# Patient Record
Sex: FEMALE | Race: BLACK OR AFRICAN AMERICAN | NOT HISPANIC OR LATINO | Employment: UNEMPLOYED | URBAN - METROPOLITAN AREA
[De-identification: names, ages, dates, MRNs, and addresses within clinical notes are randomized per-mention and may not be internally consistent; named-entity substitution may affect disease eponyms.]

---

## 2017-01-18 ENCOUNTER — HOSPITAL ENCOUNTER (EMERGENCY)
Facility: CLINIC | Age: 32
Discharge: HOME | End: 2017-01-18
Attending: FAMILY MEDICINE | Admitting: FAMILY MEDICINE
Payer: MEDICAID

## 2017-01-18 ENCOUNTER — TELEPHONE (OUTPATIENT)
Dept: OBGYN | Facility: CLINIC | Age: 32
End: 2017-01-18

## 2017-01-18 VITALS
DIASTOLIC BLOOD PRESSURE: 86 MMHG | RESPIRATION RATE: 16 BRPM | TEMPERATURE: 97.5 F | HEART RATE: 85 BPM | WEIGHT: 104.6 LBS | SYSTOLIC BLOOD PRESSURE: 118 MMHG | OXYGEN SATURATION: 98 %

## 2017-01-18 DIAGNOSIS — O03.4 INCOMPLETE MISCARRIAGE: ICD-10-CM

## 2017-01-18 LAB
B-HCG SERPL-ACNC: 432 IU/L
BASOPHILS # BLD AUTO: 0 10E9/L (ref 0–0.2)
BASOPHILS NFR BLD AUTO: 0.3 %
DIFFERENTIAL METHOD BLD: NORMAL
EOSINOPHIL # BLD AUTO: 0.5 10E9/L (ref 0–0.7)
EOSINOPHIL NFR BLD AUTO: 7.4 %
ERYTHROCYTE [DISTWIDTH] IN BLOOD BY AUTOMATED COUNT: 12.4 % (ref 10–15)
HCT VFR BLD AUTO: 36.5 % (ref 35–47)
HGB BLD-MCNC: 12.6 G/DL (ref 11.7–15.7)
IMM GRANULOCYTES # BLD: 0 10E9/L (ref 0–0.4)
IMM GRANULOCYTES NFR BLD: 0 %
LYMPHOCYTES # BLD AUTO: 1.7 10E9/L (ref 0.8–5.3)
LYMPHOCYTES NFR BLD AUTO: 27 %
MCH RBC QN AUTO: 30.9 PG (ref 26.5–33)
MCHC RBC AUTO-ENTMCNC: 34.5 G/DL (ref 31.5–36.5)
MCV RBC AUTO: 90 FL (ref 78–100)
MONOCYTES # BLD AUTO: 0.5 10E9/L (ref 0–1.3)
MONOCYTES NFR BLD AUTO: 7.6 %
NEUTROPHILS # BLD AUTO: 3.6 10E9/L (ref 1.6–8.3)
NEUTROPHILS NFR BLD AUTO: 57.7 %
NRBC # BLD AUTO: 0 10*3/UL
NRBC BLD AUTO-RTO: 0 /100
PLATELET # BLD AUTO: 177 10E9/L (ref 150–450)
RBC # BLD AUTO: 4.08 10E12/L (ref 3.8–5.2)
WBC # BLD AUTO: 6.2 10E9/L (ref 4–11)

## 2017-01-18 PROCEDURE — 99283 EMERGENCY DEPT VISIT LOW MDM: CPT | Mod: Z6 | Performed by: EMERGENCY MEDICINE

## 2017-01-18 PROCEDURE — 36415 COLL VENOUS BLD VENIPUNCTURE: CPT | Performed by: EMERGENCY MEDICINE

## 2017-01-18 PROCEDURE — 99283 EMERGENCY DEPT VISIT LOW MDM: CPT | Performed by: EMERGENCY MEDICINE

## 2017-01-18 PROCEDURE — 85025 COMPLETE CBC W/AUTO DIFF WBC: CPT | Performed by: FAMILY MEDICINE

## 2017-01-18 PROCEDURE — 84702 CHORIONIC GONADOTROPIN TEST: CPT | Performed by: FAMILY MEDICINE

## 2017-01-18 RX ORDER — PRENATAL VIT/IRON FUM/FOLIC AC 27MG-0.8MG
1 TABLET ORAL DAILY
COMMUNITY
End: 2017-04-05

## 2017-01-18 ASSESSMENT — ENCOUNTER SYMPTOMS: ABDOMINAL PAIN: 1

## 2017-01-18 NOTE — ED AVS SNAPSHOT
[facility], [place], Emergency Department    [address]    [facility] [address]    Phone:  [phone]    Fax:  [phone]                                       Roger Desouza   MRN: [id number]    Department:  [facility], Emergency Department   Date of Visit:  1/18/2017           After Visit Summary Signature Page     I have received my discharge instructions, and my questions have been answered. I have discussed any challenges I see with this plan with the nurse or doctor.    ..........................................................................................................................................  Patient/Patient Representative Signature      ..........................................................................................................................................  Patient Representative Print Name and Relationship to Patient    ..................................................               ................................................  Date                                            Time    ..........................................................................................................................................  Reviewed by Signature/Title    ...................................................              ..............................................  Date                                                            Time

## 2017-01-18 NOTE — TELEPHONE ENCOUNTER
Received call from Dr. Andrews from the ED. He stated this patient is there for probable miscarriage and after discussing with the resident, they want pt to be seen within 2 days at clinic. He reports she has declining bhcg and is bleeding but cannot rule out ectopic completely. No other notes in chart at this time.    Scheduled pt for Friday, 1/20/17 with midwife. Spoke with pt spouse who agreed with plan and had no further questions.

## 2017-01-18 NOTE — ED NOTES
Bleeding since return from [facility] on Sunday. Pt did not do a follow up with OB as recommended. Now here with bleeding for ~ 3 pads/ day. Pt states that urine for positive but US was negative.

## 2017-01-18 NOTE — ED PROVIDER NOTES
History     Chief Complaint   Patient presents with     Vaginal Bleeding     Patient is in early stages of pregnant. Has been bleeding since .     HPI  Roger Desouza is a 31 year old  female who presents today for evaluation of vaginal bleeding since . Patient reports a positive home pregancy test. She was seen in [facility] ED on  with vaginal bleeding and abdominal pain. She reports worsening vaginal bleeding since that visiti. She has been using 3 pads per day. Patient notes she has some vaginal bleeding at 3 months gestation of her last pregnancy. She reports mild abdominal pain. She denies history of other medical problems.           I have reviewed the Medications, Allergies, Past Medical and Surgical History, and Social History in the Epic system.      PAST MEDICAL HISTORY: History reviewed. No pertinent past medical history.    PAST SURGICAL HISTORY:   Past Surgical History   Procedure Laterality Date     Thyroid surgery         FAMILY HISTORY: No family history on file.    SOCIAL HISTORY:   Social History   Substance Use Topics     Smoking status: Never Smoker      Smokeless tobacco: Not on file     Alcohol Use: No         No current facility-administered medications for this encounter.     Current Outpatient Prescriptions   Medication     Acetaminophen (TYLENOL PO)     Prenatal Vit-Fe Fumarate-FA (PRENATAL MULTIVITAMIN  PLUS IRON) 27-0.8 MG TABS per tablet      No Known Allergies      Review of Systems   Gastrointestinal: Positive for abdominal pain.   All other systems reviewed and are negative.      Physical Exam   BP: 124/88 mmHg  Pulse: 85  Temp: 97.5  F (36.4  C)  Resp: 16  Weight: 47.446 kg (104 lb 9.6 oz)  SpO2: 100 %  Physical Exam   Constitutional: No distress.   HENT:   Head: Atraumatic.   Mouth/Throat: Oropharynx is clear and moist. No oropharyngeal exudate.   Eyes: Pupils are equal, round, and reactive to light. No scleral icterus.   Cardiovascular: Normal heart sounds and  intact distal pulses.    Pulmonary/Chest: Breath sounds normal. No respiratory distress.   Abdominal: Soft. Bowel sounds are normal. There is no tenderness.   Genitourinary:   Patient refusing vaginal examination at this time.   Musculoskeletal: She exhibits no edema or tenderness.   Skin: Skin is warm. No rash noted. She is not diaphoretic.       ED Course     [unfilled]  Procedures  10:29 AM  The patient was seen and examined by Dr. Blue in Room 8.          Critical Care time:  none   Labs/Imaging:      HCG Quantitative Serum 432 IU/L Final         Labs Ordered and Resulted from Time of ED Arrival Up to the Time of Departure from the ED   CBC WITH PLATELETS DIFFERENTIAL   HCG QUANTITATIVE PREGNANCY       Assessments & Plan (with Medical Decision Making)       I have reviewed the nursing notes.    I have reviewed the findings, diagnosis, plan and need for follow up with the patient.  Patient with vaginal bleeding at this time now presenting with hCG which has gone from greater than 1300-432 in approximately 72 hours I believe that she is likely having a miscarriage she has not passed the tissue and therefore I believe this is an incomplete miscarriage I cannot rule out the possibility of a tubal pregnancy at this time and patient has refused both further ultrasound or pelvic examination at this evaluation patient is requesting discharge to follow-up with her primary M.D. if she has marked increase in bleeding or pain she would return to the emergency room otherwise will follow up with her primary this week.      Final diagnoses:   Incomplete miscarriage     Isabelle NICK , am serving as a trained medical scribe to document services personally performed by Estevan Blue MD, based on the provider's statements to me.   IEstevan MD, was physically present and have reviewed and verified the accuracy of this note documented by Isabelle Vasquez.    1/18/2017   [facility], [place], [facility]  DEPARTMENT      [facility] Center[facility]  01/19/17 9881

## 2017-01-18 NOTE — ED AVS SNAPSHOT
[facility], Emergency Department    [address]    [place] [address]    Phone:  [phone]    Fax:  [phone]                                       Roger Desouza   MRN: [id number]    Department:  [facility], Emergency Department   Date of Visit:  1/18/2017           Patient Information     Date Of Birth          1985        Your diagnoses for this visit were:     Incomplete miscarriage        You were seen by Estevan Blue MD.        Discharge Instructions       Discharge to home with plans to follow up with the ObGyn clinic or return if marked increase in bleeding or pain.Please make an appointment to follow up with [facility] (phone: [phone]) they will call you for appointment.    24 Hour Appointment Hotline       To make an appointment at any [facility] clinic, call [phone] ([phone]). If you don't have a family doctor or clinic, we will help you find one. [facility] clinics are conveniently located to serve the needs of you and your family.             Review of your medicines      Our records show that you are taking the medicines listed below. If these are incorrect, please call your family doctor or clinic.        Dose / Directions Last dose taken    prenatal multivitamin  plus iron 27-0.8 MG Tabs per tablet   Dose:  1 tablet        Take 1 tablet by mouth daily   Refills:  0        TYLENOL PO   Dose:  325 mg        Take 325 mg by mouth   Refills:  0                Procedures and tests performed during your visit     CBC with platelets differential    HCG quantitative pregnancy (blood)      Orders Needing Specimen Collection     None      Pending Results     No orders found from 1/17/2017 to 1/19/2017.            Pending Culture Results     No orders found from 1/17/2017 to 1/19/2017.            Thank you for choosing [facility]       Thank you for choosing [facility] for your care. Our goal is always to provide you with excellent care. Hearing  "back from our patients is one way we can continue to improve our services. Please take a few minutes to complete the written survey that you may receive in the mail after you visit with us. Thank you!        [employer] Information     [employer] lets you send messages to your doctor, view your test results, renew your prescriptions, schedule appointments and more. To sign up, go to [email or web address] . Click on \"Log in\" on the left side of the screen, which will take you to the Welcome page. Then click on \"Sign up Now\" on the right side of the page.     You will be asked to enter the access code listed below, as well as some personal information. Please follow the directions to create your username and password.     Your access code is: [email or web address]  Expires: 2017 12:34 PM     Your access code will  in 90 days. If you need help or a new code, please call your [facility] clinic or [phone].        Care EveryWhere ID     This is your Care EveryWhere ID. This could be used by other organizations to access your [facility] medical records  [id number]        After Visit Summary       This is your record. Keep this with you and show to your community pharmacist(s) and doctor(s) at your next visit.                  "

## 2017-01-18 NOTE — DISCHARGE INSTRUCTIONS
Discharge to home with plans to follow up with the ObGyn clinic or return if marked increase in bleeding or pain.Please make an appointment to follow up with [facility] (phone: [phone]) they will call you for appointment.

## 2017-01-20 ENCOUNTER — OFFICE VISIT (OUTPATIENT)
Dept: OBGYN | Facility: CLINIC | Age: 32
End: 2017-01-20
Attending: ADVANCED PRACTICE MIDWIFE
Payer: MEDICAID

## 2017-01-20 VITALS — DIASTOLIC BLOOD PRESSURE: 75 MMHG | WEIGHT: 108 LBS | SYSTOLIC BLOOD PRESSURE: 121 MMHG | HEART RATE: 84 BPM

## 2017-01-20 DIAGNOSIS — O20.0 THREATENED MISCARRIAGE: Primary | ICD-10-CM

## 2017-01-20 LAB — B-HCG SERPL-ACNC: 257 IU/L

## 2017-01-20 PROCEDURE — 84702 CHORIONIC GONADOTROPIN TEST: CPT | Performed by: ADVANCED PRACTICE MIDWIFE

## 2017-01-20 PROCEDURE — 36415 COLL VENOUS BLD VENIPUNCTURE: CPT | Performed by: ADVANCED PRACTICE MIDWIFE

## 2017-01-20 PROCEDURE — 99212 OFFICE O/P EST SF 10 MIN: CPT | Mod: ZF

## 2017-01-20 NOTE — MR AVS SNAPSHOT
After Visit Summary   1/20/2017    Roger Desouza    MRN: [id number]           Patient Information     Date Of Birth          1985        Visit Information        Provider Department      1/20/2017 1:00 PM Crissy English APRN CNM [facility]        Today's Diagnoses     Threatened miscarriage    -  1        Follow-ups after your visit        Your next 10 appointments already scheduled     Jan 27, 2017  8:00 AM   ULTRASOUND with [facility] ULTRASOUND   [facility] ([facility])    [place] [facility] [address]  [address]  [facility] [id number] [phone]            Feb 02, 2017  2:15 PM   New Patient Visit with Shaista Humphreys MD   [facility] ([facility])    [place] [facility] [address]  [address]  [facility] [id number] [phone]              Future tests that were ordered for you today     Open Future Orders        Priority Expected Expires Ordered    Limited U/S - Viability, FHT, presentation, placenta location, and/or AKIRA Routine  1/20/2018 1/20/2017            Who to contact     Please call your clinic at [phone] to:    Ask questions about your health    Make or cancel appointments    Discuss your medicines    Learn about your test results    Speak to your doctor   If you have compliments or concerns about an experience at your clinic, or if you wish to file a complaint, please contact [facility] Patient Relations at [phone] or email us at [email or web address]         Additional Information About Your Visit        [employer] Information     [employer] is an electronic gateway that provides easy, online access to your medical records. With [employer], you can request a clinic appointment, read your test results, renew a prescription or communicate with your care team.     To sign up for [employer] visit the website at  [email or web address]   You will be asked to enter the access code listed below, as well as some personal information. Please follow the directions to create your username and password.     Your access code is: [email or web address]  Expires: 2017 12:34 PM     Your access code will  in 90 days. If you need help or a new code, please contact your [facility] or call [phone] for assistance.        Care EveryWhere ID     This is your Care EveryWhere ID. This could be used by other organizations to access your [facility] medical records  [id number]        Your Vitals Were     Pulse Breastfeeding?                84 No           Blood Pressure from Last 3 Encounters:   17 121/75   17 118/86    Weight from Last 3 Encounters:   17 48.988 kg (108 lb)   17 47.446 kg (104 lb 9.6 oz)              We Performed the Following     HCG Quantitative Pregnancy        Primary Care Provider    Physician No Ref-Primary       No address on file        Thank you!     Thank you for choosing [facility]  for your care. Our goal is always to provide you with excellent care. Hearing back from our patients is one way we can continue to improve our services. Please take a few minutes to complete the written survey that you may receive in the mail after your visit with us. Thank you!             Your Updated Medication List - Protect others around you: Learn how to safely use, store and throw away your medicines at www.disposemymeds.org.          This list is accurate as of: 17  1:50 PM.  Always use your most recent med list.                   Brand Name Dispense Instructions for use    prenatal multivitamin  plus iron 27-0.8 MG Tabs per tablet      Take 1 tablet by mouth daily       TYLENOL PO      Take 325 mg by mouth

## 2017-01-20 NOTE — PROGRESS NOTES
SUBJECTIVE: Roger Desouza is a 31 year old female   at 6 weeks 2 d gestation by LMP on 16      HPI: Pt had an US at [facility] on 1/15/17 after experiencing vaginal bleeding.  Hcg was 1327, Had an US that did not show anything in the uterus (or elsewhere). Was Recommended to repeat hcg and US.   On 17: went to  ED and  hcg 432. Pt did not have an US or exam at this time. Presents today in clinic for follow-up per ED recommendation.     Reports her bleeding has now stopped. Parker any pain. No fever.     Also reports took 5 years to achieve this pregnancy.   Past Medical History   Diagnosis Date     Hyperthyroidism      Better since surgery     Obstetric History       T1      TAB0   SAB1   E0   M0   L0       # Outcome Date GA Lbr Joe/2nd Weight Sex Delivery Anes PTL Lv   2 SAB 17           1 Term 12    F              Review Of Systems  Skin: negative  Eyes: negative  Ears/Nose/Throat: negative  Respiratory: No shortness of breath, dyspnea on exertion, cough, or hemoptysis  Cardiovascular: negative  Gastrointestinal: Mild nausea  Genitourinary: negative  Musculoskeletal: negative  Neurologic: negative  Psychiatric: negative  Hematologic/Lymphatic/Immunologic: negative  Endocrine: negative      OBJECTIVE: Blood pressure 121/75, pulse 84, weight 48.988 kg (108 lb), not currently breastfeeding.   Physical exam is deferred.     ASSESSMENT:Threatened AB- Appears to be near complete.  Rh POS    PLAN:      Quant Hcg and Viability US to rule out ectopic, ensure complete miscarriage.  We discussed causes of miscarriage including chromosome abnormalities and it was nothing she did or didn't do.  She understands that nothing can be done to prevent a miscarriage from occuring.    Warning signs were reviewed with patient. She was asked to call or present to the emergency room if she were to develop heavy bleeding saturating a maxi pad more frequently than every hour or passing large clots.   Pt instructed to call clinic if she develops a fever.   Pain relief: She knows to use Ibuprofen for the cramping, up to 800mg every 8 hours.   Discussed waiting one cycle before trying for pregnancy again.  Follow up with MD for c/o infertility.  Continue prenatal vitamin.    Rhogam NOT indicated    MARCIN Arnold CNM      More than 50% of this 30 minute appointment for threatened miscarriage was spent counseling and coordinating care for the patient.

## 2017-01-20 NOTE — Clinical Note
Date:January 23, 2017      Patient was self referred, no letter generated. Do not send.        [facility] Health Information

## 2017-01-20 NOTE — NURSING NOTE
Chief Complaint   Patient presents with     Follow Up For     Follow up miscarriage       See LEON Caceres 1/20/2017

## 2017-01-20 NOTE — Clinical Note
2017       RE: Roger Desouza  [address]   [place] [address]     Dear Colleague,    Thank you for referring your patient, Roger Desouza, to the [facility] at [facility]. Please see a copy of my visit note below.    SUBJECTIVE: Roger Desouza is a 31 year old female   at 6 weeks 2 d gestation by LMP on 16      HPI: Pt had an US at [facility] on 1/15/17 after experiencing vaginal bleeding.  Hcg was 1327, Had an US that did not show anything in the uterus (or elsewhere). Was Recommended to repeat hcg and US.   On 17: went to  ED and  hcg 432. Pt did not have an US or exam at this time. Presents today in clinic for follow-up per ED recommendation.     Reports her bleeding has now stopped. Parker any pain. No fever.     Also reports took 5 years to achieve this pregnancy.   Past Medical History   Diagnosis Date     Hyperthyroidism      Better since surgery     Obstetric History       T1      TAB0   SAB1   E0   M0   L0       # Outcome Date GA Lbr Joe/2nd Weight Sex Delivery Anes PTL Lv   2 SAB 17           1 Term 12    F              Review Of Systems  Skin: negative  Eyes: negative  Ears/Nose/Throat: negative  Respiratory: No shortness of breath, dyspnea on exertion, cough, or hemoptysis  Cardiovascular: negative  Gastrointestinal: Mild nausea  Genitourinary: negative  Musculoskeletal: negative  Neurologic: negative  Psychiatric: negative  Hematologic/Lymphatic/Immunologic: negative  Endocrine: negative      OBJECTIVE: Blood pressure 121/75, pulse 84, weight 48.988 kg (108 lb), not currently breastfeeding.   Physical exam is deferred.     ASSESSMENT:Threatened AB- Appears to be near complete.  Rh POS    PLAN:      Quant Hcg and Viability US to rule out ectopic, ensure complete miscarriage.  We discussed causes of miscarriage including chromosome abnormalities and it was nothing she did or didn't do.  She  understands that nothing can be done to prevent a miscarriage from occuring.    Warning signs were reviewed with patient. She was asked to call or present to the emergency room if she were to develop heavy bleeding saturating a maxi pad more frequently than every hour or passing large clots.  Pt instructed to call clinic if she develops a fever.   Pain relief: She knows to use Ibuprofen for the cramping, up to 800mg every 8 hours.   Discussed waiting one cycle before trying for pregnancy again.  Follow up with MD for c/o infertility.  Continue prenatal vitamin.    Rhogam NOT indicated    MARCIN Arnold CNM      More than 50% of this 30 minute appointment for threatened miscarriage was spent counseling and coordinating care for the patient.           Again, thank you for allowing me to participate in the care of your patient.      Sincerely,    MARCIN Arnold CNM

## 2017-01-27 ENCOUNTER — OFFICE VISIT (OUTPATIENT)
Dept: OBGYN | Facility: CLINIC | Age: 32
End: 2017-01-27
Attending: ADVANCED PRACTICE MIDWIFE
Payer: MEDICAID

## 2017-01-27 DIAGNOSIS — Z87.59 HISTORY OF MISCARRIAGE: Primary | ICD-10-CM

## 2017-01-27 NOTE — Clinical Note
1/27/2017       RE: Roger Desouza  [address]   [place] [facility] [facility] [place] [address]     Dear Colleague,    Thank you for referring your patient, Roger Desouza, to the [facility] at [facility]. Please see a copy of my visit note below.    31 year old female with presents for gynecologic ultrasound indicated by follow up post miscarriage.  This study was done transvaginally.    Uterine findings:   Presence: Visible Size: Normal 6.0 x 5.4 x 4.4  cm.  Endometrium = 3.7  mm.   Cx length =  31.7 mm.      Flexion:  Anteverted Position: Midline Margins: Smooth Shape: Normal   Contour: Regular Texture: Homogeneous Cavity: Normal Masses: Normal    Pelvic findings:    Right Adnexa: Normal   Left Adnexa: Normal   Bladder:  Normal         Cul - de - sac fluid: None    Ovarian follicles:   Right ovary:  2.7 x 1.1 x 1.3cm.     0 follicles     Left ovary:  3.2 x 3.2 x 2.4cm.     1 follicles     Size(s):  1.3 x 1.5 x 1.3cm       Comments:  Normal scan s/p SAb    EFFIE Koroma          Again, thank you for allowing me to participate in the care of your patient.      Sincerely,    [facility]

## 2017-01-27 NOTE — PROGRESS NOTES
31 year old female with presents for gynecologic ultrasound indicated by follow up post miscarriage.  This study was done transvaginally.    Uterine findings:   Presence: Visible Size: Normal 6.0 x 5.4 x 4.4  cm.  Endometrium = 3.7  mm.   Cx length =  31.7 mm.      Flexion:  Anteverted Position: Midline Margins: Smooth Shape: Normal   Contour: Regular Texture: Homogeneous Cavity: Normal Masses: Normal    Pelvic findings:    Right Adnexa: Normal   Left Adnexa: Normal   Bladder:  Normal         Cul - de - sac fluid: None    Ovarian follicles:   Right ovary:  2.7 x 1.1 x 1.3cm.     0 follicles     Left ovary:  3.2 x 3.2 x 2.4cm.     1 follicles     Size(s):  1.3 x 1.5 x 1.3cm       Comments:  Normal scan s/p Isael Lindsey, EFFIE Sauceda

## 2017-01-27 NOTE — Clinical Note
Date:January 30, 2017      Provider requested that no letter be sent. Do not send.       [facility] Health Information

## 2017-02-02 ENCOUNTER — OFFICE VISIT (OUTPATIENT)
Dept: OBGYN | Facility: CLINIC | Age: 32
End: 2017-02-02
Payer: MEDICAID

## 2017-02-02 VITALS
DIASTOLIC BLOOD PRESSURE: 83 MMHG | HEIGHT: 64 IN | WEIGHT: 111.1 LBS | BODY MASS INDEX: 18.97 KG/M2 | SYSTOLIC BLOOD PRESSURE: 123 MMHG

## 2017-02-02 DIAGNOSIS — E05.90 HYPERTHYROIDISM: ICD-10-CM

## 2017-02-02 DIAGNOSIS — O02.1 MISSED ABORTION: ICD-10-CM

## 2017-02-02 DIAGNOSIS — Z31.69 INFERTILITY COUNSELING: Primary | ICD-10-CM

## 2017-02-02 LAB
HCG UR QL: NEGATIVE
INTERNAL QC OK POCT: YES

## 2017-02-02 PROCEDURE — 81025 URINE PREGNANCY TEST: CPT | Mod: ZF | Performed by: OBSTETRICS & GYNECOLOGY

## 2017-02-02 PROCEDURE — 40000809 ZZH STATISTIC NO DOCUMENTATION TO SUPPORT CHARGE

## 2017-02-02 ASSESSMENT — PAIN SCALES - GENERAL: PAINLEVEL: NO PAIN (0)

## 2017-02-02 NOTE — PROGRESS NOTES
[facility]  Gynecology Visit    HPI:    Roger Desouza is a 31 year old , here to discuss infertility.  She recently had a SAB in 2016, that was a chemically diagnosed pregnancy.  US () revealed an empty uterus without structural abnormalities.  She is accompanied by her  today.  They note that it took them 3 years to achieve pregnancy with their daughter and 4 years to achieve this last pregnancy.  They have undergone work-up in the past a couple of years ago, which was remarkable for low sperm count confirmed on semen analysis x2.  Her  also underwent varicoele repair about 1 year, as this was thought to help with achieving pregnancy.  The patient has a history of hyperthyroidism, s/p partial thyroidectomy and now does not take thyroid replacement.  She denies weight changes, fevers, chills, hirsutism, dyspareunia, abdominal pain, nausea, vomiting, galactorrhea, diarrhea, constipation, urinary symptoms, abnormal vaginal discharge. Her bleeding has resolved, but she has not had a menstrual cycle yet.     GYN History  - LMP: No LMP recorded.  - Menses: every 28 days, lasts 5 days, denies dysmenorrhea and menorrhagia   - Pap Smears: 2016- NIL per patient, no history of abnormal pap smears   - Contraception: denies  - Sexual Activity/Concerns: none  - Hx STIs/UTIs: none    OBHx  Obstetric History       T1      TAB0   SAB1   E0   M0   L1       # Outcome Date GA Lbr Joe/2nd Weight Sex Delivery Anes PTL Lv   2 SAB 18           1 Term 13    F              PMHx: Hyperthyroidism  PSHx: Partial thyroidectomy   Meds: PNV  Allergies:  NKDA    SocHx:   - Denies tob/etoh/illicit drug use   - Eats healthy diet, weight stable   - Homemaker currently, 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 Non-tender  CV:  RRR, no m/r/g auscultated  Pulm: CTAB, no w/r/r auscultated  Abd: Soft, non-tender, non-distended  Ext: No LE edema, extremities warm and well perfused  Pelvic:  Declines today      Assessment/Plan:  Roger Desouza is a 31 year old  female here to discuss infertility with recent spontaneous , now resolved.      1) Infertility:   - Discussed potential etiologies of infertility - tubal, endocrine/ovulatory, male factor, structural, endometriosis, etc. The patient's partner has a history of male factor infertility, s/p varicoele repair although he never underwent repeat semen analysis postoperatively.  Discussed that encouragement by recent chemical pregnancy, although it ended in miscarriage.   - Discussed timed intercourse, OPKs.   - HA1C, TSH, prolactin ordered to evaluate endocrine causes especially given patient's history of hyperthyroidism.  AMH, Day 3 labs ordered to evaluate ovarian function. Recent TVUS was unremarkable making structural causes less likely.  Will not pursue HSG at this time, as the patient does not have any history suspicious for tubal factors and has successfully achieved pregnancy x 2.   - Recommend that partner contact his provider regarding repeat SA given history of male factor infertility. Discussed that BUD referral would not be unreasonable if azospermia persists.  They would like to delay this at this point, as they are limited financially.     2) Miscarriage management:   - Bleeding has resolved, UPT negative.     Return to clinic in 4-6 months     Staffed with Dr. Linnette Coronel MD  Ob/Gyn, PGY-2  2017, 2:15 PM    The Patient was seen in [facility] by DESIREE CORONEL.  I reviewed the history & exam. Assessment and plan were " jointly made.    Cheri Anglin MD

## 2017-02-02 NOTE — NURSING NOTE
Chief Complaint   Patient presents with     RECHECK   wanting to talk after mab 1- no period yet-  Wanting to get pregnant soon.  Trying to get pregnant 3 years before firawt child- then four years before this last mab,  Hx thyroid.

## 2017-02-02 NOTE — MR AVS SNAPSHOT
After Visit Summary   2/2/2017    Roger Desouza    MRN: [id number]           Patient Information     Date Of Birth          1985        Visit Information        Provider Department      2/2/2017 2:15 PM Shaista Humphreys MD [facility]        Today's Diagnoses     Hyperthyroidism    -  1     Infertility counseling           Care Instructions    Please go to the lab for blood draw.  Two labs (FHS, estradiol) need to be drawn on day 3 after start of menstrual cycle.      Continued timed intercourse, consider OPKs.         Follow-ups after your visit        Future tests that were ordered for you today     Open Future Orders        Priority Expected Expires Ordered    Follicle stimulating hormone Routine  2/2/2018 2/2/2017    hCG qual urine POCT Routine  2/2/2018 2/2/2017            Who to contact     Please call your clinic at [phone] to:    Ask questions about your health    Make or cancel appointments    Discuss your medicines    Learn about your test results    Speak to your doctor   If you have compliments or concerns about an experience at your clinic, or if you wish to file a complaint, please contact [facility] Patient Relations at [phone] or email us at [email or web address]         Additional Information About Your Visit        MyChart Information     PowerMag gives you secure access to your electronic health record. If you see a primary care provider, you can also send messages to your care team and make appointments. If you have questions, please call your primary care clinic.  If you do not have a primary care provider, please call [phone] and they will assist you.      PowerMag is an electronic gateway that provides easy, online access to your medical records. With PowerMag, you can request a clinic appointment, read your test results, renew a prescription or communicate with your care team.     To access your existing account,  "please contact your [facility] or call [phone] for assistance.        Care EveryWhere ID     This is your Care EveryWhere ID. This could be used by other organizations to access your [place] medical records  [id number]        Your Vitals Were     Height BMI (Body Mass Index)                1.626 m (5' 4\") 19.06 kg/m2           Blood Pressure from Last 3 Encounters:   02/02/17 123/83   01/20/17 121/75   01/18/17 118/86    Weight from Last 3 Encounters:   02/02/17 50.395 kg (111 lb 1.6 oz)   01/20/17 48.988 kg (108 lb)   01/18/17 47.446 kg (104 lb 9.6 oz)              We Performed the Following     Anti-Mullerian hormone     Estradiol     Hemoglobin A1c     Prolactin     TSH with free T4 reflex        Primary Care Provider    Physician No Ref-Primary       No address on file        Thank you!     Thank you for choosing [facility]  for your care. Our goal is always to provide you with excellent care. Hearing back from our patients is one way we can continue to improve our services. Please take a few minutes to complete the written survey that you may receive in the mail after your visit with us. Thank you!             Your Updated Medication List - Protect others around you: Learn how to safely use, store and throw away your medicines at www.disposemymeds.org.          This list is accurate as of: 2/2/17  2:50 PM.  Always use your most recent med list.                   Brand Name Dispense Instructions for use    prenatal multivitamin  plus iron 27-0.8 MG Tabs per tablet      Take 1 tablet by mouth daily       TYLENOL PO      Take 325 mg by mouth         "

## 2017-02-02 NOTE — PATIENT INSTRUCTIONS
Please go to the lab for blood draw.  Two labs (FHS, estradiol) need to be drawn on day 3 after start of menstrual cycle.      Continued timed intercourse, consider OPKs.

## 2017-04-05 ENCOUNTER — OFFICE VISIT (OUTPATIENT)
Dept: INTERNAL MEDICINE | Facility: CLINIC | Age: 32
End: 2017-04-05
Attending: INTERNAL MEDICINE
Payer: COMMERCIAL

## 2017-04-05 ENCOUNTER — TELEPHONE (OUTPATIENT)
Dept: INTERNAL MEDICINE | Facility: CLINIC | Age: 32
End: 2017-04-05

## 2017-04-05 ENCOUNTER — TELEPHONE (OUTPATIENT)
Dept: OBGYN | Facility: CLINIC | Age: 32
End: 2017-04-05

## 2017-04-05 VITALS
WEIGHT: 115 LBS | DIASTOLIC BLOOD PRESSURE: 82 MMHG | SYSTOLIC BLOOD PRESSURE: 117 MMHG | TEMPERATURE: 97.5 F | HEIGHT: 64 IN | BODY MASS INDEX: 19.63 KG/M2 | HEART RATE: 87 BPM

## 2017-04-05 DIAGNOSIS — Z34.91 NORMAL PREGNANCY IN FIRST TRIMESTER: Primary | ICD-10-CM

## 2017-04-05 DIAGNOSIS — E05.90 HYPERTHYROIDISM: Primary | ICD-10-CM

## 2017-04-05 DIAGNOSIS — R11.0 NAUSEA: ICD-10-CM

## 2017-04-05 DIAGNOSIS — Z34.91 SUPERVISION OF NORMAL PREGNANCY IN FIRST TRIMESTER: Primary | ICD-10-CM

## 2017-04-05 DIAGNOSIS — J01.90 ACUTE SINUSITIS WITH SYMPTOMS > 10 DAYS: ICD-10-CM

## 2017-04-05 PROCEDURE — 99213 OFFICE O/P EST LOW 20 MIN: CPT | Mod: ZF

## 2017-04-05 RX ORDER — NAPROXEN 500 MG/1
500 TABLET ORAL 2 TIMES DAILY PRN
Qty: 60 TABLET | Refills: 0 | Status: SHIPPED | OUTPATIENT
Start: 2017-04-05 | End: 2017-04-05

## 2017-04-05 RX ORDER — AMOXICILLIN AND CLAVULANATE POTASSIUM 500; 125 MG/1; MG/1
1 TABLET, FILM COATED ORAL 2 TIMES DAILY
Qty: 20 TABLET | Refills: 0 | Status: SHIPPED | OUTPATIENT
Start: 2017-04-05 | End: 2017-04-26

## 2017-04-05 RX ORDER — ONDANSETRON 4 MG/1
4-8 TABLET, ORALLY DISINTEGRATING ORAL EVERY 8 HOURS PRN
Qty: 20 TABLET | Refills: 1 | Status: SHIPPED | OUTPATIENT
Start: 2017-04-05 | End: 2017-07-05

## 2017-04-05 RX ORDER — PRENATAL VIT,CAL 73/IRON/FOLIC 28 MG-1 MG
1 TABLET ORAL DAILY
Qty: 90 TABLET | Refills: 3 | Status: SHIPPED | OUTPATIENT
Start: 2017-04-05 | End: 2017-09-27

## 2017-04-05 ASSESSMENT — PAIN SCALES - GENERAL: PAINLEVEL: NO PAIN (0)

## 2017-04-05 NOTE — MR AVS SNAPSHOT
"              After Visit Summary   4/5/2017    Roger Desouza    MRN: [id number]           Patient Information     Date Of Birth          1985        Visit Information        Provider Department      4/5/2017 3:00 PM Amairani Barton MD [facility]         Today's Diagnoses     Hyperthyroidism    -  1    Acute sinusitis with symptoms > 10 days           Follow-ups after your visit        Your next 10 appointments already scheduled     Apr 26, 2017  9:00 AM CDT   ULTRASOUND with [facility] ULTRASOUND   [facility] ([facility])    [facility] [address]  [address]  [facility] [address]   [phone]            Apr 26, 2017  9:30 AM CDT   OB INTAKE with Sebastian Mcdonough CNM   [facility] ([facility])    [facility] [address]  [address]  [facility] [address]   [phone]           Congratulations! You're Pregnant.  At [facility] we think of you as part of our team, working together toward a successful pregnancy and a healthy baby.  You might already have questions about all the different things that are happening to your body.  We have attached a link to our book \"The Expectant Family- From Pregnancy through Childbirth\" [email or web address]/[email or web address] to help answer some of those concerns. (You will be given a hard copy at your first visit in clinic)  Chapter 2(page 20)-is a must read- from questions about morning sickness, headaches, warning signs to look for, to:\"why do I have to go to the bathroom so often?\"   Our Doctors, Resident Physicians, Certified nurse midwives do work closely together as a team both in clinic and in the hospital to make this experience remarkable. Our patients have on numerous occasions expressed their satisfaction in knowing that there is always a provider at the hospital or on the phone no matter " what time of the day it is, to talk, triage or deliver their babies.   Your time is very important to us, so we will like you to know what to expect.  Routine Prenatal Visit Schedule:  Visit 1: 8 Weeks - Dating Ultrasound and Intake with Nurse  Visit 2: 10-12 Weeks- First Prenatal Visit & Exam with Midwife or Resident Physician, scheduling for early genetic screening at [facility] (optional)  Visit 3: 16-18 Weeks            18-20 Weeks- Level II Ultrasound done at [facility]- 4th Floor  Visit 4: 22-24 weeks  Visit 5: 28 Weeks- Extended Education Visit with Midwife or Resident Physician  Visit 6: 32 Weeks  Visit 7: 36 Weeks  Visit 8-11: 38-41 Weeks (Weekly Visits)  Changes can or may occur during your pregnancy. Your provider may ask you to come in more often for testing or monitoring frequently than stated above.   If you still have questions our triage nurses will be more than welcome to help you. Send us a message via MY Chart, our secure health Portal or give us a call anytime at [phone].  Thank You for allowing us to be part of your care.  Yours sincerely,  [facility]            Apr 26, 2017 10:20 AM CDT   Return Visit with Amairani Barton MD   [facility]  ([facility])    [place] [address]  [address] [id number] [phone]              Who to contact     Please call your clinic at [phone] to:    Ask questions about your health    Make or cancel appointments    Discuss your medicines    Learn about your test results    Speak to your doctor   If you have compliments or concerns about an experience at your clinic, or if you wish to file a complaint, please contact [facility] Patient Relations at [phone] or email us at [email or web address]         Additional Information About Your Visit        MyChart Information     PSafet gives you secure  "access to your electronic health record. If you see a primary care provider, you can also send messages to your care team and make appointments. If you have questions, please call your primary care clinic.  If you do not have a primary care provider, please call [phone] and they will assist you.      Celnyx is an electronic gateway that provides easy, online access to your medical records. With Cel[employer]x, you can request a clinic appointment, read your test results, renew a prescription or communicate with your care team.     To access your existing account, please contact your [facility] or call [phone] for assistance.        Care EveryWhere ID     This is your Care EveryWhere ID. This could be used by other organizations to access your [place] medical records  [id number]        Your Vitals Were     Pulse Temperature Height Last Period Breastfeeding? BMI (Body Mass Index)    87 97.5  F (36.4  C) (Oral) 1.626 m (5' 4\") 02/28/2017 No 19.74 kg/m2       Blood Pressure from Last 3 Encounters:   04/05/17 117/82   02/02/17 123/83   01/20/17 121/75    Weight from Last 3 Encounters:   04/05/17 52.2 kg (115 lb)   02/02/17 50.4 kg (111 lb 1.6 oz)   01/20/17 49 kg (108 lb)                 Today's Medication Changes          These changes are accurate as of: 4/5/17 11:59 PM.  If you have any questions, ask your nurse or doctor.               Start taking these medicines.        Dose/Directions    amoxicillin-clavulanate 500-125 MG per tablet   Commonly known as:  AUGMENTIN   Used for:  Acute sinusitis with symptoms > 10 days   Started by:  Amairani Barton MD        Dose:  1 tablet   Take 1 tablet by mouth 2 times daily   Quantity:  20 tablet   Refills:  0       doxylamine 25 MG Tabs tablet   Commonly known as:  UNISOM   Used for:  Acute sinusitis with symptoms > 10 days   Started by:  Amairani Barton MD        Dose:  25 mg   Take 1 tablet (25 mg) by mouth At Bedtime "   Quantity:  14 each   Refills:  0       ondansetron 4 MG ODT tab   Commonly known as:  ZOFRAN ODT   Used for:  Supervision of normal pregnancy in first trimester, Nausea   Started by:  Nurse, Ump Whs        Dose:  4-8 mg   Take 1-2 tablets (4-8 mg) by mouth every 8 hours as needed for nausea   Quantity:  20 tablet   Refills:  1       TRINATE Tabs   Used for:  Supervision of normal pregnancy in first trimester   Replaces:  prenatal multivitamin  plus iron 27-0.8 MG Tabs per tablet   Started by:  Nurse, Ump Whs        Dose:  1 tablet   Take 1 tablet by mouth daily   Quantity:  90 tablet   Refills:  3         Stop taking these medicines if you haven't already. Please contact your care team if you have questions.     prenatal multivitamin  plus iron 27-0.8 MG Tabs per tablet   Replaced by:  TRINATE Tabs   Stopped by:  Amairani Barton MD                Where to get your medicines      These medications were sent to [facility] [address]  [address] [address]     Phone:  [phone]     amoxicillin-clavulanate 500-125 MG per tablet    doxylamine 25 MG Tabs tablet    ondansetron 4 MG ODT tab    TRINATE Tabs                Primary Care Provider    Physician No Ref-Primary       No address on file        Thank you!     Thank you for choosing [facility]   for your care. Our goal is always to provide you with excellent care. Hearing back from our patients is one way we can continue to improve our services. Please take a few minutes to complete the written survey that you may receive in the mail after your visit with us. Thank you!             Your Updated Medication List - Protect others around you: Learn how to safely use, store and throw away your medicines at www.disposemymeds.org.          This list is accurate as of: 4/5/17 11:59 PM.  Always use your most recent med list.                   Brand Name Dispense Instructions for  use    amoxicillin-clavulanate 500-125 MG per tablet    AUGMENTIN    20 tablet    Take 1 tablet by mouth 2 times daily       doxylamine 25 MG Tabs tablet    UNISOM    14 each    Take 1 tablet (25 mg) by mouth At Bedtime       ondansetron 4 MG ODT tab    ZOFRAN ODT    20 tablet    Take 1-2 tablets (4-8 mg) by mouth every 8 hours as needed for nausea       TRINATE Tabs     90 tablet    Take 1 tablet by mouth daily       TYLENOL PO      Take 325 mg by mouth

## 2017-04-05 NOTE — TELEPHONE ENCOUNTER
Spoke with Roger via   Services who reports the followin. Fever - she does not have thermometer but feels 'hot' at night and wakes up thirsty. Denies sweating. 2. Cold - dry cough that comes for one week and leaves for one week and then comes back. Occasional nasal congestion followed by runny nose. 3. Nausea - worse in the morning. Is able to drink water but doesn't drink much. Has very slight dizziness sometimes. Denies increased HR, denies dry mouth. Did NOT require hydration in previous [place]    Advised that for the 'fever' and cough should see primary care and scheduled her with Dr. Barton for today. For the nausea, discussed that this is normal in early pregnancy. Advised zofran and when take increase water - goal is 8-10 eight ounces of water per day. If dizziness worsens or dry mouth or increase heartrate develops - go to ED. She indicated understanding and agreed with plan.

## 2017-04-05 NOTE — PROGRESS NOTES
HPI  Patient is here for evaluation of cough. She reports that she started to feel ill a month ago. She She had a cough and a fever as well nasal congestion. She was feeling better at one point, however, got worse again. She has not checked her temperature, however, reports subjecting fever. She reports that she has a headache and itchy eyes. She denies feeling short of breath     Review of Systems     Constitutional:  Negative for fever, chills and fatigue.   HENT:  Positive for sore throat and sinus congestion.    Respiratory:   Positive for cough. Negative for sputum production, wheezing and dyspnea on exertion.    Cardiovascular:  Negative for chest pain, dyspnea on exertion, claudication and edema.   Gastrointestinal:  Negative for abdominal pain and bloating.   Genitourinary:  Negative for dysuria.   Musculoskeletal:  Negative for back pain.   Skin:  Negative for itching and rash.   Neurological:  Negative for headaches.   Psychiatric/Behavioral:  Negative for depression, decreased concentration, mood swings and panic attacks.    Endocrine:  Negative for altered temperature regulation, polyphagia, polydipsia, unwanted hair growth and change in facial hair.    Current Outpatient Prescriptions   Medication     ondansetron (ZOFRAN ODT) 4 MG ODT tab     Prenatal Vit-Fe Fumarate-FA (TRINATE) TABS     Acetaminophen (TYLENOL PO)     No current facility-administered medications for this visit.      Past Medical History:   Diagnosis Date     Hyperthyroidism     Better since surgery     Past Surgical History:   Procedure Laterality Date     THYROID SURGERY  2010    in [facility]     No family history on file.  Social History     Social History     Marital status:      Spouse name: N/A     Number of children: N/A     Years of education: N/A     Occupational History      Unemployed     Social History Main Topics     Smoking status: Never Smoker     Smokeless tobacco: Never Used     Alcohol use No     Drug use: No  "    Sexual activity: Yes     Partners: Male     Birth control/ protection: None     Other Topics Concern     Not on file     Social History Narrative       Vitals:    04/05/17 1517   BP: 117/82   Pulse: 87   Temp: 97.5  F (36.4  C)   TempSrc: Oral   Weight: 52.2 kg (115 lb)   Height: 1.626 m (5' 4\")       Physical Exam   Constitutional: She is oriented to person, place, and time and well-developed, well-nourished, and in no distress.   HENT:   Head: Normocephalic and atraumatic.   Eyes: Pupils are equal, round, and reactive to light.   Neck: Normal range of motion. Neck supple.   Cardiovascular: Normal rate and regular rhythm.    Pulmonary/Chest: Effort normal and breath sounds normal.   Musculoskeletal: She exhibits no edema.   Neurological: She is alert and oriented to person, place, and time.   Skin: Skin is warm.   Psychiatric: Mood, memory, affect and judgment normal.   Vitals reviewed.    Assessment and Plan:  Roger was seen today for establish care.    Diagnoses and all orders for this visit:    Hyperthyroidism. Patient has enlarged thyroid on exam. Recommend checking TSH to determine thyroid function. Thyroid ultrasound was done in 2016, will consider repeating this year.   -     Cancel: TSH with free T4 reflex  -     TSH with free T4 reflex; Future    Acute sinusitis with symptoms > 10 days. Discussed possible causes of symptoms, suspect acute bacterial sinusitis. Recommend treatment with antibiotics, prescription was sent to patient's pharmacy. Discussed over the counter medications that can be used for control of symptoms in the setting of pregnancy.   -     amoxicillin-clavulanate (AUGMENTIN) 500-125 MG per tablet; Take 1 tablet by mouth 2 times daily  -     doxylamine (UNISOM) 25 MG TABS tablet; Take 1 tablet (25 mg) by mouth At Bedtime      Total time spent 45  minutes.  More than 50% of the time spent with Ms. Desouza on counseling / coordinating her care    Amairani Barton MD            "

## 2017-04-05 NOTE — TELEPHONE ENCOUNTER
Patient called and would like to establish prenatal care   Patient LMP 17  Patient   Patient complains of having vomiting, nausea and breast tenderness. Pt is taking prenatal vitamins

## 2017-04-05 NOTE — LETTER
4/5/2017       RE: Roger Desouza  [address]   [facility] [address]     Dear Colleague,    Thank you for referring your patient, Roger Desouza, to the [facility]  at [facility]. Please see a copy of my visit note below.    HPI  Patient is here for evaluation of cough. She reports that she started to feel ill a month ago. She She had a cough and a fever as well nasal congestion. She was feeling better at one point, however, got worse again. She has not checked her temperature, however, reports subjecting fever. She reports that she has a headache and itchy eyes. She denies feeling short of breath     Review of Systems     Constitutional:  Negative for fever, chills and fatigue.   HENT:  Positive for sore throat and sinus congestion.    Respiratory:   Positive for cough. Negative for sputum production, wheezing and dyspnea on exertion.    Cardiovascular:  Negative for chest pain, dyspnea on exertion, claudication and edema.   Gastrointestinal:  Negative for abdominal pain and bloating.   Genitourinary:  Negative for dysuria.   Musculoskeletal:  Negative for back pain.   Skin:  Negative for itching and rash.   Neurological:  Negative for headaches.   Psychiatric/Behavioral:  Negative for depression, decreased concentration, mood swings and panic attacks.    Endocrine:  Negative for altered temperature regulation, polyphagia, polydipsia, unwanted hair growth and change in facial hair.    Current Outpatient Prescriptions   Medication     ondansetron (ZOFRAN ODT) 4 MG ODT tab     Prenatal Vit-Fe Fumarate-FA (TRINATE) TABS     Acetaminophen (TYLENOL PO)     No current facility-administered medications for this visit.      Past Medical History:   Diagnosis Date     Hyperthyroidism     Better since surgery     Past Surgical History:   Procedure Laterality Date     THYROID SURGERY  2010    in [facility]     No family history on file.  Social History  "    Social History     Marital status:      Spouse name: N/A     Number of children: N/A     Years of education: N/A     Occupational History      Unemployed     Social History Main Topics     Smoking status: Never Smoker     Smokeless tobacco: Never Used     Alcohol use No     Drug use: No     Sexual activity: Yes     Partners: Male     Birth control/ protection: None     Other Topics Concern     Not on file     Social History Narrative       Vitals:    04/05/17 1517   BP: 117/82   Pulse: 87   Temp: 97.5  F (36.4  C)   TempSrc: Oral   Weight: 52.2 kg (115 lb)   Height: 1.626 m (5' 4\")       Physical Exam   Constitutional: She is oriented to person, place, and time and well-developed, well-nourished, and in no distress.   HENT:   Head: Normocephalic and atraumatic.   Eyes: Pupils are equal, round, and reactive to light.   Neck: Normal range of motion. Neck supple.   Cardiovascular: Normal rate and regular rhythm.    Pulmonary/Chest: Effort normal and breath sounds normal.   Musculoskeletal: She exhibits no edema.   Neurological: She is alert and oriented to person, place, and time.   Skin: Skin is warm.   Psychiatric: Mood, memory, affect and judgment normal.   Vitals reviewed.    Assessment and Plan:  Roger was seen today for establish care.    Diagnoses and all orders for this visit:    Hyperthyroidism. Patient has enlarged thyroid on exam. Recommend checking TSH to determine thyroid function. Thyroid ultrasound was done in 2016, will consider repeating this year.   -     Cancel: TSH with free T4 reflex  -     TSH with free T4 reflex; Future    Acute sinusitis with symptoms > 10 days. Discussed possible causes of symptoms, suspect acute bacterial sinusitis. Recommend treatment with antibiotics, prescription was sent to patient's pharmacy. Discussed over the counter medications that can be used for control of symptoms in the setting of pregnancy.   -     amoxicillin-clavulanate (AUGMENTIN) 500-125 MG per " tablet; Take 1 tablet by mouth 2 times daily  -     doxylamine (UNISOM) 25 MG TABS tablet; Take 1 tablet (25 mg) by mouth At Bedtime      Total time spent 45  minutes.  More than 50% of the time spent with Ms. Desouza on counseling / coordinating her care    Amairani Barton MD              Again, thank you for allowing me to participate in the care of your patient.      Sincerely,    Amairani Barton MD

## 2017-04-05 NOTE — LETTER
Date:April 10, 2017      Patient was self referred, no letter generated. Do not send.        [facility] Health Information

## 2017-04-08 ASSESSMENT — ENCOUNTER SYMPTOMS
CHILLS: 0
CLAUDICATION: 0
POLYPHAGIA: 0
SINUS CONGESTION: 1
DYSPNEA ON EXERTION: 0
FEVER: 0
INSOMNIA: 0
COUGH: 1
ALTERED TEMPERATURE REGULATION: 0
POLYDIPSIA: 0
HEADACHES: 0
ABDOMINAL PAIN: 0
NERVOUS/ANXIOUS: 0
FATIGUE: 0
WHEEZING: 0
SPUTUM PRODUCTION: 0
SORE THROAT: 1
DYSURIA: 0
BACK PAIN: 0
BLOATING: 0
DEPRESSION: 0
PANIC: 0
DECREASED CONCENTRATION: 0

## 2017-04-26 ENCOUNTER — OFFICE VISIT (OUTPATIENT)
Dept: OBGYN | Facility: CLINIC | Age: 32
End: 2017-04-26
Attending: ADVANCED PRACTICE MIDWIFE
Payer: COMMERCIAL

## 2017-04-26 VITALS
DIASTOLIC BLOOD PRESSURE: 84 MMHG | BODY MASS INDEX: 19.12 KG/M2 | HEIGHT: 64 IN | WEIGHT: 112 LBS | HEART RATE: 82 BPM | SYSTOLIC BLOOD PRESSURE: 123 MMHG

## 2017-04-26 DIAGNOSIS — Z36.89 ENCOUNTER FOR FETAL ANATOMIC SURVEY: ICD-10-CM

## 2017-04-26 DIAGNOSIS — Z34.81 OTHER NORMAL PREGNANCY, NOT FIRST, FIRST TRIMESTER: Primary | ICD-10-CM

## 2017-04-26 DIAGNOSIS — E05.90 HYPERTHYROIDISM: ICD-10-CM

## 2017-04-26 DIAGNOSIS — R12 HEARTBURN: ICD-10-CM

## 2017-04-26 DIAGNOSIS — Z34.91 NORMAL PREGNANCY IN FIRST TRIMESTER: ICD-10-CM

## 2017-04-26 LAB
ABO + RH BLD: NORMAL
ABO + RH BLD: NORMAL
BASOPHILS # BLD AUTO: 0 10E9/L (ref 0–0.2)
BASOPHILS NFR BLD AUTO: 0.2 %
BLD GP AB SCN SERPL QL: NORMAL
BLOOD BANK CMNT PATIENT-IMP: NORMAL
DEPRECATED CALCIDIOL+CALCIFEROL SERPL-MC: 42 UG/L (ref 20–75)
DIFFERENTIAL METHOD BLD: ABNORMAL
EOSINOPHIL # BLD AUTO: 0.2 10E9/L (ref 0–0.7)
EOSINOPHIL NFR BLD AUTO: 3.1 %
ERYTHROCYTE [DISTWIDTH] IN BLOOD BY AUTOMATED COUNT: 12.4 % (ref 10–15)
HBV SURFACE AG SERPL QL IA: NONREACTIVE
HCT VFR BLD AUTO: 34.1 % (ref 35–47)
HGB BLD-MCNC: 11.8 G/DL (ref 11.7–15.7)
HIV 1+2 AB+HIV1 P24 AG SERPL QL IA: NORMAL
IMM GRANULOCYTES # BLD: 0 10E9/L (ref 0–0.4)
IMM GRANULOCYTES NFR BLD: 0.2 %
LYMPHOCYTES # BLD AUTO: 1.9 10E9/L (ref 0.8–5.3)
LYMPHOCYTES NFR BLD AUTO: 31.2 %
MCH RBC QN AUTO: 30.3 PG (ref 26.5–33)
MCHC RBC AUTO-ENTMCNC: 34.6 G/DL (ref 31.5–36.5)
MCV RBC AUTO: 87 FL (ref 78–100)
MONOCYTES # BLD AUTO: 0.5 10E9/L (ref 0–1.3)
MONOCYTES NFR BLD AUTO: 7.8 %
NEUTROPHILS # BLD AUTO: 3.6 10E9/L (ref 1.6–8.3)
NEUTROPHILS NFR BLD AUTO: 57.5 %
NRBC # BLD AUTO: 0 10*3/UL
NRBC BLD AUTO-RTO: 0 /100
PLATELET # BLD AUTO: 209 10E9/L (ref 150–450)
RBC # BLD AUTO: 3.9 10E12/L (ref 3.8–5.2)
RUBV IGG SERPL IA-ACNC: 130 IU/ML
SPECIMEN EXP DATE BLD: NORMAL
T4 FREE SERPL-MCNC: 1.66 NG/DL (ref 0.76–1.46)
TSH SERPL DL<=0.05 MIU/L-ACNC: 0.03 MU/L (ref 0.4–4)
WBC # BLD AUTO: 6.2 10E9/L (ref 4–11)

## 2017-04-26 PROCEDURE — 84443 ASSAY THYROID STIM HORMONE: CPT | Performed by: ADVANCED PRACTICE MIDWIFE

## 2017-04-26 PROCEDURE — 86780 TREPONEMA PALLIDUM: CPT | Performed by: ADVANCED PRACTICE MIDWIFE

## 2017-04-26 PROCEDURE — 86850 RBC ANTIBODY SCREEN: CPT | Performed by: ADVANCED PRACTICE MIDWIFE

## 2017-04-26 PROCEDURE — 76801 OB US < 14 WKS SINGLE FETUS: CPT | Mod: ZF

## 2017-04-26 PROCEDURE — 85025 COMPLETE CBC W/AUTO DIFF WBC: CPT | Performed by: ADVANCED PRACTICE MIDWIFE

## 2017-04-26 PROCEDURE — 87340 HEPATITIS B SURFACE AG IA: CPT | Performed by: ADVANCED PRACTICE MIDWIFE

## 2017-04-26 PROCEDURE — 86900 BLOOD TYPING SEROLOGIC ABO: CPT | Performed by: ADVANCED PRACTICE MIDWIFE

## 2017-04-26 PROCEDURE — 36415 COLL VENOUS BLD VENIPUNCTURE: CPT | Performed by: ADVANCED PRACTICE MIDWIFE

## 2017-04-26 PROCEDURE — 82306 VITAMIN D 25 HYDROXY: CPT | Performed by: ADVANCED PRACTICE MIDWIFE

## 2017-04-26 PROCEDURE — 87086 URINE CULTURE/COLONY COUNT: CPT | Performed by: ADVANCED PRACTICE MIDWIFE

## 2017-04-26 PROCEDURE — 83021 HEMOGLOBIN CHROMOTOGRAPHY: CPT | Performed by: ADVANCED PRACTICE MIDWIFE

## 2017-04-26 PROCEDURE — 87389 HIV-1 AG W/HIV-1&-2 AB AG IA: CPT | Performed by: ADVANCED PRACTICE MIDWIFE

## 2017-04-26 PROCEDURE — 86901 BLOOD TYPING SEROLOGIC RH(D): CPT | Performed by: ADVANCED PRACTICE MIDWIFE

## 2017-04-26 PROCEDURE — 84439 ASSAY OF FREE THYROXINE: CPT | Performed by: ADVANCED PRACTICE MIDWIFE

## 2017-04-26 PROCEDURE — 86762 RUBELLA ANTIBODY: CPT | Performed by: ADVANCED PRACTICE MIDWIFE

## 2017-04-26 ASSESSMENT — PAIN SCALES - GENERAL: PAINLEVEL: NO PAIN (0)

## 2017-04-26 NOTE — NURSING NOTE
Chief Complaint   Patient presents with     Prenatal Care     OBI 8 weeks and 1 day   Neha Spencer LPN

## 2017-04-26 NOTE — PROGRESS NOTES
31 year old female, , with LMP 17, 8 1/7weeks Estimated Date of Delivery: 17 presents for confirmation of dates and assessment of viability. This study was done transabdominally.    Measurements     CRL = 15.0 mm = 7 6/7 weeks  EGA.   KEVIN = 17.     Fetal anatomy appears normal for gestational age.     Fetal/Fetal Cardiac Activity: Present.  FHR = 168bpm.     Implantation: Intrauterine.     Cervix = 3.7 cm      Maternal structures appear normal.    Impression: Single viable intrauterine pregnancy at 8w1d by LMP c/w today's scan.  Use KEVIN of 17 based on LMP.    Recommend comprehensive scan at 18 to 20 weeks.    Katty Collins MD

## 2017-04-26 NOTE — MR AVS SNAPSHOT
After Visit Summary   4/26/2017    Roger Desouza    MRN: [id number]           Patient Information     Date Of Birth          1985        Visit Information        Provider Department      4/26/2017 9:00 AM [facility] ULTRASOUND [facility]        Today's Diagnoses     Other normal pregnancy, not first, first trimester    -  1    Normal pregnancy in first trimester           Follow-ups after your visit        Your next 10 appointments already scheduled     May 10, 2017 11:15 AM CDT   NEW OB with Sebastian Mcdonough CNM   [facility] ([facility])    [facility] [address]  [address]  [facility] [address] [phone]              Future tests that were ordered for you today     Open Future Orders        Priority Expected Expires Ordered    [facility] Comprehensive Single Routine  2/26/2018 4/26/2017            Who to contact     Please call your clinic at [phone] to:    Ask questions about your health    Make or cancel appointments    Discuss your medicines    Learn about your test results    Speak to your doctor   If you have compliments or concerns about an experience at your clinic, or if you wish to file a complaint, please contact [facility] Patient Relations at [phone] or email us at [email or web address]         Additional Information About Your Visit        MyChart Information     [employer] gives you secure access to your electronic health record. If you see a primary care provider, you can also send messages to your care team and make appointments. If you have questions, please call your primary care clinic.  If you do not have a primary care provider, please call [phone] and they will assist you.      [employer] is an electronic gateway that provides easy, online access to your medical records. With [employer], you can request a clinic appointment, read  your test results, renew a prescription or communicate with your care team.     To access your existing account, please contact your [facility] or call [phone] for assistance.        Care EveryWhere ID     This is your Care EveryWhere ID. This could be used by other organizations to access your [place] medical records  [id number]        Your Vitals Were     Last Period                   02/28/2017 (Exact Date)            Blood Pressure from Last 3 Encounters:   04/26/17 123/84   04/05/17 117/82   02/02/17 123/83    Weight from Last 3 Encounters:   04/26/17 50.8 kg (112 lb)   04/05/17 52.2 kg (115 lb)   02/02/17 50.4 kg (111 lb 1.6 oz)              We Performed the Following     Dating ultrasound less than 14 weeks gestation Transab (Single) - 04288 (In Clinic)          Today's Medication Changes          These changes are accurate as of: 4/26/17 11:59 PM.  If you have any questions, ask your nurse or doctor.               Start taking these medicines.        Dose/Directions    ranitidine 75 MG tablet   Commonly known as:  ranitidine   Used for:  Heartburn   Started by:  Sebastian Mcdonough CNM        Dose:  75 mg   Take 1 tablet (75 mg) by mouth 2 times daily May take 1-2 tabs two times a day   Quantity:  60 tablet   Refills:  1         Stop taking these medicines if you haven't already. Please contact your care team if you have questions.     amoxicillin-clavulanate 500-125 MG per tablet   Commonly known as:  AUGMENTIN   Stopped by:  Sebastian Mcdonough CNM                Where to get your medicines      These medications were sent to [facility] [place], [place] - [address]  [address] [facility] [address], [facility] [address]     Phone:  [phone]     ranitidine 75 MG tablet                Primary Care Provider    Physician No Ref-Primary       No address on file        Thank you!     Thank you for choosing [facility]  [facility]CIALISTS CLIN[facility]  for your care. Our goal is always to provide you with excellent care. Hearing back from our patients is one way we can continue to improve our services. Please take a few minutes to complete the written survey that you may receive in the mail after your visit with us. Thank you!             Your Updated Medication List - Protect others around you: Learn how to safely use, store and throw away your medicines at www.disposemymeds.org.          This list is accurate as of: 4/26/17 11:59 PM.  Always use your most recent med list.                   Brand Name Dispense Instructions for use    doxylamine 25 MG Tabs tablet    UNISOM    14 each    Take 1 tablet (25 mg) by mouth At Bedtime       ondansetron 4 MG ODT tab    ZOFRAN ODT    20 tablet    Take 1-2 tablets (4-8 mg) by mouth every 8 hours as needed for nausea       ranitidine 75 MG tablet    ranitidine    60 tablet    Take 1 tablet (75 mg) by mouth 2 times daily May take 1-2 tabs two times a day       TRINATE Tabs     90 tablet    Take 1 tablet by mouth daily       TYLENOL PO      Take 325 mg by mouth

## 2017-04-26 NOTE — PROGRESS NOTES
Subjective   31 year old female who presents to clinic for initiation of OB care.   at 8w1d with estimated date of delivery of Dec 5, 2017 based on LMP, US today confirms.  Patient declined transvaginal ultrasound but agreed to transabdominal ultrasound. Normal u/s- c/w lmp dontrell. Fetal heart tones present.  Symptoms since LMP include nausea- reports it is mostly related to heartburn.  Patient has tried these relief measures: diet modification, small frequent meals, increased rest and increased fluids.    History significant for hyperthyroidism with partial thyroidectomy.  Done in [place] [facility].  Last pap 2016 in [place] was NIL per pt.     , Saji. Present and supportive at appointment.    PERSONAL/SOCIAL HISTORY  Lives lives with spouse and child.  Employment: Unemployed- home    Additional items: Denies past or present domestic violence, sexual and psychological abuse.  Objective  -VS: reviewed and within normal limits   -General appearance: no acute distress, patient is comfortable   NEUROLOGICAL/PSYCHIATRIC   - Oriented x3,   -Mood and affect: : normal   Genetic/Infection questionnaire completed, risks include none    Assessment/Plan   at 8w1d  by Patient's last menstrual period was 2017 (exact date). and US confirms.  Encounter Diagnosis   Name Primary?     Other normal pregnancy, not first, first trimester Yes     Orders Placed This Encounter   Procedures     25- OH-Vitamin D     CBC with Platelets Differential [DRG778]     Rubella Antibody IgG Quantitative [CXJ2338]     Anti Treponema [WNL2913]     Hepatitis B Surface Antigen [FJD258]     HIV Antigen Antibody Combo [RPH1003]     HGB Eval Reflex to ELP or RBC Solubility     TSH     T4 free     [facility] Referral [9046.022]     ABO/Rh Type and Screen [RQL468]     Orders Placed This Encounter   Medications     ranitidine (RANITIDINE) 75 MG tablet     Sig: Take 1 tablet (75 mg) by mouth 2 times daily May take  1-2 tabs two times a day     Dispense:  60 tablet     Refill:  1     - Oriented to Practice, types of care, and how to reach a provider.   - Patient received 1st trimester new OB education packet complete with aide of The Expectant Family booklet including information on genetic screening test options.  - Patient desires level II ultrasound which was ordered.  - Patient was encouraged to start prenatal vitamins as tolerated.    - Patient instructed to schedule new OB exam with provider in 2 weeks.  - Pregnancy concerns to be addressed by provider at new OB exam include: Needs GC/CT, need to request pap records if possible (NIL 2/2016), Hyperthyroidism.    OBI education reviewed.  OBI labs ordered. Hbg electrophoresis added.  Thyroid labs ordered d/t history of hyperthyroidism- follow up as needed.  Discussed relief measures for nausea and heartburn.  Prescription for ranitidine placed.  Desires level 2 ultrasound only, MFM referral placed.  Needs GC/CT at NOB. Up to date on pap- 2/2016 in [place]- need to request records.  Pt to RTO for NOB visit in 2 weeks and prn if questions or concerns    MARCIN Velasquez, OTONIEL

## 2017-04-26 NOTE — PATIENT INSTRUCTIONS
"  Thank you for choosing [facility] for your obstetrical care.  We are an integrated health clinic with obstetricians, midwives, a psychologist, an acupuncturist, a nutritionist, a pharmacist, internal medicine and family practice all in one.  If you have questions about services offered please ask.      o Please keep all appointments with your provider.  You will help catch, prevent and treat problems early.  o Review your Expectant Family booklet and folder given to you at this intake visit.  It can answer many basic questions including:    Treatment for nausea and vomiting    Medications that are safe in pregnancy    Healthy diet and weight gain    Exercise and activity  o ASK questions!!  Please use \"Questions for my New OB visit\" form to write down any questions or concerns for your next visit.  o Eat a healthy diet.  Visit www.choosemyplate.gov and click on  Pregnancy and Breastfeeding  for information and tips  o Do not smoke.  Avoid other people's smoke, too.  We are happy to help with referrals to stop smoking programs.  o Do not drink alcohol.  o Try to avoid people who have colds or other infections.  Practice good hand washing.  o Consider registering for our Healthy Pregnancy Class here at [facility].  This class is offered every 3rd Wednesday from 2:30-4:30 p.m.  [place] at [phone] or online at [email or web address]  o Consider registering for prenatal education classes through [place] at [facility].  You can view class schedules and register online at [email or web address] or call [phone] for questions     For urgent concerns, call [facility] at [phone] to speak with a triage nurse during regular clinic hours (8:00 am - 4:30 pm).  This line is answered by our service 24 hours a day, after hours a provider will return your call.  Thank you for choosing [facility] for your " "obstetrical care.  We are an integrated health clinic with obstetricians, midwives, a psychologist, an acupuncturist, a nutritionist, a pharmacist, internal medicine and family practice all in one.  If you have questions about services offered please ask.      o Please keep all appointments with your provider.  You will help catch, prevent and treat problems early.  o Review your Expectant Family booklet and folder given to you at this intake visit.  It can answer many basic questions including:    Treatment for nausea and vomiting    Medications that are safe in pregnancy    Healthy diet and weight gain    Exercise and activity  o ASK questions!!  Please use \"Questions for my New OB visit\" form to write down any questions or concerns for your next visit.  o Eat a healthy diet.  Visit www.choosemyplate.gov and click on  Pregnancy and Breastfeeding  for information and tips  o Do not smoke.  Avoid other people's smoke, too.  We are happy to help with referrals to stop smoking programs.  o Do not drink alcohol.  o Try to avoid people who have colds or other infections.  Practice good hand washing.  o Consider registering for our Healthy Pregnancy Class here at [facility].  This class is offered every 3rd Wednesday from 2:30-4:30 p.m.  [place] at [phone] or online at [email or web address] or [email or web address]  o Consider registering for prenatal education classes through [place] at [facility].  You can view class schedules and register online at [email or web address] or call [phone] for questions     For urgent concerns, call [facility] at [phone] to speak with a triage nurse during regular clinic hours (8:00 am - 4:30 pm).  This line is answered by our service 24 hours a day, after hours a provider will return your call.  "

## 2017-04-26 NOTE — LETTER
Date:May 2, 2017      Patient was self referred, no letter generated. Do not send.        [facility] Health Information

## 2017-04-26 NOTE — LETTER
2017       RE: Roger Desouza  [address]   [facility] [address]     Dear Colleague,    Thank you for referring your patient, Roger Desouza, to the [facility] at [facility]. Please see a copy of my visit note below.      Subjective   31 year old female who presents to clinic for initiation of OB care.   at 8w1d with estimated date of delivery of Dec 5, 2017 based on LMP, US today confirms.  Patient declined transvaginal ultrasound but agreed to transabdominal ultrasound. Normal u/s- c/w lmp dontrell. Fetal heart tones present.  Symptoms since LMP include nausea- reports it is mostly related to heartburn.  Patient has tried these relief measures: diet modification, small frequent meals, increased rest and increased fluids.    History significant for hyperthyroidism with partial thyroidectomy.  Done in [place].  Last pap 2016 in [place] was NIL per pt.     , Saji. Present and supportive at appointment.    PERSONAL/SOCIAL HISTORY  Lives lives with spouse and child.  Employment: Unemployed- home    Additional items: Denies past or present domestic violence, sexual and psychological abuse.  Objective  -VS: reviewed and within normal limits   -General appearance: no acute distress, patient is comfortable   NEUROLOGICAL/PSYCHIATRIC   - Oriented x3,   -Mood and affect: : normal   Genetic/Infection questionnaire completed, risks include none    Assessment/Plan   at 8w1d  by Patient's last menstrual period was 2017 (exact date). and US confirms.  Encounter Diagnosis   Name Primary?     Other normal pregnancy, not first, first trimester Yes     Orders Placed This Encounter   Procedures     25- OH-Vitamin D     CBC with Platelets Differential [YAR282]     Rubella Antibody IgG Quantitative [YRR0033]     Anti Treponema [KLT4536]     Hepatitis B Surface Antigen [WSM285]     HIV Antigen Antibody Combo [YHZ0510]     HGB Eval  Reflex to ELP or RBC Solubility     TSH     T4 free     M[facility] Referral [9046.022]     ABO/Rh Type and Screen [SNS231]     Orders Placed This Encounter   Medications     ranitidine (RANITIDINE) 75 MG tablet     Sig: Take 1 tablet (75 mg) by mouth 2 times daily May take 1-2 tabs two times a day     Dispense:  60 tablet     Refill:  1     - Oriented to Practice, types of care, and how to reach a provider.   - Patient received 1st trimester new OB education packet complete with aide of The Expectant Family booklet including information on genetic screening test options.  - Patient desires level II ultrasound which was ordered.  - Patient was encouraged to start prenatal vitamins as tolerated.    - Patient instructed to schedule new OB exam with provider in 2 weeks.  - Pregnancy concerns to be addressed by provider at new OB exam include: Needs GC/CT, need to request pap records if possible (NIL 2/2016), Hyperthyroidism.    OBI education reviewed.  OBI labs ordered. Hbg electrophoresis added.  Thyroid labs ordered d/t history of hyperthyroidism- follow up as needed.  Discussed relief measures for nausea and heartburn.  Prescription for ranitidine placed.  Desires level 2 ultrasound only, MFM referral placed.  Needs GC/CT at NOB. Up to date on pap- 2/2016 in [place]- need to request records.  Pt to RTO for NOB visit in 2 weeks and prn if questions or concerns    MARCNI Velasquez CNM                    Again, thank you for allowing me to participate in the care of your patient.      Sincerely,    Sebastian Mcdonough CNM

## 2017-04-26 NOTE — LETTER
2017       RE: Roger Desouza  [address]   [facility] [address]     Dear Colleague,    Thank you for referring your patient, Roger Desouza, to the [facility] at [facility]. Please see a copy of my visit note below.    31 year old female, , with LMP 17, 8 1/7weeks Estimated Date of Delivery: 17 presents for confirmation of dates and assessment of viability. This study was done transabdominally.    Measurements     CRL = 15.0 mm = 7 6/7 weeks  EGA.   KEVIN = 17.     Fetal anatomy appears normal for gestational age.     Fetal/Fetal Cardiac Activity: Present.  FHR = 168bpm.     Implantation: Intrauterine.     Cervix = 3.7 cm      Maternal structures appear normal.    Impression: Single viable intrauterine pregnancy at 8w1d by LMP c/w today's scan.  Use KEVIN of 17 based on LMP.    Recommend comprehensive scan at 18 to 20 weeks.    Katty Collins MD

## 2017-04-26 NOTE — MR AVS SNAPSHOT
"              After Visit Summary   4/26/2017    Roger Desouza    MRN: [id number]           Patient Information     Date Of Birth          1985        Visit Information        Provider Department      4/26/2017 9:30 AM Sebastian Mcdonough CNM [facility]        Today's Diagnoses     Other normal pregnancy, not first, first trimester    -  1    Encounter for fetal anatomic survey        Heartburn        Hyperthyroidism          Care Instructions      Thank you for choosing [facility] for your obstetrical care.  We are an integrated health clinic with obstetricians, midwives, a psychologist, an acupuncturist, a nutritionist, a pharmacist, internal medicine and family practice all in one.  If you have questions about services offered please ask.      o Please keep all appointments with your provider.  You will help catch, prevent and treat problems early.  o Review your Expectant Family booklet and folder given to you at this intake visit.  It can answer many basic questions including:    Treatment for nausea and vomiting    Medications that are safe in pregnancy    Healthy diet and weight gain    Exercise and activity  o ASK questions!!  Please use \"Questions for my New OB visit\" form to write down any questions or concerns for your next visit.  o Eat a healthy diet.  Visit www.choosemyplate.gov and click on  Pregnancy and Breastfeeding  for information and tips  o Do not smoke.  Avoid other people's smoke, too.  We are happy to help with referrals to stop smoking programs.  o Do not drink alcohol.  o Try to avoid people who have colds or other infections.  Practice good hand washing.  o Consider registering for our Healthy Pregnancy Class here at [facility].  This class is offered every 3rd Wednesday from 2:30-4:30 p.m.  [place] at [phone] or online at [email or web address] or [email or web address]  o Consider registering for prenatal " "education classes through [facility] at [facility].  You can view class schedules and register online at [email or web address] or call [phone] for questions     For urgent concerns, call [facility] at [phone] to speak with a triage nurse during regular clinic hours (8:00 am - 4:30 pm).  This line is answered by our service 24 hours a day, after hours a provider will return your call.  Thank you for choosing [facility] for your obstetrical care.  We are an integrated health clinic with obstetricians, midwives, a psychologist, an acupuncturist, a nutritionist, a pharmacist, internal medicine and family practice all in one.  If you have questions about services offered please ask.      o Please keep all appointments with your provider.  You will help catch, prevent and treat problems early.  o Review your Expectant Family booklet and folder given to you at this intake visit.  It can answer many basic questions including:    Treatment for nausea and vomiting    Medications that are safe in pregnancy    Healthy diet and weight gain    Exercise and activity  o ASK questions!!  Please use \"Questions for my New OB visit\" form to write down any questions or concerns for your next visit.  o Eat a healthy diet.  Visit www.choosemyplate.gov and click on  Pregnancy and Breastfeeding  for information and tips  o Do not smoke.  Avoid other people's smoke, too.  We are happy to help with referrals to stop smoking programs.  o Do not drink alcohol.  o Try to avoid people who have colds or other infections.  Practice good hand washing.  o Consider registering for our Healthy Pregnancy Class here at [facility].  This class is offered every 3rd Wednesday from 2:30-4:30 p.m.  [place] at [phone] or online at [email or web address] or [email or web address]  o Consider registering for prenatal education classes through [facility] at [facility].  You can view " class schedules and register online at [email or web address] or call [phone] for questions     For urgent concerns, call [facility] at [phone] to speak with a triage nurse during regular clinic hours (8:00 am - 4:30 pm).  This line is answered by our service 24 hours a day, after hours a provider will return your call.        Follow-ups after your visit        Additional Services     Maternal Fetal Medicine Center Referral [9046.022]       >> Patient may proceed with recommendations for further testing as directed by the Maternal Fetal Medicine Specialist >>    >> If requesting Fetal Echo: M will determine appropriate location for exam due to indication.    >> If requesting Lung Maturity Amnio:  If results indicate fetal lung maturity, induction or C/S is recommended within 36 hours.  Please schedule accordingly.     Dear Patient:   Please be aware that coverage of these services is subject to the terms and limitations of your health insurance plan.  Call member services at your health plan with any benefit or coverage questions.      Please bring the following to your appointment:    >>  Any x-rays, CTs or MRIs which have been performed.  Contact the facility where they were done to arrange for  prior to your scheduled appointment.  Any new CT, MRI or other procedures ordered by your specialist must be performed at a [place] facility or coordinated by your clinic's referral office.  >>  List of current medications   >>  This referral request   >>  Any documents/labs given to you for this referral                  Follow-up notes from your care team     Return in about 2 weeks (around 5/10/2017) for NOB.      Your next 10 appointments already scheduled     May 10, 2017 11:15 AM CDT   NEW OB with Sebastian Mcdonough CNM   [facility] ([facility])    [facility] [address]  [address]  [facility] [address]  "  [phone]              Who to contact     Please call your clinic at [phone] to:    Ask questions about your health    Make or cancel appointments    Discuss your medicines    Learn about your test results    Speak to your doctor   If you have compliments or concerns about an experience at your clinic, or if you wish to file a complaint, please contact [facility] Patient Relations at [phone] or email us at [email or web address]         Additional Information About Your Visit        [email or web address] Information     Bababoo gives you secure access to your electronic health record. If you see a primary care provider, you can also send messages to your care team and make appointments. If you have questions, please call your primary care clinic.  If you do not have a primary care provider, please call [phone] and they will assist you.      Bababoo is an electronic gateway that provides easy, online access to your medical records. With Bababoo, you can request a clinic appointment, read your test results, renew a prescription or communicate with your care team.     To access your existing account, please contact your [facility] or call [phone] for assistance.        Care EveryWhere ID     This is your Care EveryWhere ID. This could be used by other organizations to access your [place] medical records  [id number]        Your Vitals Were     Pulse Height Last Period Breastfeeding? BMI (Body Mass Index)       82 1.626 m (5' 4\") 02/28/2017 (Exact Date) No 19.22 kg/m2        Blood Pressure from Last 3 Encounters:   04/26/17 123/84   04/05/17 117/82   02/02/17 123/83    Weight from Last 3 Encounters:   04/26/17 50.8 kg (112 lb)   04/05/17 52.2 kg (115 lb)   02/02/17 50.4 kg (111 lb 1.6 oz)              We Performed the Following     25- OH-Vitamin D     ABO/Rh Type and Screen [HVY231]     Anti Treponema [ABO8526]     CBC with Platelets " Differential [ALL654]     Hepatitis B Surface Antigen [QVA320]     HGB Eval Reflex to ELP or RBC Solubility     HIV Antigen Antibody Combo [KJJ5886]     Maternal Fetal Medicine Center Referral [9046.022]     Rubella Antibody IgG Quantitative [EPA8761]     T4 free     TSH     Urine Culture Aerobic Bacterial [PHM342]          Today's Medication Changes          These changes are accurate as of: 4/26/17 11:59 PM.  If you have any questions, ask your nurse or doctor.               Start taking these medicines.        Dose/Directions    ranitidine 75 MG tablet   Commonly known as:  ranitidine   Used for:  Heartburn   Started by:  Sebastian Mcdonough CNM        Dose:  75 mg   Take 1 tablet (75 mg) by mouth 2 times daily May take 1-2 tabs two times a day   Quantity:  60 tablet   Refills:  1         Stop taking these medicines if you haven't already. Please contact your care team if you have questions.     amoxicillin-clavulanate 500-125 MG per tablet   Commonly known as:  AUGMENTIN   Stopped by:  Sebastian Mcdonough CNM                Where to get your medicines      These medications were sent to [facility] [address]  [address] [address]     Phone:  [phone]     ranitidine 75 MG tablet                Primary Care Provider    Physician No Ref-Primary       No address on file        Thank you!     Thank you for choosing [facility]  for your care. Our goal is always to provide you with excellent care. Hearing back from our patients is one way we can continue to improve our services. Please take a few minutes to complete the written survey that you may receive in the mail after your visit with us. Thank you!             Your Updated Medication List - Protect others around you: Learn how to safely use, store and throw away your medicines at www.disposemymeds.org.          This list is accurate as of:  4/26/17 11:59 PM.  Always use your most recent med list.                   Brand Name Dispense Instructions for use    doxylamine 25 MG Tabs tablet    UNISOM    14 each    Take 1 tablet (25 mg) by mouth At Bedtime       ondansetron 4 MG ODT tab    ZOFRAN ODT    20 tablet    Take 1-2 tablets (4-8 mg) by mouth every 8 hours as needed for nausea       ranitidine 75 MG tablet    ranitidine    60 tablet    Take 1 tablet (75 mg) by mouth 2 times daily May take 1-2 tabs two times a day       TRINATE Tabs     90 tablet    Take 1 tablet by mouth daily       TYLENOL PO      Take 325 mg by mouth

## 2017-04-27 LAB
BACTERIA SPEC CULT: NORMAL
HGB A1 MFR BLD: 95.9 %
HGB A2 MFR BLD: 3.2 %
HGB C MFR BLD: 0 %
HGB E MFR BLD: 0 %
HGB F MFR BLD: 0.9 %
HGB FRACT BLD ELPH-IMP: NORMAL
HGB OTHER MFR BLD: 0 %
HGB S BLD QL SOLY: NORMAL
HGB S MFR BLD: 0 %
Lab: NORMAL
MICRO REPORT STATUS: NORMAL
PATH INTERP BLD-IMP: NORMAL
SPECIMEN SOURCE: NORMAL
T PALLIDUM IGG+IGM SER QL: NEGATIVE

## 2017-05-10 ENCOUNTER — OFFICE VISIT (OUTPATIENT)
Dept: OBGYN | Facility: CLINIC | Age: 32
End: 2017-05-10
Attending: ADVANCED PRACTICE MIDWIFE
Payer: COMMERCIAL

## 2017-05-10 VITALS
HEIGHT: 64 IN | WEIGHT: 108.5 LBS | HEART RATE: 76 BPM | DIASTOLIC BLOOD PRESSURE: 71 MMHG | BODY MASS INDEX: 18.52 KG/M2 | SYSTOLIC BLOOD PRESSURE: 104 MMHG

## 2017-05-10 DIAGNOSIS — Z34.81 OTHER NORMAL PREGNANCY, NOT FIRST, FIRST TRIMESTER: Primary | ICD-10-CM

## 2017-05-10 DIAGNOSIS — R12 HEARTBURN: ICD-10-CM

## 2017-05-10 DIAGNOSIS — E89.0 H/O PARTIAL THYROIDECTOMY: ICD-10-CM

## 2017-05-10 DIAGNOSIS — E05.90 HYPERTHYROIDISM: ICD-10-CM

## 2017-05-10 PROCEDURE — 99212 OFFICE O/P EST SF 10 MIN: CPT | Mod: ZF

## 2017-05-10 PROCEDURE — 87491 CHLMYD TRACH DNA AMP PROBE: CPT | Performed by: ADVANCED PRACTICE MIDWIFE

## 2017-05-10 PROCEDURE — 87591 N.GONORRHOEAE DNA AMP PROB: CPT | Performed by: ADVANCED PRACTICE MIDWIFE

## 2017-05-10 ASSESSMENT — PAIN SCALES - GENERAL: PAINLEVEL: NO PAIN (0)

## 2017-05-10 NOTE — LETTER
5/10/2017       RE: Roger Desouza  [address]   [facility] [address]     Dear Colleague,    Thank you for referring your patient, Roger Desouza, to the [facility] at [facility]. Please see a copy of my visit note below.      SUBJECTIVE:    32 year old, female, , 10w1d,  who presents to the clinic today for a new ob visit.    Feels well. Has started PNV.  Estimated Date of Delivery: Dec 5, 2017   Dec 5, 2017 is calculated from Patient's last menstrual period was 2017 (exact date). c/w 1st trimester dating u/s.  She has not had bleeding since her LMP.   She has had mild nausea- improving. Weight loss has not occurred. Mostly having heartburn.  This was a planned pregnancy.   FOB is involved.   .  OTHER CONCERNS: Discussed recommendations from consult with Dr. Barton re: hyperthyroidism and hx of partial thyroidectomy- OB intake labs were c/w hyperthyroidism.  Recommended repeat thyroid labs in 4 weeks and endocrinology referral.    ===========================================  ROS  PSYCHIATRIC:  Denies mood changes, difficulty concentrating, depression, anxiety, panic attacks or post partum depression  PHQ9: Last PHQ-9 score on record= No Value exists for the : HP#PHQ9  Social History   Substance Use Topics     Smoking status: Never Smoker     Smokeless tobacco: Never Used     Alcohol use No     History   Drug Use No     History   Smoking Status     Never Smoker   Smokeless Tobacco     Never Used       Alcohol use No     Family History   Problem Relation Age of Onset     Hypertension Father      ============================================  MEDICAL HISTORY   No Known Allergies    [unfilled]      Current Outpatient Prescriptions:      ranitidine (ZANTAC) 150 MG tablet, Take 1 tablet (150 mg) by mouth 2 times daily, Disp: 60 tablet, Rfl: 1     ondansetron (ZOFRAN ODT) 4 MG ODT tab, Take 1-2 tablets (4-8 mg) by mouth every 8 hours  "as needed for nausea, Disp: 20 tablet, Rfl: 1     ranitidine (RANITIDINE) 75 MG tablet, Take 1 tablet (75 mg) by mouth 2 times daily May take 1-2 tabs two times a day (Patient not taking: Reported on 5/10/2017), Disp: 60 tablet, Rfl: 1     Prenatal Vit-Fe Fumarate-FA (TRINATE) TABS, Take 1 tablet by mouth daily (Patient not taking: Reported on 5/10/2017), Disp: 90 tablet, Rfl: 3     doxylamine (UNISOM) 25 MG TABS tablet, Take 1 tablet (25 mg) by mouth At Bedtime (Patient not taking: Reported on 5/10/2017), Disp: 14 each, Rfl: 0     Acetaminophen (TYLENOL PO), Take 325 mg by mouth Reported on 5/10/2017, Disp: , Rfl:     Past Medical History:   Diagnosis Date     Hyperthyroidism     Better since surgery       Past Surgical History:   Procedure Laterality Date     THYROID SURGERY      in [facility]; partial thyroidectomy            Obstetric History       T1      TAB0   SAB1   E0   M0   L1       # Outcome Date GA Lbr Joe/2nd Weight Sex Delivery Anes PTL Lv   3 Current            2 SAB 17           1 Term 12   3.1 kg (6 lb 13.4 oz) F  None            GYN History- Reports last pap was NIL on 2016.     Done in [place]- unsure of location, states she will find out so records can be requested.    I personally reviewed the past social/family/medical and surgical history on the date of service.   I reviewed lab work done at Intake visit with patient.    OBJECTIVE:   PHYSICAL EXAM:  /71  Pulse 76  Ht 1.626 m (5' 4\")  Wt 49.2 kg (108 lb 8 oz)  LMP 2017 (Exact Date)  BMI 18.62 kg/m2  BMI- Body mass index is 18.62 kg/(m^2)., GENERAL:  Pleasant pregnant female, alert, cooperative  and well groomed.  SKIN:  Warm and dry, without lesions or rashes  HEAD: Symmetrical features.  MOUTH:  Buccal mucosa pink, moist without lesions.  Teeth in good repair.    NECK:  Thyroid without enlargement and nodules.  Lymph nodes not palpable.   LUNGS:  Clear to auscultation.  BREAST:    No " dominant, fixed or suspicious masses are noted.  No skin or nipple changes or axillary nodes.   Nipples everted.      HEART:  RRR without murmur.  ABDOMEN: Soft without masses , tenderness or organomegaly.  No CVA tenderness.  Uterus palpable at size equal to dates.  No scars noted.. Fetal heart tones present.  MUSCULOSKELETAL:  Full range of motion  EXTREMITIES:  No edema. No significant varicosities.   PELVIC EXAM:  GENITALIA: EGBUS  External genitalia, Barth[place]'s glands, urethra & [place]'s glands:normal. Vulva reveals no erythema or lesions.         VAGINA:  pink, normal rugae and discharge, no lesions, good tone.   CERVIX:  smooth, without discharge or CMT. Cervix visually closed on sse. Closed/long on bimanual exam.              UTERUS:   nontender 10 week size.   ADNEXA:  Without masses or tenderness.   RECTAL:  Normal appearance.  Digital exam deferred.  WET PREP:Not done  GC/CHLAMYDIA CULTURE OBTAINED:YES,urine  Pap NIL 2016    ASSESSMENT:  Intrauterine pregnancy 10w1d size consistent with dates  Genetic Screening: Level 2 Ultrasound only  Encounter Diagnoses   Name Primary?     Heartburn      Hyperthyroidism      H/O partial thyroidectomy      Other normal pregnancy, not first, first trimester Yes        PLAN:  Orders Placed This Encounter   Procedures     Endocrinology Adult Referral     Orders Placed This Encounter   Medications     ranitidine (ZANTAC) 150 MG tablet     Sig: Take 1 tablet (150 mg) by mouth 2 times daily     Dispense:  60 tablet     Refill:  1     - Reviewed use of triage nurse line and contacting the on-call provider after hours for an urgent need such as fever, vagina bleeding, bladder or vaginal infection, rupture of membranes,  or term labor.    - Reviewed best evidence for: weight gain for her weight and height for pregnancy:  RECOMMENDED WEIGHT GAIN: 25-35 lbs.   healthy diet and foods to avoid; exercise and activity during pregnancy;avoiding exposure to toxoplasmosis; and  maintenance of a generally healthy lifestyle.   - Discussed the harms, benefits, side effects and alternative therapies for current prescribed and OTC medications.    - All pt's questions discussed and answered.  Pt verbalized understanding of and agreement to plan of care.     Reviewed relief measures for heartburn, including dietary modifications.  Discussed taking 150mg BID of ranitidine instead of 75mg.  Level 2 ultrasound ordered.  Discussed recommendations from consult with Dr. Barton re: hyperthyroidism and hx of partial thyroidectomy.  Recommended repeat thyroid labs in 4 weeks and endocrinology referral.  Endocrinology referral/consult placed.  Urine GC/CT done today.  Pt states she will find out name of provider in [place] so pap records can be requested- reports NIL 2/2016.  - Continue scheduled prenatal care, RTC in approx 4 weeks and prn if questions or concerns    MARCIN Velasquez CNM                   Again, thank you for allowing me to participate in the care of your patient.      Sincerely,    Sebastian Mcdonough CNM

## 2017-05-10 NOTE — NURSING NOTE
Breastfeeding education provided:  - Benefits of Breastfeeding reviewed with Roger, referenced page 53 of Great Expectations book she states she breast fed her daughter, Jessy Izquierdo RN

## 2017-05-10 NOTE — LETTER
Date:May 16, 2017      Patient was self referred, no letter generated. Do not send.        [facility] Health Information

## 2017-05-10 NOTE — NURSING NOTE
Chief Complaint   Patient presents with     Prenatal Care     Pt c/o vomiting and states unable to keep fluids and food down.

## 2017-05-10 NOTE — PROGRESS NOTES
SUBJECTIVE:    32 year old, female, , 10w1d,  who presents to the clinic today for a new ob visit.    Feels well. Has started PNV.  Estimated Date of Delivery: Dec 5, 2017   Dec 5, 2017 is calculated from Patient's last menstrual period was 2017 (exact date). c/w 1st trimester dating u/s.  She has not had bleeding since her LMP.   She has had mild nausea- improving. Weight loss has not occurred. Mostly having heartburn.  This was a planned pregnancy.   FOB is involved.   .  OTHER CONCERNS: Discussed recommendations from consult with Dr. Barton re: hyperthyroidism and hx of partial thyroidectomy- OB intake labs were c/w hyperthyroidism.  Recommended repeat thyroid labs in 4 weeks and endocrinology referral.    ===========================================  ROS  PSYCHIATRIC:  Denies mood changes, difficulty concentrating, depression, anxiety, panic attacks or post partum depression  PHQ9: Last PHQ-9 score on record= No Value exists for the : HP#PHQ9  Social History   Substance Use Topics     Smoking status: Never Smoker     Smokeless tobacco: Never Used     Alcohol use No     History   Drug Use No     History   Smoking Status     Never Smoker   Smokeless Tobacco     Never Used       Alcohol use No     Family History   Problem Relation Age of Onset     Hypertension Father      ============================================  MEDICAL HISTORY   No Known Allergies    [unfilled]      Current Outpatient Prescriptions:      ranitidine (ZANTAC) 150 MG tablet, Take 1 tablet (150 mg) by mouth 2 times daily, Disp: 60 tablet, Rfl: 1     ondansetron (ZOFRAN ODT) 4 MG ODT tab, Take 1-2 tablets (4-8 mg) by mouth every 8 hours as needed for nausea, Disp: 20 tablet, Rfl: 1     ranitidine (RANITIDINE) 75 MG tablet, Take 1 tablet (75 mg) by mouth 2 times daily May take 1-2 tabs two times a day (Patient not taking: Reported on 5/10/2017), Disp: 60 tablet, Rfl: 1     Prenatal Vit-Fe Fumarate-FA (TRINATE) TABS, Take 1  "tablet by mouth daily (Patient not taking: Reported on 5/10/2017), Disp: 90 tablet, Rfl: 3     doxylamine (UNISOM) 25 MG TABS tablet, Take 1 tablet (25 mg) by mouth At Bedtime (Patient not taking: Reported on 5/10/2017), Disp: 14 each, Rfl: 0     Acetaminophen (TYLENOL PO), Take 325 mg by mouth Reported on 5/10/2017, Disp: , Rfl:     Past Medical History:   Diagnosis Date     Hyperthyroidism     Better since surgery       Past Surgical History:   Procedure Laterality Date     THYROID SURGERY      in [facility]; partial thyroidectomy            Obstetric History       T1      TAB0   SAB1   E0   M0   L1       # Outcome Date GA Lbr Joe/2nd Weight Sex Delivery Anes PTL Lv   3 Current            2 SAB 17           1 Term 12   3.1 kg (6 lb 13.4 oz) F  None            GYN History- Reports last pap was NIL on 2016.     Done in [place]- unsure of location, states she will find out so records can be requested.    I personally reviewed the past social/family/medical and surgical history on the date of service.   I reviewed lab work done at Intake visit with patient.    OBJECTIVE:   PHYSICAL EXAM:  /71  Pulse 76  Ht 1.626 m (5' 4\")  Wt 49.2 kg (108 lb 8 oz)  LMP 2017 (Exact Date)  BMI 18.62 kg/m2  BMI- Body mass index is 18.62 kg/(m^2)., GENERAL:  Pleasant pregnant female, alert, cooperative  and well groomed.  SKIN:  Warm and dry, without lesions or rashes  HEAD: Symmetrical features.  MOUTH:  Buccal mucosa pink, moist without lesions.  Teeth in good repair.    NECK:  Thyroid without enlargement and nodules.  Lymph nodes not palpable.   LUNGS:  Clear to auscultation.  BREAST:    No dominant, fixed or suspicious masses are noted.  No skin or nipple changes or axillary nodes.   Nipples everted.      HEART:  RRR without murmur.  ABDOMEN: Soft without masses , tenderness or organomegaly.  No CVA tenderness.  Uterus palpable at size equal to dates.  No scars noted.. Fetal " heart tones present.  MUSCULOSKELETAL:  Full range of motion  EXTREMITIES:  No edema. No significant varicosities.   PELVIC EXAM:  GENITALIA: EGBUS  External genitalia, Bartholin's glands, urethra & [place]'s glands:normal. Vulva reveals no erythema or lesions.         VAGINA:  pink, normal rugae and discharge, no lesions, good tone.   CERVIX:  smooth, without discharge or CMT. Cervix visually closed on sse. Closed/long on bimanual exam.              UTERUS:   nontender 10 week size.   ADNEXA:  Without masses or tenderness.   RECTAL:  Normal appearance.  Digital exam deferred.  WET PREP:Not done  GC/CHLAMYDIA CULTURE OBTAINED:YES,urine  Pap NIL 2016    ASSESSMENT:  Intrauterine pregnancy 10w1d size consistent with dates  Genetic Screening: Level 2 Ultrasound only  Encounter Diagnoses   Name Primary?     Heartburn      Hyperthyroidism      H/O partial thyroidectomy      Other normal pregnancy, not first, first trimester Yes        PLAN:  Orders Placed This Encounter   Procedures     Endocrinology Adult Referral     Orders Placed This Encounter   Medications     ranitidine (ZANTAC) 150 MG tablet     Sig: Take 1 tablet (150 mg) by mouth 2 times daily     Dispense:  60 tablet     Refill:  1     - Reviewed use of triage nurse line and contacting the on-call provider after hours for an urgent need such as fever, vagina bleeding, bladder or vaginal infection, rupture of membranes,  or term labor.    - Reviewed best evidence for: weight gain for her weight and height for pregnancy:  RECOMMENDED WEIGHT GAIN: 25-35 lbs.   healthy diet and foods to avoid; exercise and activity during pregnancy;avoiding exposure to toxoplasmosis; and maintenance of a generally healthy lifestyle.   - Discussed the harms, benefits, side effects and alternative therapies for current prescribed and OTC medications.    - All pt's questions discussed and answered.  Pt verbalized understanding of and agreement to plan of care.     Reviewed  relief measures for heartburn, including dietary modifications.  Discussed taking 150mg BID of ranitidine instead of 75mg.  Level 2 ultrasound ordered.  Discussed recommendations from consult with Dr. Barton re: hyperthyroidism and hx of partial thyroidectomy.  Recommended repeat thyroid labs in 4 weeks and endocrinology referral.  Endocrinology referral/consult placed.  Urine GC/CT done today.  Pt states she will find out name of provider in [place] so pap records can be requested- reports NIL 2/2016.  - Continue scheduled prenatal care, RTC in approx 4 weeks and prn if questions or concerns    MARCIN Velasquez, CNM

## 2017-05-10 NOTE — PATIENT INSTRUCTIONS
"  Thank you for choosing [facility] for your obstetrical care.  We are an integrated health clinic with obstetricians, midwives, a psychologist, an acupuncturist, a nutritionist, a pharmacist, internal medicine and family practice all in one.  If you have questions about services offered please ask.      o Please keep all appointments with your provider.  You will help catch, prevent and treat problems early.  o Review your Expectant Family booklet and folder given to you at this intake visit.  It can answer many basic questions including:    Treatment for nausea and vomiting    Medications that are safe in pregnancy    Healthy diet and weight gain    Exercise and activity  o ASK questions!!  Please use \"Questions for my New OB visit\" form to write down any questions or concerns for your next visit.  o Eat a healthy diet.  Visit www.choosemyplate.gov and click on  Pregnancy and Breastfeeding  for information and tips  o Do not smoke.  Avoid other people's smoke, too.  We are happy to help with referrals to stop smoking programs.  o Do not drink alcohol.  o Try to avoid people who have colds or other infections.  Practice good hand washing.  o Consider registering for our Healthy Pregnancy Class here at [facility].  This class is offered every 3rd Wednesday from 2:30-4:30 p.m.  [place] at [phone] or online at [email or web address] or [email or web address]  o Consider registering for prenatal education classes through [place] at [facility].  You can view class schedules and register online at [email or web address] or call [phone] for questions     For urgent concerns, call [facility] at [phone] to speak with a triage nurse during regular clinic hours (8:00 am - 4:30 pm).  This line is answered by our service 24 hours a day, after hours a provider will return your call.  "

## 2017-05-10 NOTE — MR AVS SNAPSHOT
After Visit Summary   5/10/2017    Roger Desouza    MRN: [id number]           Patient Information     Date Of Birth          1985        Visit Information        Provider Department      5/10/2017 11:15 AM Sebastian Mcdonough CNM [facility]        Today's Diagnoses     Supervision of normal pregnancy in first trimester    -  1    Heartburn        Hyperthyroidism        H/O partial thyroidectomy           Follow-ups after your visit        Additional Services     Endocrinology Adult Referral       Patient has a history of hyperthyroidism and a partial thyroidectomy. She is currently 10 weeks pregnant. Had thyroid labs drawn TSH 0.03, T4 1.66. Reviewed by internal med MD who recommended endocrine referral.                  Follow-up notes from your care team     Return in about 4 weeks (around 6/7/2017) for SEGUNDO FREDERICK.      Your next 10 appointments already scheduled     Jun 07, 2017  3:00 PM CDT   RETURN OB with MARCIN Arnold CNM   [facility] ([facility])    [facility] [address]  [address]  [facility] [address] [phone]              Who to contact     Please call your clinic at [phone] to:    Ask questions about your health    Make or cancel appointments    Discuss your medicines    Learn about your test results    Speak to your doctor   If you have compliments or concerns about an experience at your clinic, or if you wish to file a complaint, please contact [facility] Patient Relations at [phone] or email us at [email or web address]         Additional Information About Your Visit        MyChart Information     [employer] gives you secure access to your electronic health record. If you see a primary care provider, you can also send messages to your care team and make appointments. If you have questions, please call your primary care  "clinic.  If you do not have a primary care provider, please call [phone] and they will assist you.      [employer] is an electronic gateway that provides easy, online access to your medical records. With [employer], you can request a clinic appointment, read your test results, renew a prescription or communicate with your care team.     To access your existing account, please contact your [facility] or call [phone] for assistance.        Care EveryWhere ID     This is your Care EveryWhere ID. This could be used by other organizations to access your [facility] medical records  [id number]        Your Vitals Were     Pulse Height Last Period BMI (Body Mass Index)          76 1.626 m (5' 4\") 02/28/2017 (Exact Date) 18.62 kg/m2         Blood Pressure from Last 3 Encounters:   05/10/17 104/71   04/26/17 123/84   04/05/17 117/82    Weight from Last 3 Encounters:   05/10/17 49.2 kg (108 lb 8 oz)   04/26/17 50.8 kg (112 lb)   04/05/17 52.2 kg (115 lb)              We Performed the Following     Endocrinology Adult Referral          Today's Medication Changes          These changes are accurate as of: 5/10/17 12:21 PM.  If you have any questions, ask your nurse or doctor.               These medicines have changed or have updated prescriptions.        Dose/Directions    * ranitidine 75 MG tablet   Commonly known as:  ranitidine   This may have changed:  Another medication with the same name was added. Make sure you understand how and when to take each.   Used for:  Heartburn   Changed by:  Sebastian Mcdonough CNM        Dose:  75 mg   Take 1 tablet (75 mg) by mouth 2 times daily May take 1-2 tabs two times a day   Quantity:  60 tablet   Refills:  1       * ranitidine 150 MG tablet   Commonly known as:  ZANTAC   This may have changed:  You were already taking a medication with the same name, and this prescription was added. Make sure you understand how and when to take each. "   Used for:  Heartburn   Changed by:  Sebastian Mcdonough CNM        Dose:  150 mg   Take 1 tablet (150 mg) by mouth 2 times daily   Quantity:  60 tablet   Refills:  1       * Notice:  This list has 2 medication(s) that are the same as other medications prescribed for you. Read the directions carefully, and ask your doctor or other care provider to review them with you.         Where to get your medicines      These medications were sent to [place], [place] - [address]  [address], [facility] [address]     Phone:  [phone]     ranitidine 150 MG tablet                Primary Care Provider    Physician No Ref-Primary       No address on file        Thank you!     Thank you for choosing [facility]  for your care. Our goal is always to provide you with excellent care. Hearing back from our patients is one way we can continue to improve our services. Please take a few minutes to complete the written survey that you may receive in the mail after your visit with us. Thank you!             Your Updated Medication List - Protect others around you: Learn how to safely use, store and throw away your medicines at www.disposemymeds.org.          This list is accurate as of: 5/10/17 12:21 PM.  Always use your most recent med list.                   Brand Name Dispense Instructions for use    doxylamine 25 MG Tabs tablet    UNISOM    14 each    Take 1 tablet (25 mg) by mouth At Bedtime       ondansetron 4 MG ODT tab    ZOFRAN ODT    20 tablet    Take 1-2 tablets (4-8 mg) by mouth every 8 hours as needed for nausea       * ranitidine 75 MG tablet    ranitidine    60 tablet    Take 1 tablet (75 mg) by mouth 2 times daily May take 1-2 tabs two times a day       * ranitidine 150 MG tablet    ZANTAC    60 tablet    Take 1 tablet (150 mg) by mouth 2 times daily       TRINATE Tabs     90 tablet    Take 1 tablet by mouth daily       TYLENOL PO       Take 325 mg by mouth Reported on 5/10/2017       * Notice:  This list has 2 medication(s) that are the same as other medications prescribed for you. Read the directions carefully, and ask your doctor or other care provider to review them with you.

## 2017-05-10 NOTE — LETTER
May 10, 2017    To Whom It May Concern:    Roger Desouza is being seen in our clinic for prenatal care.      Her Estimated Date of Delivery: Dec 5, 2017.    LMP:  Patient's last menstrual period was 02/28/2017 (exact date).     Sincerely,        MARCIN Velasquez, OTONIEL

## 2017-05-11 LAB
C TRACH DNA SPEC QL NAA+PROBE: NORMAL
N GONORRHOEA DNA SPEC QL NAA+PROBE: NORMAL
SPECIMEN SOURCE: NORMAL
SPECIMEN SOURCE: NORMAL

## 2017-06-07 ENCOUNTER — OFFICE VISIT (OUTPATIENT)
Dept: OBGYN | Facility: CLINIC | Age: 32
End: 2017-06-07
Attending: ADVANCED PRACTICE MIDWIFE
Payer: COMMERCIAL

## 2017-06-07 VITALS
WEIGHT: 108 LBS | SYSTOLIC BLOOD PRESSURE: 110 MMHG | HEART RATE: 86 BPM | DIASTOLIC BLOOD PRESSURE: 76 MMHG | BODY MASS INDEX: 18.44 KG/M2 | HEIGHT: 64 IN

## 2017-06-07 DIAGNOSIS — Z34.81 OTHER NORMAL PREGNANCY, NOT FIRST, FIRST TRIMESTER: Primary | ICD-10-CM

## 2017-06-07 DIAGNOSIS — E05.90 HYPERTHYROIDISM: ICD-10-CM

## 2017-06-07 DIAGNOSIS — K21.9 GASTROESOPHAGEAL REFLUX DISEASE, ESOPHAGITIS PRESENCE NOT SPECIFIED: ICD-10-CM

## 2017-06-07 DIAGNOSIS — K59.00 CONSTIPATION, UNSPECIFIED CONSTIPATION TYPE: ICD-10-CM

## 2017-06-07 LAB
T4 FREE SERPL-MCNC: 1.23 NG/DL (ref 0.76–1.46)
TSH SERPL DL<=0.05 MIU/L-ACNC: 0.02 MU/L (ref 0.4–4)

## 2017-06-07 PROCEDURE — 84439 ASSAY OF FREE THYROXINE: CPT | Performed by: ADVANCED PRACTICE MIDWIFE

## 2017-06-07 PROCEDURE — 84443 ASSAY THYROID STIM HORMONE: CPT | Performed by: ADVANCED PRACTICE MIDWIFE

## 2017-06-07 PROCEDURE — 36415 COLL VENOUS BLD VENIPUNCTURE: CPT | Performed by: ADVANCED PRACTICE MIDWIFE

## 2017-06-07 PROCEDURE — 99212 OFFICE O/P EST SF 10 MIN: CPT | Mod: ZF

## 2017-06-07 RX ORDER — ASPIRIN 81 MG
100 TABLET, DELAYED RELEASE (ENTERIC COATED) ORAL DAILY
Qty: 60 TABLET | Refills: 1 | Status: SHIPPED | OUTPATIENT
Start: 2017-06-07 | End: 2019-01-16

## 2017-06-07 RX ORDER — PRENATAL VIT/IRON FUM/FOLIC AC 27MG-0.8MG
1 TABLET ORAL DAILY
Qty: 100 TABLET | Refills: 3 | Status: SHIPPED | OUTPATIENT
Start: 2017-06-07 | End: 2017-07-05

## 2017-06-07 ASSESSMENT — PAIN SCALES - GENERAL: PAINLEVEL: NO PAIN (0)

## 2017-06-07 NOTE — LETTER
"2017       RE: Roger Desouza  [address]   [facility] [address]     Dear Colleague,    Thank you for referring your patient, Roger Desouza, to the [facility] at [facility]. Please see a copy of my visit note below.    Subjective:      32 year old  at 14w1d presents for a routine prenatal appointment.       No vaginal bleeding or leakage of fluid.  No contractions or cramping.   No  fetal movement.       No HA, visual changes, RUQ or epigastric pain.   The patient presents with the following concerns:   1. Wants a different PNV. Reports not tolerating current script.   2. C/o of leg pain.  Pain is in lower legs, bilateral. Describes as an ache, comes and goes. Denies tenderness. No redness. No edema. No varicosities.    3. C/o of persistent Heartburn- zantac 150 BID not helping. Requesting alternate medication.   4. Constipation: Reports significant constipation., Has not tried any diet changes or medications.   5. Thyroid- pt reports she was told she would be called by  Endocrine for appointment.  Pt was not called.  Has not followed up.   6. Poor appetite:  Pt reports she does not feel like eating or drinking.  Denies nausea and vomiting.     Level II US -scheduled,      Objective:  Vitals:    17 1506   BP: 110/76   Pulse: 86   Weight: 49 kg (108 lb)   Height: 1.626 m (5' 4\")     See OB flowsheet    Assessment/Plan  Encounter Diagnoses   Name Primary?     Other normal pregnancy, not first, first trimester, , CNM pt Yes     Constipation, unspecified constipation type      Gastroesophageal reflux disease, esophagitis presence not specified      Hyperthyroidism      Orders Placed This Encounter   Procedures     Thyroxine, Free     TSH     ENDOCRINOLOGY ADULT REFERRAL     Orders Placed This Encounter   Medications     Prenatal Vit-Fe Fumarate-FA (PRENATAL MULTIVITAMIN  PLUS IRON) 27-0.8 MG TABS per tablet     Sig: Take 1 tablet " by mouth daily     Dispense:  100 tablet     Refill:  3     docusate sodium (COLACE) 100 MG tablet     Sig: Take 100 mg by mouth daily     Dispense:  60 tablet     Refill:  1     omeprazole (PRILOSEC) 20 MG CR capsule     Sig: Take 1 capsule (20 mg) by mouth daily     Dispense:  90 capsule     Refill:  1     1. New Prenatal vitamin ordered. Instructed her to talk to pharmacist when picking it up.   2.Encouraged stretches, heat, ice, elevation, bath soaks, Mg supplements for sore legs.  Reviewed signs/sx of blood clot.   3. Prilosec ordered for heartburn.    4. Constipation: Rx for BID colace. Encouraged increase in water and fiber.    5. Instructed pt to call endocrine to make appt and labs ordered.    6. Poor appetite:  Reviewed importance of increase fluids, frequent small meals, and treating constipation.      - Reviewed total weight gain, encouraged continued healthy diet and exercise.      - Reviewed why/how to contact provider.    Patient education/orders or handouts today:constipation   Return to clinic in 4 weeks and prn if questions or concerns.   MARCIN Arnold CNM

## 2017-06-07 NOTE — PROGRESS NOTES
"Subjective:      32 year old  at 14w1d presents for a routine prenatal appointment.       No vaginal bleeding or leakage of fluid.  No contractions or cramping.   No  fetal movement.       No HA, visual changes, RUQ or epigastric pain.   The patient presents with the following concerns:   1. Wants a different PNV. Reports not tolerating current script.   2. C/o of leg pain.  Pain is in lower legs, bilateral. Describes as an ache, comes and goes. Denies tenderness. No redness. No edema. No varicosities.    3. C/o of persistent Heartburn- zantac 150 BID not helping. Requesting alternate medication.   4. Constipation: Reports significant constipation., Has not tried any diet changes or medications.   5. Thyroid- pt reports she was told she would be called by  Endocrine for appointment.  Pt was not called.  Has not followed up.   6. Poor appetite:  Pt reports she does not feel like eating or drinking.  Denies nausea and vomiting.     Level II US -scheduled,        Objective:  Vitals:    17 1506   BP: 110/76   Pulse: 86   Weight: 49 kg (108 lb)   Height: 1.626 m (5' 4\")     See OB flowsheet    Assessment/Plan     Encounter Diagnoses   Name Primary?     Other normal pregnancy, not first, first trimester, , CNM pt Yes     Constipation, unspecified constipation type      Gastroesophageal reflux disease, esophagitis presence not specified      Hyperthyroidism      Orders Placed This Encounter   Procedures     Thyroxine, Free     TSH     ENDOCRINOLOGY ADULT REFERRAL     Orders Placed This Encounter   Medications     Prenatal Vit-Fe Fumarate-FA (PRENATAL MULTIVITAMIN  PLUS IRON) 27-0.8 MG TABS per tablet     Sig: Take 1 tablet by mouth daily     Dispense:  100 tablet     Refill:  3     docusate sodium (COLACE) 100 MG tablet     Sig: Take 100 mg by mouth daily     Dispense:  60 tablet     Refill:  1     omeprazole (PRILOSEC) 20 MG CR capsule     Sig: Take 1 capsule (20 mg) by mouth daily     Dispense:  90 " capsule     Refill:  1     1. New Prenatal vitamin ordered. Instructed her to talk to pharmacist when picking it up.   2.Encouraged stretches, heat, ice, elevation, bath soaks, Mg supplements for sore legs.  Reviewed signs/sx of blood clot.   3. Prilosec ordered for heartburn.    4. Constipation: Rx for BID colace. Encouraged increase in water and fiber.    5. Instructed pt to call endocrine to make appt and labs ordered.    6. Poor appetite:  Reviewed importance of increase fluids, frequent small meals, and treating constipation.        - Reviewed total weight gain, encouraged continued healthy diet and exercise.      - Reviewed why/how to contact provider.    Patient education/orders or handouts today:constipation   Return to clinic in 4 weeks and prn if questions or concerns.   MARCIN Arnold CNM

## 2017-06-08 ENCOUNTER — TELEPHONE (OUTPATIENT)
Dept: ENDOCRINOLOGY | Facility: CLINIC | Age: 32
End: 2017-06-08

## 2017-06-08 NOTE — TELEPHONE ENCOUNTER
----- Message from Seda LUCAS Link sent at 6/8/2017  1:58 PM CDT -----  Regarding: RE: Gestational hyperthyroid NEW JULIA  10 AM Tomorrow  This is done.  ----- Message -----     From: Yolanda Lobato RN     Sent: 6/8/2017   1:10 PM       To: Clinic [id number]  Subject: FW: Gestational hyperthyroid NEW JULIA  10 #    Please contact patieint and schedule with Webb tomorrow at 10 AM  Per Dr Morataya. I hope she can make it .   ----- Message -----     From: Darwin Morataya MD     Sent: 6/7/2017   5:28 PM       To: Yolanda Lobato RN  Subject: RE: Gestational hyperthyroid NEW                 Sure - make it 10.  BR      ----- Message -----     From: Yolanda Lobato RN     Sent: 6/7/2017   4:03 PM       To: Darwin Morataya MD  Subject: Gestational hyperthyroid NEW                     Due in December ( miscarriage  1/2017) Hyperthyroid   FT4 1.66 and TSH 0.03  OB  Asking us to see soon. I think we were putting one in Friday  At 9 AM  Can you do 10 AM for this? Paty Frey is already set up to see  4 news  Add ons  Next week.

## 2017-06-09 ENCOUNTER — OFFICE VISIT (OUTPATIENT)
Dept: ENDOCRINOLOGY | Facility: CLINIC | Age: 32
End: 2017-06-09

## 2017-06-09 VITALS
BODY MASS INDEX: 18.5 KG/M2 | WEIGHT: 107.8 LBS | DIASTOLIC BLOOD PRESSURE: 72 MMHG | HEART RATE: 76 BPM | SYSTOLIC BLOOD PRESSURE: 107 MMHG

## 2017-06-09 DIAGNOSIS — E05.90 HYPERTHYROIDISM: Primary | ICD-10-CM

## 2017-06-09 DIAGNOSIS — E89.0 H/O PARTIAL THYROIDECTOMY: ICD-10-CM

## 2017-06-09 DIAGNOSIS — O99.281 HYPERTHYROIDISM COMPLICATING PREGNANCY IN FIRST TRIMESTER: ICD-10-CM

## 2017-06-09 DIAGNOSIS — E05.90 HYPERTHYROIDISM COMPLICATING PREGNANCY IN FIRST TRIMESTER: ICD-10-CM

## 2017-06-09 DIAGNOSIS — E05.90 HYPERTHYROIDISM: ICD-10-CM

## 2017-06-09 LAB
T3 SERPL-MCNC: 109 NG/DL (ref 60–181)
THYROPEROXIDASE AB SERPL-ACNC: 13 IU/ML

## 2017-06-09 PROCEDURE — 84480 ASSAY TRIIODOTHYRONINE (T3): CPT | Performed by: INTERNAL MEDICINE

## 2017-06-09 PROCEDURE — 86376 MICROSOMAL ANTIBODY EACH: CPT | Performed by: INTERNAL MEDICINE

## 2017-06-09 NOTE — PROGRESS NOTES
CHIEF COMPLAINT:  A 32-year-old woman with goiter, 31 weeks pregnant and abnormal thyroid function tests.      HISTORY OF PRESENT ILLNESS:  Ms. Desouza is referred to us from the OB clinic for evaluation.  The patient is now about 14 weeks pregnant.  Her history includes a history of goiter with partial thyroidectomy in 2010, which was reportedly done in [place].  She has had recent thyroid function tests with her pregnancy which have shown a mildly suppressed TSH and free T4 level that has been in the upper normal or borderline elevated range.  Because of her history and findings she is referred to us for evaluation.      She reports the pregnancy is going well.  She says she does not have a lot of appetite, but she is not having significant problems with nausea or vomiting.  She does not describe any palpitations, no heat intolerance, if anything she tends to feel cold.  I note that she is being treated for constipation, so no problems with frequent bowel movements.  She has no eye complaints.      Her  is here today with her, along with a [facility] .  She reports that in 2010 in [facility] she had a partial thyroidectomy.  She said this was done because the thyroid was swollen.  She was also noting increased heart rate and headache.  I asked if there was a reason found for an overactive thyroid at that time; she and her  were not entirely clear, although her  seems fairly certain that it was not Graves' disease.  In any event, she apparently has not required any thyroid hormone replacement.      She reports no family history of thyroid disease.  She is from [facility] [place].  I asked her whether she felt that enlarged thyroids were common in her community when she was growing up and she said she really had not noticed that.      She has 1 child already who is 5 years old.  She had a pregnancy around the first of the year, which ended in early miscarriage.  Apparently it did take the  patient and her  some time to conceive that pregnancy, although she clearly conceived fairly soon after the miscarriage.      PAST MEDICAL HISTORY:  Significant for pregnancies in the past with outcomes as above, history of goiter with hyperthyroidism by history, status post what appears to be partial thyroidectomy.      MEDICATIONS:  The only medication she is taking regularly she says are prenatal vitamin and a medication for gas.  She is also prescribed Colace for constipation.  I see a prescription for Zofran to use as needed.      HABITS:  She has never smoked.      FAMILY HISTORY:  As above, negative for any thyroid disease in her parents or siblings.      SOCIAL HISTORY:  She is a homemaker.      REVIEW OF SYSTEMS:  She has no acute cardiac, respiratory or GI or visual complaints.      PHYSICAL EXAMINATION:   GENERAL:  She is a thin young woman who appears healthy, pulse was 76, blood pressure 107/72.   VITAL SIGNS:  Her weight was almost 108 pounds.  It looks like it has been stable since May.  The highest weight I see over the last 6 months was 115 pounds in April.  In January, she had a weight of 104-1/2 pounds.   EYES:  She has no proptosis, lid lag or stare.  Extraocular movements are intact.   NECK:  She has a visible goiter.  There is a surgical scar visible from her previous surgery.  On palpation, the thyroid feels moderately enlarged, both lobes feel palpable.  I do not really appreciate any discrete nodules.  There is no tenderness.   CARDIAC:  Regular rate and rhythm, normal heart sounds.   NEUROLOGIC:  She has no tremor of the outstretched hands.  Deep tendon reflexes are 2+ with normal relaxation phase.      LABORATORY TESTS:  We reviewed her past records.  On 04/26 she had a free T4 of 1.66, TSH was 0.03.  On 06/07, 2 days ago, she had a free T4 of 1.23, TSH was 0.02.      ASSESSMENT:  A 32-year-old woman with a known goiter, probable hyperthyroidism in the past apparently treated by  subtotal thyroidectomy.  Presumably the patient has been euthyroid in the past, although we do not have any documentation one way or the other.      The patient is now pregnant; her thyroid hormone levels are those that could be seen typically with pregnancy.  Her TSH is not completely suppressed, although it is below the normal range for nonpregnant adults.  Her initial free T4 in April was very mildly above our normal range but would be within the normal range for pregnant women.  A repeat free T4 done 2 days ago was within the normal range, even for nonpregnant adults.      I discussed the findings with Ms. Desouza and her .  Clinically she is euthyroid.  Her thyroid function test may just reflect normal changes that we see with pregnancy, primarily due to the effect of the hCG to stimulate the thyroid.  Her levels may also be affected somewhat by the goiter and she may have some areas of autonomous function.  I doubt this is Graves' disease, although if it is it would be mild.      I told her at this point that I think her free T4 levels are in the normal range for pregnancy and given her clinical picture, I do not think there is any need for any intervention at this point, it would be reasonable to follow things.      PLAN:   1.  We are going to check a TSI today just to rule out Graves' disease.  If she did have positive antibodies this would also be important in terms of potential effects on the fetus, I doubt that she has Graves'.   2.  We will check a total T3 today just to see where the baseline level is.  I would expect it may be slightly high given that she will have elevated thyroid binding globulin with pregnancy.  I do not think we need to repeat a free T4 and TSH at this point since it was just done 2 days ago.   3.  We will follow up with her on the results, she is on MyChart.  Unless there is any substantial change in her hormonal levels, then I think we can just monitor things periodically  during her pregnancy.  I did discuss with her symptoms of overt hyperthyroidism and instructed her she should contact us if she begins to note any of these.   4.  I do not think we will likely need to see her on a regular basis in our clinic, I think we could monitor things with her thyroid function tests and through visits at her OB clinic.  If we need to see her in the future, we can arrange that as needed.

## 2017-06-09 NOTE — MR AVS SNAPSHOT
After Visit Summary   6/9/2017    Roger Desouza    MRN: [id number]           Patient Information     Date Of Birth          1985        Visit Information        Provider Department      6/9/2017 9:45 AM Darwin Morataya MD; [facility]        Today's Diagnoses     Hyperthyroidism    -  1    H/O partial thyroidectomy        Hyperthyroidism complicating pregnancy in first trimester          Care Instructions    Dr. Morataya will follow up with results of lab tests.  We will then decide when to recheck the tests or if we need to do anything additional.    Right now the thyroid hormone level is normal - this is good.          Follow-ups after your visit        Follow-up notes from your care team     Return will fu with pt after lavbs.      Your next 10 appointments already scheduled     Jul 05, 2017  1:00 PM CDT   RETURN OB with Sebastian Mcdonough CNM   [facility] ([facility])    [facility] [address]  [address]  [facility] [id number] [phone]            Jul 11, 2017  1:30 PM CDT   MFSIGRID US COMP with [id number]   [facility])    [address]  [facility] [id number] [phone]           Wear comfortable clothes and leave your valuables at home.            Jul 11, 2017  2:00 PM CDT   Radiology MD with MARINA FAJARDO MD   [facility])    [address]  [facility] [address]   [phone]           Please arrive at the time given for your first appointment.  This visit is used internally to schedule the physician's time during your ultrasound.              Who to contact     Please call your clinic at [phone] to:    Ask questions about your health    Make or cancel appointments    Discuss your  medicines    Learn about your test results    Speak to your doctor   If you have compliments or concerns about an experience at your clinic, or if you wish to file a complaint, please contact [facility] Patient Relations at [phone] or email us at [email or web address]         Additional Information About Your Visit        [employer] Information     [employer] gives you secure access to your electronic health record. If you see a primary care provider, you can also send messages to your care team and make appointments. If you have questions, please call your primary care clinic.  If you do not have a primary care provider, please call [phone] and they will assist you.      [employer] is an electronic gateway that provides easy, online access to your medical records. With [employer], you can request a clinic appointment, read your test results, renew a prescription or communicate with your care team.     To access your existing account, please contact your [facility] or call [phone] for assistance.        Care EveryWhere ID     This is your Care EveryWhere ID. This could be used by other organizations to access your [place] medical records  [id number]        Your Vitals Were     Pulse Last Period BMI (Body Mass Index)             76 02/28/2017 (Exact Date) 18.5 kg/m2          Blood Pressure from Last 3 Encounters:   06/09/17 107/72   06/07/17 110/76   05/10/17 104/71    Weight from Last 3 Encounters:   06/09/17 48.9 kg (107 lb 12.8 oz)   06/07/17 49 kg (108 lb)   05/10/17 49.2 kg (108 lb 8 oz)               Primary Care Provider    Physician No Ref-Primary       No address on file        Thank you!     Thank you for choosing [facility] ENDOCRIN[facility]  for your care. Our goal is always to provide you with excellent care. Hearing back from our patients is one way we can continue to improve our services. Please take a few minutes to complete the  written survey that you may receive in the mail after your visit with us. Thank you!             Your Updated Medication List - Protect others around you: Learn how to safely use, store and throw away your medicines at www.disposemymeds.org.          This list is accurate as of: 6/9/17 11:59 PM.  Always use your most recent med list.                   Brand Name Dispense Instructions for use    docusate sodium 100 MG tablet    COLACE    60 tablet    Take 100 mg by mouth daily       doxylamine 25 MG Tabs tablet    UNISOM    14 each    Take 1 tablet (25 mg) by mouth At Bedtime       omeprazole 20 MG CR capsule    priLOSEC    90 capsule    Take 1 capsule (20 mg) by mouth daily       ondansetron 4 MG ODT tab    ZOFRAN ODT    20 tablet    Take 1-2 tablets (4-8 mg) by mouth every 8 hours as needed for nausea       ranitidine 150 MG tablet    ZANTAC    60 tablet    Take 1 tablet (150 mg) by mouth 2 times daily       * TRINATE Tabs     90 tablet    Take 1 tablet by mouth daily       * prenatal multivitamin  plus iron 27-0.8 MG Tabs per tablet     100 tablet    Take 1 tablet by mouth daily       TYLENOL PO      Take 325 mg by mouth Reported on 5/10/2017       * Notice:  This list has 2 medication(s) that are the same as other medications prescribed for you. Read the directions carefully, and ask your doctor or other care provider to review them with you.

## 2017-06-09 NOTE — PATIENT INSTRUCTIONS
Dr. Morataya will follow up with results of lab tests.  We will then decide when to recheck the tests or if we need to do anything additional.    Right now the thyroid hormone level is normal - this is good.

## 2017-06-09 NOTE — LETTER
6/9/2017       RE: Roger Desouza  [address]   [place] [address]     Dear Colleague,    Thank you for referring your patient, Roger Desouza, to the [facility] ENDOCRINOLOGY at [facility]. Please see a copy of my visit note below.    CHIEF COMPLAINT:  A 32-year-old woman with goiter, 31 weeks pregnant and abnormal thyroid function tests.      HISTORY OF PRESENT ILLNESS:  Ms. Desouza is referred to us from the OB clinic for evaluation.  The patient is now about 14 weeks pregnant.  Her history includes a history of goiter with partial thyroidectomy in 2010, which was reportedly done in [place].  She has had recent thyroid function tests with her pregnancy which have shown a mildly suppressed TSH and free T4 level that has been in the upper normal or borderline elevated range.  Because of her history and findings she is referred to us for evaluation.      She reports the pregnancy is going well.  She says she does not have a lot of appetite, but she is not having significant problems with nausea or vomiting.  She does not describe any palpitations, no heat intolerance, if anything she tends to feel cold.  I note that she is being treated for constipation, so no problems with frequent bowel movements.  She has no eye complaints.      Her  is here today with her, along with a [place] .  She reports that in 2010 in [facility] she had a partial thyroidectomy.  She said this was done because the thyroid was swollen.  She was also noting increased heart rate and headache.  I asked if there was a reason found for an overactive thyroid at that time; she and her  were not entirely clear, although her  seems fairly certain that it was not Graves' disease.  In any event, she apparently has not required any thyroid hormone replacement.      She reports no family history of thyroid disease.  She is from [place].  I asked her whether she felt that  enlarged thyroids were common in her community when she was growing up and she said she really had not noticed that.      She has 1 child already who is 5 years old.  She had a pregnancy around the first of the year, which ended in early miscarriage.  Apparently it did take the patient and her  some time to conceive that pregnancy, although she clearly conceived fairly soon after the miscarriage.      PAST MEDICAL HISTORY:  Significant for pregnancies in the past with outcomes as above, history of goiter with hyperthyroidism by history, status post what appears to be partial thyroidectomy.      MEDICATIONS:  The only medication she is taking regularly she says are prenatal vitamin and a medication for gas.  She is also prescribed Colace for constipation.  I see a prescription for Zofran to use as needed.      HABITS:  She has never smoked.      FAMILY HISTORY:  As above, negative for any thyroid disease in her parents or siblings.      SOCIAL HISTORY:  She is a homemaker.      REVIEW OF SYSTEMS:  She has no acute cardiac, respiratory or GI or visual complaints.      PHYSICAL EXAMINATION:   GENERAL:  She is a thin young woman who appears healthy, pulse was 76, blood pressure 107/72.   VITAL SIGNS:  Her weight was almost 108 pounds.  It looks like it has been stable since May.  The highest weight I see over the last 6 months was 115 pounds in April.  In January, she had a weight of 104-1/2 pounds.   EYES:  She has no proptosis, lid lag or stare.  Extraocular movements are intact.   NECK:  She has a visible goiter.  There is a surgical scar visible from her previous surgery.  On palpation, the thyroid feels moderately enlarged, both lobes feel palpable.  I do not really appreciate any discrete nodules.  There is no tenderness.   CARDIAC:  Regular rate and rhythm, normal heart sounds.   NEUROLOGIC:  She has no tremor of the outstretched hands.  Deep tendon reflexes are 2+ with normal relaxation phase.       LABORATORY TESTS:  We reviewed her past records.  On 04/26 she had a free T4 of 1.66, TSH was 0.03.  On 06/07, 2 days ago, she had a free T4 of 1.23, TSH was 0.02.      ASSESSMENT:  A 32-year-old woman with a known goiter, probable hyperthyroidism in the past apparently treated by subtotal thyroidectomy.  Presumably the patient has been euthyroid in the past, although we do not have any documentation one way or the other.      The patient is now pregnant; her thyroid hormone levels are those that could be seen typically with pregnancy.  Her TSH is not completely suppressed, although it is below the normal range for nonpregnant adults.  Her initial free T4 in April was very mildly above our normal range but would be within the normal range for pregnant women.  A repeat free T4 done 2 days ago was within the normal range, even for nonpregnant adults.      I discussed the findings with Ms. Desouza and her .  Clinically she is euthyroid.  Her thyroid function test may just reflect normal changes that we see with pregnancy, primarily due to the effect of the hCG to stimulate the thyroid.  Her levels may also be affected somewhat by the goiter and she may have some areas of autonomous function.  I doubt this is Graves' disease, although if it is it would be mild.      I told her at this point that I think her free T4 levels are in the normal range for pregnancy and given her clinical picture, I do not think there is any need for any intervention at this point, it would be reasonable to follow things.      PLAN:   1.  We are going to check a TSI today just to rule out Graves' disease.  If she did have positive antibodies this would also be important in terms of potential effects on the fetus, I doubt that she has Graves'.   2.  We will check a total T3 today just to see where the baseline level is.  I would expect it may be slightly high given that she will have elevated thyroid binding globulin with pregnancy.  I do  not think we need to repeat a free T4 and TSH at this point since it was just done 2 days ago.   3.  We will follow up with her on the results, she is on MyChart.  Unless there is any substantial change in her hormonal levels, then I think we can just monitor things periodically during her pregnancy.  I did discuss with her symptoms of overt hyperthyroidism and instructed her she should contact us if she begins to note any of these.   4.  I do not think we will likely need to see her on a regular basis in our clinic, I think we could monitor things with her thyroid function tests and through visits at her OB clinic.  If we need to see her in the future, we can arrange that as needed.       Darwin Morataya MD

## 2017-06-14 LAB — TSI SER-ACNC: NORMAL

## 2017-06-15 ENCOUNTER — TELEPHONE (OUTPATIENT)
Dept: ENDOCRINOLOGY | Facility: CLINIC | Age: 32
End: 2017-06-15

## 2017-06-15 DIAGNOSIS — E05.90 HYPERTHYROIDISM: Primary | ICD-10-CM

## 2017-06-15 NOTE — TELEPHONE ENCOUNTER
----- Message from Seda LUCAS Link sent at 6/15/2017 10:45 AM CDT -----  Regarding: RE: Schedule lab   This is done.  ----- Message -----     From: Yolanda Lobato RN     Sent: 6/15/2017  10:32 AM       To: [id number]  Subject: Schedule lab                                      Lab orders placed. Please contact patient and schedule  Lab visit for one month per Dr morataya.   ----- Message -----     From: Darwin Morataya MD     Sent: 6/14/2017   5:06 PM       To: [facility]      Would like her to get FT4, T3 and TSH recheck in 1 month.  BR

## 2017-07-05 ENCOUNTER — OFFICE VISIT (OUTPATIENT)
Dept: OBGYN | Facility: CLINIC | Age: 32
End: 2017-07-05
Attending: ADVANCED PRACTICE MIDWIFE
Payer: COMMERCIAL

## 2017-07-05 VITALS
HEIGHT: 64 IN | DIASTOLIC BLOOD PRESSURE: 79 MMHG | WEIGHT: 111.3 LBS | SYSTOLIC BLOOD PRESSURE: 111 MMHG | BODY MASS INDEX: 19 KG/M2

## 2017-07-05 DIAGNOSIS — E05.90 HYPERTHYROIDISM: ICD-10-CM

## 2017-07-05 DIAGNOSIS — Z34.82 ENCOUNTER FOR SUPERVISION OF OTHER NORMAL PREGNANCY IN SECOND TRIMESTER: Primary | ICD-10-CM

## 2017-07-05 PROBLEM — Z34.92 SUPERVISION OF NORMAL PREGNANCY IN SECOND TRIMESTER: Status: ACTIVE | Noted: 2017-04-26

## 2017-07-05 PROBLEM — Z34.92 SUPERVISION OF NORMAL PREGNANCY IN SECOND TRIMESTER: Status: RESOLVED | Noted: 2017-04-26 | Resolved: 2017-07-05

## 2017-07-05 PROCEDURE — 99211 OFF/OP EST MAY X REQ PHY/QHP: CPT | Mod: ZF

## 2017-07-05 ASSESSMENT — PAIN SCALES - GENERAL: PAINLEVEL: MODERATE PAIN (4)

## 2017-07-05 NOTE — PROGRESS NOTES
"Subjective:      32 year old  at 18w1d presents for a routine prenatal appointment.         Denies cramping/contractions, vaginal bleeding or leakage of fluid.Reports +fetal movement- approx 1 week, most laying down at night.   No HA, visual changes, RUQ or epigastric pain.     Patient concerns: Doing well. Does still report some bilateral leg soreness- describes as achy/hurting. Worse with prolonged standing. Has felt similar discomfort in between pregnancies and during past pregnancies.     Saw endocrinologist who recommended repeat thyroid labs in 4 weeks.    Level II US  Scheduled for . Finding out boy or girl.     Objective:  Vitals:    17 1306   BP: 111/79   Weight: 50.5 kg (111 lb 4.8 oz)   Height: 1.626 m (5' 4.02\")      See OB flowsheet    Physical exam: Bilateral lower extremities- Exam normal, no edema, no redness, normal temperature. No isolated areas of tenderness.     Assessment/Plan     Encounter Diagnosis   Name Primary?     Encounter for supervision of other normal pregnancy in second trimester Yes       Orders Placed This Encounter   Medications     order for DME     Sig: Compression stockings     Dispense:  1 each     Refill:  0       - Reviewed total weight gain, encouraged continued healthy diet and exercise.      - Reviewed why/how to contact provider.    Patient education/orders or handouts today:  PTL signs/symptoms, Fetal movement and Level 2 u/s scheduled    Relief/comfort measures reviewed for generalized leg soreness with prolonged standing, including elevation, warm packs, compression stockings.  Prescription given for compression stockings.  S/S of dvt reviewed.  Follow endocrinology's recommendations - repeat thyroid labs on - orders in place from Dr. Morataya.  Level 2 u/s scheduled for .  Continue scheduled prenatal care, RTC in approx 4 weeks and prn if questions or concerns.     MARCIN Velasquez, OTONIEL                "

## 2017-07-05 NOTE — MR AVS SNAPSHOT
After Visit Summary   7/5/2017    Roger Desouza    MRN: [id number]           Patient Information     Date Of Birth          1985        Visit Information        Provider Department      7/5/2017 1:00 PM Sebastian Mcdonough CNM [facility]        Today's Diagnoses     Encounter for supervision of other normal pregnancy in second trimester    -  1    Hyperthyroidism           Follow-ups after your visit        Follow-up notes from your care team     Return in about 4 weeks (around 8/2/2017) for SEGUNDO FREDERICK.      Your next 10 appointments already scheduled     Jul 11, 2017  1:30 PM CDT   SCOTTY US COMP with [id number]   [facility])    [address]  [facility] [address] [phone]           Wear comfortable clothes and leave your valuables at home.            Jul 11, 2017  2:00 PM CDT   Radiology MD with UR SIGRID DONG   [facility])    [address]  [facility] [address] [phone]           Please arrive at the time given for your first appointment.  This visit is used internally to schedule the physician's time during your ultrasound.            Jul 17, 2017  2:30 PM CDT   LAB with  LAB    [facility])    [address] [address] [phone]           Patient must bring picture ID.  Patient should be prepared to give a urine specimen  Please do not eat 10-12 hours before your appointment if you are coming in fasting for labs on lipids, cholesterol, or glucose (sugar).  Pregnant women should follow their Care Team instructions. Water with medications is okay. Do not drink coffee or other fluids.   If you have concerns about taking  your medications, please ask at office or if scheduling via trip.met, send a  "message by clicking on Secure Messaging, Message Your Care Team.            Aug 02, 2017  2:00 PM CDT   RETURN OB with MARCIN Griffith CNM   [facility] ([facility])    [facility] [address]  [address]  [facility] [id number] [phone]              Who to contact     Please call your clinic at [phone] to:    Ask questions about your health    Make or cancel appointments    Discuss your medicines    Learn about your test results    Speak to your doctor   If you have compliments or concerns about an experience at your clinic, or if you wish to file a complaint, please contact [facility] Patient Relations at [phone] or email us at [email or web address]         Additional Information About Your Visit        Citizensidehart Information     [employer] gives you secure access to your electronic health record. If you see a primary care provider, you can also send messages to your care team and make appointments. If you have questions, please call your primary care clinic.  If you do not have a primary care provider, please call [phone] and they will assist you.      [employer] is an electronic gateway that provides easy, online access to your medical records. With [employer], you can request a clinic appointment, read your test results, renew a prescription or communicate with your care team.     To access your existing account, please contact your [facility] or call [phone] for assistance.        Care EveryWhere ID     This is your Care EveryWhere ID. This could be used by other organizations to access your [place] medical records  [id number]        Your Vitals Were     Height Last Period BMI (Body Mass Index)             1.626 m (5' 4.02\") 02/28/2017 (Exact Date) 19.1 kg/m2          Blood Pressure from Last 3 Encounters:   07/05/17 111/79   06/09/17 107/72   06/07/17 " 110/76    Weight from Last 3 Encounters:   07/05/17 50.5 kg (111 lb 4.8 oz)   06/09/17 48.9 kg (107 lb 12.8 oz)   06/07/17 49 kg (108 lb)              Today, you had the following     No orders found for display         Today's Medication Changes          These changes are accurate as of: 7/5/17 11:59 PM.  If you have any questions, ask your nurse or doctor.               Start taking these medicines.        Dose/Directions    order for DME   Used for:  Encounter for supervision of other normal pregnancy in second trimester   Started by:  Sebastian Mcdonough CNM        Compression stockings   Quantity:  1 each   Refills:  0         These medicines have changed or have updated prescriptions.        Dose/Directions    TRINATE Tabs   This may have changed:  Another medication with the same name was removed. Continue taking this medication, and follow the directions you see here.   Used for:  Supervision of normal pregnancy in first trimester   Changed by:  NurseJeremias        Dose:  1 tablet   Take 1 tablet by mouth daily   Quantity:  90 tablet   Refills:  3         Stop taking these medicines if you haven't already. Please contact your care team if you have questions.     doxylamine 25 MG Tabs tablet   Commonly known as:  UNISOM   Stopped by:  Sebastian Mcdonough CNM           ondansetron 4 MG ODT tab   Commonly known as:  ZOFRAN ODT   Stopped by:  Sebastian Mcdonough CNM                Where to get your medicines      Some of these will need a paper prescription and others can be bought over the counter.  Ask your nurse if you have questions.     Bring a paper prescription for each of these medications     order for DME                Primary Care Provider    Physician No Ref-Primary       No address on file        Equal Access to Services     AWAIS PARSON AH: Saira Sawyer, linda al, ynes suh  laaustin de la torre. So [facility] [phone].    ATENCIÓN: Si habla tejinder, tiene a fox disposición servicios gratuitos de asistencia lingüística. Justin al [phone].    We comply with applicable federal civil rights laws and [place] laws. We do not discriminate on the basis of race, color, national origin, age, disability sex, sexual orientation or gender identity.            Thank you!     Thank you for choosing [facility]  for your care. Our goal is always to provide you with excellent care. Hearing back from our patients is one way we can continue to improve our services. Please take a few minutes to complete the written survey that you may receive in the mail after your visit with us. Thank you!             Your Updated Medication List - Protect others around you: Learn how to safely use, store and throw away your medicines at www.disposemymeds.org.          This list is accurate as of: 7/5/17 11:59 PM.  Always use your most recent med list.                   Brand Name Dispense Instructions for use Diagnosis    docusate sodium 100 MG tablet    COLACE    60 tablet    Take 100 mg by mouth daily    Constipation, unspecified constipation type       omeprazole 20 MG CR capsule    priLOSEC    90 capsule    Take 1 capsule (20 mg) by mouth daily    Gastroesophageal reflux disease, esophagitis presence not specified       order for DME     1 each    Compression stockings    Encounter for supervision of other normal pregnancy in second trimester       ranitidine 150 MG tablet    ZANTAC    60 tablet    Take 1 tablet (150 mg) by mouth 2 times daily    Heartburn       TRINATE Tabs     90 tablet    Take 1 tablet by mouth daily    Supervision of normal pregnancy in first trimester       TYLENOL PO      Take 325 mg by mouth Reported on 5/10/2017

## 2017-07-05 NOTE — LETTER
"2017       RE: Roger Desouza  [address]   [facility] [address]     Dear Colleague,    Thank you for referring your patient, Roger Desouza, to the [facility] at [facility]. Please see a copy of my visit note below.    Subjective:      32 year old  at 18w1d presents for a routine prenatal appointment.         Denies cramping/contractions, vaginal bleeding or leakage of fluid.Reports +fetal movement- approx 1 week, most laying down at night.   No HA, visual changes, RUQ or epigastric pain.     Patient concerns: Doing well. Does still report some bilateral leg soreness- describes as achy/hurting. Worse with prolonged standing. Has felt similar discomfort in between pregnancies and during past pregnancies.     Saw endocrinologist who recommended repeat thyroid labs in 4 weeks.    Level II US  Scheduled for . Finding out boy or girl.     Objective:  Vitals:    17 1306   BP: 111/79   Weight: 50.5 kg (111 lb 4.8 oz)   Height: 1.626 m (5' 4.02\")      See OB flowsheet    Physical exam: Bilateral lower extremities- Exam normal, no edema, no redness, normal temperature. No isolated areas of tenderness.     Assessment/Plan     Encounter Diagnosis   Name Primary?     Encounter for supervision of other normal pregnancy in second trimester Yes       Orders Placed This Encounter   Medications     order for DME     Sig: Compression stockings     Dispense:  1 each     Refill:  0       - Reviewed total weight gain, encouraged continued healthy diet and exercise.      - Reviewed why/how to contact provider.    Patient education/orders or handouts today:  PTL signs/symptoms, Fetal movement and Level 2 u/s scheduled    Relief/comfort measures reviewed for generalized leg soreness with prolonged standing, including elevation, warm packs, compression stockings.  Prescription given for compression stockings.  S/S of dvt reviewed.  Follow endocrinology's " recommendations - repeat thyroid labs on 7/17- orders in place from Dr. Morataya.  Level 2 u/s scheduled for 7/11.  Continue scheduled prenatal care, RTC in approx 4 weeks and prn if questions or concerns.     MARCIN Velasquez, EDDIEM

## 2017-07-05 NOTE — NURSING NOTE
Chief Complaint   Patient presents with     Prenatal Care   18.2 week for almost the lat two weeks.   Left leg pain  For last month   Hurts to stand.

## 2017-07-06 ENCOUNTER — PRE VISIT (OUTPATIENT)
Dept: MATERNAL FETAL MEDICINE | Facility: CLINIC | Age: 32
End: 2017-07-06

## 2017-07-11 ENCOUNTER — HOSPITAL ENCOUNTER (OUTPATIENT)
Dept: ULTRASOUND IMAGING | Facility: CLINIC | Age: 32
Discharge: HOME | End: 2017-07-11
Attending: ADVANCED PRACTICE MIDWIFE | Admitting: ADVANCED PRACTICE MIDWIFE
Payer: COMMERCIAL

## 2017-07-11 ENCOUNTER — OFFICE VISIT (OUTPATIENT)
Dept: MATERNAL FETAL MEDICINE | Facility: CLINIC | Age: 32
End: 2017-07-11
Attending: ADVANCED PRACTICE MIDWIFE
Payer: COMMERCIAL

## 2017-07-11 DIAGNOSIS — O99.282 HYPERTHYROIDISM AFFECTING PREGNANCY IN SECOND TRIMESTER: Primary | ICD-10-CM

## 2017-07-11 DIAGNOSIS — O26.90 PREGNANCY RELATED CONDITION, UNSPECIFIED TRIMESTER: ICD-10-CM

## 2017-07-11 DIAGNOSIS — E05.90 HYPERTHYROIDISM AFFECTING PREGNANCY IN SECOND TRIMESTER: Primary | ICD-10-CM

## 2017-07-11 PROCEDURE — 76811 OB US DETAILED SNGL FETUS: CPT

## 2017-07-11 NOTE — PROGRESS NOTES
"Please see \"Imaging\" tab under \"Chart Review\" for details of today's US at the [facility].    Kaleb Valdivia MD  Maternal-Fetal Medicine      "

## 2017-07-11 NOTE — MR AVS SNAPSHOT
After Visit Summary   7/11/2017    Roger Desouza    MRN: [id number]           Patient Information     Date Of Birth          1985        Visit Information        Provider Department      7/11/2017 2:00 PM Kaleb Valdivia MD [facility] Maternal Fetal Medicine [facility]        Today's Diagnoses     Hyperthyroidism affecting pregnancy in second trimester    -  1       Follow-ups after your visit        Your next 10 appointments already scheduled     Jul 17, 2017  2:30 PM CDT   LAB with [facility] ([facility])    [address] [facility]  [facility] [address] [phone]           Patient must bring picture ID.  Patient should be prepared to give a urine specimen  Please do not eat 10-12 hours before your appointment if you are coming in fasting for labs on lipids, cholesterol, or glucose (sugar).  Pregnant women should follow their Care Team instructions. Water with medications is okay. Do not drink coffee or other fluids.   If you have concerns about taking  your medications, please ask at office or if scheduling via Jobmetoo, send a message by clicking on Secure Messaging, Message Your Care Team.            Aug 02, 2017  2:00 PM CDT   RETURN OB with MARCIN Griffith CNM   [facility] ([facility])    [facility] [address]  [address]  [address]  [facility] [address] [phone]              Who to contact     If you have questions or need follow up information about today's clinic visit or your schedule please contact [facility] directly at [phone].  Normal or non-critical lab and imaging results will be communicated to you by MyChart, letter or phone within 4 business days after the clinic has received the results. If you do not hear from us within 7 days, please contact the clinic through MyChart or phone. If you have a critical or abnormal  lab result, we will notify you by phone as soon as possible.  Submit refill requests through SwiftPayMD(TM) by [employer] or call your pharmacy and they will forward the refill request to us. Please allow 3 business days for your refill to be completed.          Additional Information About Your Visit        Evrihart Information     Swift[employer]MD(TM) by [employer] gives you secure access to your electronic health record. If you see a primary care provider, you can also send messages to your care team and make appointments. If you have questions, please call your primary care clinic.  If you do not have a primary care provider, please call [phone] and they will assist you.        Care EveryWhere ID     This is your Care EveryWhere ID. This could be used by other organizations to access your [place] medical records  [id number]        Your Vitals Were     Last Period                   02/28/2017 (Exact Date)            Blood Pressure from Last 3 Encounters:   07/05/17 111/79   06/09/17 107/72   06/07/17 110/76    Weight from Last 3 Encounters:   07/05/17 50.5 kg (111 lb 4.8 oz)   06/09/17 48.9 kg (107 lb 12.8 oz)   06/07/17 49 kg (108 lb)              Today, you had the following     No orders found for display       Primary Care Provider    Physician No Ref-Primary       No address on file        Equal Access to Services     AWAIS PARSON : Hadii barb bobbyo Sohomerali, waaxda luqadaha, qaybta kaalmada adeegyada, ynes palomino . So [facility] [phone].    ATENCIÓN: Si habla español, tiene a fox disposición servicios gratuitos de asistencia lingüística. Llame al [phone].    We comply with applicable federal civil rights laws and [place] laws. We do not discriminate on the basis of race, color, national origin, age, disability sex, sexual orientation or gender identity.            Thank you!     Thank you for choosing [facility]  for your care. Our goal is always to provide you with excellent  care. Hearing back from our patients is one way we can continue to improve our services. Please take a few minutes to complete the written survey that you may receive in the mail after your visit with us. Thank you!             Your Updated Medication List - Protect others around you: Learn how to safely use, store and throw away your medicines at www.disposemymeds.org.          This list is accurate as of: 7/11/17  2:54 PM.  Always use your most recent med list.                   Brand Name Dispense Instructions for use Diagnosis    docusate sodium 100 MG tablet    COLACE    60 tablet    Take 100 mg by mouth daily    Constipation, unspecified constipation type       omeprazole 20 MG CR capsule    priLOSEC    90 capsule    Take 1 capsule (20 mg) by mouth daily    Gastroesophageal reflux disease, esophagitis presence not specified       order for DME     1 each    Compression stockings    Encounter for supervision of other normal pregnancy in second trimester       ranitidine 150 MG tablet    ZANTAC    60 tablet    Take 1 tablet (150 mg) by mouth 2 times daily    Heartburn       TRINATE Tabs     90 tablet    Take 1 tablet by mouth daily    Supervision of normal pregnancy in first trimester       TYLENOL PO      Take 325 mg by mouth Reported on 5/10/2017

## 2017-07-17 DIAGNOSIS — E05.90 HYPERTHYROIDISM: ICD-10-CM

## 2017-07-17 LAB
T3 SERPL-MCNC: 137 NG/DL (ref 60–181)
T4 FREE SERPL-MCNC: 1.12 NG/DL (ref 0.76–1.46)
TSH SERPL DL<=0.05 MIU/L-ACNC: 0.6 MU/L (ref 0.4–4)

## 2017-07-17 PROCEDURE — 36415 COLL VENOUS BLD VENIPUNCTURE: CPT | Performed by: INTERNAL MEDICINE

## 2017-07-17 PROCEDURE — 84439 ASSAY OF FREE THYROXINE: CPT | Performed by: INTERNAL MEDICINE

## 2017-07-17 PROCEDURE — 84443 ASSAY THYROID STIM HORMONE: CPT | Performed by: INTERNAL MEDICINE

## 2017-07-17 PROCEDURE — 84480 ASSAY TRIIODOTHYRONINE (T3): CPT | Performed by: INTERNAL MEDICINE

## 2017-07-18 ENCOUNTER — TELEPHONE (OUTPATIENT)
Dept: ENDOCRINOLOGY | Facility: CLINIC | Age: 32
End: 2017-07-18

## 2017-07-18 DIAGNOSIS — E05.90 HYPERTHYROIDISM: Primary | ICD-10-CM

## 2017-08-02 ENCOUNTER — OFFICE VISIT (OUTPATIENT)
Dept: OBGYN | Facility: CLINIC | Age: 32
End: 2017-08-02
Attending: ADVANCED PRACTICE MIDWIFE
Payer: COMMERCIAL

## 2017-08-02 VITALS
BODY MASS INDEX: 19.58 KG/M2 | HEIGHT: 64 IN | WEIGHT: 114.7 LBS | SYSTOLIC BLOOD PRESSURE: 115 MMHG | DIASTOLIC BLOOD PRESSURE: 73 MMHG

## 2017-08-02 DIAGNOSIS — Z34.82 ENCOUNTER FOR SUPERVISION OF OTHER NORMAL PREGNANCY IN SECOND TRIMESTER: Primary | ICD-10-CM

## 2017-08-02 DIAGNOSIS — E05.90 HYPERTHYROIDISM: ICD-10-CM

## 2017-08-02 ASSESSMENT — PAIN SCALES - GENERAL: PAINLEVEL: NO PAIN (0)

## 2017-08-02 NOTE — NURSING NOTE
Chief Complaint   Patient presents with     Prenatal Care   22.1 weeks ob visit would like a pap copy rx for compression stockings.

## 2017-08-02 NOTE — MR AVS SNAPSHOT
After Visit Summary   8/2/2017    Roger Desouza    MRN: [id number]           Patient Information     Date Of Birth          1985        Visit Information        Provider Department      8/2/2017 2:00 PM Isha Pennington APRN [facility]        Today's Diagnoses     Encounter for supervision of other normal pregnancy in second trimester    -  1    Hyperthyroidism           Follow-ups after your visit        Follow-up notes from your care team     Return in about 5 weeks (around 9/6/2017) for EOB, GCT and labs.      Your next 10 appointments already scheduled     Sep 06, 2017 10:00 AM CDT   Return Obstetrics Extended with Sebastian Mcdonough CNM   [facility] ([facility])    [facility] [address]  [address]  [facility] [address] [phone]              Who to contact     Please call your clinic at [phone] to:    Ask questions about your health    Make or cancel appointments    Discuss your medicines    Learn about your test results    Speak to your doctor   If you have compliments or concerns about an experience at your clinic, or if you wish to file a complaint, please contact [facility] Patient Relations at [phone] or email us at [email or web address]         Additional Information About Your Visit        MyChart Information     Abigail Stewartt gives you secure access to your electronic health record. If you see a primary care provider, you can also send messages to your care team and make appointments. If you have questions, please call your primary care clinic.  If you do not have a primary care provider, please call [phone] and they will assist you.      [employer] is an electronic gateway that provides easy, online access to your medical records. With [employer], you can request a clinic appointment, read your test results, renew a  "prescription or communicate with your care team.     To access your existing account, please contact your [facility] or call [phone] for assistance.        Care EveryWhere ID     This is your Care EveryWhere ID. This could be used by other organizations to access your [place] medical records  [id number]        Your Vitals Were     Height Last Period BMI (Body Mass Index)             1.626 m (5' 4.02\") 02/28/2017 (Exact Date) 19.68 kg/m2          Blood Pressure from Last 3 Encounters:   08/07/17 120/81   08/02/17 115/73   07/05/17 111/79    Weight from Last 3 Encounters:   08/07/17 52 kg (114 lb 9.6 oz)   08/02/17 52 kg (114 lb 11.2 oz)   07/05/17 50.5 kg (111 lb 4.8 oz)              Today, you had the following     No orders found for display         Today's Medication Changes          These changes are accurate as of: 8/2/17 11:59 PM.  If you have any questions, ask your nurse or doctor.               Stop taking these medicines if you haven't already. Please contact your care team if you have questions.     order for DME   Stopped by:  Isha Pennington APRN CNM           ranitidine 150 MG tablet   Commonly known as:  ZANTAC   Stopped by:  Isha Pennington APRN CNM                    Primary Care Provider    Physician No Ref-Primary       No address on file        Equal Access to Services     AWAIS PARSON : Hadii barb otoole Sopadma, waaxda luqadaha, qaybta kaalmada ynes law . So [facility] [phone].    ATENCIÓN: Si habla español, tiene a fox disposición servicios gratuitos de asistencia lingüística. Llame al [phone].    We comply with applicable federal civil rights laws and [place] laws. We do not discriminate on the basis of race, color, national origin, age, disability sex, sexual orientation or gender identity.            Thank you!     Thank you for choosing [facility]  for your " care. Our goal is always to provide you with excellent care. Hearing back from our patients is one way we can continue to improve our services. Please take a few minutes to complete the written survey that you may receive in the mail after your visit with us. Thank you!             Your Updated Medication List - Protect others around you: Learn how to safely use, store and throw away your medicines at www.disposemymeds.org.          This list is accurate as of: 8/2/17 11:59 PM.  Always use your most recent med list.                   Brand Name Dispense Instructions for use Diagnosis    docusate sodium 100 MG tablet    COLACE    60 tablet    Take 100 mg by mouth daily    Constipation, unspecified constipation type       omeprazole 20 MG CR capsule    priLOSEC    90 capsule    Take 1 capsule (20 mg) by mouth daily    Gastroesophageal reflux disease, esophagitis presence not specified       TRINATE Tabs     90 tablet    Take 1 tablet by mouth daily    Supervision of normal pregnancy in first trimester       TYLENOL PO      Take 325 mg by mouth Reported on 5/10/2017

## 2017-08-02 NOTE — LETTER
"2017       RE: Roger Desouza  [address]   [place] [facility] [address]     Dear Colleague,    Thank you for referring your patient, Roger Desouza, to the [facility] at [facility]. Please see a copy of my visit note below.    Subjective:      32 year old  at 23w1d presents for a routine prenatal appointment.       no vaginal bleeding or leakage of fluid.  no contractions or cramping.    pos fetal movement.       No HA, visual changes, RUQ or epigastric pain.   The patient presents with the following concerns: none   Level II US  Results reviewed normal scan.      Objective:  Vitals:    17 1411   BP: 115/73   Weight: 52 kg (114 lb 11.2 oz)   Height: 1.626 m (5' 4.02\")     See OB flowsheet    Assessment/Plan  Encounter Diagnoses   Name Primary?     Encounter for supervision of other normal pregnancy in second trimester Yes     Hyperthyroidism      No orders of the defined types were placed in this encounter.    No orders of the defined types were placed in this encounter.    - Reviewed total weight gain, encouraged continued healthy diet and exercise.      - Reviewed why/how to contact provider.    Patient education/orders or handouts today:  Plan for EOB visit w labs   Return to clinic in 5 weeks and prn if questions or concerns.   Isha Pennington, APRN OTONIEL          "

## 2017-08-07 ENCOUNTER — HOSPITAL ENCOUNTER (OUTPATIENT)
Age: 32
End: 2017-08-07
Attending: ADVANCED PRACTICE MIDWIFE | Admitting: ADVANCED PRACTICE MIDWIFE
Payer: COMMERCIAL

## 2017-08-07 ENCOUNTER — OFFICE VISIT (OUTPATIENT)
Dept: OBGYN | Facility: CLINIC | Age: 32
End: 2017-08-07
Attending: ADVANCED PRACTICE MIDWIFE
Payer: COMMERCIAL

## 2017-08-07 VITALS
SYSTOLIC BLOOD PRESSURE: 120 MMHG | BODY MASS INDEX: 19.66 KG/M2 | DIASTOLIC BLOOD PRESSURE: 81 MMHG | WEIGHT: 114.6 LBS | HEART RATE: 84 BPM

## 2017-08-07 DIAGNOSIS — Z34.82 ENCOUNTER FOR SUPERVISION OF OTHER NORMAL PREGNANCY IN SECOND TRIMESTER: Primary | ICD-10-CM

## 2017-08-07 PROCEDURE — 99212 OFFICE O/P EST SF 10 MIN: CPT | Mod: ZF

## 2017-08-07 ASSESSMENT — PAIN SCALES - GENERAL: PAINLEVEL: NO PAIN (0)

## 2017-08-07 NOTE — LETTER
2017       RE: Roger Desouza  [address]   [facility] [address]     Dear Colleague,    Thank you for referring your patient, Roger Desouza, to the [facility] at [facility]. Please see a copy of my visit note below.    Subjective:      32 year old  at 22w6d presents for a prenatal appointment d/t decreased fetal movement.      Patient concerns: Pt walked into clinic today stating that she was worried because she has not felt fetal movement since yesterday morning. Pt states that she started feeling fetal movement around 17 weeks of pregnancy. She usually feels baby move at night when she is laying down to go to sleep. If she doesn't feel movement, she will position herself to her side or drink cold water. She states that she did not feel the normal movement last night. She was worried and came in for doptones.     Denies cramping/contractions. Denies vaginal bleeding or leakage of fluid.  Reports +fetal movement.   No HA, visual changes, RUQ or epigastric pain.      Objective:  Vitals:    17 1356   BP: 120/81   Pulse: 84   Weight: 52 kg (114 lb 9.6 oz)      See OB flowsheet    FHTs auscultated at 145bpm via doppler.  Auscultated >3minutes.     Assessment/Plan     Encounter Diagnosis   Name Primary?     Encounter for supervision of other normal pregnancy in second trimester Yes       - Reviewed total weight gain, encouraged continued healthy diet and exercise.      - Reviewed why/how to contact provider.    Patient education/orders or handouts today:  Fetal movement and Plan for EOB visit w labs     Pt reassured by hearing fetal heart tones.  Discussed fetal movement counts- call clinic for any concerns.  Plan EOB at next visit.  Continue scheduled prenatal care, RTC in approx 4 weeks for EOB  and prn if questions or concerns.     MARCIN Velasquez, OTONIEL

## 2017-08-07 NOTE — MR AVS SNAPSHOT
After Visit Summary   8/7/2017    Roger Desouza    MRN: [id number]           Patient Information     Date Of Birth          1985        Visit Information        Provider Department      8/7/2017 1:45 PM Sebastian Mcdonough CNM [facility]        Today's Diagnoses     Encounter for supervision of other normal pregnancy in second trimester    -  1       Follow-ups after your visit        Follow-up notes from your care team     Return in about 4 weeks (around 9/4/2017) for EOB visit.      Your next 10 appointments already scheduled     Sep 06, 2017 10:00 AM CDT   Return Obstetrics Extended with Sebastian Mcdonough CNM   [facility] ([facility])    [facility]  [address]  [address]  [facility] [id number] [phone]              Who to contact     Please call your clinic at [phone] to:    Ask questions about your health    Make or cancel appointments    Discuss your medicines    Learn about your test results    Speak to your doctor   If you have compliments or concerns about an experience at your clinic, or if you wish to file a complaint, please contact [facility] Patient Relations at [phone] or email us at [email or web address]         Additional Information About Your Visit        MyChart Information     Paicet gives you secure access to your electronic health record. If you see a primary care provider, you can also send messages to your care team and make appointments. If you have questions, please call your primary care clinic.  If you do not have a primary care provider, please call [phone] and they will assist you.      Parantez is an electronic gateway that provides easy, online access to your medical records. With Parantez, you can request a clinic appointment, read your test results, renew a prescription or communicate with your  care team.     To access your existing account, please contact your [facility] or call [phone] for assistance.        Care EveryWhere ID     This is your Care EveryWhere ID. This could be used by other organizations to access your [place] medical records  [id number]        Your Vitals Were     Pulse Last Period Breastfeeding? BMI (Body Mass Index)          84 02/28/2017 (Exact Date) No 19.66 kg/m2         Blood Pressure from Last 3 Encounters:   08/07/17 120/81   08/02/17 115/73   07/05/17 111/79    Weight from Last 3 Encounters:   08/07/17 52 kg (114 lb 9.6 oz)   08/02/17 52 kg (114 lb 11.2 oz)   07/05/17 50.5 kg (111 lb 4.8 oz)              Today, you had the following     No orders found for display       Primary Care Provider    Physician No Ref-Primary       No address on file        Equal Access to Services     LEONOR [facility] HospitalSHEFALI : Hadii barb Sawyer, waaxda servando, qaybta kaalmada ani, ynes palomino . So [facility] [phone].    ATENCIÓN: Si habla español, tiene a fox disposición servicios gratuitos de asistencia lingüística. Llame al [phone].    We comply with applicable federal civil rights laws and [place] laws. We do not discriminate on the basis of race, color, national origin, age, disability sex, sexual orientation or gender identity.            Thank you!     Thank you for choosing [facility]  for your care. Our goal is always to provide you with excellent care. Hearing back from our patients is one way we can continue to improve our services. Please take a few minutes to complete the written survey that you may receive in the mail after your visit with us. Thank you!             Your Updated Medication List - Protect others around you: Learn how to safely use, store and throw away your medicines at www.disposemymeds.org.          This list is accurate as of: 8/7/17  2:18 PM.  Always use your most  recent med list.                   Brand Name Dispense Instructions for use Diagnosis    docusate sodium 100 MG tablet    COLACE    60 tablet    Take 100 mg by mouth daily    Constipation, unspecified constipation type       omeprazole 20 MG CR capsule    priLOSEC    90 capsule    Take 1 capsule (20 mg) by mouth daily    Gastroesophageal reflux disease, esophagitis presence not specified       TRINATE Tabs     90 tablet    Take 1 tablet by mouth daily    Supervision of normal pregnancy in first trimester       TYLENOL PO      Take 325 mg by mouth Reported on 5/10/2017

## 2017-08-07 NOTE — NURSING NOTE
Chief Complaint   Patient presents with     Prenatal Care     ROB22 weeks and 6 days   Neha Spencer LPN

## 2017-08-07 NOTE — PROGRESS NOTES
Subjective:      32 year old  at 22w6d presents for a prenatal appointment d/t decreased fetal movement.      Patient concerns: Pt walked into clinic today stating that she was worried because she has not felt fetal movement since yesterday morning. Pt states that she started feeling fetal movement around 17 weeks of pregnancy. She usually feels baby move at night when she is laying down to go to sleep. If she doesn't feel movement, she will position herself to her side or drink cold water. She states that she did not feel the normal movement last night. She was worried and came in for doptones.     Denies cramping/contractions. Denies vaginal bleeding or leakage of fluid.  Reports +fetal movement.   No HA, visual changes, RUQ or epigastric pain.      Objective:  Vitals:    17 1356   BP: 120/81   Pulse: 84   Weight: 52 kg (114 lb 9.6 oz)      See OB flowsheet    FHTs auscultated at 145bpm via doppler.  Auscultated >3minutes.     Assessment/Plan     Encounter Diagnosis   Name Primary?     Encounter for supervision of other normal pregnancy in second trimester Yes       - Reviewed total weight gain, encouraged continued healthy diet and exercise.      - Reviewed why/how to contact provider.    Patient education/orders or handouts today:  Fetal movement and Plan for EOB visit w labs     Pt reassured by hearing fetal heart tones.  Discussed fetal movement counts- call clinic for any concerns.  Plan EOB at next visit.  Continue scheduled prenatal care, RTC in approx 4 weeks for EOB  and prn if questions or concerns.     MARCIN Velasquez, OTONIEL

## 2017-09-06 ENCOUNTER — OFFICE VISIT (OUTPATIENT)
Dept: OBGYN | Facility: CLINIC | Age: 32
End: 2017-09-06
Attending: OBSTETRICS & GYNECOLOGY
Payer: COMMERCIAL

## 2017-09-06 VITALS
BODY MASS INDEX: 19.99 KG/M2 | DIASTOLIC BLOOD PRESSURE: 78 MMHG | HEIGHT: 64 IN | HEART RATE: 98 BPM | WEIGHT: 117.1 LBS | SYSTOLIC BLOOD PRESSURE: 118 MMHG

## 2017-09-06 DIAGNOSIS — O99.810 GLUCOSE INTOLERANCE OF PREGNANCY: ICD-10-CM

## 2017-09-06 DIAGNOSIS — Z34.82 ENCOUNTER FOR SUPERVISION OF OTHER NORMAL PREGNANCY IN SECOND TRIMESTER: Primary | ICD-10-CM

## 2017-09-06 DIAGNOSIS — D64.9 ANEMIA, UNSPECIFIED TYPE: ICD-10-CM

## 2017-09-06 LAB
DEPRECATED CALCIDIOL+CALCIFEROL SERPL-MC: 60 UG/L (ref 20–75)
ERYTHROCYTE [DISTWIDTH] IN BLOOD BY AUTOMATED COUNT: 14 % (ref 10–15)
GLUCOSE 1H P 50 G GLC PO SERPL-MCNC: 141 MG/DL (ref 60–129)
HCT VFR BLD AUTO: 32.1 % (ref 35–47)
HGB BLD-MCNC: 10.8 G/DL (ref 11.7–15.7)
MCH RBC QN AUTO: 30.9 PG (ref 26.5–33)
MCHC RBC AUTO-ENTMCNC: 33.6 G/DL (ref 31.5–36.5)
MCV RBC AUTO: 92 FL (ref 78–100)
PLATELET # BLD AUTO: 193 10E9/L (ref 150–450)
RBC # BLD AUTO: 3.5 10E12/L (ref 3.8–5.2)
WBC # BLD AUTO: 9.5 10E9/L (ref 4–11)

## 2017-09-06 PROCEDURE — 86780 TREPONEMA PALLIDUM: CPT | Performed by: ADVANCED PRACTICE MIDWIFE

## 2017-09-06 PROCEDURE — 82306 VITAMIN D 25 HYDROXY: CPT | Performed by: ADVANCED PRACTICE MIDWIFE

## 2017-09-06 PROCEDURE — 82950 GLUCOSE TEST: CPT | Performed by: ADVANCED PRACTICE MIDWIFE

## 2017-09-06 PROCEDURE — 99212 OFFICE O/P EST SF 10 MIN: CPT | Mod: ZF

## 2017-09-06 PROCEDURE — 36415 COLL VENOUS BLD VENIPUNCTURE: CPT | Performed by: ADVANCED PRACTICE MIDWIFE

## 2017-09-06 PROCEDURE — 85027 COMPLETE CBC AUTOMATED: CPT | Performed by: ADVANCED PRACTICE MIDWIFE

## 2017-09-06 ASSESSMENT — PAIN SCALES - GENERAL: PAINLEVEL: NO PAIN (0)

## 2017-09-06 NOTE — LETTER
Date:September 11, 2017      Patient was self referred, no letter generated. Do not send.        [facility] Health Information

## 2017-09-06 NOTE — PROGRESS NOTES
32 year old, , 27w1d, presents for EOB visit.    Patient concerns: Feeling well overall. Denies cramping/contractions, vaginal bleeding, discharge or leakage of fluid. Reports +fetal movement.  No HA, vision changes, ruq/epigastric pain.    Education completed today includes breast feeding, [facility] hand out , contraception, counting movements, signs of pre-term labor, when to present to birthplace, post partum depression, GBS, getting enough iron and labor induction.  Birth preferences reviewed: Does not want epidural; maybe interested in nitrous oxide; Unmedicated with 1st baby  Labor support:     [place] Feeding plans : Breastfeeding   Contraception planned: Undecided- states she did not use anything since the birth of her daughter 5 years ago.   The following labs were ordered today:       GCT, CBC w platelets, Vitamin D, Hep C and Anti-treponema  [place] birth consent form was not given.    Blood type:   ABO   Date Value Ref Range Status   2017 O  Final     RH(D)   Date Value Ref Range Status   2017  Pos  Final     Antibody Screen   Date Value Ref Range Status   2017 Neg  Final     Rhogam  was not given.  TDAP  was not given- declined today.    A/P:  Encounter Diagnosis   Name Primary?     Encounter for supervision of other normal pregnancy in second trimester Yes     Orders Placed This Encounter   Procedures     Glucose 1 Hour     CBC with Platelets     25- OH-Vitamin D     Anti Treponema       EOB education reviewed.  EOB labs ordered- 1 hour glucose, cbc with plts, anti-trep, vitamin d.  Plan to check thyroid labs at next visit- after 17.  Declines Tdap today.  Continue scheduled prenatal care, RTC in approx 3 weeks for MARCIN Montes De Oca, OTONIEL

## 2017-09-06 NOTE — LETTER
2017       RE: Roger Desouza  [address]   [facility] [address]     Dear Colleague,    Thank you for referring your patient, Roger Desouza, to the [facility] at [facility]. Please see a copy of my visit note below.     32 year old, , 27w1d, presents for EOB visit.    Patient concerns: Feeling well overall. Denies cramping/contractions, vaginal bleeding, discharge or leakage of fluid. Reports +fetal movement.  No HA, vision changes, ruq/epigastric pain.    Education completed today includes breast feeding, [facility] hand out , contraception, counting movements, signs of pre-term labor, when to present to birthplace, post partum depression, GBS, getting enough iron and labor induction.  Birth preferences reviewed: Does not want epidural; maybe interested in nitrous oxide; Unmedicated with 1st baby  Labor support:     [place] Feeding plans : Breastfeeding   Contraception planned: Undecided- states she did not use anything since the birth of her daughter 5 years ago.   The following labs were ordered today:       GCT, CBC w platelets, Vitamin D, Hep C and Anti-treponema  Water birth consent form was not given.    Blood type:   ABO   Date Value Ref Range Status   2017 O  Final     RH(D)   Date Value Ref Range Status   2017  Pos  Final     Antibody Screen   Date Value Ref Range Status   2017 Neg  Final     Rhogam  was not given.  TDAP  was not given- declined today.    A/P:  Encounter Diagnosis   Name Primary?     Encounter for supervision of other normal pregnancy in second trimester Yes     Orders Placed This Encounter   Procedures     Glucose 1 Hour     CBC with Platelets     25- OH-Vitamin D     Anti Treponema       EOB education reviewed.  EOB labs ordered- 1 hour glucose, cbc with plts, anti-trep, vitamin d.  Plan to check thyroid labs at next visit- after 17.  Declines Tdap today.  Continue scheduled prenatal  care, RTC in approx 3 weeks for MARCIN Montes De Oca CNM                 Spoke with Roger through [employer] . Gave instructions/directions for 3 hour glucose test. She will come tomorrow. Also directed her to take iron daily. She would like Rx. Rx sent.    Again, thank you for allowing me to participate in the care of your patient.      Sincerely,    Sebastian Mcdonough CNM

## 2017-09-06 NOTE — MR AVS SNAPSHOT
After Visit Summary   9/6/2017    Roger Desouza    MRN: [id number]           Patient Information     Date Of Birth          1985        Visit Information        Provider Department      9/6/2017 10:00 AM Sebastian Mcdonough CNM [facility]        Today's Diagnoses     Encounter for supervision of other normal pregnancy in second trimester    -  1    Glucose intolerance of pregnancy        Anemia, unspecified type           Follow-ups after your visit        Follow-up notes from your care team     Return in about 3 weeks (around 9/27/2017) for LAECIA.      Your next 10 appointments already scheduled     Sep 27, 2017  1:00 PM CDT   RETURN OB with Sebastian Mcdonough CNM   [facility] ([facility])    [facility] [address]  [address]  [facility] [id number] [phone]              Who to contact     Please call your clinic at [phone] to:    Ask questions about your health    Make or cancel appointments    Discuss your medicines    Learn about your test results    Speak to your doctor   If you have compliments or concerns about an experience at your clinic, or if you wish to file a complaint, please contact [facility] Patient Relations at [phone] or email us at [email or web address]         Additional Information About Your Visit        MyChart Information     Mozaicohart gives you secure access to your electronic health record. If you see a primary care provider, you can also send messages to your care team and make appointments. If you have questions, please call your primary care clinic.  If you do not have a primary care provider, please call [phone] and they will assist you.      MobileTag is an electronic gateway that provides easy, online access to your medical records. With MobileTag, you can request a clinic appointment, read your test  "results, renew a prescription or communicate with your care team.     To access your existing account, please contact your [facility] or call [phone] for assistance.        Care EveryWhere ID     This is your Care EveryWhere ID. This could be used by other organizations to access your [place] medical records  [id number]        Your Vitals Were     Pulse Height Last Period BMI (Body Mass Index)          98 1.626 m (5' 4\") 02/28/2017 (Exact Date) 20.1 kg/m2         Blood Pressure from Last 3 Encounters:   09/06/17 118/78   08/07/17 120/81   08/02/17 115/73    Weight from Last 3 Encounters:   09/06/17 53.1 kg (117 lb 1.6 oz)   08/07/17 52 kg (114 lb 9.6 oz)   08/02/17 52 kg (114 lb 11.2 oz)              We Performed the Following     25- OH-Vitamin D     Anti Treponema     CBC with Platelets     Ferritin     Glucose 1 Hour     Iron and Iron Binding Capacity        Primary Care Provider    Physician No Ref-Primary       No address on file        Equal Access to Services     AWAIS PARSON : Hadii barb otoole Sopadma, waaxda luqadaha, qaybta kaalmabouchra law, ynes palomino . So [facility] [phone].    ATENCIÓN: Si habla español, tiene a fox disposición servicios gratuitos de asistencia lingüística. Llame al [phone].    We comply with applicable federal civil rights laws and [place] laws. We do not discriminate on the basis of race, color, national origin, age, disability sex, sexual orientation or gender identity.            Thank you!     Thank you for choosing [facility]  for your care. Our goal is always to provide you with excellent care. Hearing back from our patients is one way we can continue to improve our services. Please take a few minutes to complete the written survey that you may receive in the mail after your visit with us. Thank you!             Your Updated Medication List - Protect others around you: Learn how " to safely use, store and throw away your medicines at www.disposemymeds.org.          This list is accurate as of: 9/6/17 11:59 PM.  Always use your most recent med list.                   Brand Name Dispense Instructions for use Diagnosis    docusate sodium 100 MG tablet    COLACE    60 tablet    Take 100 mg by mouth daily    Constipation, unspecified constipation type       omeprazole 20 MG CR capsule    priLOSEC    90 capsule    Take 1 capsule (20 mg) by mouth daily    Gastroesophageal reflux disease, esophagitis presence not specified       TRINATE Tabs     90 tablet    Take 1 tablet by mouth daily    Supervision of normal pregnancy in first trimester       TYLENOL PO      Take 325 mg by mouth Reported on 5/10/2017

## 2017-09-07 ENCOUNTER — TELEPHONE (OUTPATIENT)
Dept: OBGYN | Facility: CLINIC | Age: 32
End: 2017-09-07

## 2017-09-07 DIAGNOSIS — O99.810 ABNORMAL MATERNAL GLUCOSE TOLERANCE, ANTEPARTUM: Primary | ICD-10-CM

## 2017-09-07 DIAGNOSIS — O99.012 ANEMIA IN PREGNANCY, SECOND TRIMESTER: ICD-10-CM

## 2017-09-07 LAB — T PALLIDUM IGG+IGM SER QL: NEGATIVE

## 2017-09-07 RX ORDER — PNV NO.95/FERROUS FUM/FOLIC AC 28MG-0.8MG
1 TABLET ORAL
Qty: 100 TABLET | Refills: 3 | Status: ON HOLD | OUTPATIENT
Start: 2017-09-07 | End: 2017-12-16

## 2017-09-07 NOTE — PROGRESS NOTES
Spoke with Roger through [facility] . Gave instructions/directions for 3 hour glucose test. She will come tomorrow. Also directed her to take iron daily. She would like Rx. Rx sent.

## 2017-09-08 DIAGNOSIS — O99.810 ABNORMAL MATERNAL GLUCOSE TOLERANCE, ANTEPARTUM: ICD-10-CM

## 2017-09-08 PROBLEM — D64.9 ANEMIA: Status: ACTIVE | Noted: 2017-09-08

## 2017-09-08 LAB
GLUCOSE 1H P 100 G GLC PO SERPL-MCNC: 187 MG/DL (ref 60–179)
GLUCOSE 2H P 100 G GLC PO SERPL-MCNC: 147 MG/DL (ref 60–154)
GLUCOSE 3H P 100 G GLC PO SERPL-MCNC: 136 MG/DL (ref 60–139)
GLUCOSE P FAST SERPL-MCNC: 87 MG/DL (ref 60–94)

## 2017-09-08 PROCEDURE — 83540 ASSAY OF IRON: CPT | Performed by: ADVANCED PRACTICE MIDWIFE

## 2017-09-08 PROCEDURE — 82952 GTT-ADDED SAMPLES: CPT | Performed by: MIDWIFE

## 2017-09-08 PROCEDURE — 82728 ASSAY OF FERRITIN: CPT | Performed by: ADVANCED PRACTICE MIDWIFE

## 2017-09-08 PROCEDURE — 36415 COLL VENOUS BLD VENIPUNCTURE: CPT | Performed by: MIDWIFE

## 2017-09-08 PROCEDURE — 82951 GLUCOSE TOLERANCE TEST (GTT): CPT | Performed by: MIDWIFE

## 2017-09-08 PROCEDURE — 83550 IRON BINDING TEST: CPT | Performed by: ADVANCED PRACTICE MIDWIFE

## 2017-09-11 LAB
FERRITIN SERPL-MCNC: 32 NG/ML (ref 12–150)
IRON SATN MFR SERPL: 63 % (ref 15–46)
IRON SERPL-MCNC: 228 UG/DL (ref 35–180)
TIBC SERPL-MCNC: 364 UG/DL (ref 240–430)

## 2017-09-27 ENCOUNTER — OFFICE VISIT (OUTPATIENT)
Dept: OBGYN | Facility: CLINIC | Age: 32
End: 2017-09-27
Attending: OBSTETRICS & GYNECOLOGY
Payer: COMMERCIAL

## 2017-09-27 VITALS
SYSTOLIC BLOOD PRESSURE: 129 MMHG | DIASTOLIC BLOOD PRESSURE: 80 MMHG | BODY MASS INDEX: 20.49 KG/M2 | HEIGHT: 64 IN | WEIGHT: 120 LBS

## 2017-09-27 DIAGNOSIS — D64.9 ANEMIA, UNSPECIFIED TYPE: ICD-10-CM

## 2017-09-27 DIAGNOSIS — E05.90 HYPERTHYROIDISM: ICD-10-CM

## 2017-09-27 DIAGNOSIS — Z34.83 ENCOUNTER FOR SUPERVISION OF OTHER NORMAL PREGNANCY IN THIRD TRIMESTER: Primary | ICD-10-CM

## 2017-09-27 DIAGNOSIS — Z23 NEED FOR VACCINATION: ICD-10-CM

## 2017-09-27 LAB
T3 SERPL-MCNC: 157 NG/DL (ref 60–181)
T4 FREE SERPL-MCNC: 1 NG/DL (ref 0.76–1.46)
TSH SERPL DL<=0.005 MIU/L-ACNC: 0.87 MU/L (ref 0.4–4)

## 2017-09-27 PROCEDURE — 25000128 H RX IP 250 OP 636: Mod: ZF

## 2017-09-27 PROCEDURE — 84480 ASSAY TRIIODOTHYRONINE (T3): CPT | Performed by: INTERNAL MEDICINE

## 2017-09-27 PROCEDURE — 90715 TDAP VACCINE 7 YRS/> IM: CPT | Mod: ZF

## 2017-09-27 PROCEDURE — T1013 SIGN LANG/ORAL INTERPRETER: HCPCS | Mod: U3

## 2017-09-27 PROCEDURE — 36415 COLL VENOUS BLD VENIPUNCTURE: CPT | Performed by: INTERNAL MEDICINE

## 2017-09-27 PROCEDURE — 40000809 ZZH STATISTIC NO DOCUMENTATION TO SUPPORT CHARGE

## 2017-09-27 PROCEDURE — 90471 IMMUNIZATION ADMIN: CPT | Mod: ZF

## 2017-09-27 PROCEDURE — 84439 ASSAY OF FREE THYROXINE: CPT | Performed by: INTERNAL MEDICINE

## 2017-09-27 PROCEDURE — 84443 ASSAY THYROID STIM HORMONE: CPT | Performed by: INTERNAL MEDICINE

## 2017-09-27 RX ORDER — PRENATAL VIT,CAL 73/IRON/FOLIC 28 MG-1 MG
1 TABLET ORAL DAILY
Qty: 90 TABLET | Refills: 3 | Status: SHIPPED | OUTPATIENT
Start: 2017-09-27 | End: 2019-01-18

## 2017-09-27 ASSESSMENT — PAIN SCALES - GENERAL: PAINLEVEL: NO PAIN (0)

## 2017-09-27 NOTE — PROGRESS NOTES
"Subjective:      32 year old  at 30w1d presentst for a routine prenatal appointment.      Denies cramping/contractions, vaginal bleeding or leakage of fluid. Reports + fetal movement.      No HA, visual changes, RUQ or epigastric pain.     Patient concerns:   Feeling well. Pt declines IV iron infusions despite previous recommendations. States that she will continue taking only po iron. Agreeable to thyroid labs today per endocrinologist's recommendations. Would like tdap.     Objective:  Vitals:    17 1302   BP: 129/80   Weight: 54.4 kg (120 lb)   Height: 1.626 m (5' 4.02\")     See ob flowsheet    Assessment/Plan     Encounter Diagnosis   Name Primary?     Encounter for supervision of other normal pregnancy in third trimester Yes     Orders Placed This Encounter   Procedures     TDAP VACCINE (BOOSTRIX)     Orders Placed This Encounter   Medications     Prenatal Vit-Fe Fumarate-FA (TRINATE) TABS     Sig: Take 1 tablet by mouth daily     Dispense:  90 tablet     Refill:  3     - Reviewed total weight gain, encouraged continued healthy diet and exercise.  .  Reviewed importance of daily fetal kick count and why/how to contact provider.  - Reviewed why/how to contact provider if headache/visual changes/RUQ or epigastric pain, decreased fetal movement, vaginal bleeding, leakage of fluid or more than 4 contractions in an hour.     Patient education/orders or handouts today:  PTL signs/symptoms    Reviewed EOB labs. 3 hour glucose test WNL.  Reviewed recommendations for IV iron infusions. Pt declines and states she will continue taking a daily iron supplement.  Tdap given today.  Refilled prenatal vitamin.  Thyroid labs to be drawn today- orders have been placed by endocrinologist, Dr. Morataya.  Continue scheduled prenatal care, RTC in approx 2 weeks and prn if questions or concerns.     MARCIN Velasquez, OTONIEL      "

## 2017-09-27 NOTE — LETTER
"2017       RE: Roger Desouza  [address]   [facility] [address]     Dear Colleague,    Thank you for referring your patient, Roger Desouza, to the [facility] at [facility]. Please see a copy of my visit note below.    Subjective:      32 year old  at 30w1d presentst for a routine prenatal appointment.      Denies cramping/contractions, vaginal bleeding or leakage of fluid. Reports + fetal movement.      No HA, visual changes, RUQ or epigastric pain.     Patient concerns:   Feeling well. Pt declines IV iron infusions despite previous recommendations. States that she will continue taking only po iron. Agreeable to thyroid labs today per endocrinologist's recommendations. Would like tdap.     Objective:  Vitals:    17 1302   BP: 129/80   Weight: 54.4 kg (120 lb)   Height: 1.626 m (5' 4.02\")     See ob flowsheet    Assessment/Plan     Encounter Diagnosis   Name Primary?     Encounter for supervision of other normal pregnancy in third trimester Yes     Orders Placed This Encounter   Procedures     TDAP VACCINE (BOOSTRIX)     Orders Placed This Encounter   Medications     Prenatal Vit-Fe Fumarate-FA (TRINATE) TABS     Sig: Take 1 tablet by mouth daily     Dispense:  90 tablet     Refill:  3     - Reviewed total weight gain, encouraged continued healthy diet and exercise.  .  Reviewed importance of daily fetal kick count and why/how to contact provider.  - Reviewed why/how to contact provider if headache/visual changes/RUQ or epigastric pain, decreased fetal movement, vaginal bleeding, leakage of fluid or more than 4 contractions in an hour.     Patient education/orders or handouts today:  PTL signs/symptoms    Reviewed EOB labs. 3 hour glucose test WNL.  Reviewed recommendations for IV iron infusions. Pt declines and states she will continue taking a daily iron supplement.  Tdap given today.  Refilled prenatal vitamin.  Thyroid labs to " be drawn today- orders have been placed by endocrinologist, Dr. Morataya.  Continue scheduled prenatal care, RTC in approx 2 weeks and prn if questions or concerns.     MARCIN Velasquez CNM        Again, thank you for allowing me to participate in the care of your patient.      Sincerely,    Sebastian Mcdonough CNM

## 2017-09-27 NOTE — MR AVS SNAPSHOT
After Visit Summary   9/27/2017    Roger Desouza    MRN: [id number]           Patient Information     Date Of Birth          1985        Visit Information        Provider Department      9/27/2017 1:00 PM Sebastian Mcdonough CNM [facility]        Today's Diagnoses     Encounter for supervision of other normal pregnancy in third trimester    -  1    Need for vaccination        Hyperthyroidism        Anemia, unspecified type           Follow-ups after your visit        Follow-up notes from your care team     Return in about 2 weeks (around 10/11/2017) for SEGUNDO FREDERICK.      Your next 10 appointments already scheduled     Oct 12, 2017  1:00 PM CDT   RETURN OB with Sebastian Mcdonough CNM   [facility] ([facility])    [facility] [address]  [address]  [facility] [address] [phone]              Who to contact     Please call your clinic at [phone] to:    Ask questions about your health    Make or cancel appointments    Discuss your medicines    Learn about your test results    Speak to your doctor   If you have compliments or concerns about an experience at your clinic, or if you wish to file a complaint, please contact [facility] Patient Relations at [phone] or email us at [email or web address]         Additional Information About Your Visit        MyChart Information     [employer] gives you secure access to your electronic health record. If you see a primary care provider, you can also send messages to your care team and make appointments. If you have questions, please call your primary care clinic.  If you do not have a primary care provider, please call [phone] and they will assist you.      [employer] is an electronic gateway that provides easy, online access to your medical records. With [employer], you can request a clinic appointment,  "read your test results, renew a prescription or communicate with your care team.     To access your existing account, please contact your [facility] or call [phone] for assistance.        Care EveryWhere ID     This is your Care EveryWhere ID. This could be used by other organizations to access your [place] medical records  [id number]        Your Vitals Were     Height Last Period BMI (Body Mass Index)             1.626 m (5' 4.02\") 02/28/2017 (Exact Date) 20.59 kg/m2          Blood Pressure from Last 3 Encounters:   09/27/17 129/80   09/06/17 118/78   08/07/17 120/81    Weight from Last 3 Encounters:   09/27/17 54.4 kg (120 lb)   09/06/17 53.1 kg (117 lb 1.6 oz)   08/07/17 52 kg (114 lb 9.6 oz)              We Performed the Following     TDAP VACCINE (BOOSTRIX)          Where to get your medicines      These medications were sent to [facility] [address]  [address] [address]     Phone:  [phone]     [facility]          Primary Care Provider Fax #    Physician No Ref-Primary [phone]       No address on file        Equal Access to Services     AWAIS PARSON : Hadii barb ku hadasho Soomaali, waaxda luqadaha, qaybta kaalmada adeegyada, waxay kyin radha de la torre. So [facility] [phone].    ATENCIÓN: Si habla español, tiene a fox disposición servicios gratuitos de asistencia lingüística. Llame al [phone].    We comply with applicable federal civil rights laws and [place] laws. We do not discriminate on the basis of race, color, national origin, age, disability, sex, sexual orientation, or gender identity.            Thank you!     Thank you for choosing [facility]  for your care. Our goal is always to provide you with excellent care. Hearing back from our patients is one way we can continue to improve our services. Please take a few minutes to complete the written " survey that you may receive in the mail after your visit with us. Thank you!             Your Updated Medication List - Protect others around you: Learn how to safely use, store and throw away your medicines at www.disposemymeds.org.          This list is accurate as of: 9/27/17 11:59 PM.  Always use your most recent med list.                   Brand Name Dispense Instructions for use Diagnosis    docusate sodium 100 MG tablet    COLACE    60 tablet    Take 100 mg by mouth daily    Constipation, unspecified constipation type       iron 325 (65 FE) MG tablet     100 tablet    Take 1 tablet by mouth daily (with breakfast)    Anemia in pregnancy, second trimester       omeprazole 20 MG CR capsule    priLOSEC    90 capsule    Take 1 capsule (20 mg) by mouth daily    Gastroesophageal reflux disease, esophagitis presence not specified       TRINATE Tabs     90 tablet    Take 1 tablet by mouth daily    Encounter for supervision of other normal pregnancy in third trimester, Need for vaccination       TYLENOL PO      Take 325 mg by mouth Reported on 5/10/2017

## 2017-09-27 NOTE — LETTER
Date:October 2, 2017      Patient was self referred, no letter generated. Do not send.        [facility] Health Information

## 2017-10-12 ENCOUNTER — OFFICE VISIT (OUTPATIENT)
Dept: OBGYN | Facility: CLINIC | Age: 32
End: 2017-10-12
Attending: ADVANCED PRACTICE MIDWIFE
Payer: COMMERCIAL

## 2017-10-12 VITALS
TEMPERATURE: 97.5 F | BODY MASS INDEX: 19.13 KG/M2 | WEIGHT: 119 LBS | DIASTOLIC BLOOD PRESSURE: 77 MMHG | SYSTOLIC BLOOD PRESSURE: 125 MMHG | HEIGHT: 66 IN

## 2017-10-12 DIAGNOSIS — D64.9 ANEMIA, UNSPECIFIED TYPE: ICD-10-CM

## 2017-10-12 DIAGNOSIS — Z34.83 ENCOUNTER FOR SUPERVISION OF OTHER NORMAL PREGNANCY IN THIRD TRIMESTER: Primary | ICD-10-CM

## 2017-10-12 LAB — HEMOGLOBIN: 10.3 G/DL (ref 11.7–15.7)

## 2017-10-12 PROCEDURE — 99211 OFF/OP EST MAY X REQ PHY/QHP: CPT

## 2017-10-12 PROCEDURE — 85018 HEMOGLOBIN: CPT | Mod: ZF | Performed by: ADVANCED PRACTICE MIDWIFE

## 2017-10-12 RX ORDER — DIPHENHYDRAMINE HCL 25 MG
25 TABLET ORAL
Status: CANCELLED
Start: 2017-10-12

## 2017-10-12 RX ORDER — ACETAMINOPHEN 325 MG/1
650 TABLET ORAL
Status: CANCELLED
Start: 2017-10-12

## 2017-10-12 ASSESSMENT — PAIN SCALES - GENERAL: PAINLEVEL: SEVERE PAIN (6)

## 2017-10-12 NOTE — NURSING NOTE
Chief Complaint   Patient presents with     Prenatal Care   32.2 weeks today   Not feeling well for last week   Headaches  Tylenol does make it better-   And fever at night.   But not in the day.   Feels dizzy in am.

## 2017-10-12 NOTE — LETTER
"10/12/2017       RE: Roger Desouza  [address]   [facility] [address]     Dear Colleague,    Thank you for referring your patient, Roger Desouza, to the [facility] at [facility]. Please see a copy of my visit note below.    Subjective:      32 year old  at 32w2d presentst for a routine prenatal appointment.      Denies cramping/contractions, vaginal bleeding or leakage of fluid. Reports + fetal movement.      No visual changes, RUQ or epigastric pain.     Patient concerns: Has noted that she is having trouble falling asleep in the last week. She tries to go to bed at 9pm like usual and cannot fall asleep. If she does fall asleep, she feels it is not restful and that she wakes up frequently. She reports that this recent lack of sleep has been causing her to have a headache. She is take 2 tabs of tylenol (unsure of dose) in the morning and evening which relieves the headache. Feels a little more weak than usual and sometimes dizzy in the morning if she gets up too quickly.     Discussed 1000mg tylenol may be taken at a time, no more than 4000mg/day. Pt would like prescription for sleep medicine- agreeable to trying unisom.  Reviewed previous recommendation for IV iron infusion and that her s/s could be c/w anemia. Pt would like to do a fingerstick hemoglobin in clinic. If still low, she agrees to IV iron infusions.     Objective:  Vitals:    10/12/17 1302   BP: 125/77   Weight: 54 kg (119 lb)   Height: 1.664 m (5' 5.51\")     See ob flowsheet    POCT HBG= 10.3    Assessment/Plan     Encounter Diagnoses   Name Primary?     Encounter for supervision of other normal pregnancy in third trimester Yes     Anemia, unspecified type      Orders Placed This Encounter   Procedures     Hemoglobin POCT     Orders Placed This Encounter   Medications     doxylamine (UNISOM) 25 MG TABS tablet     Sig: Take 1 tablet (25 mg) by mouth At Bedtime     Dispense:  30 " each     Refill:  0     - Reviewed total weight gain, encouraged continued healthy diet and exercise.  .  Reviewed importance of daily fetal kick count and why/how to contact provider.  - Reviewed why/how to contact provider if headache/visual changes/RUQ or epigastric pain, decreased fetal movement, vaginal bleeding, leakage of fluid or more than 4 contractions in an hour.     Patient education/orders or handouts today:  PTL signs/symptoms    POCT Hbg= 10.3. Pt now agreeable to IV iron infusions.  Orders placed. Infusion center number given to schedule with assistance of .  Prescription for 25mg unisom placed for sleep.  Continue scheduled prenatal care, Return for IV iron infusions asap and RTC in 1-2 weeks for ALECIA appt. PRN if questions or concerns.    MARCIN Velasquez CNM          Again, thank you for allowing me to participate in the care of your patient.      Sincerely,    Sebastian Mcdonough CNM

## 2017-10-12 NOTE — MR AVS SNAPSHOT
After Visit Summary   10/12/2017    Roger Desouza    MRN: [id number]           Patient Information     Date Of Birth          1985        Visit Information        Provider Department      10/12/2017 1:00 PM Sebastian Mcdonough CNM [facility]        Today's Diagnoses     Encounter for supervision of other normal pregnancy in third trimester    -  1    Anemia, unspecified type           Follow-ups after your visit        Follow-up notes from your care team     Return in about 2 weeks (around 10/26/2017) for ALECIA- CNM; Iron infusions asap.      Your next 10 appointments already scheduled     Oct 17, 2017  9:45 AM CDT   Level 3 with UR CH 05   [facility] ([facility])    [address] [place] [address] S.  [address]  [facility] [address] [phone]            Oct 26, 2017  1:00 PM CDT   RETURN OB with MARCIN Rock CNM   [facility] ([facility])    [facility] [address]  [address] [place], [place] [address]  [facility] [address] [phone]              Who to contact     Please call your clinic at [phone] to:    Ask questions about your health    Make or cancel appointments    Discuss your medicines    Learn about your test results    Speak to your doctor   If you have compliments or concerns about an experience at your clinic, or if you wish to file a complaint, please contact [facility] Patient Relations at [phone] or email us at [email or web address]         Additional Information About Your Visit        MyChart Information     [employer] gives you secure access to your electronic health record. If you see a primary care provider, you can also send messages to your care team and make appointments. If you have questions, please call your primary care clinic.  If you do not have a primary care provider, please call  "[phone] and they will assist you.      [employer] is an electronic gateway that provides easy, online access to your medical records. With [employer], you can request a clinic appointment, read your test results, renew a prescription or communicate with your care team.     To access your existing account, please contact your [facility] or call [phone] for assistance.        Care EveryWhere ID     This is your Care EveryWhere ID. This could be used by other organizations to access your [place] medical records  [id number]        Your Vitals Were     Temperature Height Last Period BMI (Body Mass Index)          97.5  F (36.4  C) 1.664 m (5' 5.51\") 02/28/2017 (Exact Date) 19.49 kg/m2         Blood Pressure from Last 3 Encounters:   10/12/17 125/77   09/27/17 129/80   09/06/17 118/78    Weight from Last 3 Encounters:   10/12/17 54 kg (119 lb)   09/27/17 54.4 kg (120 lb)   09/06/17 53.1 kg (117 lb 1.6 oz)              We Performed the Following     Hemoglobin POCT          Today's Medication Changes          These changes are accurate as of: 10/12/17 11:59 PM.  If you have any questions, ask your nurse or doctor.               Start taking these medicines.        Dose/Directions    doxylamine 25 MG Tabs tablet   Commonly known as:  UNISOM   Used for:  Encounter for supervision of other normal pregnancy in third trimester        Dose:  25 mg   Take 1 tablet (25 mg) by mouth At Bedtime   Quantity:  30 each   Refills:  0            Where to get your medicines      These medications were sent to [facility] [address]  [address] [address]     Phone:  [phone]     doxylamine 25 MG Tabs tablet                Primary Care Provider    Physician No Ref-Primary       NO REF-PRIMARY PHYSICIAN        Equal Access to Services     AWAIS PARSON AH: Saira Sawyer, linda al, qajoseta rebekah law, ynes dupont " kassyquang fredanisah mirashilpi lalissettcarroll ah. So [facility] [phone].    ATENCIÓN: Si habla tejinder, tiene a fox disposición servicios gratuitos de asistencia lingüística. Justin al [phone].    We comply with applicable federal civil rights laws and [place] laws. We do not discriminate on the basis of race, color, national origin, age, disability, sex, sexual orientation, or gender identity.            Thank you!     Thank you for choosing [facility]  for your care. Our goal is always to provide you with excellent care. Hearing back from our patients is one way we can continue to improve our services. Please take a few minutes to complete the written survey that you may receive in the mail after your visit with us. Thank you!             Your Updated Medication List - Protect others around you: Learn how to safely use, store and throw away your medicines at www.disposemymeds.org.          This list is accurate as of: 10/12/17 11:59 PM.  Always use your most recent med list.                   Brand Name Dispense Instructions for use Diagnosis    docusate sodium 100 MG tablet    COLACE    60 tablet    Take 100 mg by mouth daily    Constipation, unspecified constipation type       doxylamine 25 MG Tabs tablet    UNISOM    30 each    Take 1 tablet (25 mg) by mouth At Bedtime    Encounter for supervision of other normal pregnancy in third trimester       iron 325 (65 FE) MG tablet     100 tablet    Take 1 tablet by mouth daily (with breakfast)    Anemia in pregnancy, second trimester       omeprazole 20 MG CR capsule    priLOSEC    90 capsule    Take 1 capsule (20 mg) by mouth daily    Gastroesophageal reflux disease, esophagitis presence not specified       TRINATE Tabs     90 tablet    Take 1 tablet by mouth daily    Encounter for supervision of other normal pregnancy in third trimester, Need for vaccination       TYLENOL PO      Take 325 mg by mouth Reported on 5/10/2017

## 2017-10-12 NOTE — PROGRESS NOTES
"Subjective:      32 year old  at 32w2d presentst for a routine prenatal appointment.      Denies cramping/contractions, vaginal bleeding or leakage of fluid. Reports + fetal movement.      No visual changes, RUQ or epigastric pain.     Patient concerns: Has noted that she is having trouble falling asleep in the last week. She tries to go to bed at 9pm like usual and cannot fall asleep. If she does fall asleep, she feels it is not restful and that she wakes up frequently. She reports that this recent lack of sleep has been causing her to have a headache. She is take 2 tabs of tylenol (unsure of dose) in the morning and evening which relieves the headache. Feels a little more weak than usual and sometimes dizzy in the morning if she gets up too quickly.     Discussed 1000mg tylenol may be taken at a time, no more than 4000mg/day. Pt would like prescription for sleep medicine- agreeable to trying unisom.  Reviewed previous recommendation for IV iron infusion and that her s/s could be c/w anemia. Pt would like to do a fingerstick hemoglobin in clinic. If still low, she agrees to IV iron infusions.     Objective:  Vitals:    10/12/17 1302   BP: 125/77   Weight: 54 kg (119 lb)   Height: 1.664 m (5' 5.51\")     See ob flowsheet    POCT HBG= 10.3    Assessment/Plan     Encounter Diagnoses   Name Primary?     Encounter for supervision of other normal pregnancy in third trimester Yes     Anemia, unspecified type      Orders Placed This Encounter   Procedures     Hemoglobin POCT     Orders Placed This Encounter   Medications     doxylamine (UNISOM) 25 MG TABS tablet     Sig: Take 1 tablet (25 mg) by mouth At Bedtime     Dispense:  30 each     Refill:  0     - Reviewed total weight gain, encouraged continued healthy diet and exercise.  .  Reviewed importance of daily fetal kick count and why/how to contact provider.  - Reviewed why/how to contact provider if headache/visual changes/RUQ or epigastric pain, decreased fetal " movement, vaginal bleeding, leakage of fluid or more than 4 contractions in an hour.     Patient education/orders or handouts today:  PTL signs/symptoms    POCT Hbg= 10.3. Pt now agreeable to IV iron infusions.  Orders placed. Infusion center number given to schedule with assistance of .  Prescription for 25mg unisom placed for sleep.  Continue scheduled prenatal care, Return for IV iron infusions asap and RTC in 1-2 weeks for ALECIA appt. PRN if questions or concerns.    MARCIN Velasquez, EDDIEM

## 2017-10-12 NOTE — LETTER
Date:October 16, 2017      Patient was self referred, no letter generated. Do not send.        [facility] Health Information

## 2017-10-17 ENCOUNTER — INFUSION THERAPY VISIT (OUTPATIENT)
Dept: INFUSION THERAPY | Facility: CLINIC | Age: 32
End: 2017-10-17
Attending: ADVANCED PRACTICE MIDWIFE
Payer: COMMERCIAL

## 2017-10-17 VITALS — TEMPERATURE: 97.9 F | RESPIRATION RATE: 18 BRPM

## 2017-10-17 DIAGNOSIS — D64.9 ANEMIA: Primary | ICD-10-CM

## 2017-10-17 PROCEDURE — 40000268 ZZH STATISTIC NO CHARGES

## 2017-10-17 RX ORDER — DIPHENHYDRAMINE HCL 25 MG
25 TABLET ORAL
Status: CANCELLED
Start: 2017-10-17

## 2017-10-17 RX ORDER — ACETAMINOPHEN 325 MG/1
650 TABLET ORAL
Status: CANCELLED
Start: 2017-10-17

## 2017-10-17 NOTE — PROGRESS NOTES
Here for her first venofer infusion.  here to assist with questions and answers. Given printout of drug information. Reviewed the side affects. Roger was reluctant to start the infusion.  Roger decided to re-schedule so her  could be here with her during the infusion. Left ambulatory. Will return at next appointment.

## 2017-10-17 NOTE — MR AVS SNAPSHOT
After Visit Summary   10/17/2017    Roger Desouza    MRN: [id number]           Patient Information     Date Of Birth          1985        Visit Information        Provider Department      10/17/2017 9:45 AM [employer]; UR CH 05 [facility]        Today's Diagnoses     Anemia    -  1       Follow-ups after your visit        Your next 10 appointments already scheduled     Oct 20, 2017 12:30 PM CDT   Level 3 with UR CH 05   [facility] ([facility])    [address]  [address]  [facility] [address]   [phone]            Oct 23, 2017  1:00 PM CDT   Level 3 with UR CH 03   [facility])    [address]  [address]  [facility] [address]   [phone]            Oct 26, 2017  1:00 PM CDT   RETURN OB with MARCIN Rock CNM   [facility] ([facility])    [place] [facility] [address]  [address]  [facility] [address]   [phone]            Oct 27, 2017 10:00 AM CDT   Level 3 with UR CH 03   [facility])    [address]  [address]  [facility] [address]   [phone]              Who to contact     If you have questions or need follow up information about today's clinic visit or your schedule please contact [facility] directly at [phone].  Normal or non-critical lab and imaging results will be communicated to you by MyChart, letter or phone within 4 business days after the clinic has received the results. If you do not hear from us within 7 days, please contact the clinic through MyChart or phone. If you have a critical or abnormal lab result, we will notify you by phone as soon as possible.  Submit refill requests through [employer] or call  your pharmacy and they will forward the refill request to us. Please allow 3 business days for your refill to be completed.          Additional Information About Your Visit        [employer] Information     [employer] gives you secure access to your electronic health record. If you see a primary care provider, you can also send messages to your care team and make appointments. If you have questions, please call your primary care clinic.  If you do not have a primary care provider, please call [phone] and they will assist you.        Care EveryWhere ID     This is your Care EveryWhere ID. This could be used by other organizations to access your [place] medical records  [id number]        Your Vitals Were     Temperature Respirations Last Period             97.9  F (36.6  C) (Oral) 18 02/28/2017 (Exact Date)          Blood Pressure from Last 3 Encounters:   10/12/17 125/77   09/27/17 129/80   09/06/17 118/78    Weight from Last 3 Encounters:   10/12/17 54 kg (119 lb)   09/27/17 54.4 kg (120 lb)   09/06/17 53.1 kg (117 lb 1.6 oz)              Today, you had the following     No orders found for display       Primary Care Provider    Physician No Ref-Primary       NO REF-PRIMARY PHYSICIAN        Equal Access to Services     LEONOR PARSON : Hadii barb otoole Sopadma, waaxda lucastilloadaha, qaybta kaalmada adeanishayada, ynes palomino . So [facility] [phone].    ATENCIÓN: Si habla español, tiene a fox disposición servicios gratuitos de asistencia lingüística. Llame al [phone].    We comply with applicable federal civil rights laws and [place] laws. We do not discriminate on the basis of race, color, national origin, age, disability, sex, sexual orientation, or gender identity.            Thank you!     Thank you for choosing [facility]  for your care. Our goal is always to provide you with excellent care. Hearing back from our patients is one way we can continue to improve  our services. Please take a few minutes to complete the written survey that you may receive in the mail after your visit with us. Thank you!             Your Updated Medication List - Protect others around you: Learn how to safely use, store and throw away your medicines at www.disposemymeds.org.          This list is accurate as of: 10/17/17 10:25 AM.  Always use your most recent med list.                   Brand Name Dispense Instructions for use Diagnosis    docusate sodium 100 MG tablet    COLACE    60 tablet    Take 100 mg by mouth daily    Constipation, unspecified constipation type       doxylamine 25 MG Tabs tablet    UNISOM    30 each    Take 1 tablet (25 mg) by mouth At Bedtime    Encounter for supervision of other normal pregnancy in third trimester       iron 325 (65 FE) MG tablet     100 tablet    Take 1 tablet by mouth daily (with breakfast)    Anemia in pregnancy, second trimester       omeprazole 20 MG CR capsule    priLOSEC    90 capsule    Take 1 capsule (20 mg) by mouth daily    Gastroesophageal reflux disease, esophagitis presence not specified       TRINATE Tabs     90 tablet    Take 1 tablet by mouth daily    Encounter for supervision of other normal pregnancy in third trimester, Need for vaccination       TYLENOL PO      Take 325 mg by mouth Reported on 5/10/2017

## 2017-10-31 ENCOUNTER — OFFICE VISIT (OUTPATIENT)
Dept: OBGYN | Facility: CLINIC | Age: 32
End: 2017-10-31
Attending: ADVANCED PRACTICE MIDWIFE
Payer: COMMERCIAL

## 2017-10-31 VITALS
HEIGHT: 66 IN | DIASTOLIC BLOOD PRESSURE: 75 MMHG | HEART RATE: 106 BPM | BODY MASS INDEX: 19.65 KG/M2 | SYSTOLIC BLOOD PRESSURE: 131 MMHG | WEIGHT: 122.3 LBS

## 2017-10-31 DIAGNOSIS — Z34.83 ENCOUNTER FOR SUPERVISION OF OTHER NORMAL PREGNANCY IN THIRD TRIMESTER: Primary | ICD-10-CM

## 2017-10-31 DIAGNOSIS — Z23 NEED FOR IMMUNIZATION AGAINST INFLUENZA: ICD-10-CM

## 2017-10-31 PROCEDURE — 90686 IIV4 VACC NO PRSV 0.5 ML IM: CPT | Mod: ZF

## 2017-10-31 PROCEDURE — 25000128 H RX IP 250 OP 636: Mod: ZF

## 2017-10-31 PROCEDURE — G0008 ADMIN INFLUENZA VIRUS VAC: HCPCS

## 2017-10-31 PROCEDURE — 99211 OFF/OP EST MAY X REQ PHY/QHP: CPT | Mod: ZF

## 2017-10-31 NOTE — MR AVS SNAPSHOT
After Visit Summary   10/31/2017    Roger Desouza    MRN: [id number]           Patient Information     Date Of Birth          1985        Visit Information        Provider Department      10/31/2017 11:45 AM Aliyah Stephenson APRN CNM [facility]        Today's Diagnoses     Encounter for supervision of other normal pregnancy in third trimester    -  1       Follow-ups after your visit        Follow-up notes from your care team     Return in about 1 week (around 11/7/2017) for ALECIA.      Who to contact     Please call your clinic at [phone] to:    Ask questions about your health    Make or cancel appointments    Discuss your medicines    Learn about your test results    Speak to your doctor   If you have compliments or concerns about an experience at your clinic, or if you wish to file a complaint, please contact [facility] Patient Relations at [phone] or email us at [email or web address]         Additional Information About Your Visit        MyChart Information     M_SOLUTIONt gives you secure access to your electronic health record. If you see a primary care provider, you can also send messages to your care team and make appointments. If you have questions, please call your primary care clinic.  If you do not have a primary care provider, please call [phone] and they will assist you.      SimpleTuition is an electronic gateway that provides easy, online access to your medical records. With SimpleTuition, you can request a clinic appointment, read your test results, renew a prescription or communicate with your care team.     To access your existing account, please contact your [facility] or call [phone] for assistance.        Care EveryWhere ID     This is your Care EveryWhere ID. This could be used by other organizations to access your [place] medical records  [id number]        Your Vitals Were      "Pulse Height Last Period BMI (Body Mass Index)          106 1.664 m (5' 5.51\") 02/28/2017 (Exact Date) 20.03 kg/m2         Blood Pressure from Last 3 Encounters:   10/31/17 131/75   10/12/17 125/77   09/27/17 129/80    Weight from Last 3 Encounters:   10/31/17 55.5 kg (122 lb 4.8 oz)   10/12/17 54 kg (119 lb)   09/27/17 54.4 kg (120 lb)              Today, you had the following     No orders found for display       Primary Care Provider    Physician No Ref-Primary       NO REF-PRIMARY PHYSICIAN        Equal Access to Services     [facility]: Hadii barb Sawyer, linda al, jenny grimesalmabouchra law, ynes palomino . So [facility] [phone].    ATENCIÓN: Si habla español, tiene a fox disposición servicios gratuitos de asistencia lingüística. Llame al [phone].    We comply with applicable federal civil rights laws and [place] laws. We do not discriminate on the basis of race, color, national origin, age, disability, sex, sexual orientation, or gender identity.            Thank you!     Thank you for choosing [facility]  for your care. Our goal is always to provide you with excellent care. Hearing back from our patients is one way we can continue to improve our services. Please take a few minutes to complete the written survey that you may receive in the mail after your visit with us. Thank you!             Your Updated Medication List - Protect others around you: Learn how to safely use, store and throw away your medicines at www.disposemymeds.org.          This list is accurate as of: 10/31/17 12:08 PM.  Always use your most recent med list.                   Brand Name Dispense Instructions for use Diagnosis    docusate sodium 100 MG tablet    COLACE    60 tablet    Take 100 mg by mouth daily    Constipation, unspecified constipation type       doxylamine 25 MG Tabs tablet    UNISOM    30 each    Take 1 tablet (25 mg) by mouth At Bedtime    " Encounter for supervision of other normal pregnancy in third trimester       iron 325 (65 FE) MG tablet     100 tablet    Take 1 tablet by mouth daily (with breakfast)    Anemia in pregnancy, second trimester       omeprazole 20 MG CR capsule    priLOSEC    90 capsule    Take 1 capsule (20 mg) by mouth daily    Gastroesophageal reflux disease, esophagitis presence not specified       TRINATE Tabs     90 tablet    Take 1 tablet by mouth daily    Encounter for supervision of other normal pregnancy in third trimester, Need for vaccination       TYLENOL PO      Take 325 mg by mouth Reported on 5/10/2017

## 2017-10-31 NOTE — LETTER
"10/31/2017       RE: Roger Desouza  [address]   [place] [address]     Dear Colleague,    Thank you for referring your patient, Roger Desouza, to the [facility] at [facility]. Please see a copy of my visit note below.    Subjective:      32 year old  at 35w0d presentst for a routine prenatal appointment.    Denies vaginal bleeding or leakage of fluid.  Denies contractions. Denies fetal movement.       No HA, visual changes, RUQ or epigastric pain.   Patient concerns:  Did not complete IV iron, after reading potential side effects, patient declined infusions.   Feeling well overall.      Objective:  Vitals:    10/31/17 1150   BP: 131/75   Pulse: 106   Weight: 55.5 kg (122 lb 4.8 oz)   Height: 1.664 m (5' 5.51\")   , see ob flowsheet  Assessment/Plan     Encounter Diagnosis   Name Primary?     Encounter for supervision of other normal pregnancy in third trimester Yes         ABO   Date Value Ref Range Status   2017 O  Final     RH(D)   Date Value Ref Range Status   2017  Pos  Final     Antibody Screen   Date Value Ref Range Status   2017 Neg  Final     -Reviewed safety of IV iron transfusions, encourages patient to consider.    - Reviewed total weight gain, encouraged continued healthy diet and exercise.  Reviewed importance of daily fetal kick count and why/how to contact provider.    - Reviewed why/how to contact provider if headache/visual changes/RUQ or epigastric pain, decreased fetal movement, vaginal bleeding, leakage of fluid or more than 4 contractions in an hour.     Patient education/orders or handouts today:  Flu shot given today  Reviewed GBS screening at 35-36 wks.    Return to clinic in 1 weeks and prn if questions or concerns.     MARCIN Rock CNM      Again, thank you for allowing me to participate in the care of your patient.      Sincerely,    MARCIN Rock CNM      "

## 2017-10-31 NOTE — PROGRESS NOTES
"Subjective:      32 year old  at 35w0d presentst for a routine prenatal appointment.    Denies vaginal bleeding or leakage of fluid.  Denies contractions. Denies fetal movement.       No HA, visual changes, RUQ or epigastric pain.   Patient concerns:  Did not complete IV iron, after reading potential side effects, patient declined infusions.   Feeling well overall.      Objective:  Vitals:    10/31/17 1150   BP: 131/75   Pulse: 106   Weight: 55.5 kg (122 lb 4.8 oz)   Height: 1.664 m (5' 5.51\")   , see ob flowsheet  Assessment/Plan     Encounter Diagnosis   Name Primary?     Encounter for supervision of other normal pregnancy in third trimester Yes         ABO   Date Value Ref Range Status   2017 O  Final     RH(D)   Date Value Ref Range Status   2017  Pos  Final     Antibody Screen   Date Value Ref Range Status   2017 Neg  Final     -Reviewed safety of IV iron transfusions, encourages patient to consider.    - Reviewed total weight gain, encouraged continued healthy diet and exercise.  Reviewed importance of daily fetal kick count and why/how to contact provider.    - Reviewed why/how to contact provider if headache/visual changes/RUQ or epigastric pain, decreased fetal movement, vaginal bleeding, leakage of fluid or more than 4 contractions in an hour.     Patient education/orders or handouts today:  Flu shot given today  Reviewed GBS screening at 35-36 wks.    Return to clinic in 1 weeks and prn if questions or concerns.     MARCIN Rock CNM    "

## 2017-10-31 NOTE — NURSING NOTE
"Injectable Influenza Immunization Documentation    1.  Has the patient received the information for the injectable influenza vaccine? YES     2. Is the patient 6 months of age or older? YES     3. Does the patient have any of the following contraindications?         Severe allergy to eggs? No     Severe allergic reaction to previous influenza vaccines? No   Severe allergy to latex? No       History of Guillain-[place] syndrome? No     Currently have a temperature greater than 100.4F? No        4.  Severely egg allergic patients should have flu vaccine eligibility assessed by an MD, RN, or pharmacist, and those who received flu vaccine should be observed for 15 min by an MD, RN, Pharmacist, Medical Technician, or member of clinic staff.\": YES    5. Latex-allergic patients should be given latex-free influenza vaccine No. Please reference the Vaccine latex table to determine if your clinic s product is latex-containing.       Vaccination given by Yazmin MATTA MA        "

## 2017-10-31 NOTE — LETTER
Date:November 1, 2017      Patient was self referred, no letter generated. Do not send.        [facility] Health Information

## 2017-11-07 ENCOUNTER — OFFICE VISIT (OUTPATIENT)
Dept: OBGYN | Facility: CLINIC | Age: 32
End: 2017-11-07
Attending: ADVANCED PRACTICE MIDWIFE
Payer: COMMERCIAL

## 2017-11-07 VITALS
BODY MASS INDEX: 19.93 KG/M2 | WEIGHT: 124 LBS | DIASTOLIC BLOOD PRESSURE: 74 MMHG | HEART RATE: 109 BPM | SYSTOLIC BLOOD PRESSURE: 124 MMHG | HEIGHT: 66 IN

## 2017-11-07 DIAGNOSIS — Z34.83 ENCOUNTER FOR SUPERVISION OF OTHER NORMAL PREGNANCY IN THIRD TRIMESTER: Primary | ICD-10-CM

## 2017-11-07 DIAGNOSIS — D64.9 ANEMIA, UNSPECIFIED TYPE: ICD-10-CM

## 2017-11-07 LAB
ERYTHROCYTE [DISTWIDTH] IN BLOOD BY AUTOMATED COUNT: 13.5 % (ref 10–15)
HCT VFR BLD AUTO: 34.4 % (ref 35–47)
HGB BLD-MCNC: 11.4 G/DL (ref 11.7–15.7)
MCH RBC QN AUTO: 31.3 PG (ref 26.5–33)
MCHC RBC AUTO-ENTMCNC: 33.1 G/DL (ref 31.5–36.5)
MCV RBC AUTO: 95 FL (ref 78–100)
PLATELET # BLD AUTO: 165 10E9/L (ref 150–450)
RBC # BLD AUTO: 3.64 10E12/L (ref 3.8–5.2)
WBC # BLD AUTO: 10.3 10E9/L (ref 4–11)

## 2017-11-07 PROCEDURE — 99212 OFFICE O/P EST SF 10 MIN: CPT | Mod: ZF

## 2017-11-07 PROCEDURE — 85027 COMPLETE CBC AUTOMATED: CPT | Performed by: ADVANCED PRACTICE MIDWIFE

## 2017-11-07 PROCEDURE — 87653 STREP B DNA AMP PROBE: CPT | Performed by: ADVANCED PRACTICE MIDWIFE

## 2017-11-07 ASSESSMENT — ANXIETY QUESTIONNAIRES
GAD7 TOTAL SCORE: 0
6. BECOMING EASILY ANNOYED OR IRRITABLE: NOT AT ALL
2. NOT BEING ABLE TO STOP OR CONTROL WORRYING: NOT AT ALL
3. WORRYING TOO MUCH ABOUT DIFFERENT THINGS: NOT AT ALL
5. BEING SO RESTLESS THAT IT IS HARD TO SIT STILL: NOT AT ALL
1. FEELING NERVOUS, ANXIOUS, OR ON EDGE: NOT AT ALL
7. FEELING AFRAID AS IF SOMETHING AWFUL MIGHT HAPPEN: NOT AT ALL

## 2017-11-07 ASSESSMENT — PATIENT HEALTH QUESTIONNAIRE - PHQ9
5. POOR APPETITE OR OVEREATING: NOT AT ALL
SUM OF ALL RESPONSES TO PHQ QUESTIONS 1-9: 3

## 2017-11-07 NOTE — LETTER
Date:November 13, 2017      Patient was self referred, no letter generated. Do not send.        [facility] Health Information

## 2017-11-07 NOTE — LETTER
"2017       RE: Roger Desouza  [address]   [place] [address]     Dear Colleague,    Thank you for referring your patient, Roger Desouza, to the [facility] at [facility]. Please see a copy of my visit note below.    Subjective:      32 year old  at 36w0d presents for a routine prenatal appointment.    Denies cramping/contractions, vaginal bleeding,  leakage of fluid, or change in vaginal discharge. Reports + fetal movement.       No HA, visual changes, RUQ or epigastric pain.     Patient concerns: Feeling well overall- symptoms from last visit have improved. Went to infusion center when she agreead to IV iron and then decided not to receive infusions after she read the side effects. No longer interested in IV iron. Agreeable to repeating cbc today- states she has been taking the prescribed PO iron.    Objective:  Vitals:    17 1132   BP: 124/74   Pulse: 109   Weight: 56.2 kg (124 lb)   Height: 1.664 m (5' 5.51\")      See OB flowsheet    Cephalic    Assessment/Plan     Encounter Diagnoses   Name Primary?     Encounter for supervision of other normal pregnancy in third trimester Yes     Anemia, unspecified type      Orders Placed This Encounter   Procedures     CBC with Platelets     Labor signs discussed. Reinforced daily fetal movement counts.  Reviewed why/how to contact provider if headache/visual changes/RUQ or epigastric pain, decreased fetal movement, vaginal bleeding, leakage of fluid.    GBS collected.  Confirmed cephalic.  Discussed IV iron. Pt declines. Agreeable to repeat CBC today as she has been taking PO iron.  Continue scheduled prenatal care, RTC in  1 week and prn if questions or concerns.     MARCIN Velasquez CNM    Again, thank you for allowing me to participate in the care of your patient.      Sincerely,    Sebastian Mcdonough CNM      "

## 2017-11-07 NOTE — PROGRESS NOTES
"Subjective:      32 year old  at 36w0d presents for a routine prenatal appointment.    Denies cramping/contractions, vaginal bleeding,  leakage of fluid, or change in vaginal discharge. Reports + fetal movement.       No HA, visual changes, RUQ or epigastric pain.     Patient concerns: Feeling well overall- symptoms from last visit have improved. Went to infusion center when she agreead to IV iron and then decided not to receive infusions after she read the side effects. No longer interested in IV iron. Agreeable to repeating cbc today- states she has been taking the prescribed PO iron.    Objective:  Vitals:    17 1132   BP: 124/74   Pulse: 109   Weight: 56.2 kg (124 lb)   Height: 1.664 m (5' 5.51\")      See OB flowsheet    Cephalic    Assessment/Plan     Encounter Diagnoses   Name Primary?     Encounter for supervision of other normal pregnancy in third trimester Yes     Anemia, unspecified type      Orders Placed This Encounter   Procedures     CBC with Platelets     Labor signs discussed. Reinforced daily fetal movement counts.  Reviewed why/how to contact provider if headache/visual changes/RUQ or epigastric pain, decreased fetal movement, vaginal bleeding, leakage of fluid.    GBS collected.  Confirmed cephalic.  Discussed IV iron. Pt declines. Agreeable to repeat CBC today as she has been taking PO iron.  Continue scheduled prenatal care, RTC in  1 week and prn if questions or concerns.     MARCIN Velasquez, OTONIEL  "

## 2017-11-07 NOTE — MR AVS SNAPSHOT
After Visit Summary   11/7/2017    Roger Desouza    MRN: [id number]           Patient Information     Date Of Birth          1985        Visit Information        Provider Department      11/7/2017 11:30 AM Sebastian Mcdonough CNM [facility]        Today's Diagnoses     Encounter for supervision of other normal pregnancy in third trimester    -  1    Anemia, unspecified type           Follow-ups after your visit        Follow-up notes from your care team     Return in about 1 week (around 11/14/2017) for SEGUNDO FREDERICK.      Your next 10 appointments already scheduled     Nov 14, 2017  1:45 PM CST   RETURN OB with MARCIN Rock CNM   [facility] ([facility])    [facility]  [address]  [address]  [facility] [place] [address] [phone]              Who to contact     Please call your clinic at [phone] to:    Ask questions about your health    Make or cancel appointments    Discuss your medicines    Learn about your test results    Speak to your doctor   If you have compliments or concerns about an experience at your clinic, or if you wish to file a complaint, please contact [facility] Patient Relations at [phone] or email us at [email or web address]         Additional Information About Your Visit        MyChart Information     Slackt gives you secure access to your electronic health record. If you see a primary care provider, you can also send messages to your care team and make appointments. If you have questions, please call your primary care clinic.  If you do not have a primary care provider, please call [phone] and they will assist you.      Shout is an electronic gateway that provides easy, online access to your medical records. With Shout, you can request a clinic appointment, read your test results, renew a prescription or communicate  "with your care team.     To access your existing account, please contact your [facility] or call [phone] for assistance.        Care EveryWhere ID     This is your Care EveryWhere ID. This could be used by other organizations to access your [facility] medical records  [id number]        Your Vitals Were     Pulse Height Last Period BMI (Body Mass Index)          109 1.664 m (5' 5.51\") 02/28/2017 (Exact Date) 20.31 kg/m2         Blood Pressure from Last 3 Encounters:   11/07/17 124/74   10/31/17 131/75   10/12/17 125/77    Weight from Last 3 Encounters:   11/07/17 56.2 kg (124 lb)   10/31/17 55.5 kg (122 lb 4.8 oz)   10/12/17 54 kg (119 lb)              We Performed the Following     CBC with Platelets     Group B strep PCR        Primary Care Provider    Physician No Ref-Primary       NO REF-PRIMARY PHYSICIAN        Equal Access to Services     AWAIS PARSON : Hadii barb Sawyer, waaxda servando, qaybta kaalmada ani, ynes palomino . So [facility] [phone].    ATENCIÓN: Si habla español, tiene a fox disposición servicios gratuitos de asistencia lingüística. Llame al [phone].    We comply with applicable federal civil rights laws and [place] laws. We do not discriminate on the basis of race, color, national origin, age, disability, sex, sexual orientation, or gender identity.            Thank you!     Thank you for choosing [facility]  for your care. Our goal is always to provide you with excellent care. Hearing back from our patients is one way we can continue to improve our services. Please take a few minutes to complete the written survey that you may receive in the mail after your visit with us. Thank you!             Your Updated Medication List - Protect others around you: Learn how to safely use, store and throw away your medicines at www.disposemymeds.org.          This list is accurate as of: 11/7/17 11:59 " PM.  Always use your most recent med list.                   Brand Name Dispense Instructions for use Diagnosis    docusate sodium 100 MG tablet    COLACE    60 tablet    Take 100 mg by mouth daily    Constipation, unspecified constipation type       doxylamine 25 MG Tabs tablet    UNISOM    30 each    Take 1 tablet (25 mg) by mouth At Bedtime    Encounter for supervision of other normal pregnancy in third trimester       iron 325 (65 FE) MG tablet     100 tablet    Take 1 tablet by mouth daily (with breakfast)    Anemia in pregnancy, second trimester       omeprazole 20 MG CR capsule    priLOSEC    90 capsule    Take 1 capsule (20 mg) by mouth daily    Gastroesophageal reflux disease, esophagitis presence not specified       TRINATE Tabs     90 tablet    Take 1 tablet by mouth daily    Encounter for supervision of other normal pregnancy in third trimester, Need for vaccination       TYLENOL PO      Take 325 mg by mouth Reported on 5/10/2017

## 2017-11-08 LAB
GP B STREP DNA SPEC QL NAA+PROBE: NEGATIVE
SPECIMEN SOURCE: NORMAL

## 2017-11-08 ASSESSMENT — ANXIETY QUESTIONNAIRES: GAD7 TOTAL SCORE: 0

## 2017-11-14 ENCOUNTER — OFFICE VISIT (OUTPATIENT)
Dept: OBGYN | Facility: CLINIC | Age: 32
End: 2017-11-14
Attending: ADVANCED PRACTICE MIDWIFE
Payer: COMMERCIAL

## 2017-11-14 VITALS
HEART RATE: 102 BPM | WEIGHT: 125.3 LBS | BODY MASS INDEX: 20.14 KG/M2 | SYSTOLIC BLOOD PRESSURE: 116 MMHG | HEIGHT: 66 IN | DIASTOLIC BLOOD PRESSURE: 74 MMHG

## 2017-11-14 DIAGNOSIS — O36.8130 DECREASED FETAL MOVEMENTS IN THIRD TRIMESTER, SINGLE OR UNSPECIFIED FETUS: ICD-10-CM

## 2017-11-14 DIAGNOSIS — O09.93 HRP (HIGH RISK PREGNANCY), THIRD TRIMESTER: Primary | ICD-10-CM

## 2017-11-14 PROCEDURE — 99211 OFF/OP EST MAY X REQ PHY/QHP: CPT | Mod: ZF

## 2017-11-14 NOTE — LETTER
"2017       RE: Roger Desouza  [address]   [place] [address]     Dear Colleague,    Thank you for referring your patient, Roger Desouza, to the [facility] at [facility]. Please see a copy of my visit note below.    Subjective:     32 year old  at 37w0d presents for routine prenatal visit.            Denies vaginal bleeding or leakage of fluid.  Denies contractions.  Reports decreased  fetal movement over the weekend, reports feeling movement now.  Did report that she started URI symptoms last week, decreased fetal movement during that time.  Does feel like URI symptoms are resolving..        No HA, visual changes, RUQ or epigastric pain.     Objective:  Vitals:    17 1326   BP: 116/74   Pulse: 102   Weight: 56.8 kg (125 lb 4.8 oz)   Height: 1.664 m (5' 5.51\")    See OB flowsheet  Assessment/Plan     Encounter Diagnoses   Name Primary?     HRP (high risk pregnancy), third trimester Yes     Decreased fetal movements in third trimester, single or unspecified fetus      Orders Placed This Encounter   Procedures     NST, Single (In Clinic)     -EFM applied and Reactive NST obtained.  Baseline 45 with accels to 160.  Category 1 tracing.    -Reviewed negative GBS results with patient today.  - Reviewed why/how to contact provider if headache/visual changes/RUQ or epigastric pain, decreased fetal movement, vaginal bleeding, leakage of fluid or strong/regular contractions.   Patient education/orders or handouts today:  Sign/symptoms of labor and When to call for labor or other concerns  Return to clinic in 1 week and prn if questions or concerns.   MARCIN Rock CNM      Again, thank you for allowing me to participate in the care of your patient.      Sincerely,    MARCIN Rock CNM      "

## 2017-11-14 NOTE — LETTER
Date:November 15, 2017      Patient was self referred, no letter generated. Do not send.        [facility] Health Information

## 2017-11-14 NOTE — MR AVS SNAPSHOT
After Visit Summary   11/14/2017    Roger Desouza    MRN: [id number]           Patient Information     Date Of Birth          1985        Visit Information        Provider Department      11/14/2017 1:45 PM Aliyah Stephenson APRN [facility]        Today's Diagnoses     HRP (high risk pregnancy), third trimester    -  1    Decreased fetal movements in third trimester, single or unspecified fetus           Follow-ups after your visit        Follow-up notes from your care team     Return in about 1 week (around 11/21/2017) for ALECIA.      Your next 10 appointments already scheduled     Nov 22, 2017 11:15 AM CST   RETURN OB with MARCIN Rokc CNM   [facility] ([facility])    [place] [facility] [address]  [address]  [facility] [address] [phone]            Nov 29, 2017 11:15 AM CST   RETURN OB with MARCIN Gonzalez [facility] ([facility])    [place] [facility] [address]  [address]  [facility] [id number] [phone]            Dec 06, 2017 11:15 AM CST   RETURN OB with EDDIE Henson   [facility] ([facility])    [place] [facility] [address]  [address]  [facility] [address] [phone]              Future tests that were ordered for you today     Open Future Orders        Priority Expected Expires Ordered    NST, Single (In Clinic) Routine  3/14/2018 11/14/2017            Who to contact     Please call your clinic at [phone] to:    Ask questions about your health    Make or cancel appointments    Discuss your medicines    Learn about your test results    Speak to your doctor   If you have compliments or concerns about an experience at your clinic, or if you wish to file a complaint, please contact [facility] Patient  "Relations at [phone] or email us at [email or web address]         Additional Information About Your Visit        LaserGenhart Information     IndaBox gives you secure access to your electronic health record. If you see a primary care provider, you can also send messages to your care team and make appointments. If you have questions, please call your primary care clinic.  If you do not have a primary care provider, please call [phone] and they will assist you.      IndaBox is an electronic gateway that provides easy, online access to your medical records. With IndaBox, you can request a clinic appointment, read your test results, renew a prescription or communicate with your care team.     To access your existing account, please contact your [facility] or call [phone] for assistance.        Care EveryWhere ID     This is your Care EveryWhere ID. This could be used by other organizations to access your [place] medical records  [id number]        Your Vitals Were     Pulse Height Last Period BMI (Body Mass Index)          102 1.664 m (5' 5.51\") 02/28/2017 (Exact Date) 20.53 kg/m2         Blood Pressure from Last 3 Encounters:   11/14/17 116/74   11/07/17 124/74   10/31/17 131/75    Weight from Last 3 Encounters:   11/14/17 56.8 kg (125 lb 4.8 oz)   11/07/17 56.2 kg (124 lb)   10/31/17 55.5 kg (122 lb 4.8 oz)               Primary Care Provider    Physician No Ref-Primary       NO REF-PRIMARY PHYSICIAN        Equal Access to Services     AWAIS PARSON AH: Hadii aad ku hadasho Soomaali, waaxda luqadaha, qaybta kaalmada adeegyada, ynes de la torre. So [facility] [phone].    ATENCIÓN: Si habla español, tiene a fox disposición servicios gratuitos de asistencia lingüística. Llame al [phone].    We comply with applicable federal civil rights laws and [place] laws. We do not discriminate on the basis of race, color, national origin, age, " disability, sex, sexual orientation, or gender identity.            Thank you!     Thank you for choosing [facility]  for your care. Our goal is always to provide you with excellent care. Hearing back from our patients is one way we can continue to improve our services. Please take a few minutes to complete the written survey that you may receive in the mail after your visit with us. Thank you!             Your Updated Medication List - Protect others around you: Learn how to safely use, store and throw away your medicines at www.disposemymeds.org.          This list is accurate as of: 11/14/17  2:45 PM.  Always use your most recent med list.                   Brand Name Dispense Instructions for use Diagnosis    docusate sodium 100 MG tablet    COLACE    60 tablet    Take 100 mg by mouth daily    Constipation, unspecified constipation type       doxylamine 25 MG Tabs tablet    UNISOM    30 each    Take 1 tablet (25 mg) by mouth At Bedtime    Encounter for supervision of other normal pregnancy in third trimester       iron 325 (65 FE) MG tablet     100 tablet    Take 1 tablet by mouth daily (with breakfast)    Anemia in pregnancy, second trimester       omeprazole 20 MG CR capsule    priLOSEC    90 capsule    Take 1 capsule (20 mg) by mouth daily    Gastroesophageal reflux disease, esophagitis presence not specified       TRINATE Tabs     90 tablet    Take 1 tablet by mouth daily    Encounter for supervision of other normal pregnancy in third trimester, Need for vaccination       TYLENOL PO      Take 325 mg by mouth Reported on 5/10/2017

## 2017-11-22 ENCOUNTER — OFFICE VISIT (OUTPATIENT)
Dept: OBGYN | Facility: CLINIC | Age: 32
End: 2017-11-22
Attending: ADVANCED PRACTICE MIDWIFE
Payer: COMMERCIAL

## 2017-11-22 VITALS
BODY MASS INDEX: 20.1 KG/M2 | DIASTOLIC BLOOD PRESSURE: 75 MMHG | HEIGHT: 66 IN | WEIGHT: 125.1 LBS | SYSTOLIC BLOOD PRESSURE: 116 MMHG

## 2017-11-22 DIAGNOSIS — O09.93 HRP (HIGH RISK PREGNANCY), THIRD TRIMESTER: Primary | ICD-10-CM

## 2017-11-22 PROCEDURE — 99211 OFF/OP EST MAY X REQ PHY/QHP: CPT | Mod: ZF

## 2017-11-22 ASSESSMENT — PAIN SCALES - GENERAL: PAINLEVEL: NO PAIN (0)

## 2017-11-22 NOTE — MR AVS SNAPSHOT
After Visit Summary   11/22/2017    Roger Desouza    MRN: [id number]           Patient Information     Date Of Birth          1985        Visit Information        Provider Department      11/22/2017 11:15 AM Aliyah Stephenson APRN [facility]        Today's Diagnoses     HRP (high risk pregnancy), third trimester    -  1       Follow-ups after your visit        Follow-up notes from your care team     Return in about 1 week (around 11/29/2017) for ALECIA.      Your next 10 appointments already scheduled     Nov 29, 2017 11:15 AM CST   RETURN OB with MARCIN Gonzalez [facility]   [facility]    [place] [facility] [address]  [address]  [facility] [address] [phone]            Dec 06, 2017 11:15 AM CST   RETURN OB with Sebastian Mcdonough [facility] ([facility])    [place] [facility] [address]  [address]  [facility] [address] [phone]              Who to contact     Please call your clinic at [phone] to:    Ask questions about your health    Make or cancel appointments    Discuss your medicines    Learn about your test results    Speak to your doctor   If you have compliments or concerns about an experience at your clinic, or if you wish to file a complaint, please contact [facility] Patient Relations at [phone] or email us at [email or web address]         Additional Information About Your Visit        MyChart Information     [employer] Onlinehart gives you secure access to your electronic health record. If you see a primary care provider, you can also send messages to your care team and make appointments. If you have questions, please call your primary care clinic.  If you do not have a primary care provider, please call [phone] and they will assist you.      WikiBrains is an electronic  "gateway that provides easy, online access to your medical records. With Simple IT, you can request a clinic appointment, read your test results, renew a prescription or communicate with your care team.     To access your existing account, please contact your [facility] or call [phone] for assistance.        Care EveryWhere ID     This is your Care EveryWhere ID. This could be used by other organizations to access your [place] medical records  [id number]        Your Vitals Were     Height Last Period BMI (Body Mass Index)             1.664 m (5' 5.51\") 02/28/2017 (Exact Date) 20.49 kg/m2          Blood Pressure from Last 3 Encounters:   11/22/17 116/75   11/14/17 116/74   11/07/17 124/74    Weight from Last 3 Encounters:   11/22/17 56.7 kg (125 lb 1.6 oz)   11/14/17 56.8 kg (125 lb 4.8 oz)   11/07/17 56.2 kg (124 lb)              Today, you had the following     No orders found for display       Primary Care Provider    Physician No Ref-Primary       NO REF-PRIMARY PHYSICIAN        Equal Access to Services     [facility]: Hadii barb Sawyer, waaxda servando, qaybta kaalsheldon law, ynes palomino . So [facility] [phone].    ATENCIÓN: Si habla español, tiene a fox disposición servicios gratuitos de asistencia lingüística. Justin al [phone].    We comply with applicable federal civil rights laws and [place] laws. We do not discriminate on the basis of race, color, national origin, age, disability, sex, sexual orientation, or gender identity.            Thank you!     Thank you for choosing [facility]  for your care. Our goal is always to provide you with excellent care. Hearing back from our patients is one way we can continue to improve our services. Please take a few minutes to complete the written survey that you may receive in the mail after your visit with us. Thank you!             Your Updated Medication " List - Protect others around you: Learn how to safely use, store and throw away your medicines at www.disposemymeds.org.          This list is accurate as of: 11/22/17 11:56 AM.  Always use your most recent med list.                   Brand Name Dispense Instructions for use Diagnosis    docusate sodium 100 MG tablet    COLACE    60 tablet    Take 100 mg by mouth daily    Constipation, unspecified constipation type       doxylamine 25 MG Tabs tablet    UNISOM    30 each    Take 1 tablet (25 mg) by mouth At Bedtime    Encounter for supervision of other normal pregnancy in third trimester       iron 325 (65 FE) MG tablet     100 tablet    Take 1 tablet by mouth daily (with breakfast)    Anemia in pregnancy, second trimester       omeprazole 20 MG CR capsule    priLOSEC    90 capsule    Take 1 capsule (20 mg) by mouth daily    Gastroesophageal reflux disease, esophagitis presence not specified       TRINATE Tabs     90 tablet    Take 1 tablet by mouth daily    Encounter for supervision of other normal pregnancy in third trimester, Need for vaccination       TYLENOL PO      Take 325 mg by mouth Reported on 5/10/2017

## 2017-11-22 NOTE — PROGRESS NOTES
"Subjective:     32 year old  at 38w1d presents for routine prenatal visit.            Denies vaginal bleeding or leakage of fluid.  Denies contractions.  Reports regular fetal movement.        No HA, visual changes, RUQ or epigastric pain.   Patient concerns: Low back pain at night.  Has not tried any OTC relief.  Discussed comfort measures and Tylenol use.  Feeling well overall.  Objective:  Vitals:    17 1053   BP: 116/75   Weight: 56.7 kg (125 lb 1.6 oz)   Height: 1.664 m (5' 5.51\")    See OB flowsheet  Assessment/Plan     Encounter Diagnosis   Name Primary?     HRP (high risk pregnancy), third trimester Yes         - Reviewed why/how to contact provider if headache/visual changes/RUQ or epigastric pain, decreased fetal movement, vaginal bleeding, leakage of fluid or strong/regular contractions.   Patient education/orders or handouts today:  Sign/symptoms of labor and When to call for labor or other concerns  Return to clinic in 1 week and prn if questions or concerns.   Aliyah Stephenson, MARCIN MORGANM    "

## 2017-11-22 NOTE — LETTER
"2017       RE: Roger Desouza  [address]   [facility] [address]     Dear Colleague,    Thank you for referring your patient, Roger Desouza, to the [facility] at [facility]. Please see a copy of my visit note below.    Subjective:     32 year old  at 38w1d presents for routine prenatal visit.            Denies vaginal bleeding or leakage of fluid.  Denies contractions.  Reports regular fetal movement.        No HA, visual changes, RUQ or epigastric pain.   Patient concerns: Low back pain at night.  Has not tried any OTC relief.  Discussed comfort measures and Tylenol use.  Feeling well overall.  Objective:  Vitals:    17 1053   BP: 116/75   Weight: 56.7 kg (125 lb 1.6 oz)   Height: 1.664 m (5' 5.51\")    See OB flowsheet  Assessment/Plan     Encounter Diagnosis   Name Primary?     HRP (high risk pregnancy), third trimester Yes         - Reviewed why/how to contact provider if headache/visual changes/RUQ or epigastric pain, decreased fetal movement, vaginal bleeding, leakage of fluid or strong/regular contractions.   Patient education/orders or handouts today:  Sign/symptoms of labor and When to call for labor or other concerns  Return to clinic in 1 week and prn if questions or concerns.   MARCIN Rock CNM      Again, thank you for allowing me to participate in the care of your patient.      Sincerely,    MARCIN Rock CNM      "

## 2017-11-22 NOTE — LETTER
Date:November 29, 2017      Patient was self referred, no letter generated. Do not send.        [facility] Health Information

## 2017-11-29 ENCOUNTER — OFFICE VISIT (OUTPATIENT)
Dept: OBGYN | Facility: CLINIC | Age: 32
End: 2017-11-29
Attending: ADVANCED PRACTICE MIDWIFE
Payer: COMMERCIAL

## 2017-11-29 VITALS
SYSTOLIC BLOOD PRESSURE: 121 MMHG | HEIGHT: 66 IN | WEIGHT: 124.3 LBS | BODY MASS INDEX: 19.98 KG/M2 | HEART RATE: 92 BPM | DIASTOLIC BLOOD PRESSURE: 78 MMHG

## 2017-11-29 DIAGNOSIS — Z34.83 ENCOUNTER FOR SUPERVISION OF OTHER NORMAL PREGNANCY IN THIRD TRIMESTER: Primary | ICD-10-CM

## 2017-11-29 PROCEDURE — 99211 OFF/OP EST MAY X REQ PHY/QHP: CPT | Mod: ZF

## 2017-11-29 NOTE — LETTER
"2017       RE: Roger Desouza  [address]   [facility] [address]     Dear Colleague,    Thank you for referring your patient, Roger Desouza, to the [facility] at [facility]. Please see a copy of my visit note below.    Subjective:     32 year old  at 39w1d presents for routine prenatal visit.   Denies vaginal bleeding or leakage of fluid.  Denies contractions.  + fetal movement.       No HA, visual changes, RUQ or epigastric pain.   Patient concerns: Feeling well overall, continuing to take iron supplement    Objective:  Vitals:    17 1048   BP: 121/78   Pulse: 92   Weight: 56.4 kg (124 lb 4.8 oz)   Height: 1.664 m (5' 5.51\")   See OB flowsheet    Assessment/Plan:   Encounter Diagnosis   Name Primary?     Encounter for supervision of other normal pregnancy in third trimester Yes     - Reviewed postdates testing including BPP => 41 weeks and rationale for induction of labor based on results. Patient desires postdates testing.  - Reviewed why/how to contact provider if headache/visual changes/RUQ or epigastric pain, decreased fetal movement, vaginal bleeding, leakage of fluid or strong/regular contractions.  - Patient education/orders or handouts today: Sign/symptoms of labor, When to call for labor or other concerns and Postdates testing discussion  - Return to clinic in 1 week and prn if questions or concerns.     Again, thank you for allowing me to participate in the care of your patient.      Sincerely,    MARCIN Whitehead CNM      "

## 2017-11-29 NOTE — MR AVS SNAPSHOT
After Visit Summary   11/29/2017    Roger Desouza    MRN: [id number]           Patient Information     Date Of Birth          1985        Visit Information        Provider Department      11/29/2017 11:15 AM Chas Noel APRN [facility]        Today's Diagnoses     Encounter for supervision of other normal pregnancy in third trimester    -  1       Follow-ups after your visit        Follow-up notes from your care team     Return in about 1 week (around 12/6/2017) for ALECIA Visit.      Your next 10 appointments already scheduled     Nov 29, 2017 11:15 AM CST   RETURN OB with MARCIN Gonzalez [facility]   [facility] ([facility])    [place] [facility] [address]  [address]  [facility] [address] [phone]            Dec 06, 2017 11:15 AM CST   RETURN OB with Sebastian Mcdonough [facility] ([facility])    [place] [facility] [address]  [address]  [facility] [address] [phone]              Who to contact     Please call your clinic at [phone] to:    Ask questions about your health    Make or cancel appointments    Discuss your medicines    Learn about your test results    Speak to your doctor   If you have compliments or concerns about an experience at your clinic, or if you wish to file a complaint, please contact [facility] Patient Relations at [phone] or email us at [email or web address]         Additional Information About Your Visit        MyChart Information     ContactPointt gives you secure access to your electronic health record. If you see a primary care provider, you can also send messages to your care team and make appointments. If you have questions, please call your primary care clinic.  If you do not have a primary care provider, please call [phone] and they will assist you.    "   Enpirion is an electronic gateway that provides easy, online access to your medical records. With Enpirion, you can request a clinic appointment, read your test results, renew a prescription or communicate with your care team.     To access your existing account, please contact your [facility] or call [phone] for assistance.        Care EveryWhere ID     This is your Care EveryWhere ID. This could be used by other organizations to access your [place] medical records  [id number]        Your Vitals Were     Pulse Height Last Period BMI (Body Mass Index)          92 1.664 m (5' 5.51\") 02/28/2017 (Exact Date) 20.36 kg/m2         Blood Pressure from Last 3 Encounters:   11/29/17 121/78   11/22/17 116/75   11/14/17 116/74    Weight from Last 3 Encounters:   11/29/17 56.4 kg (124 lb 4.8 oz)   11/22/17 56.7 kg (125 lb 1.6 oz)   11/14/17 56.8 kg (125 lb 4.8 oz)              Today, you had the following     No orders found for display       Primary Care Provider Fax #    Physician No Ref-Primary [phone]       No address on file        Equal Access to Services     LEONOR PARSON : Hadii barb Sawyer, washannenda servando, qaybta kaalmada ani, ynes de la torre. So [facility] [phone].    ATENCIÓN: Si habla español, tiene a fox disposición servicios gratuitos de asistencia lingüística. Willyame al [phone].    We comply with applicable federal civil rights laws and [place] laws. We do not discriminate on the basis of race, color, national origin, age, disability, sex, sexual orientation, or gender identity.            Thank you!     Thank you for choosing [facility]  for your care. Our goal is always to provide you with excellent care. Hearing back from our patients is one way we can continue to improve our services. Please take a few minutes to complete the written survey that you may receive in the mail after your visit with " us. Thank you!             Your Updated Medication List - Protect others around you: Learn how to safely use, store and throw away your medicines at www.disposemymeds.org.          This list is accurate as of: 11/29/17 11:08 AM.  Always use your most recent med list.                   Brand Name Dispense Instructions for use Diagnosis    docusate sodium 100 MG tablet    COLACE    60 tablet    Take 100 mg by mouth daily    Constipation, unspecified constipation type       doxylamine 25 MG Tabs tablet    UNISOM    30 each    Take 1 tablet (25 mg) by mouth At Bedtime    Encounter for supervision of other normal pregnancy in third trimester       iron 325 (65 FE) MG tablet     100 tablet    Take 1 tablet by mouth daily (with breakfast)    Anemia in pregnancy, second trimester       omeprazole 20 MG CR capsule    priLOSEC    90 capsule    Take 1 capsule (20 mg) by mouth daily    Gastroesophageal reflux disease, esophagitis presence not specified       TRINATE Tabs     90 tablet    Take 1 tablet by mouth daily    Encounter for supervision of other normal pregnancy in third trimester, Need for vaccination       TYLENOL PO      Take 325 mg by mouth Reported on 5/10/2017

## 2017-11-29 NOTE — LETTER
Date:December 1, 2017      Patient was self referred, no letter generated. Do not send.        [facility] Health Information

## 2017-11-29 NOTE — PROGRESS NOTES
"Subjective:     32 year old  at 39w1d presents for routine prenatal visit.   Denies vaginal bleeding or leakage of fluid.  Denies contractions.  + fetal movement.       No HA, visual changes, RUQ or epigastric pain.   Patient concerns: Feeling well overall, continuing to take iron supplement    Objective:  Vitals:    17 1048   BP: 121/78   Pulse: 92   Weight: 56.4 kg (124 lb 4.8 oz)   Height: 1.664 m (5' 5.51\")   See OB flowsheet    Assessment/Plan:   Encounter Diagnosis   Name Primary?     Encounter for supervision of other normal pregnancy in third trimester Yes     - Reviewed postdates testing including BPP => 41 weeks and rationale for induction of labor based on results. Patient desires postdates testing.  - Reviewed why/how to contact provider if headache/visual changes/RUQ or epigastric pain, decreased fetal movement, vaginal bleeding, leakage of fluid or strong/regular contractions.  - Patient education/orders or handouts today: Sign/symptoms of labor, When to call for labor or other concerns and Postdates testing discussion  - Return to clinic in 1 week and prn if questions or concerns.   "

## 2017-12-06 ENCOUNTER — OFFICE VISIT (OUTPATIENT)
Dept: OBGYN | Facility: CLINIC | Age: 32
End: 2017-12-06
Attending: ADVANCED PRACTICE MIDWIFE
Payer: COMMERCIAL

## 2017-12-06 VITALS
BODY MASS INDEX: 20.82 KG/M2 | HEART RATE: 89 BPM | SYSTOLIC BLOOD PRESSURE: 128 MMHG | DIASTOLIC BLOOD PRESSURE: 77 MMHG | WEIGHT: 127.1 LBS

## 2017-12-06 DIAGNOSIS — Z34.83 ENCOUNTER FOR SUPERVISION OF OTHER NORMAL PREGNANCY IN THIRD TRIMESTER: Primary | ICD-10-CM

## 2017-12-06 PROCEDURE — 99212 OFFICE O/P EST SF 10 MIN: CPT | Mod: ZF

## 2017-12-06 NOTE — LETTER
2017       RE: Roger Desouza  [address]   [place] [address]     Dear Colleague,    Thank you for referring your patient, Roger Desouza, to the [facility]. Please see a copy of my visit note below.    Subjective:     32 year old  at 40w1d presents for routine prenatal visit.            Denies cramping, contractions, vaginal bleeding or leakage of fluid.  Reports + fetal movement.        No HA, visual changes, RUQ or epigastric pain.     Patient concerns: Feeling well overall. Has noticed slight increase in swelling in ankles bilaterally. Encouraged elevating feet at night, po hydration. No other concerns.  Desires spontaneous labor- agreeable to BPP at 41 weeks.    Objective:  Vitals:    17 1100   BP: 128/77   BP Location: Left arm   Patient Position: Chair   Pulse: 89   Weight: 57.7 kg (127 lb 1.6 oz)      See OB flowsheet    Ankle edema trace to +1, no pitting    Assessment/Plan     Encounter Diagnosis   Name Primary?     Encounter for supervision of other normal pregnancy in third trimester Yes     Orders Placed This Encounter   Procedures     BPP (Single) w/out NST (In Clinic)       - Reviewed late term testing including BPP => 41 weeks and rationale for induction of labor based on results.   Patient desires  Late term testing.  - Reviewed why/how to contact provider if headache/visual changes/RUQ or epigastric pain, decreased fetal movement, vaginal bleeding, leakage of fluid or strong/regular contractions.   Patient education/orders or handouts today:  Sign/symptoms of labor, When to call for labor or other concerns, Postdates testing discussion, BPP, NST and Induction of labor     Desires spontaneous labor. Agreeable to late term testing at 41 weeks.  Plan BPP and ALECIA on  at 41 weeks. Order placed.  Continue scheduled prenatal care, RTC in 1 week for BPP and ALECIA and prn if questions or concerns.      MARCIN Velasquez, OTONIEL

## 2017-12-06 NOTE — PROGRESS NOTES
Subjective:     32 year old  at 40w1d presents for routine prenatal visit.            Denies cramping, contractions, vaginal bleeding or leakage of fluid.  Reports + fetal movement.        No HA, visual changes, RUQ or epigastric pain.     Patient concerns: Feeling well overall. Has noticed slight increase in swelling in ankles bilaterally. Encouraged elevating feet at night, po hydration. No other concerns.  Desires spontaneous labor- agreeable to BPP at 41 weeks.    Objective:  Vitals:    17 1100   BP: 128/77   BP Location: Left arm   Patient Position: Chair   Pulse: 89   Weight: 57.7 kg (127 lb 1.6 oz)      See OB flowsheet    Ankle edema trace to +1, no pitting    Assessment/Plan     Encounter Diagnosis   Name Primary?     Encounter for supervision of other normal pregnancy in third trimester Yes     Orders Placed This Encounter   Procedures     BPP (Single) w/out NST (In Clinic)       - Reviewed late term testing including BPP => 41 weeks and rationale for induction of labor based on results.   Patient desires  Late term testing.  - Reviewed why/how to contact provider if headache/visual changes/RUQ or epigastric pain, decreased fetal movement, vaginal bleeding, leakage of fluid or strong/regular contractions.   Patient education/orders or handouts today:  Sign/symptoms of labor, When to call for labor or other concerns, Postdates testing discussion, BPP, NST and Induction of labor     Desires spontaneous labor. Agreeable to late term testing at 41 weeks.  Plan BPP and ALECIA on  at 41 weeks. Order placed.  Continue scheduled prenatal care, RTC in 1 week for BPP and ALECIA and prn if questions or concerns.     MARCIN Velasquez, OTONIEL

## 2017-12-06 NOTE — NURSING NOTE
Chief Complaint   Patient presents with     Prenatal Care     40w1d       See LEON Caceres 12/6/2017

## 2017-12-06 NOTE — MR AVS SNAPSHOT
After Visit Summary   12/6/2017    Roger Desouza    MRN: [id number]           Patient Information     Date Of Birth          1985        Visit Information        Provider Department      12/6/2017 11:15 AM Sebastian Mcdonough [facility]        Today's Diagnoses     Encounter for supervision of other normal pregnancy in third trimester    -  1       Follow-ups after your visit        Follow-up notes from your care team     Return in about 6 days (around 12/12/2017) for BPP and ALECIA on 12/12.      Your next 10 appointments already scheduled     Dec 13, 2017  2:00 PM CST   ULTRASOUND with [facility] ULTRASOUND   [facility] ([facility])    [place] [facility] [address]  [facility] [address] [phone]            Dec 13, 2017  2:45 PM CST   RETURN OB with MARCIN Gonzalez [facility]   [facility] ([facility])    [place] [facility] [address]  [address]  [facility] [address] [phone]              Future tests that were ordered for you today     Open Standing Orders        Priority Remaining Interval Expires Ordered    BPP (Single) w/out NST (In Clinic) Routine 2/2 q3days 4/5/2018 12/6/2017            Who to contact     Please call your clinic at [phone] to:    Ask questions about your health    Make or cancel appointments    Discuss your medicines    Learn about your test results    Speak to your doctor   If you have compliments or concerns about an experience at your clinic, or if you wish to file a complaint, please contact [facility] Patient Relations at [phone] or email us at [email or web address]         Additional Information About Your Visit        MyChart Information     "[employer]"hart gives you secure access to your electronic health record. If you see a primary care provider, you can also  send messages to your care team and make appointments. If you have questions, please call your primary care clinic.  If you do not have a primary care provider, please call [phone] and they will assist you.      [email or web address] is an electronic gateway that provides easy, online access to your medical records. With [email or web address], you can request a clinic appointment, read your test results, renew a prescription or communicate with your care team.     To access your existing account, please contact your [facility] or call [phone] for assistance.        Care EveryWhere ID     This is your Care EveryWhere ID. This could be used by other organizations to access your [place] medical records  [id number]        Your Vitals Were     Pulse Last Period BMI (Body Mass Index)             89 02/28/2017 (Exact Date) 20.82 kg/m2          Blood Pressure from Last 3 Encounters:   12/06/17 128/77   11/29/17 121/78   11/22/17 116/75    Weight from Last 3 Encounters:   12/06/17 57.7 kg (127 lb 1.6 oz)   11/29/17 56.4 kg (124 lb 4.8 oz)   11/22/17 56.7 kg (125 lb 1.6 oz)               Primary Care Provider Fax #    Physician No Ref-Primary [phone]       No address on file        Equal Access to Services     AWAIS PARSON : Hadii aad ku hadasho Soomaali, waaxda luqadaha, qaybta kaalmada adeegyada, ynes de la torre. So [facility] [phone].    ATENCIÓN: Si habla español, tiene a fox disposición servicios gratuitos de asistencia lingüística. Llame al [phone].    We comply with applicable federal civil rights laws and [place] laws. We do not discriminate on the basis of race, color, national origin, age, disability, sex, sexual orientation, or gender identity.            Thank you!     Thank you for choosing [facility]  for your care. Our goal is always to provide you with excellent care. Hearing back from our patients is one way we can continue to improve  our services. Please take a few minutes to complete the written survey that you may receive in the mail after your visit with us. Thank you!             Your Updated Medication List - Protect others around you: Learn how to safely use, store and throw away your medicines at www.disposemymeds.org.          This list is accurate as of: 12/6/17 11:24 AM.  Always use your most recent med list.                   Brand Name Dispense Instructions for use Diagnosis    docusate sodium 100 MG tablet    COLACE    60 tablet    Take 100 mg by mouth daily    Constipation, unspecified constipation type       doxylamine 25 MG Tabs tablet    UNISOM    30 each    Take 1 tablet (25 mg) by mouth At Bedtime    Encounter for supervision of other normal pregnancy in third trimester       iron 325 (65 FE) MG tablet     100 tablet    Take 1 tablet by mouth daily (with breakfast)    Anemia in pregnancy, second trimester       omeprazole 20 MG CR capsule    priLOSEC    90 capsule    Take 1 capsule (20 mg) by mouth daily    Gastroesophageal reflux disease, esophagitis presence not specified       TRINATE Tabs     90 tablet    Take 1 tablet by mouth daily    Encounter for supervision of other normal pregnancy in third trimester, Need for vaccination       TYLENOL PO      Take 325 mg by mouth Reported on 5/10/2017

## 2017-12-13 ENCOUNTER — OFFICE VISIT (OUTPATIENT)
Dept: OBGYN | Facility: CLINIC | Age: 32
End: 2017-12-13
Attending: ADVANCED PRACTICE MIDWIFE
Payer: COMMERCIAL

## 2017-12-13 VITALS
HEIGHT: 66 IN | DIASTOLIC BLOOD PRESSURE: 72 MMHG | SYSTOLIC BLOOD PRESSURE: 118 MMHG | WEIGHT: 128.4 LBS | BODY MASS INDEX: 20.63 KG/M2

## 2017-12-13 DIAGNOSIS — Z34.83 ENCOUNTER FOR SUPERVISION OF OTHER NORMAL PREGNANCY IN THIRD TRIMESTER: Primary | ICD-10-CM

## 2017-12-13 DIAGNOSIS — Z34.83 ENCOUNTER FOR SUPERVISION OF OTHER NORMAL PREGNANCY IN THIRD TRIMESTER: ICD-10-CM

## 2017-12-13 PROCEDURE — 76819 FETAL BIOPHYS PROFIL W/O NST: CPT | Mod: ZF

## 2017-12-13 PROCEDURE — 99211 OFF/OP EST MAY X REQ PHY/QHP: CPT | Mod: ZF

## 2017-12-13 ASSESSMENT — PAIN SCALES - GENERAL: PAINLEVEL: NO PAIN (0)

## 2017-12-13 NOTE — LETTER
"2017       RE: Roger Desouza  [address]   [facility] [address]     Dear Colleague,    Thank you for referring your patient, Roger Desouza, to the [facility] at [facility]. Please see a copy of my visit note below.    Subjective:     32 year old  at 41w1d presents for routine prenatal visit.    Denies vaginal bleeding or leakage of fluid.  Denies contractions.  + fetal movement.       No HA, visual changes, RUQ or epigastric pain.   Patient concerns: feeling more pressure and mild discomfort when baby moves; denies regular contractions or cramping.    Objective:  Vitals:    17 1432   BP: 118/72   Weight: 58.2 kg (128 lb 6.4 oz)   Height: 1.664 m (5' 5.51\")   See OB flowsheet    Assessment/Plan:  Encounter Diagnosis   Name Primary?     Encounter for supervision of other normal pregnancy in third trimester Yes     - Reviewed postdates testing including BPP => 41 weeks and rationale for induction of labor based on results. Patient desires postdates testing.   - Reviewed why/how to contact provider if headache/visual changes/RUQ or epigastric pain, decreased fetal movement, vaginal bleeding, leakage of fluid or strong/regular contractions.  - Patient education/orders or handouts today: Sign/symptoms of labor and When to call for labor or other concerns   - Discussed recommendation for induction at 42 weeks  - Scheduled for NST on L&D for .    Again, thank you for allowing me to participate in the care of your patient.      Sincerely,    MARCIN Whitehead CNM      "

## 2017-12-13 NOTE — MR AVS SNAPSHOT
After Visit Summary   12/13/2017    Roger Desouza    MRN: [id number]           Patient Information     Date Of Birth          1985        Visit Information        Provider Department      12/13/2017 2:00 PM [facility] ULTRASOUND [facility]        Today's Diagnoses     Encounter for supervision of other normal pregnancy in third trimester           Follow-ups after your visit        Your next 10 appointments already scheduled     Dec 19, 2017 10:30 AM CST   ULTRASOUND with [facility] ULTRASOUND II   [facility] ([facility])    [place] [facility] [address]  [address]  [facility] [address] [phone]            Dec 19, 2017 11:15 AM CST   RETURN OB with MARCIN Al CNM   [facility] ([facility])    [place] [facility] [address]  [address]  [facility] [address] [phone]              Who to contact     Please call your clinic at [phone] to:    Ask questions about your health    Make or cancel appointments    Discuss your medicines    Learn about your test results    Speak to your doctor   If you have compliments or concerns about an experience at your clinic, or if you wish to file a complaint, please contact [facility] Patient Relations at [phone] or email us at [email or web address]         Additional Information About Your Visit        MyChart Information     [employer] gives you secure access to your electronic health record. If you see a primary care provider, you can also send messages to your care team and make appointments. If you have questions, please call your primary care clinic.  If you do not have a primary care provider, please call [phone] and they will assist you.      [employer] is an electronic gateway that provides easy, online access to your medical records. With [employer], you can request  a clinic appointment, read your test results, renew a prescription or communicate with your care team.     To access your existing account, please contact your [facility] or call [phone] for assistance.        Care EveryWhere ID     This is your Care EveryWhere ID. This could be used by other organizations to access your [place] medical records  [id number]        Your Vitals Were     Last Period                   02/28/2017 (Exact Date)            Blood Pressure from Last 3 Encounters:   12/13/17 118/72   12/06/17 128/77   11/29/17 121/78    Weight from Last 3 Encounters:   12/13/17 58.2 kg (128 lb 6.4 oz)   12/06/17 57.7 kg (127 lb 1.6 oz)   11/29/17 56.4 kg (124 lb 4.8 oz)              We Performed the Following     BPP (Single) w/out NST (In Clinic)        Primary Care Provider Fax #    Physician No Ref-Primary [phone]       No address on file        Equal Access to Services     AWAIS PARSON : Hadii aad ku hadasho Sopadma, waaxda luqadaha, qaybta kaalmada adeegyada, waxay kiah palomino . So [facility] [phone].    ATENCIÓN: Si habla español, tiene a fox disposición servicios gratuitos de asistencia lingüística. Llame al [phone].    We comply with applicable federal civil rights laws and [place] laws. We do not discriminate on the basis of race, color, national origin, age, disability, sex, sexual orientation, or gender identity.            Thank you!     Thank you for choosing [facility]  for your care. Our goal is always to provide you with excellent care. Hearing back from our patients is one way we can continue to improve our services. Please take a few minutes to complete the written survey that you may receive in the mail after your visit with us. Thank you!             Your Updated Medication List - Protect others around you: Learn how to safely use, store and throw away your medicines at www.disposemymeds.org.           This list is accurate as of: 12/13/17  4:33 PM.  Always use your most recent med list.                   Brand Name Dispense Instructions for use Diagnosis    docusate sodium 100 MG tablet    COLACE    60 tablet    Take 100 mg by mouth daily    Constipation, unspecified constipation type       doxylamine 25 MG Tabs tablet    UNISOM    30 each    Take 1 tablet (25 mg) by mouth At Bedtime    Encounter for supervision of other normal pregnancy in third trimester       iron 325 (65 FE) MG tablet     100 tablet    Take 1 tablet by mouth daily (with breakfast)    Anemia in pregnancy, second trimester       omeprazole 20 MG CR capsule    priLOSEC    90 capsule    Take 1 capsule (20 mg) by mouth daily    Gastroesophageal reflux disease, esophagitis presence not specified       TRINATE Tabs     90 tablet    Take 1 tablet by mouth daily    Encounter for supervision of other normal pregnancy in third trimester, Need for vaccination       TYLENOL PO      Take 325 mg by mouth Reported on 5/10/2017

## 2017-12-13 NOTE — LETTER
Date:December 14, 2017      Patient was self referred, no letter generated. Do not send.        [facility] Health Information

## 2017-12-13 NOTE — MR AVS SNAPSHOT
After Visit Summary   12/13/2017    Roger Desouza    MRN: [id number]           Patient Information     Date Of Birth          1985        Visit Information        Provider Department      12/13/2017 2:45 PM Chas Noel APRN [facility]        Today's Diagnoses     Encounter for supervision of other normal pregnancy in third trimester    -  1       Follow-ups after your visit        Follow-up notes from your care team     Return in about 3 days (around 12/16/2017).      Your next 10 appointments already scheduled     Dec 19, 2017 10:30 AM CST   ULTRASOUND with [facility] ULTRASOUND II   [facility] ([facility])    [place] [facility]  [address]  [facility] [address] [phone]            Dec 19, 2017 11:15 AM CST   RETURN OB with MARCIN Al CNM   [facility] ([facility])    [place] [facility] [address]  [address]  [facility] [address] [phone]              Who to contact     Please call your clinic at [phone] to:    Ask questions about your health    Make or cancel appointments    Discuss your medicines    Learn about your test results    Speak to your doctor   If you have compliments or concerns about an experience at your clinic, or if you wish to file a complaint, please contact [facility] Patient Relations at [phone] or email us at [email or web address]         Additional Information About Your Visit        Eugen[employer]hart Information     [employer] gives you secure access to your electronic health record. If you see a primary care provider, you can also send messages to your care team and make appointments. If you have questions, please call your primary care clinic.  If you do not have a primary care provider, please call [phone] and they will assist you.      [employer] is an  "electronic gateway that provides easy, online access to your medical records. With [employer], you can request a clinic appointment, read your test results, renew a prescription or communicate with your care team.     To access your existing account, please contact your [facility] or call [phone] for assistance.        Care EveryWhere ID     This is your Care EveryWhere ID. This could be used by other organizations to access your [place] medical records  [id number]        Your Vitals Were     Height Last Period BMI (Body Mass Index)             1.664 m (5' 5.51\") 02/28/2017 (Exact Date) 21.03 kg/m2          Blood Pressure from Last 3 Encounters:   12/13/17 118/72   12/06/17 128/77   11/29/17 121/78    Weight from Last 3 Encounters:   12/13/17 58.2 kg (128 lb 6.4 oz)   12/06/17 57.7 kg (127 lb 1.6 oz)   11/29/17 56.4 kg (124 lb 4.8 oz)              Today, you had the following     No orders found for display       Primary Care Provider Fax #    Physician No Ref-Primary [phone]       No address on file        Equal Access to Services     [facility] CenterSHEFALI : Hadii barb Sawyer, washannenda lumarlene, qaybta kaalmada ademaryda, ynes palomino . So [facility] [phone].    ATENCIÓN: Si habla español, tiene a fox disposición servicios gratuitos de asistencia lingüística. Llame al [phone].    We comply with applicable federal civil rights laws and [place] laws. We do not discriminate on the basis of race, color, national origin, age, disability, sex, sexual orientation, or gender identity.            Thank you!     Thank you for choosing [facility]  for your care. Our goal is always to provide you with excellent care. Hearing back from our patients is one way we can continue to improve our services. Please take a few minutes to complete the written survey that you may receive in the mail after your visit with us. Thank you!      "        Your Updated Medication List - Protect others around you: Learn how to safely use, store and throw away your medicines at www.disposemymeds.org.          This list is accurate as of: 12/13/17  3:05 PM.  Always use your most recent med list.                   Brand Name Dispense Instructions for use Diagnosis    docusate sodium 100 MG tablet    COLACE    60 tablet    Take 100 mg by mouth daily    Constipation, unspecified constipation type       doxylamine 25 MG Tabs tablet    UNISOM    30 each    Take 1 tablet (25 mg) by mouth At Bedtime    Encounter for supervision of other normal pregnancy in third trimester       iron 325 (65 FE) MG tablet     100 tablet    Take 1 tablet by mouth daily (with breakfast)    Anemia in pregnancy, second trimester       omeprazole 20 MG CR capsule    priLOSEC    90 capsule    Take 1 capsule (20 mg) by mouth daily    Gastroesophageal reflux disease, esophagitis presence not specified       TRINATE Tabs     90 tablet    Take 1 tablet by mouth daily    Encounter for supervision of other normal pregnancy in third trimester, Need for vaccination       TYLENOL PO      Take 325 mg by mouth Reported on 5/10/2017

## 2017-12-13 NOTE — PROGRESS NOTES
"Subjective:     32 year old  at 41w1d presents for routine prenatal visit.    Denies vaginal bleeding or leakage of fluid.  Denies contractions.  + fetal movement.       No HA, visual changes, RUQ or epigastric pain.   Patient concerns: feeling more pressure and mild discomfort when baby moves; denies regular contractions or cramping.    Objective:  Vitals:    17 1432   BP: 118/72   Weight: 58.2 kg (128 lb 6.4 oz)   Height: 1.664 m (5' 5.51\")   See OB flowsheet    Assessment/Plan:  Encounter Diagnosis   Name Primary?     Encounter for supervision of other normal pregnancy in third trimester Yes     - Reviewed postdates testing including BPP => 41 weeks and rationale for induction of labor based on results. Patient desires postdates testing.   - Reviewed why/how to contact provider if headache/visual changes/RUQ or epigastric pain, decreased fetal movement, vaginal bleeding, leakage of fluid or strong/regular contractions.  - Patient education/orders or handouts today: Sign/symptoms of labor and When to call for labor or other concerns   - Discussed recommendation for induction at 42 weeks  - Scheduled for NST on L&D for .  "

## 2017-12-13 NOTE — LETTER
Date:December 14, 2017      Patient was self referred, no letter generated. Do not send.        [facility] Health Information

## 2017-12-13 NOTE — LETTER
2017       RE: Roger Desouza  [address]   [place] [facility] [place] [facility] [address]     Dear Colleague,    Thank you for referring your patient, Roger Desouza, to the [facility] at [facility]. Please see a copy of my visit note below.    32 year old,  , presents at 41 1/7 weeks in pregnancy complicated by post dates for biophysical assessment.    Fetal Breathing Movements (FBM): Normal - 2  Gross Body Movements (GBM): Normal - 2  Fetal Tone (FT): Normal - 2  AFV: Pocket of amniotic fluid > or = to 2 cm x 2 cm - 2  Quantitative Amniotic Fluid Volume Total: 15.6 cm      BPP 8/8.  FHR = 171bpm    MCA Not done.  NST Not done.     Single fetus in cephalic presentation.  Placenta posterior and grade 2.    Recommend twice weekly assessment until delivery.    EFFIE Mitchell MD      Again, thank you for allowing me to participate in the care of your patient.      Sincerely,    S Ultrasound

## 2017-12-13 NOTE — NURSING NOTE
Chief Complaint   Patient presents with     Prenatal Care   41.1 weeks ob visit and bpp ultrasound today-    A lot of pressure  Baby's head is low.

## 2017-12-13 NOTE — PROGRESS NOTES
32 year old,  , presents at 41 1/7 weeks in pregnancy complicated by post dates for biophysical assessment.    Fetal Breathing Movements (FBM): Normal - 2  Gross Body Movements (GBM): Normal - 2  Fetal Tone (FT): Normal - 2  AFV: Pocket of amniotic fluid > or = to 2 cm x 2 cm - 2  Quantitative Amniotic Fluid Volume Total: 15.6 cm      BPP 8/8.  FHR = 171bpm    MCA Not done.  NST Not done.     Single fetus in cephalic presentation.  Placenta posterior and grade 2.    Recommend twice weekly assessment until delivery.    EFFIE Mitchell MD

## 2017-12-15 ENCOUNTER — HOSPITAL ENCOUNTER (INPATIENT)
Facility: CLINIC | Age: 32
LOS: 2 days | Discharge: HOME | End: 2017-12-17
Attending: ADVANCED PRACTICE MIDWIFE | Admitting: ADVANCED PRACTICE MIDWIFE
Payer: COMMERCIAL

## 2017-12-15 DIAGNOSIS — O99.012 ANEMIA IN PREGNANCY, SECOND TRIMESTER: ICD-10-CM

## 2017-12-15 PROBLEM — Z37.9 NORMAL LABOR: Status: ACTIVE | Noted: 2017-12-15

## 2017-12-15 LAB
ABO + RH BLD: NORMAL
ABO + RH BLD: NORMAL
BASOPHILS # BLD AUTO: 0 10E9/L (ref 0–0.2)
BASOPHILS NFR BLD AUTO: 0.2 %
DIFFERENTIAL METHOD BLD: ABNORMAL
EOSINOPHIL # BLD AUTO: 0.3 10E9/L (ref 0–0.7)
EOSINOPHIL NFR BLD AUTO: 3.2 %
ERYTHROCYTE [DISTWIDTH] IN BLOOD BY AUTOMATED COUNT: 13.3 % (ref 10–15)
HCT VFR BLD AUTO: 33.9 % (ref 35–47)
HGB BLD-MCNC: 11.7 G/DL (ref 11.7–15.7)
IMM GRANULOCYTES # BLD: 0.1 10E9/L (ref 0–0.4)
IMM GRANULOCYTES NFR BLD: 0.7 %
LYMPHOCYTES # BLD AUTO: 2 10E9/L (ref 0.8–5.3)
LYMPHOCYTES NFR BLD AUTO: 22.9 %
MCH RBC QN AUTO: 31.8 PG (ref 26.5–33)
MCHC RBC AUTO-ENTMCNC: 34.5 G/DL (ref 31.5–36.5)
MCV RBC AUTO: 92 FL (ref 78–100)
MONOCYTES # BLD AUTO: 0.7 10E9/L (ref 0–1.3)
MONOCYTES NFR BLD AUTO: 8.7 %
NEUTROPHILS # BLD AUTO: 5.5 10E9/L (ref 1.6–8.3)
NEUTROPHILS NFR BLD AUTO: 64.3 %
NRBC # BLD AUTO: 0 10*3/UL
NRBC BLD AUTO-RTO: 0 /100
PLATELET # BLD AUTO: 135 10E9/L (ref 150–450)
RBC # BLD AUTO: 3.68 10E12/L (ref 3.8–5.2)
SPECIMEN EXP DATE BLD: NORMAL
T PALLIDUM IGG+IGM SER QL: NEGATIVE
WBC # BLD AUTO: 8.5 10E9/L (ref 4–11)

## 2017-12-15 PROCEDURE — 12000030 ZZH R&B OB INTERMEDIATE UMMC

## 2017-12-15 PROCEDURE — 40000977 ZZH STATISTIC ATTENDANCE AT DELIVERY

## 2017-12-15 PROCEDURE — 86900 BLOOD TYPING SEROLOGIC ABO: CPT | Performed by: ADVANCED PRACTICE MIDWIFE

## 2017-12-15 PROCEDURE — 25000128 H RX IP 250 OP 636: Performed by: ADVANCED PRACTICE MIDWIFE

## 2017-12-15 PROCEDURE — 86901 BLOOD TYPING SEROLOGIC RH(D): CPT | Performed by: ADVANCED PRACTICE MIDWIFE

## 2017-12-15 PROCEDURE — 99215 OFFICE O/P EST HI 40 MIN: CPT

## 2017-12-15 PROCEDURE — 85025 COMPLETE CBC W/AUTO DIFF WBC: CPT | Performed by: ADVANCED PRACTICE MIDWIFE

## 2017-12-15 PROCEDURE — 72200001 ZZH LABOR CARE VAGINAL DELIVERY SINGLE

## 2017-12-15 PROCEDURE — 25000132 ZZH RX MED GY IP 250 OP 250 PS 637: Performed by: ADVANCED PRACTICE MIDWIFE

## 2017-12-15 PROCEDURE — 86780 TREPONEMA PALLIDUM: CPT | Performed by: ADVANCED PRACTICE MIDWIFE

## 2017-12-15 PROCEDURE — 36415 COLL VENOUS BLD VENIPUNCTURE: CPT | Performed by: ADVANCED PRACTICE MIDWIFE

## 2017-12-15 RX ORDER — BISACODYL 10 MG
10 SUPPOSITORY, RECTAL RECTAL DAILY PRN
Status: DISCONTINUED | OUTPATIENT
Start: 2017-12-17 | End: 2017-12-17 | Stop reason: HOSPADM

## 2017-12-15 RX ORDER — MISOPROSTOL 200 UG/1
TABLET ORAL
Status: DISCONTINUED
Start: 2017-12-15 | End: 2017-12-15 | Stop reason: HOSPADM

## 2017-12-15 RX ORDER — OXYTOCIN 10 [USP'U]/ML
10 INJECTION, SOLUTION INTRAMUSCULAR; INTRAVENOUS
Status: COMPLETED | OUTPATIENT
Start: 2017-12-15 | End: 2017-12-15

## 2017-12-15 RX ORDER — NALOXONE HYDROCHLORIDE 0.4 MG/ML
.1-.4 INJECTION, SOLUTION INTRAMUSCULAR; INTRAVENOUS; SUBCUTANEOUS
Status: DISCONTINUED | OUTPATIENT
Start: 2017-12-15 | End: 2017-12-17 | Stop reason: HOSPADM

## 2017-12-15 RX ORDER — CARBOPROST TROMETHAMINE 250 UG/ML
250 INJECTION, SOLUTION INTRAMUSCULAR
Status: DISCONTINUED | OUTPATIENT
Start: 2017-12-15 | End: 2017-12-15

## 2017-12-15 RX ORDER — MISOPROSTOL 200 UG/1
400 TABLET ORAL
Status: COMPLETED | OUTPATIENT
Start: 2017-12-15 | End: 2017-12-15

## 2017-12-15 RX ORDER — IBUPROFEN 800 MG/1
800 TABLET, FILM COATED ORAL EVERY 6 HOURS PRN
Status: DISCONTINUED | OUTPATIENT
Start: 2017-12-15 | End: 2017-12-17 | Stop reason: HOSPADM

## 2017-12-15 RX ORDER — OXYTOCIN 10 [USP'U]/ML
10 INJECTION, SOLUTION INTRAMUSCULAR; INTRAVENOUS
Status: DISCONTINUED | OUTPATIENT
Start: 2017-12-15 | End: 2017-12-17 | Stop reason: HOSPADM

## 2017-12-15 RX ORDER — AMOXICILLIN 250 MG
2 CAPSULE ORAL 2 TIMES DAILY PRN
Status: DISCONTINUED | OUTPATIENT
Start: 2017-12-15 | End: 2017-12-17 | Stop reason: HOSPADM

## 2017-12-15 RX ORDER — OXYTOCIN/0.9 % SODIUM CHLORIDE 30/500 ML
340 PLASTIC BAG, INJECTION (ML) INTRAVENOUS CONTINUOUS PRN
Status: DISCONTINUED | OUTPATIENT
Start: 2017-12-15 | End: 2017-12-17 | Stop reason: HOSPADM

## 2017-12-15 RX ORDER — SODIUM CHLORIDE, SODIUM LACTATE, POTASSIUM CHLORIDE, CALCIUM CHLORIDE 600; 310; 30; 20 MG/100ML; MG/100ML; MG/100ML; MG/100ML
INJECTION, SOLUTION INTRAVENOUS CONTINUOUS
Status: DISCONTINUED | OUTPATIENT
Start: 2017-12-15 | End: 2017-12-15

## 2017-12-15 RX ORDER — ACETAMINOPHEN 325 MG/1
650 TABLET ORAL EVERY 4 HOURS PRN
Status: DISCONTINUED | OUTPATIENT
Start: 2017-12-15 | End: 2017-12-15

## 2017-12-15 RX ORDER — OXYTOCIN/0.9 % SODIUM CHLORIDE 30/500 ML
100 PLASTIC BAG, INJECTION (ML) INTRAVENOUS CONTINUOUS
Status: DISCONTINUED | OUTPATIENT
Start: 2017-12-15 | End: 2017-12-17 | Stop reason: HOSPADM

## 2017-12-15 RX ORDER — IBUPROFEN 800 MG/1
800 TABLET, FILM COATED ORAL
Status: COMPLETED | OUTPATIENT
Start: 2017-12-15 | End: 2017-12-15

## 2017-12-15 RX ORDER — OXYTOCIN/0.9 % SODIUM CHLORIDE 30/500 ML
PLASTIC BAG, INJECTION (ML) INTRAVENOUS
Status: DISCONTINUED
Start: 2017-12-15 | End: 2017-12-15 | Stop reason: HOSPADM

## 2017-12-15 RX ORDER — HYDROCORTISONE 2.5 %
CREAM (GRAM) TOPICAL 3 TIMES DAILY PRN
Status: DISCONTINUED | OUTPATIENT
Start: 2017-12-15 | End: 2017-12-17 | Stop reason: HOSPADM

## 2017-12-15 RX ORDER — NALOXONE HYDROCHLORIDE 0.4 MG/ML
.1-.4 INJECTION, SOLUTION INTRAMUSCULAR; INTRAVENOUS; SUBCUTANEOUS
Status: DISCONTINUED | OUTPATIENT
Start: 2017-12-15 | End: 2017-12-15

## 2017-12-15 RX ORDER — OXYTOCIN/0.9 % SODIUM CHLORIDE 30/500 ML
100-340 PLASTIC BAG, INJECTION (ML) INTRAVENOUS CONTINUOUS PRN
Status: DISCONTINUED | OUTPATIENT
Start: 2017-12-15 | End: 2017-12-15

## 2017-12-15 RX ORDER — OXYTOCIN 10 [USP'U]/ML
INJECTION, SOLUTION INTRAMUSCULAR; INTRAVENOUS
Status: DISCONTINUED
Start: 2017-12-15 | End: 2017-12-15 | Stop reason: HOSPADM

## 2017-12-15 RX ORDER — AMOXICILLIN 250 MG
1 CAPSULE ORAL 2 TIMES DAILY PRN
Status: DISCONTINUED | OUTPATIENT
Start: 2017-12-15 | End: 2017-12-17 | Stop reason: HOSPADM

## 2017-12-15 RX ORDER — LANOLIN 100 %
OINTMENT (GRAM) TOPICAL
Status: DISCONTINUED | OUTPATIENT
Start: 2017-12-15 | End: 2017-12-17 | Stop reason: HOSPADM

## 2017-12-15 RX ORDER — METHYLERGONOVINE MALEATE 0.2 MG/ML
200 INJECTION INTRAVENOUS
Status: DISCONTINUED | OUTPATIENT
Start: 2017-12-15 | End: 2017-12-15

## 2017-12-15 RX ORDER — ACETAMINOPHEN 325 MG/1
650 TABLET ORAL EVERY 4 HOURS PRN
Status: DISCONTINUED | OUTPATIENT
Start: 2017-12-15 | End: 2017-12-17 | Stop reason: HOSPADM

## 2017-12-15 RX ORDER — LIDOCAINE HYDROCHLORIDE 10 MG/ML
INJECTION, SOLUTION EPIDURAL; INFILTRATION; INTRACAUDAL; PERINEURAL
Status: DISCONTINUED
Start: 2017-12-15 | End: 2017-12-15 | Stop reason: HOSPADM

## 2017-12-15 RX ORDER — FENTANYL CITRATE 50 UG/ML
50-100 INJECTION, SOLUTION INTRAMUSCULAR; INTRAVENOUS
Status: DISCONTINUED | OUTPATIENT
Start: 2017-12-15 | End: 2017-12-15

## 2017-12-15 RX ORDER — OXYCODONE AND ACETAMINOPHEN 5; 325 MG/1; MG/1
1 TABLET ORAL
Status: DISCONTINUED | OUTPATIENT
Start: 2017-12-15 | End: 2017-12-15

## 2017-12-15 RX ADMIN — OXYTOCIN 10 UNITS: 10 INJECTION INTRAVENOUS at 15:57

## 2017-12-15 RX ADMIN — ACETAMINOPHEN 650 MG: 325 TABLET, FILM COATED ORAL at 20:57

## 2017-12-15 RX ADMIN — MISOPROSTOL 400 MCG: 200 TABLET ORAL at 16:00

## 2017-12-15 RX ADMIN — IBUPROFEN 800 MG: 800 TABLET ORAL at 16:10

## 2017-12-15 NOTE — IP AVS SNAPSHOT
[facility]    [address] [facility] [address]    Phone:  [phone]                                       After Visit Summary   12/15/2017    Roger Desouza    MRN: [id number]           After Visit Summary Signature Page     I have received my discharge instructions, and my questions have been answered. I have discussed any challenges I see with this plan with the nurse or doctor.    ..........................................................................................................................................  Patient/Patient Representative Signature      ..........................................................................................................................................  Patient Representative Print Name and Relationship to Patient    ..................................................               ................................................  Date                                            Time    ..........................................................................................................................................  Reviewed by Signature/Title    ...................................................              ..............................................  Date                                                            Time

## 2017-12-15 NOTE — PLAN OF CARE
Problem: Labor (Cervical Ripen, Induct, Augment) (Adult,Obstetrics,Pediatric)  Goal: Signs and Symptoms of Listed Potential Problems Will be Absent, Minimized or Managed (Labor)  Signs and symptoms of listed potential problems will be absent, minimized or managed by discharge/transition of care (reference Labor (Cervical Ripen, Induct, Augment) (Adult,Obstetrics,Pediatric) CPG).  Outcome: Improving  Ctx continue to be strong. Pt coping well with labor with movement and position changes. Vss. Membranes intact. Declines interventions. Plan to continue supportive cares.

## 2017-12-15 NOTE — IP AVS SNAPSHOT
MRN:[id number]                      After Visit Summary   12/15/2017    Roger Desouza    MRN: [id number]           Thank you!     Thank you for choosing [facility] for your care. Our goal is always to provide you with excellent care. Hearing back from our patients is one way we can continue to improve our services. Please take a few minutes to complete the written survey that you may receive in the mail after you visit with us. Thank you!        Patient Information     Date Of Birth          1985        Designated Caregiver       Most Recent Value    Caregiver    Will someone help with your care after discharge? no      About your hospital stay     You were admitted on:  December 15, 2017 You last received care in the:  [facility]    You were discharged on:  December 17, 2017        Reason for your hospital stay       Maternity care                  Who to Call     For medical emergencies, please call 911.  For non-urgent questions about your medical care, please call your primary care provider or clinic, None          Attending Provider     Provider Specialty    Page, MARCIN Alvarado CNM MIDWIFE       Primary Care Provider Fax #    Physician No Ref-Primary [phone]      After Care Instructions     Activity       Review discharge instructions            Diet       Resume previous diet            Discharge Instructions - Gestational diabetic patients       Gestational diabetic patients to follow up for fasting blood sugar and 2 hour 75gm glucose load at 6 weeks postpartum.            Discharge Instructions - Hypertensive disorders patients       High Blood pressure patients to follow up in clinic or via home care within one week for a blood pressure check            Discharge Instructions - Postpartum visit       Schedule postpartum visit with your provider and return to clinic in 6 weeks.                  Your next 10 appointments already scheduled     Dec 19, 2017 10:30 AM CST    [facility] ([facility])    [facility] [address]  [address]  [facility] [id number] [phone]            Dec 19, 2017 11:15 AM CST   RETURN OB with MARCIN Al CNM   [facility] ([facility])    [facility] [address]  [address]  [facility] [id number] [phone]              Pending Results     No orders found from 12/13/2017 to 12/16/2017.            Statement of Approval     Ordered          12/17/17 1205  I have reviewed and agree with all the recommendations and orders detailed in this document.  EFFECTIVE NOW     Approved and electronically signed by:  Isha Pennington APRN CNM             Admission Information     Date & Time Provider Department Dept. Phone    12/15/2017 Isha Pennington APRN CNM [facility] [phone]      Your Vitals Were     Blood Pressure Temperature Respirations Last Period          133/87 97.8  F (36.6  C) (Oral) 16 02/28/2017 (Exact Date)        MyChart Information     The [employer] gives you secure access to your electronic health record. If you see a primary care provider, you can also send messages to your care team and make appointments. If you have questions, please call your primary care clinic.  If you do not have a primary care provider, please call [phone] and they will assist you.        Care EveryWhere ID     This is your Care EveryWhere ID. This could be used by other organizations to access your [facility] medical records  [id number]        Equal Access to Services     [facility] Hospital[facility]HEFALI : Hadii aad ku hadashevelin Sopadma, waaxda luqadaha, qaybta kaalmada alicjayabouchra, ynes de la torre. So [facility] [phone].    ATENCIÓN: Si habla español, tiene a fox disposición servicios gratuitos de asistencia lingüística. Llame al [phone].    We comply with applicable  federal civil rights laws and [place] laws. We do not discriminate on the basis of race, color, national origin, age, disability, sex, sexual orientation, or gender identity.               Review of your medicines      START taking        Dose / Directions    ibuprofen 800 MG tablet   Commonly known as:  ADVIL/MOTRIN        Dose:  800 mg   Take 1 tablet (800 mg) by mouth every 6 hours as needed for other (cramping)   Quantity:  90 tablet   Refills:  1       senna-docusate 8.6-50 MG per tablet   Commonly known as:  SENOKOT-S;PERICOLACE        Dose:  2 tablet   Take 2 tablets by mouth 2 times daily as needed for constipation   Quantity:  90 tablet   Refills:  1         CONTINUE these medicines which have NOT CHANGED        Dose / Directions    docusate sodium 100 MG tablet   Commonly known as:  COLACE   Used for:  Constipation, unspecified constipation type        Dose:  100 mg   Take 100 mg by mouth daily   Quantity:  60 tablet   Refills:  1       doxylamine 25 MG Tabs tablet   Commonly known as:  UNISOM   Used for:  Encounter for supervision of other normal pregnancy in third trimester        Dose:  25 mg   Take 1 tablet (25 mg) by mouth At Bedtime   Quantity:  30 each   Refills:  0       iron 325 (65 FE) MG tablet        Dose:  1 tablet   Take 1 tablet by mouth daily (with breakfast)   Quantity:  60 tablet   Refills:  1       omeprazole 20 MG CR capsule   Commonly known as:  priLOSEC   Used for:  Gastroesophageal reflux disease, esophagitis presence not specified        Dose:  20 mg   Take 1 capsule (20 mg) by mouth daily   Quantity:  90 capsule   Refills:  1       TRINATE Tabs   Used for:  Encounter for supervision of other normal pregnancy in third trimester, Need for vaccination        Dose:  1 tablet   Take 1 tablet by mouth daily   Quantity:  90 tablet   Refills:  3       TYLENOL PO        Dose:  325 mg   Take 325 mg by mouth Reported on 5/10/2017   Refills:  0            Where to get your medicines       These medications were sent to [facility] [place], [place] - [address]  [address] [place] [address], [facility] [address]     Phone:  [phone]     ibuprofen 800 MG tablet    iron 325 (65 FE) MG tablet    senna-docusate 8.6-50 MG per tablet                Protect others around you: Learn how to safely use, store and throw away your medicines at www.disposemymeds.org.             Medication List: This is a list of all your medications and when to take them. Check marks below indicate your daily home schedule. Keep this list as a reference.      Medications           Morning Afternoon Evening Bedtime As Needed    docusate sodium 100 MG tablet   Commonly known as:  COLACE   Take 100 mg by mouth daily                                doxylamine 25 MG Tabs tablet   Commonly known as:  UNISOM   Take 1 tablet (25 mg) by mouth At Bedtime                                ibuprofen 800 MG tablet   Commonly known as:  ADVIL/MOTRIN   Take 1 tablet (800 mg) by mouth every 6 hours as needed for other (cramping)   Last time this was given:  800 mg on 12/16/2017  8:00 AM                                iron 325 (65 FE) MG tablet   Take 1 tablet by mouth daily (with breakfast)                                omeprazole 20 MG CR capsule   Commonly known as:  priLOSEC   Take 1 capsule (20 mg) by mouth daily                                senna-docusate 8.6-50 MG per tablet   Commonly known as:  SENOKOT-S;PERICOLACE   Take 2 tablets by mouth 2 times daily as needed for constipation   Last time this was given:  2 tablets on 12/16/2017  8:00 AM                                TRINATE Tabs   Take 1 tablet by mouth daily                                TYLENOL PO   Take 325 mg by mouth Reported on 5/10/2017   Last time this was given:  650 mg on 12/16/2017  7:55 PM

## 2017-12-15 NOTE — PROGRESS NOTES
"ADMIT NOTE  =================  41w3d    Roger Desouza is a 32 year old female with an Patient's last menstrual period was 2017 (exact date). and Estimated Date of Delivery: Dec 5, 2017 [facility] at 1:31 PM with in active labor.     HPI  ================    Contractions- strong  Fetal movement- active  ROM- no   Vaginal bleeding- none  GBS- negative  FOB- is involved, Abdizirik  Other labor support- cousinYadira    Weight gain- pregravid wt. 108 lbs, Total weight gain- 20.6 lbs  Height- 5' 5.5\"  BMI- 17.7  First prenatal visit at 8 weeks, Total visits- 16    PROBLEM LIST  =================  Patient Active Problem List    Diagnosis Date Noted     Normal labor 12/15/2017     Priority: Medium     Glucose intolerance of pregnancy, PASSED 3 HOUR 2017     Priority: Medium     17: elevated 1 hour glucose, RN notified pt to schedule 3 hour _passed 3 hr GTT 1 elevated value  __       Anemia 2017     Priority: Medium     17: Hemoglobin @ EOB= 10.8  Per protocol, ordered ferritin, serum iron and tibc.   Follow Iron protocol when resulted to determine need for IV iron    17: Pt declines IV iron infusions. Ageres to PO iron q day    10/12/17: POCT Hbg= 10.3; pt now agreeable to IV iron infusions, orders placed.     Declined IV iron infusion when read side effects at the infusion center.    17: Does not want IV iron, agreeable to repeat CBC today as she has been taking PO iron        Encounter for supervision of other normal pregnancy in third trimester 2017     Priority: Medium     Level 2 u/s wnl. Posterior placenta. Baby boy.   Repeat thyroid labs  per endocrine    17: Does not want IV iron, agreeable to repeat CBC today as she has been taking PO iron   GBS collected    2017: BPP and ALECIA In 1 week  17: BPP 8/8, scheduled for NST on BP        H/O partial thyroidectomy 05/10/2017     Priority: Medium     Hyperthyroidism " 2017     Priority: Medium     S/p partial thyroidectomy  17: Per Dr. Barton: TSH and free T4 are consistent with mild hyperthyroidism. If patient is not symptomatic, recheck in 4 weeks would be indicated. Follow up with Endocrinology would be helpful. TSH 0.03, T4 1.66 (high)  17- TSH 0.02, T4 1.23  17- see endocrine note__orders in place fro f/u labs on __    17: Thyroid labs today, orders in by Dr. Morataya--> WNL, no change in plan         HISTORIES  ============  No Known Allergies  Past Medical History:   Diagnosis Date     Hyperthyroidism     Better since surgery     Past Surgical History:   Procedure Laterality Date     THYROID SURGERY      in [facility]; partial thyroidectomy   .  Family History   Problem Relation Age of Onset     Hypertension Father      Social History   Substance Use Topics     Smoking status: Never Smoker     Smokeless tobacco: Never Used     Alcohol use No     Obstetric History       T1      L1     SAB1   TAB0   Ectopic0   Multiple0   Live Births0       # Outcome Date GA Lbr Oje/2nd Weight Sex Delivery Anes PTL Lv   3 Current            2 SAB 17           1 Term 12 40w1d  3.1 kg (6 lb 13.4 oz) F  None             LABS:   ===========  Prenatal Labs:  Rhogam not indicated   Lab Results   Component Value Date    ABO O 12/15/2017    RH Pos 12/15/2017    AS Neg 2017    HEPBANG Nonreactive 2017    TREPAB Negative 2017    RUQIGG 130 2017    HGB 11.7 12/15/2017     Rubella immune  Lab Results   Component Value Date    GBS Negative 2017     Other labs:  Results for orders placed or performed during the hospital encounter of 12/15/17 (from the past 24 hour(s))   ABO and Rh   Result Value Ref Range    ABO O     RH(D) Pos     Specimen Expires 2017    CBC with platelets differential   Result Value Ref Range    WBC 8.5 4.0 - 11.0 10e9/L    RBC Count 3.68 (L) 3.8 - 5.2 10e12/L    Hemoglobin 11.7 11.7 -  15.7 g/dL    Hematocrit 33.9 (L) 35.0 - 47.0 %    MCV 92 78 - 100 fl    MCH 31.8 26.5 - 33.0 pg    MCHC 34.5 31.5 - 36.5 g/dL    RDW 13.3 10.0 - 15.0 %    Platelet Count 135 (L) 150 - 450 10e9/L    Diff Method Automated Method     % Neutrophils 64.3 %    % Lymphocytes 22.9 %    % Monocytes 8.7 %    % Eosinophils 3.2 %    % Basophils 0.2 %    % Immature Granulocytes 0.7 %    Nucleated RBCs 0 0 /100    Absolute Neutrophil 5.5 1.6 - 8.3 10e9/L    Absolute Lymphocytes 2.0 0.8 - 5.3 10e9/L    Absolute Monocytes 0.7 0.0 - 1.3 10e9/L    Absolute Eosinophils 0.3 0.0 - 0.7 10e9/L    Absolute Basophils 0.0 0.0 - 0.2 10e9/L    Abs Immature Granulocytes 0.1 0 - 0.4 10e9/L    Absolute Nucleated RBC 0.0        ROS  =========  Pt denies significant respiratory, cardiovacular, GI, or muscular/skeletalcomplaints.    See RN data base ROS.       PHYSICAL EXAM:  ===============  /87  Temp 97.4  F (36.3  C) (Oral)  Resp 18  LMP 02/28/2017 (Exact Date)  General appearance: uncomfortable with contractions  GENERAL APPEARANCE: healthy, alert and no distress  RESP: lungs clear to auscultation - no rales, rhonchi or wheezes  CV: regular rates and rhythm, normal S1 S2, no S3 or S4 and no murmur,and no varicosities  NEURO: Denies headache, blurred vision, other vision changes  PSYCH: mentation appears normal      Abdomen: gravid, vertex fetus per Leopold's, non-tender between contractions.   EFW-  8.5 lbs.   CONTACTIONS: strong and every 3 minutes  FETAL HEART TONES: Cat 1 on admit strip, no added risk factors to recommended CEFM, IA started.   Intermittent auscultation- 130's. No decelerations heard.  PELVIC EXAM: 7/ 90%/ Mid/ average/ -1   ZAMBRANO SCORE: 11  BLOODY SHOW: no   ROM:yes, moderate, clear and meconium  FLUID: clear  AMNISURE: not done    ASSESSMENT:  ==============  IUP @ 41w3d admitted in active labor   NST REACTIVE  Fetal Heart Rate - no decelerations heard with intermittent auscultation  GBS- negative      PLAN:  ===========  Admit - see IP orders  Pt is considering pain med options,knows the options.  Encourage upright and mobility.  IA for now.  MARCIN Levin CNM

## 2017-12-15 NOTE — PLAN OF CARE
Pt arrived for rule out labor assessment. Pt denies bleeding or LOF. Notes feeling well and and a states she has had a normal pregnancy. Pt states ctx are about Q10 minutes, however noted to be more frequent on TOCO. CNM Isha Page notified of pt arrival.  Jim interpretor requested. Pt is open to pain medications. Plan for CNM to eval and determine plan.

## 2017-12-16 LAB — HGB BLD-MCNC: 10.1 G/DL (ref 11.7–15.7)

## 2017-12-16 PROCEDURE — 25000132 ZZH RX MED GY IP 250 OP 250 PS 637: Performed by: ADVANCED PRACTICE MIDWIFE

## 2017-12-16 PROCEDURE — 36415 COLL VENOUS BLD VENIPUNCTURE: CPT | Performed by: ADVANCED PRACTICE MIDWIFE

## 2017-12-16 PROCEDURE — 85018 HEMOGLOBIN: CPT | Performed by: ADVANCED PRACTICE MIDWIFE

## 2017-12-16 PROCEDURE — 12000028 ZZH R&B OB UMMC

## 2017-12-16 RX ORDER — PNV NO.95/FERROUS FUM/FOLIC AC 28MG-0.8MG
1 TABLET ORAL
Qty: 60 TABLET | Refills: 1 | Status: SHIPPED | OUTPATIENT
Start: 2017-12-16 | End: 2019-01-16

## 2017-12-16 RX ORDER — IBUPROFEN 800 MG/1
800 TABLET, FILM COATED ORAL EVERY 6 HOURS PRN
Qty: 90 TABLET | Refills: 1 | Status: SHIPPED | OUTPATIENT
Start: 2017-12-16 | End: 2019-01-16

## 2017-12-16 RX ORDER — AMOXICILLIN 250 MG
2 CAPSULE ORAL 2 TIMES DAILY PRN
Qty: 90 TABLET | Refills: 1 | Status: SHIPPED | OUTPATIENT
Start: 2017-12-16 | End: 2019-01-16

## 2017-12-16 RX ADMIN — IBUPROFEN 800 MG: 800 TABLET ORAL at 08:00

## 2017-12-16 RX ADMIN — ACETAMINOPHEN 650 MG: 325 TABLET, FILM COATED ORAL at 01:23

## 2017-12-16 RX ADMIN — ACETAMINOPHEN 650 MG: 325 TABLET, FILM COATED ORAL at 19:55

## 2017-12-16 RX ADMIN — IBUPROFEN 800 MG: 800 TABLET ORAL at 01:23

## 2017-12-16 RX ADMIN — SENNOSIDES AND DOCUSATE SODIUM 2 TABLET: 8.6; 5 TABLET ORAL at 08:00

## 2017-12-16 NOTE — PROGRESS NOTES
Postpartum Note  Postpartum Day#1  SIGNIFICANT PROBLEMS:  Patient Active Problem List    Diagnosis Date Noted     Normal labor 12/15/2017     Priority: Medium      (normal spontaneous vaginal delivery) 12/15/2017     Priority: Medium     Glucose intolerance of pregnancy, PASSED 3 HOUR 2017     Priority: Medium     17: elevated 1 hour glucose, RN notified pt to schedule 3 hour _passed 3 hr GTT 1 elevated value  __       Anemia 2017     Priority: Medium     17: Hemoglobin @ EOB= 10.8  Per protocol, ordered ferritin, serum iron and tibc.   Follow Iron protocol when resulted to determine need for IV iron    17: Pt declines IV iron infusions. Ageres to PO iron q day    10/12/17: POCT Hbg= 10.3; pt now agreeable to IV iron infusions, orders placed.     Declined IV iron infusion when read side effects at the infusion center.    17: Does not want IV iron, agreeable to repeat CBC today as she has been taking PO iron        Encounter for supervision of other normal pregnancy in third trimester 2017     Priority: Medium     Level 2 u/s wnl. Posterior placenta. Baby boy.   Repeat thyroid labs  per endocrine    17: Does not want IV iron, agreeable to repeat CBC today as she has been taking PO iron   GBS collected    2017: BPP and ALECIA In 1 week  17: BPP 8/8, scheduled for NST on BP        H/O partial thyroidectomy 05/10/2017     Priority: Medium     Hyperthyroidism 2017     Priority: Medium     S/p partial thyroidectomy  17: Per Dr. Barton: TSH and free T4 are consistent with mild hyperthyroidism. If patient is not symptomatic, recheck in 4 weeks would be indicated. Follow up with Endocrinology would be helpful. TSH 0.03, T4 1.66 (high)  17- TSH 0.02, T4 1.23  17- see endocrine note__orders in place fro f/u labs on __    17: Thyroid labs today, orders in by Dr. Morataya--> WNL, no change in plan       INTERVAL HISTORY:  /79  Temp  "97.9  F (36.6  C) (Oral)  Resp 18  LMP 02/28/2017 (Exact Date)  Breastfeeding? Unknown  Pt stable, baby is rooming in.   Breast feeding status: initiated and breastfeeding with formula supplementation per patient request.  Pt reports baby \"not sucking well,\" and notes discomfort during nursing.   Complications since 2 hours post delivery: None  Patient is tolerating activity well. Voiding without difficulty, no BM yet. Pt reports R sided, abdominal cramping, relieved by ibuprofen and hot pack. Lochia is decreasing and patient denies clots.  Perineal pain is minimal, reports mild burning with urination. The perineum healing well, no edema present.    Physical Exam:   Breasts: soft, nontender, nipples intact  Abdomen: soft, nontender, fundus firm deviated to R at U  Lochia: scant, rubra, no clots or odor  Perineum: healing well, no edema or redness  Extremities: no edema, neg Mandeep's    Postpartum hemoglobin   Hemoglobin   Date Value Ref Range Status   12/16/2017 10.1 (L) 11.7 - 15.7 g/dL Final     Blood type   Lab Results   Component Value Date    ABO O 12/15/2017       Lab Results   Component Value Date    RH Pos 12/15/2017     Rubella status   Lab Results   Component Value Date    RUQIGG 130 04/26/2017     Rubella: immune  History of depression: denies. Postpartum depression warning signs reviewed.    ASSESSMENT/PLAN:  Normal postpartum exam, stable postpartum day #1  Complications: anemic, plan for iron supplementation and breastfeeding difficulties  Breastfeeding counseling and assistance given.  Discussed ideal latch for baby and different positions to try with baby. Baby able to successfully latch during visit.  Plan d/c home tomorrow. Home Visit Ordered- No  RTC 6 weeks  Postpartum warning s/s reviewed, including bleeding/clots, fever, mastitis, or depression  Continue prenatal vitamins and iron supplement. Stool softener and ibuprofen prn.  Birthcontrol planned:None    Current Discharge Medication List    "   CONTINUE these medications which have NOT CHANGED    Details   doxylamine (UNISOM) 25 MG TABS tablet Take 1 tablet (25 mg) by mouth At Bedtime  Qty: 30 each, Refills: 0    Associated Diagnoses: Encounter for supervision of other normal pregnancy in third trimester      Prenatal Vit-Fe Fumarate-FA (TRINATE) TABS Take 1 tablet by mouth daily  Qty: 90 tablet, Refills: 3    Associated Diagnoses: Encounter for supervision of other normal pregnancy in third trimester; Need for vaccination      Ferrous Sulfate (IRON) 325 (65 FE) MG tablet Take 1 tablet by mouth daily (with breakfast)  Qty: 100 tablet, Refills: 3    Associated Diagnoses: Anemia in pregnancy, second trimester      docusate sodium (COLACE) 100 MG tablet Take 100 mg by mouth daily  Qty: 60 tablet, Refills: 1    Associated Diagnoses: Constipation, unspecified constipation type      omeprazole (PRILOSEC) 20 MG CR capsule Take 1 capsule (20 mg) by mouth daily  Qty: 90 capsule, Refills: 1    Associated Diagnoses: Gastroesophageal reflux disease, esophagitis presence not specified      Acetaminophen (TYLENOL PO) Take 325 mg by mouth Reported on 5/10/2017           MARCIN WhiteheadM

## 2017-12-16 NOTE — PLAN OF CARE
Problem: Patient Care Overview  Goal: Plan of Care/Patient Progress Review  Outcome: No Change  Took care of patient from [id number]. Minor complaints of cramping; tylenol and warm packs. Attempting breastfeeding; formula requested. Voiding. Tolerating PO intake. Continue to monitor and notify MD of any changes or concerns.     Hourly Rounding Completed.

## 2017-12-16 NOTE — PROVIDER NOTIFICATION
Pt very uncomfortable. Using N2O2 but its not helping anymore. Wants an epidural. SVE. Rim of cervix left. Told her its too late for an epidural. Continued using the N2O2 in hands and knees position.

## 2017-12-16 NOTE — PLAN OF CARE
Problem: Patient Care Overview  Goal: Plan of Care/Patient Progress Review  Patient arrived to [facility] unit via wheelchair at 1855,with belongings, accompanied by spouse/ significant other, with infant in arms. Received report from deo NI and checked bands. Unit and room orientation started. Call light within arms reach; no concerns present at this time. Continue with plan of care.

## 2017-12-16 NOTE — PLAN OF CARE
Problem: Patient Care Overview  Goal: Plan of Care/Patient Progress Review  Outcome: Improving  Data: Vital signs and postpartum checks WDL - see flow record. Patient eating and drinking normally. Patient able to empty bladder independently and is up ambulating.  Patient performing self cares and is able to care for infant.  Action: Patient medicated during the shift for pain. Patient education done see education record.  Response: Positive attachment behaviors observed with infant. Support persons are present.    Plan: Continue with the plan of care and notify provider if decline in status. Will continue to monitor and assess.

## 2017-12-16 NOTE — PLAN OF CARE
Problem: Postpartum (Vaginal Delivery) (Adult,Obstetrics,Pediatric)  Goal: Signs and Symptoms of Listed Potential Problems Will be Absent, Minimized or Managed (Postpartum)  Signs and symptoms of listed potential problems will be absent, minimized or managed by discharge/transition of care (reference Postpartum (Vaginal Delivery) (Adult,Obstetrics,Pediatric) CPG).  Data: Roger Desouza transferred to 72 via wheelchair at 1850. Baby transferred via parent's arms.  Action: Receiving unit notified of transfer: Yes. Patient and family notified of room change. Report given to Pippa RAPHAEL at 1830. Belongings sent to receiving unit. Accompanied by Registered Nurse. Oriented patient to surroundings. Call light within reach. ID bands double-checked with receiving RN.  Response: Patient tolerated transfer and is stable.

## 2017-12-16 NOTE — PLAN OF CARE
Problem: Patient Care Overview  Goal: Plan of Care/Patient Progress Review  Outcome: Therapy, progress toward functional goals as expected  VSS.  Pain well controlled with Tylenol and Ibuprofen.  Uterus off to right, pt voided and uterus shifted more midline, but still remains slightly right. Fundus firm at U. Pt attempting to breastfeed and hand expressing EBM to spoon. Baby spitty/sleepy and not interested in latching. Pt voiding without diffuculty. Bonding well with baby.

## 2017-12-17 ENCOUNTER — OFFICE VISIT (OUTPATIENT)
Dept: INTERPRETER SERVICES | Facility: CLINIC | Age: 32
End: 2017-12-17
Payer: COMMERCIAL

## 2017-12-17 VITALS — SYSTOLIC BLOOD PRESSURE: 133 MMHG | RESPIRATION RATE: 16 BRPM | TEMPERATURE: 97.8 F | DIASTOLIC BLOOD PRESSURE: 87 MMHG

## 2017-12-17 PROCEDURE — T1013 SIGN LANG/ORAL INTERPRETER: HCPCS | Mod: U3

## 2017-12-17 NOTE — L&D DELIVERY NOTE
OTONIEL Delivery Note  Labor Course: spon labor  Pain meds: N2O, was wanting epidural, labor progressed too quickly  IUP at 41 weeks gestation delivered on 2017.     delivery of a viable Male infant.  Weight : 8 pounds 1 ounces   Apgars of 8 at 1 minute and 9 at 5 minutes.  Labor was spontaneous.  Medications administered  in labor:  Pain Rx N2O; Antibiotics No; Other   Perineum: Minor laceration - No repair  Placenta-mechanism: spontaneous, intact,  with a 3 vessel cord. IV oxytocin was given.  QBLs was 591.  Complications of pregnancy, labor and delivery: Shoulder Dystocia, pop heard w traction, some attempt at liv but patient resisted, delivery of posterior arm at 1 min. Dr. Valdivia in after delivery of baby.  Anticipated d/c date:   Birth attendants:MARCIN Levin,OTONIEL      Delivery Summary - No Beck  Delivery Summary    Roger Desouza MRN# [id number]   Age: 32 year old YOB: 1985     Labor Event Times:    Labor Onset Date       Labor Onset Time    Dilation Complete Date    Dilation Complete Time       Start Pushing Date        Start Pushing Time            Labor Length:    1st Stage (hrs/min) 1.00 30.00   2nd Stage (hrs/min) 0.00 18.00   3rd Stage (hrs/min) 0.00 12.00       Labor Events:     Labor No   Rupture Date     Rupture Time     Rupture Type Spontaneous rupture of membranes occuring during spontaneous labor or augmentation   Fluid Color     Labor Type     Induction    Induction Indication         Augmentation    Labor Complications     Additional Complications     Management of Labor        Antibiotics     IV Antibiotic Given     Additional Management     Fetal Status Prior to  Delivery     Fetal Status Comments         Cervical Ripening:    Date     Time     Type         Delivery:    Episiotomy None   Local Anesthetic        Lacerations 1st   Sponge Count Correct       Needle Count Correct     Final Count by:    Sutures     Blood loss (ml)    Packing  "Intentionally Left In     Number     Comments           Information for the patient's :  Ludy Desouza [[id number]]       Delivery  12/15/2017 3:48 PM by  Vaginal, Spontaneous Delivery  Sex:  male Gestational Age: 41w3d  Delivery Clinician:     Living?:            APGARS  One minute Five minutes Ten minutes   Skin color:            Heart rate:            Grimace:            Muscle tone:            Breathing:            Totals: 8  9         Presentation/position:           Resuscitation and Interventions: Method:  None  Oxygen Type:     Intubation Time:   # of Attempts:     ETT Size:        Tracheal Suction:     Tracheal returns:      Paron Care at Delivery:  Invited to attend this vaginal delivery for this term infant with meconium stained fluid. Difficult delivery with shoulder dystocia. Provider felt \"pop\" near left shoulder prior to delivery. Infant was born with grimace. Dried and stimulated. Infant   with tone and grimace within seconds, cry by 30 seconds. Infant was placed on mom's abdomen. Umbilical cord clamped and cut around 90 seconds of life. Infant brought to the open bed warmer for assessment. Exam WDL except crepitus felt over the medial   aspect of the left clavicle. Parents updated regarding this finding. Explained that an x-ray may be obtained to confirm the diagnosis of a fracture. Explained that he has equal strength and tone in both extremities. Discussed that these fractures us  ually need no treatment or intervention but their pediatrition will follow up with them. Encouraged the nursery staff to call with any concerns or questions.     MARCIN Vick, NNP-BC 12/15/2017 3:53 PM            Cord information:     Disposition of cord blood:      Blood gases sent?    Complications:     Placenta: Delivered:           appearance.  Comments:  .  Disposition: Hospital disposal   Measurements:  Weight: 8 lb 1 oz (3657 g)  Height: 21.5\"  Head circumference: 31.8 cm  Chest " circumference:     Temperature:     Other providers:       Additional  information:  Forceps:    Verbal Informed Consent Obtained:       Alternative Labor Strategies Discussed:     Emergency Resources Available:       Type:       Accrued Pulling Time:       # of Pulls:      Position:     Fetal Station:       Indications:      Other Indications:     Operative Vaginal Delivery Brief Note Forceps:        Vacuum:    Verbal Informed Consent Obtained:     Alternative Labor Strategies Discussed:     Emergency Resources Available:     Type:      Accrued Pulling Time:       # of Pop-Offs:       # of Pulls:       Position:     Fetal Station:      Indications for Vacuum:       Other Indications:    Operative Vaginal Delivery Brief Note Vacuum:        Shoulder Dystocia Shoulder Dystocia    Fetal Tracing Prior to Delivery:  Category 1   Shoulder dystocia present?:  Yes   Anterior shoulder:  right    Time recognized:  12/15/2017 1547    Time help called:  12/15/2017 1547 Help called by:  Isha Pennington CNM    Physician/Provider:  Isha pennington CNM   NICU arrived:  12/15/2017 153    Additional staff arrived:  12/15/2017 1548 Additional staff:  Dr. Valdivia      Gentle attempt at traction, assisted by maternal expulsive forces?:  Yes       First maneuver:  Kelsey maneuver, Delivery of the posterior arm   Time performed:  12/15/2017 154    Performed by:  Isha Pennington CNM                                       Breech:       : Type:     Indications for Primary:     Indications for Secondary:     Other Indications:        Observed anomalies     Output in Delivery Room:

## 2017-12-17 NOTE — PLAN OF CARE
Home care referral was made to [facility] care [place] for early discharge.

## 2017-12-17 NOTE — PLAN OF CARE
Problem: Postpartum (Vaginal Delivery) (Adult,Obstetrics,Pediatric)  Goal: Signs and Symptoms of Listed Potential Problems Will be Absent, Minimized or Managed (Postpartum)  Signs and symptoms of listed potential problems will be absent, minimized or managed by discharge/transition of care (reference Postpartum (Vaginal Delivery) (Adult,Obstetrics,Pediatric) CPG).   Outcome: No Change  Data: Vital signs within normal limits. Postpartum checks within normal limits - see flow record. Patient eating and drinking normally. Patient able to empty bladder independently and is up ambulating. No apparent signs of infection. laceration healing well. Patient performing self cares and is able to care for infant.  Action: Patient medicated during the shift for cramping. See MAR. Patient reassessed within 1 hour after each medication and pain was improved - patient stated she was comfortable. Patient education done about feeding. See flow record.  Response: Positive attachment behaviors observed with infant. Support persons present.   Plan: Anticipate discharge on 12/17/17.

## 2017-12-17 NOTE — DISCHARGE SUMMARY
[facility] [place] Discharge Summary    Roger Desouza MRN# [id number]   Age: 32 year old YOB: 1985     Date of Admission:  12/15/2017  Date of Discharge::  2017  Admitting Physician:  MARCIN Griffith CNM  Discharge Physician:  Isha Pennington CNM, MS      Home clinic: [facility]          Admission Diagnoses:   Maternity *KEVIN: 2017 NST  Normal labor   (normal spontaneous vaginal delivery)          Discharge Diagnosis:   Normal spontaneous vaginal delivery  Intrauterine pregnancy at 41 weeks gestation          Procedures:   Procedure(s): Shoulder dystocia reduction       No other procedures performed during this admission           Medications Prior to Admission:     Prescriptions Prior to Admission   Medication Sig Dispense Refill Last Dose     doxylamine (UNISOM) 25 MG TABS tablet Take 1 tablet (25 mg) by mouth At Bedtime 30 each 0 2017 at Unknown time     Prenatal Vit-Fe Fumarate-FA (TRINATE) TABS Take 1 tablet by mouth daily 90 tablet 3 2017 at Unknown time     docusate sodium (COLACE) 100 MG tablet Take 100 mg by mouth daily 60 tablet 1 2017 at Unknown time     omeprazole (PRILOSEC) 20 MG CR capsule Take 1 capsule (20 mg) by mouth daily 90 capsule 1 2017 at Unknown time     Acetaminophen (TYLENOL PO) Take 325 mg by mouth Reported on 5/10/2017   2017 at Unknown time             Discharge Medications:     Current Discharge Medication List      START taking these medications    Details   ibuprofen (ADVIL/MOTRIN) 800 MG tablet Take 1 tablet (800 mg) by mouth every 6 hours as needed for other (cramping)  Qty: 90 tablet, Refills: 1    Associated Diagnoses:  (normal spontaneous vaginal delivery)      senna-docusate (SENOKOT-S;PERICOLACE) 8.6-50 MG per tablet Take 2 tablets by mouth 2 times daily as needed for constipation  Qty: 90 tablet, Refills: 1    Associated Diagnoses:  (normal spontaneous vaginal delivery)         CONTINUE these medications  which have CHANGED    Details   Ferrous Sulfate (IRON) 325 (65 FE) MG tablet Take 1 tablet by mouth daily (with breakfast)  Qty: 60 tablet, Refills: 1    Associated Diagnoses:  (normal spontaneous vaginal delivery)         CONTINUE these medications which have NOT CHANGED    Details   doxylamine (UNISOM) 25 MG TABS tablet Take 1 tablet (25 mg) by mouth At Bedtime  Qty: 30 each, Refills: 0    Associated Diagnoses: Encounter for supervision of other normal pregnancy in third trimester      Prenatal Vit-Fe Fumarate-FA (TRINATE) TABS Take 1 tablet by mouth daily  Qty: 90 tablet, Refills: 3    Associated Diagnoses: Encounter for supervision of other normal pregnancy in third trimester; Need for vaccination      docusate sodium (COLACE) 100 MG tablet Take 100 mg by mouth daily  Qty: 60 tablet, Refills: 1    Associated Diagnoses: Constipation, unspecified constipation type      omeprazole (PRILOSEC) 20 MG CR capsule Take 1 capsule (20 mg) by mouth daily  Qty: 90 capsule, Refills: 1    Associated Diagnoses: Gastroesophageal reflux disease, esophagitis presence not specified      Acetaminophen (TYLENOL PO) Take 325 mg by mouth Reported on 5/10/2017                   Consultations:   No consultations were requested during this admission          Brief History of Labor:     CNM Delivery Note  Labor Course: spon labor  Pain meds: N2O, was wanting epidural, labor progressed too quickly  IUP at 41 weeks gestation delivered on 2017.     delivery of a viable Male infant.  Weight : 8 pounds 1 ounces   Apgars of 8 at 1 minute and 9 at 5 minutes.  Labor was spontaneous.  Medications administered  in labor:  Pain Rx N2O; Antibiotics No; Other   Perineum: Minor laceration - No repair  Placenta-mechanism: spontaneous, intact,  with a 3 vessel cord. IV oxytocin was given.  QBLs was 591.  Complications of pregnancy, labor and delivery: Shoulder Dystocia x 1 min, broke collar bone, delivery of posterior arm  Anticipated  d/c date: 12/17  Birth attendants:MARCIN Levin,JORGE       Assessment Day of Discharge      Breasts:soft  Nipples:erect  Fundus:ff u/-1  Abdomen:soft  Lochia:min rubra, no clots  Perineum:well approx, no edema  Legs:NT           Hospital Course:   The patient's hospital course was unremarkable.  On discharge, her pain was well controlled. Vaginal bleeding is similar to peak menstrual flow.  Voiding without difficulty.  Ambulating well and tolerating a normal diet.  No fever.  Breastfeeding well.  Infant is stable.  No bowel movement yet.*  She was discharged on post-partum day #2.    Post-partum hemoglobin:   Hemoglobin   Date Value Ref Range Status   12/16/2017 10.1 (L) 11.7 - 15.7 g/dL Final             Discharge Instructions and Follow-Up:   Discharge diet: Regular   Discharge activity: Activity as tolerated   Discharge follow-up: Follow up with jorge in 6 weeks   Wound care: Drink plenty of fluids           Discharge Disposition:   Discharged to home        Isha Pennington CNM, MS

## 2017-12-26 RX ORDER — BREAST PUMP
1 EACH MISCELLANEOUS DAILY PRN
Qty: 1 EACH | Refills: 0 | Status: SHIPPED | OUTPATIENT
Start: 2017-12-26 | End: 2019-01-16

## 2017-12-26 NOTE — PROGRESS NOTES
Javi with [employer] called to request breast pump order for patient. Order entered, signed, and faxed.

## 2018-01-07 ENCOUNTER — HEALTH MAINTENANCE LETTER (OUTPATIENT)
Age: 33
End: 2018-01-07

## 2018-01-09 NOTE — TELEPHONE ENCOUNTER
APPT INFO    Date /Time: 1/19/18 at 9:30 AM   Reason for Appt: Left side hearing loss after a cold   Ref Provider/Clinic: [facility] Dr. Olga Lassiter   Are there internal records? Yes/No?  IF YES, list clinic names: No   Are there outside records? Yes/No? Yes;  See below   Patient Contact (Y/N) & Call Details: No   Action: Chart Reviewed     OUTSIDE RECORDS CHECKLIST     CLINIC NAME COMMENTS REC (x) IMG (x)   [facility]  1/9/18, 1ST REQUEST FAXED   1/9/18 RECS RECEIVED & FORWARDED     - OV NOTES: 1/4/18      - AUDIO: 1/4/18 X NONE

## 2018-01-18 DIAGNOSIS — H91.92 HEARING LOSS IN LEFT EAR: Primary | ICD-10-CM

## 2018-01-19 ENCOUNTER — OFFICE VISIT (OUTPATIENT)
Dept: AUDIOLOGY | Facility: CLINIC | Age: 33
End: 2018-01-19
Attending: OTOLARYNGOLOGY
Payer: COMMERCIAL

## 2018-01-19 ENCOUNTER — OFFICE VISIT (OUTPATIENT)
Dept: OTOLARYNGOLOGY | Facility: CLINIC | Age: 33
End: 2018-01-19
Payer: COMMERCIAL

## 2018-01-19 ENCOUNTER — PRE VISIT (OUTPATIENT)
Dept: OTOLARYNGOLOGY | Facility: CLINIC | Age: 33
End: 2018-01-19

## 2018-01-19 VITALS — WEIGHT: 112 LBS | BODY MASS INDEX: 19.84 KG/M2 | HEIGHT: 63 IN

## 2018-01-19 DIAGNOSIS — H90.2 CONDUCTIVE HEARING LOSS, UNILATERAL: Primary | ICD-10-CM

## 2018-01-19 DIAGNOSIS — H93.12 TINNITUS OF LEFT EAR: ICD-10-CM

## 2018-01-19 DIAGNOSIS — H90.A21 SENSORINEURAL HEARING LOSS (SNHL) OF RIGHT EAR WITH RESTRICTED HEARING OF LEFT EAR: ICD-10-CM

## 2018-01-19 DIAGNOSIS — H90.A12 CONDUCTIVE HEARING LOSS OF LEFT EAR WITH RESTRICTED HEARING OF RIGHT EAR: Primary | ICD-10-CM

## 2018-01-19 ASSESSMENT — PAIN SCALES - GENERAL: PAINLEVEL: NO PAIN (0)

## 2018-01-19 NOTE — NURSING NOTE
"Chief Complaint   Patient presents with     Consult     left sided hearing loss    Height 1.61 m (5' 3.39\"), weight 50.8 kg (112 lb), last menstrual period 02/28/2017, unknown if currently breastfeeding.      Amador Valdes LPN    "

## 2018-01-19 NOTE — MR AVS SNAPSHOT
After Visit Summary   1/19/2018    Roger Desouza    MRN: [id number]           Patient Information     Date Of Birth          1985        Visit Information        Provider Department      1/19/2018 8:30 AM Dawna Bailey, [place]; [employer] [facility]        Today's Diagnoses     Conductive hearing loss of left ear with restricted hearing of right ear    -  1    Sensorineural hearing loss (SNHL) of right ear with restricted hearing of left ear        Tinnitus of left ear           Follow-ups after your visit        Your next 10 appointments already scheduled     Jan 23, 2018 11:30 AM CST   (Arrive by 11:15 AM)   New Patient Visit with Peg Pak MD   [facility] ([facility])    [address] [address] [phone]              Who to contact     Please call your clinic at [phone] to:    Ask questions about your health    Make or cancel appointments    Discuss your medicines    Learn about your test results    Speak to your doctor   If you have compliments or concerns about an experience at your clinic, or if you wish to file a complaint, please contact [facility] Patient Relations at [phone] or email us at [email or web address]         Additional Information About Your Visit        MyChart Information     [employer] gives you secure access to your electronic health record. If you see a primary care provider, you can also send messages to your care team and make appointments. If you have questions, please call your primary care clinic.  If you do not have a primary care provider, please call [phone] and they will assist you.      [employer] is an electronic gateway that provides easy, online access to your medical records. With [employer], you can request a clinic appointment, read your test results, renew a prescription or communicate with  your care team.     To access your existing account, please contact your [facility] or call [phone] for assistance.        Care EveryWhere ID     This is your Care EveryWhere ID. This could be used by other organizations to access your Mobile medical records  [id number]        Your Vitals Were     Last Period                   02/28/2017 (Exact Date)            Blood Pressure from Last 3 Encounters:   12/17/17 133/87   12/13/17 118/72   12/06/17 128/77    Weight from Last 3 Encounters:   01/19/18 50.8 kg (112 lb)   12/13/17 58.2 kg (128 lb 6.4 oz)   12/06/17 57.7 kg (127 lb 1.6 oz)              We Performed the Following     AUDIOGRAM/TYMPANOGRAM - INTERFACE     [facility] Eval [facility] Speech Recog ([id number])     Tymps / Reflex   ([id number])        Primary Care Provider Fax #    Physician No Ref-Primary [phone]       No address on file        Equal Access to Services     AWAIS PARSON : Hadii aad ku hadasho Soomaali, waaxda luqadaha, qaybta kaalmada adeegyada, waxay idiin hayaan fredeg kharacinthya palomino . So [facility] [phone].    ATENCIÓN: Si habla español, tiene a fox disposición servicios gratuitos de asistencia lingüística. Llame al [phone].    We comply with applicable federal civil rights laws and [place] laws. We do not discriminate on the basis of race, color, national origin, age, disability, sex, sexual orientation, or gender identity.            Thank you!     Thank you for choosing [facility]  for your care. Our goal is always to provide you with excellent care. Hearing back from our patients is one way we can continue to improve our services. Please take a few minutes to complete the written survey that you may receive in the mail after your visit with us. Thank you!             Your Updated Medication List - Protect others around you: Learn how to safely use, store and throw away your medicines at www.disposemymeds.org.          This list is  accurate as of: 18  9:58 AM.  Always use your most recent med list.                   Brand Name Dispense Instructions for use Diagnosis    breast pump Misc     1 each    1 each daily as needed    Postpartum care and examination of lactating mother       docusate sodium 100 MG tablet    COLACE    60 tablet    Take 100 mg by mouth daily    Constipation, unspecified constipation type       doxylamine 25 MG Tabs tablet    UNISOM    30 each    Take 1 tablet (25 mg) by mouth At Bedtime    Encounter for supervision of other normal pregnancy in third trimester       ibuprofen 800 MG tablet    ADVIL/MOTRIN    90 tablet    Take 1 tablet (800 mg) by mouth every 6 hours as needed for other (cramping)     (normal spontaneous vaginal delivery)       iron 325 (65 FE) MG tablet     60 tablet    Take 1 tablet by mouth daily (with breakfast)     (normal spontaneous vaginal delivery)       omeprazole 20 MG CR capsule    priLOSEC    90 capsule    Take 1 capsule (20 mg) by mouth daily    Gastroesophageal reflux disease, esophagitis presence not specified       senna-docusate 8.6-50 MG per tablet    SENOKOT-S;PERICOLACE    90 tablet    Take 2 tablets by mouth 2 times daily as needed for constipation     (normal spontaneous vaginal delivery)       TRINATE Tabs     90 tablet    Take 1 tablet by mouth daily    Encounter for supervision of other normal pregnancy in third trimester, Need for vaccination       TYLENOL PO      Take 325 mg by mouth Reported on 5/10/2017

## 2018-01-19 NOTE — PATIENT INSTRUCTIONS
The patient presents with a history of conductive hearing loss in the left ear. She will be referred to Dr. Peg Pak for consideration of further treatment options including surgical and non-surgical options.

## 2018-01-19 NOTE — PROGRESS NOTES
The patient presents with a history of hearing loss in the left ear. The patient reports many years of difficulty in the left ear only. The patient denies ear infections, otalgia, otorrhea, dizziness, or tinnitus. The patient denies sinusitis, rhinitis, facial pain, nasal obstruction or purulent nasal discharge. The patient denies chronic or recurrent tonsillitis, chronic or recurrent pharyngitis. Her Audiogram and Tympanogram are reviewed with her and they demonstrate bilateral conductive hearing loss in the left ear with 100% word recognition scores in both ears and normal tympanograms.     This patient is seen in consultation at the request of Dr. Olga Lassiter.    All other systems were reviewed and they are either negative or they are not directly pertinent to this Otolaryngology examination.      Past Medical History:    Past Medical History:   Diagnosis Date     Anemia 2017     Conductive hearing loss 2015     Hearing problem 2015     Hyperthyroidism     Better since surgery     Tinnitus 2018       Past Surgical History:    Past Surgical History:   Procedure Laterality Date     THYROID SURGERY  2010    in [facility]; partial thyroidectomy       Medications:      Current Outpatient Prescriptions:      Misc. Devices (BREAST PUMP) MISC, 1 each daily as needed, Disp: 1 each, Rfl: 0     ibuprofen (ADVIL/MOTRIN) 800 MG tablet, Take 1 tablet (800 mg) by mouth every 6 hours as needed for other (cramping), Disp: 90 tablet, Rfl: 1     Ferrous Sulfate (IRON) 325 (65 FE) MG tablet, Take 1 tablet by mouth daily (with breakfast), Disp: 60 tablet, Rfl: 1     senna-docusate (SENOKOT-S;PERICOLACE) 8.6-50 MG per tablet, Take 2 tablets by mouth 2 times daily as needed for constipation, Disp: 90 tablet, Rfl: 1     doxylamine (UNISOM) 25 MG TABS tablet, Take 1 tablet (25 mg) by mouth At Bedtime, Disp: 30 each, Rfl: 0     Prenatal Vit-Fe Fumarate-FA (TRINATE) TABS, Take 1 tablet by mouth daily, Disp: 90 tablet, Rfl: 3     docusate  sodium (COLACE) 100 MG tablet, Take 100 mg by mouth daily, Disp: 60 tablet, Rfl: 1     omeprazole (PRILOSEC) 20 MG CR capsule, Take 1 capsule (20 mg) by mouth daily, Disp: 90 capsule, Rfl: 1     Acetaminophen (TYLENOL PO), Take 325 mg by mouth Reported on 5/10/2017, Disp: , Rfl:     Allergies:    Review of patient's allergies indicates no known allergies.    Physical Examination:    The patient is a well developed, well nourished female in no apparent distress.  She is normocephalic, atraumatic with pupils equally round and reactive to light.    Oral Cavity Examination:  Normal mucosa with no masses or lesions  Nasal Examination: Normal mucosa with no masses or lesions  Ear Examination: Ear canals clear, tympanic membranes and middle ear spaces normal  Neurological Examination: Facial nerve function intact and symmetric  Integumentary Examination: No lesions on the skin of the head and neck  Neck Examination: No masses or lesions, no lymphadenopathy  Endocrine Examination: Normal thyroid examination    Assessment and Plan:    The patient presents with a history of conductive hearing loss in the left ear. She will be referred to Dr. Peg Pak for consideration of further treatment options including surgical and non-surgical options.     CC: Dr. Olga Lassiter  CC: Dr. Pavan Ferguson

## 2018-01-19 NOTE — PROGRESS NOTES
AUDIOLOGY REPORT    SUMMARY: Audiology visit completed. See audiogram for results.      RECOMMENDATIONS: Follow-up with ENT.      Mateo Figueroa  Audiologist  [place] License  #[id number]

## 2018-01-19 NOTE — MR AVS SNAPSHOT
After Visit Summary   1/19/2018    Roger Desouza    MRN: [id number]           Patient Information     Date Of Birth          1985        Visit Information        Provider Department      1/19/2018 9:15 AM Pavan Ferguson MD; [employer] [facility]        Today's Diagnoses     Conductive hearing loss, unilateral    -  1      Care Instructions    The patient presents with a history of conductive hearing loss in the left ear. She will be referred to Dr. Peg Pak for consideration of further treatment options including surgical and non-surgical options.           Follow-ups after your visit        Your next 10 appointments already scheduled     Jan 23, 2018 11:30 AM CST   (Arrive by 11:15 AM)   New Patient Visit with Peg Pak MD   [facility] ([facility] [place])    [address] [place] [id number] [phone]              Who to contact     Please call your clinic at [phone] to:    Ask questions about your health    Make or cancel appointments    Discuss your medicines    Learn about your test results    Speak to your doctor   If you have compliments or concerns about an experience at your clinic, or if you wish to file a complaint, please contact [facility] Patient Relations at [phone] or email us at [email or web address]         Additional Information About Your Visit        MyChart Information     [employer] gives you secure access to your electronic health record. If you see a primary care provider, you can also send messages to your care team and make appointments. If you have questions, please call your primary care clinic.  If you do not have a primary care provider, please call [phone] and they will assist you.      [employer] is an electronic gateway that provides easy, online access to your medical records. With [employer],  "you can request a clinic appointment, read your test results, renew a prescription or communicate with your care team.     To access your existing account, please contact your [facility] or call [phone] for assistance.        Care EveryWhere ID     This is your Care EveryWhere ID. This could be used by other organizations to access your [place] medical records  [id number]        Your Vitals Were     Height Last Period BMI (Body Mass Index)             1.61 m (5' 3.39\") 02/28/2017 (Exact Date) 19.6 kg/m2          Blood Pressure from Last 3 Encounters:   12/17/17 133/87   12/13/17 118/72   12/06/17 128/77    Weight from Last 3 Encounters:   01/19/18 50.8 kg (112 lb)   12/13/17 58.2 kg (128 lb 6.4 oz)   12/06/17 57.7 kg (127 lb 1.6 oz)              Today, you had the following     No orders found for display       Primary Care Provider Fax #    Physician No Ref-Primary [phone]       No address on file        Equal Access to Services     [facility]: Hadii aad ku hadasho Sopadma, waaxda luqadaha, qaybta kaalmada adeegyabouchra, ynes palomino . So [facility] [phone].    ATENCIÓN: Si habla español, tiene a fox disposición servicios gratuitos de asistencia lingüística. Llame al [phone].    We comply with applicable federal civil rights laws and [place] laws. We do not discriminate on the basis of race, color, national origin, age, disability, sex, sexual orientation, or gender identity.            Thank you!     Thank you for choosing [facility]  for your care. Our goal is always to provide you with excellent care. Hearing back from our patients is one way we can continue to improve our services. Please take a few minutes to complete the written survey that you may receive in the mail after your visit with us. Thank you!             Your Updated Medication List - Protect others around you: Learn how to safely use, store and throw " away your medicines at www.disposemymeds.org.          This list is accurate as of: 18  9:44 AM.  Always use your most recent med list.                   Brand Name Dispense Instructions for use Diagnosis    breast pump Misc     1 each    1 each daily as needed    Postpartum care and examination of lactating mother       docusate sodium 100 MG tablet    COLACE    60 tablet    Take 100 mg by mouth daily    Constipation, unspecified constipation type       doxylamine 25 MG Tabs tablet    UNISOM    30 each    Take 1 tablet (25 mg) by mouth At Bedtime    Encounter for supervision of other normal pregnancy in third trimester       ibuprofen 800 MG tablet    ADVIL/MOTRIN    90 tablet    Take 1 tablet (800 mg) by mouth every 6 hours as needed for other (cramping)     (normal spontaneous vaginal delivery)       iron 325 (65 FE) MG tablet     60 tablet    Take 1 tablet by mouth daily (with breakfast)     (normal spontaneous vaginal delivery)       omeprazole 20 MG CR capsule    priLOSEC    90 capsule    Take 1 capsule (20 mg) by mouth daily    Gastroesophageal reflux disease, esophagitis presence not specified       senna-docusate 8.6-50 MG per tablet    SENOKOT-S;PERICOLACE    90 tablet    Take 2 tablets by mouth 2 times daily as needed for constipation     (normal spontaneous vaginal delivery)       TRINATE Tabs     90 tablet    Take 1 tablet by mouth daily    Encounter for supervision of other normal pregnancy in third trimester, Need for vaccination       TYLENOL PO      Take 325 mg by mouth Reported on 5/10/2017

## 2018-01-19 NOTE — LETTER
1/19/2018       RE: Roger Desouza  [address]   [place] [facility] [place] [address]     Dear Colleague,    Thank you for referring your patient, Roger Desouza, to the [facility] at [facility]. Please see a copy of my visit note below.    The patient presents with a history of hearing loss in the left ear. The patient reports many years of difficulty in the left ear only. The patient denies ear infections, otalgia, otorrhea, dizziness, or tinnitus. The patient denies sinusitis, rhinitis, facial pain, nasal obstruction or purulent nasal discharge. The patient denies chronic or recurrent tonsillitis, chronic or recurrent pharyngitis. Her Audiogram and Tympanogram are reviewed with her and they demonstrate bilateral conductive hearing loss in the left ear with 100% word recognition scores in both ears and normal tympanograms.     This patient is seen in consultation at the request of Dr. Olga Lassiter.    All other systems were reviewed and they are either negative or they are not directly pertinent to this Otolaryngology examination.      Past Medical History:    Past Medical History:   Diagnosis Date     Anemia 2017     Conductive hearing loss 2015     Hearing problem 2015     Hyperthyroidism     Better since surgery     Tinnitus 2018       Past Surgical History:    Past Surgical History:   Procedure Laterality Date     THYROID SURGERY  2010    in [facility]; partial thyroidectomy       Medications:      Current Outpatient Prescriptions:      Misc. Devices (BREAST PUMP) MISC, 1 each daily as needed, Disp: 1 each, Rfl: 0     ibuprofen (ADVIL/MOTRIN) 800 MG tablet, Take 1 tablet (800 mg) by mouth every 6 hours as needed for other (cramping), Disp: 90 tablet, Rfl: 1     Ferrous Sulfate (IRON) 325 (65 FE) MG tablet, Take 1 tablet by mouth daily (with breakfast), Disp: 60 tablet, Rfl: 1     senna-docusate (SENOKOT-S;PERICOLACE) 8.6-50 MG per tablet, Take 2 tablets by mouth 2  times daily as needed for constipation, Disp: 90 tablet, Rfl: 1     doxylamine (UNISOM) 25 MG TABS tablet, Take 1 tablet (25 mg) by mouth At Bedtime, Disp: 30 each, Rfl: 0     Prenatal Vit-Fe Fumarate-FA (TRINATE) TABS, Take 1 tablet by mouth daily, Disp: 90 tablet, Rfl: 3     docusate sodium (COLACE) 100 MG tablet, Take 100 mg by mouth daily, Disp: 60 tablet, Rfl: 1     omeprazole (PRILOSEC) 20 MG CR capsule, Take 1 capsule (20 mg) by mouth daily, Disp: 90 capsule, Rfl: 1     Acetaminophen (TYLENOL PO), Take 325 mg by mouth Reported on 5/10/2017, Disp: , Rfl:     Allergies:    Review of patient's allergies indicates no known allergies.    Physical Examination:    The patient is a well developed, well nourished female in no apparent distress.  She is normocephalic, atraumatic with pupils equally round and reactive to light.    Oral Cavity Examination:  Normal mucosa with no masses or lesions  Nasal Examination: Normal mucosa with no masses or lesions  Ear Examination: Ear canals clear, tympanic membranes and middle ear spaces normal  Neurological Examination: Facial nerve function intact and symmetric  Integumentary Examination: No lesions on the skin of the head and neck  Neck Examination: No masses or lesions, no lymphadenopathy  Endocrine Examination: Normal thyroid examination    Assessment and Plan:    The patient presents with a history of conductive hearing loss in the left ear. She will be referred to Dr. Peg Pak for consideration of further treatment options including surgical and non-surgical options.     CC: Dr. Olga Lassiter  CC: Dr. Pavan Ferguson     Again, thank you for allowing me to participate in the care of your patient.      Sincerely,    Pavan Ferguson MD

## 2018-01-19 NOTE — TELEPHONE ENCOUNTER
APPT INFO    Date /Time: 1/23/18 at 11:30AM   Reason for Appt: Possible Stapedectomy surgery   Ref Provider/Clinic: Pavan Ferguson   Are there internal records? Yes/No?  IF YES, list clinic names: [facility]   Are there outside records? Yes/No? Yes, [facility] - see pre-visit encounter for [place]   Patient Contact (Y/N) & Call Details: No   Action: Chart reviewed

## 2018-01-23 ENCOUNTER — OFFICE VISIT (OUTPATIENT)
Dept: OTOLARYNGOLOGY | Facility: CLINIC | Age: 33
End: 2018-01-23
Payer: COMMERCIAL

## 2018-01-23 ENCOUNTER — PRE VISIT (OUTPATIENT)
Dept: OTOLARYNGOLOGY | Facility: CLINIC | Age: 33
End: 2018-01-23

## 2018-01-23 VITALS — HEIGHT: 63 IN | WEIGHT: 112 LBS | BODY MASS INDEX: 19.84 KG/M2

## 2018-01-23 DIAGNOSIS — H90.2 CONDUCTIVE HEARING LOSS, UNILATERAL: Primary | ICD-10-CM

## 2018-01-23 ASSESSMENT — PAIN SCALES - GENERAL: PAINLEVEL: MILD PAIN (3)

## 2018-01-23 NOTE — PROGRESS NOTES
Otolaryngology Consult Note  January 23, 2018      CC: otosclerosis     We were asked by Dr. Ferguson to evaluate Ms. Roger Desouza for possible otosclerosis.    HPI: Roger Desouza is a 32 year old female with a history of thryoid goiter s/p hemithyroidectomy (performed in [place]) who started noticing decreased hearing in her left ear approximately 2 years ago. She states that her hearing has slowly but progressively worsened on her left side. She does have associated tinnitus, which she describes as a constant whooshing sound. It's only worse when lying on her left ear, but not when straining or rotating her head laterally. She also endorses left sided aural fullness, but denies any vertiginous symptoms. She was recently pregnant, giving birth on 12/15/17. She believes that her hearing did worsen during her pregnancy.    Of note, she reports multiple ear infections in 2011. She was treated multiple times with antifungal and antibacterials for recurrent otitis. Since her infectious symptoms were controlled, she has not had any recurrent infectious symptoms.     Past Medical History:   Diagnosis Date     Anemia 2017     Conductive hearing loss 2015     Hearing problem 2015     Hyperthyroidism     Better since surgery     Tinnitus 2018       Past Surgical History:   Procedure Laterality Date     THYROID SURGERY  2010    in [facility]; partial thyroidectomy       Current Outpatient Prescriptions   Medication Sig Dispense Refill     Misc. Devices (BREAST PUMP) MISC 1 each daily as needed 1 each 0     ibuprofen (ADVIL/MOTRIN) 800 MG tablet Take 1 tablet (800 mg) by mouth every 6 hours as needed for other (cramping) 90 tablet 1     Ferrous Sulfate (IRON) 325 (65 FE) MG tablet Take 1 tablet by mouth daily (with breakfast) 60 tablet 1     senna-docusate (SENOKOT-S;PERICOLACE) 8.6-50 MG per tablet Take 2 tablets by mouth 2 times daily as needed for constipation 90 tablet 1     doxylamine (UNISOM) 25 MG TABS tablet Take 1 tablet  "(25 mg) by mouth At Bedtime 30 each 0     Prenatal Vit-Fe Fumarate-FA (TRINATE) TABS Take 1 tablet by mouth daily 90 tablet 3     docusate sodium (COLACE) 100 MG tablet Take 100 mg by mouth daily 60 tablet 1     omeprazole (PRILOSEC) 20 MG CR capsule Take 1 capsule (20 mg) by mouth daily 90 capsule 1     Acetaminophen (TYLENOL PO) Take 325 mg by mouth Reported on 5/10/2017          No Known Allergies    Social History     Social History     Marital status:      Spouse name: Saji     Number of children: N/A     Years of education: N/A     Occupational History      Unemployed     Social History Main Topics     Smoking status: Never Smoker     Smokeless tobacco: Never Used     Alcohol use No     Drug use: No     Sexual activity: Yes     Partners: Male     Birth control/ protection: None     Other Topics Concern     Not on file     Social History Narrative       Family History   Problem Relation Age of Onset     Hypertension Father        ROS: 12 point review of systems is negative unless noted in HPI.  Patient Supplied Answers to Review of Systems  [facility] ROS 1/23/2018   Constitutional -   Ears, Nose, Throat Hearing loss   The remainder of the 10 point review of systems is negative.    PHYSICAL EXAM:  Ht 1.61 m (5' 3.39\")  Wt 50.8 kg (112 lb)  LMP 02/28/2017 (Exact Date)  BMI 19.6 kg/m2  General: awake, alert, in no distress  HEAD: normocephalic, atraumatic  Face: symmetrical, CN VII intact bilaterally (HB 1), no swelling, edema, or erythema. Sensation V1-V3 intact and equal bilaterally.   Eyes: EOMI without spontaneous or gaze evoked nystagmus, PERRL, clear sclera  Ears: no tragal tenderness, external ear canal open and clear bilaterally; both ears examined under the microscope; both TMs clear bilaterally.  512 Hz tuning fork lateralizes to right ear.  Bone conduction is louder than air conduction on the right.  Air conduction is louder than bone conduction on the left.   Nose: no anterior drainage, " intact and midline septum without perforation or hematoma   Neck: no LAD, trachea midline  Neuro: cranial nerves 2-12 grossly intact    Audiogram  Right: essentially normal hearing with mild SNHL at 8 kHz.  5 dB speech detection threshold and 9 dB pure tone average.  %. Normal tymps.  Absent acoustic reflexes.    Left: asymmetric moderately-severe rising to mild conductive HL.  50 dB speech detection threshold. 54 dB pure tone average. %. Normal tymps.  Absent acoustic reflexes.     Assessment and Plan  Roger Desouza is a 32 year old female with conductive hearing loss symptoms consistent with otosclerosis. The management options for her condition were discussed in detail, including observation, hearing aids, and surgical management. She wants to discuss these options with her . She was instructed to contact the ENT clinic if she decides to pursue operative management. Prior to any surgical intervention, we would like to obtain a temporal bone CT to further assess for any underlying middle ear pathology.     Thank you for the opportunity to participate in her care.    Chantel Kunz MD  Otolaryngology-Head & Neck Surgery PGY2    I, Peg Pak, saw this patient with the resident/fellow and agree with the resident s findings and plan of care as documented in the resident s/fellow s note. I was present for the entire procedure.

## 2018-01-23 NOTE — PATIENT INSTRUCTIONS
1. Please call if you would like to schedule surgery with Dr. Pak. If you would like surgery, you will need a CT scan prior.   2. Please call the ENT clinic with any questions,concerns, new or worsening symptoms.    -Clinic number is [phone]   - [facility]'s direct line (Dr. Pak' nurse) [phone]

## 2018-01-23 NOTE — LETTER
1/23/2018       RE: Roger Desouza  [address]   [place] [address]     Dear Colleague,    Thank you for referring your patient, Roger Desouza, to the [facility] TH[facility] at [facility]. Please see a copy of my visit note below.    Otolaryngology Consult Note  January 23, 2018      CC: otosclerosis     We were asked by Dr. Ferguson to evaluate Ms. Roger Desouza for possible otosclerosis.    HPI: Roger Desouza is a 32 year old female with a history of thryoid goiter s/p hemithyroidectomy (performed in [place]) who started noticing decreased hearing in her left ear approximately 2 years ago. She states that her hearing has slowly but progressively worsened on her left side. She does have associated tinnitus, which she describes as a constant whooshing sound. It's only worse when lying on her left ear, but not when straining or rotating her head laterally. She also endorses left sided aural fullness, but denies any vertiginous symptoms. She was recently pregnant, giving birth on 12/15/17. She believes that her hearing did worsen during her pregnancy.    Of note, she reports multiple ear infections in 2011. She was treated multiple times with antifungal and antibacterials for recurrent otitis. Since her infectious symptoms were controlled, she has not had any recurrent infectious symptoms.     Past Medical History:   Diagnosis Date     Anemia 2017     Conductive hearing loss 2015     Hearing problem 2015     Hyperthyroidism     Better since surgery     Tinnitus 2018       Past Surgical History:   Procedure Laterality Date     THYROID SURGERY  2010    in [place]; partial thyroidectomy       Current Outpatient Prescriptions   Medication Sig Dispense Refill     Misc. Devices (BREAST PUMP) MISC 1 each daily as needed 1 each 0     ibuprofen (ADVIL/MOTRIN) 800 MG tablet Take 1 tablet (800 mg) by mouth every 6 hours as needed for other (cramping) 90 tablet 1     Ferrous  "Sulfate (IRON) 325 (65 FE) MG tablet Take 1 tablet by mouth daily (with breakfast) 60 tablet 1     senna-docusate (SENOKOT-S;PERICOLACE) 8.6-50 MG per tablet Take 2 tablets by mouth 2 times daily as needed for constipation 90 tablet 1     doxylamine (UNISOM) 25 MG TABS tablet Take 1 tablet (25 mg) by mouth At Bedtime 30 each 0     Prenatal Vit-Fe Fumarate-FA (TRINATE) TABS Take 1 tablet by mouth daily 90 tablet 3     docusate sodium (COLACE) 100 MG tablet Take 100 mg by mouth daily 60 tablet 1     omeprazole (PRILOSEC) 20 MG CR capsule Take 1 capsule (20 mg) by mouth daily 90 capsule 1     Acetaminophen (TYLENOL PO) Take 325 mg by mouth Reported on 5/10/2017          No Known Allergies    Social History     Social History     Marital status:      Spouse name: Saji     Number of children: N/A     Years of education: N/A     Occupational History      Unemployed     Social History Main Topics     Smoking status: Never Smoker     Smokeless tobacco: Never Used     Alcohol use No     Drug use: No     Sexual activity: Yes     Partners: Male     Birth control/ protection: None     Other Topics Concern     Not on file     Social History Narrative       Family History   Problem Relation Age of Onset     Hypertension Father        ROS: 12 point review of systems is negative unless noted in HPI.  Patient Supplied Answers to Review of Systems  [facility] ROS 1/23/2018   Constitutional -   Ears, Nose, Throat Hearing loss   The remainder of the 10 point review of systems is negative.    PHYSICAL EXAM:  Ht 1.61 m (5' 3.39\")  Wt 50.8 kg (112 lb)  LMP 02/28/2017 (Exact Date)  BMI 19.6 kg/m2  General: awake, alert, in no distress  HEAD: normocephalic, atraumatic  Face: symmetrical, CN VII intact bilaterally (HB 1), no swelling, edema, or erythema. Sensation V1-V3 intact and equal bilaterally.   Eyes: EOMI without spontaneous or gaze evoked nystagmus, PERRL, clear sclera  Ears: no tragal tenderness, external ear canal open " and clear bilaterally; both ears examined under the microscope; both TMs clear bilaterally.  512 Hz tuning fork lateralizes to right ear.  Bone conduction is louder than air conduction on the right.  Air conduction is louder than bone conduction on the left.   Nose: no anterior drainage, intact and midline septum without perforation or hematoma   Neck: no LAD, trachea midline  Neuro: cranial nerves 2-12 grossly intact    Audiogram  Right: essentially normal hearing with mild SNHL at 8 kHz.  5 dB speech detection threshold and 9 dB pure tone average.  %. Normal tymps.  Absent acoustic reflexes.    Left: asymmetric moderately-severe rising to mild conductive HL.  50 dB speech detection threshold. 54 dB pure tone average. %. Normal tymps.  Absent acoustic reflexes.     Assessment and Plan  Roger Desouza is a 32 year old female with conductive hearing loss symptoms consistent with otosclerosis. The management options for her condition were discussed in detail, including observation, hearing aids, and surgical management. She wants to discuss these options with her . She was instructed to contact the ENT clinic if she decides to pursue operative management. Prior to any surgical intervention, we would like to obtain a temporal bone CT to further assess for any underlying middle ear pathology.     Thank you for the opportunity to participate in her care.    Chantel Kunz MD  Otolaryngology-Head & Neck Surgery PGY2    I, Peg Pak, saw this patient with the resident/fellow and agree with the resident s findings and plan of care as documented in the resident s/fellow s note. I was present for the entire procedure.        Again, thank you for allowing me to participate in the care of your patient.      Sincerely,    Peg Pak MD

## 2018-01-23 NOTE — NURSING NOTE
Chief Complaint   Patient presents with     Consult     possible surgery     Luzmaria Dubon Medical Assistant

## 2018-01-23 NOTE — MR AVS SNAPSHOT
After Visit Summary   1/23/2018    Roger Desouza    MRN: [id number]           Patient Information     Date Of Birth          1985        Visit Information        Provider Department      1/23/2018 11:15 AM Peg Pak MD; [facility] Ear Nose and Throat        Care Instructions    1. Please call if you would like to schedule surgery with Dr. Pak. If you would like surgery, you will need a CT scan prior.   2. Please call the ENT clinic with any questions,concerns, new or worsening symptoms.    -Clinic number is [phone]   - Lee Ann's direct line (Dr. Pak' nurse) [phone]              Follow-ups after your visit        Who to contact     Please call your clinic at [phone] to:    Ask questions about your health    Make or cancel appointments    Discuss your medicines    Learn about your test results    Speak to your doctor   If you have compliments or concerns about an experience at your clinic, or if you wish to file a complaint, please contact [facility] Patient Relations at [phone] or email us at [email or web address]         Additional Information About Your Visit        MyChart Information     [employer] gives you secure access to your electronic health record. If you see a primary care provider, you can also send messages to your care team and make appointments. If you have questions, please call your primary care clinic.  If you do not have a primary care provider, please call [phone] and they will assist you.      EventBoard is an electronic gateway that provides easy, online access to your medical records. With Event[employer], you can request a clinic appointment, read your test results, renew a prescription or communicate with your care team.     To access your existing account, please contact your [facility] or call [phone] for assistance.        Care EveryWhere ID      "This is your Care EveryWhere ID. This could be used by other organizations to access your [facility] medical records  [id number]        Your Vitals Were     Height Last Period BMI (Body Mass Index)             1.61 m (5' 3.39\") 02/28/2017 (Exact Date) 19.6 kg/m2          Blood Pressure from Last 3 Encounters:   12/17/17 133/87   12/13/17 118/72   12/06/17 128/77    Weight from Last 3 Encounters:   01/23/18 50.8 kg (112 lb)   01/19/18 50.8 kg (112 lb)   12/13/17 58.2 kg (128 lb 6.4 oz)              Today, you had the following     No orders found for display       Primary Care Provider Fax #    Physician No Ref-Primary [phone]       No address on file        Equal Access to Services     AWAIS PARSON : Saira Sawyer, wamargarito thomasadaha, qaatul kaalmabouchra law, ynes palomino . So [facility] [phone].    ATENCIÓN: Si habla español, tiene a fox disposición servicios gratuitos de asistencia lingüística. Justin al [phone].    We comply with applicable federal civil rights laws and [place] laws. We do not discriminate on the basis of race, color, national origin, age, disability, sex, sexual orientation, or gender identity.            Thank you!     Thank you for choosing [facility] EAR NOSE AND THROAT  for your care. Our goal is always to provide you with excellent care. Hearing back from our patients is one way we can continue to improve our services. Please take a few minutes to complete the written survey that you may receive in the mail after your visit with us. Thank you!             Your Updated Medication List - Protect others around you: Learn how to safely use, store and throw away your medicines at www.disposemymeds.org.          This list is accurate as of: 1/23/18 12:59 PM.  Always use your most recent med list.                   Brand Name Dispense Instructions for use Diagnosis    breast pump Misc     1 each    1 each daily as needed    Postpartum care and " examination of lactating mother       docusate sodium 100 MG tablet    COLACE    60 tablet    Take 100 mg by mouth daily    Constipation, unspecified constipation type       doxylamine 25 MG Tabs tablet    UNISOM    30 each    Take 1 tablet (25 mg) by mouth At Bedtime    Encounter for supervision of other normal pregnancy in third trimester       ibuprofen 800 MG tablet    ADVIL/MOTRIN    90 tablet    Take 1 tablet (800 mg) by mouth every 6 hours as needed for other (cramping)     (normal spontaneous vaginal delivery)       iron 325 (65 FE) MG tablet     60 tablet    Take 1 tablet by mouth daily (with breakfast)     (normal spontaneous vaginal delivery)       omeprazole 20 MG CR capsule    priLOSEC    90 capsule    Take 1 capsule (20 mg) by mouth daily    Gastroesophageal reflux disease, esophagitis presence not specified       senna-docusate 8.6-50 MG per tablet    SENOKOT-S;PERICOLACE    90 tablet    Take 2 tablets by mouth 2 times daily as needed for constipation     (normal spontaneous vaginal delivery)       TRINATE Tabs     90 tablet    Take 1 tablet by mouth daily    Encounter for supervision of other normal pregnancy in third trimester, Need for vaccination       TYLENOL PO      Take 325 mg by mouth Reported on 5/10/2017

## 2018-05-10 ENCOUNTER — TELEPHONE (OUTPATIENT)
Dept: OBGYN | Facility: CLINIC | Age: 33
End: 2018-05-10

## 2018-05-10 DIAGNOSIS — O20.9 BLEEDING IN EARLY PREGNANCY: ICD-10-CM

## 2018-05-10 DIAGNOSIS — O20.9 BLEEDING IN EARLY PREGNANCY: Primary | ICD-10-CM

## 2018-05-10 LAB — B-HCG SERPL-ACNC: 6618 IU/L (ref 0–5)

## 2018-05-10 PROCEDURE — 36415 COLL VENOUS BLD VENIPUNCTURE: CPT | Performed by: ADVANCED PRACTICE MIDWIFE

## 2018-05-10 PROCEDURE — 84702 CHORIONIC GONADOTROPIN TEST: CPT | Performed by: ADVANCED PRACTICE MIDWIFE

## 2018-05-10 NOTE — TELEPHONE ENCOUNTER
Patient 's partner called acting as support and  for patient to discuss new onset bleeding in early pregnancy -- reports light bleeding, only on tissue when wiping after voiding, bright red in coloration. Denies pain/cramping. LMP dated March 9th and had positive UPT at the end of April. Pt delivered baby in Dec 2017.     Advised patient to present to lab for HCG draw today and Saturday. Instructed where to present to have lab drawn over weekend. Gave bleeding precautions and when to present to ED.     Patient's partner and pt expressed understanding, agree with this plan and have no further questions at this time

## 2018-05-11 ENCOUNTER — OFFICE VISIT (OUTPATIENT)
Dept: OBGYN | Facility: CLINIC | Age: 33
End: 2018-05-11
Attending: ADVANCED PRACTICE MIDWIFE
Payer: MEDICAID

## 2018-05-11 ENCOUNTER — TELEPHONE (OUTPATIENT)
Dept: OBGYN | Facility: CLINIC | Age: 33
End: 2018-05-11

## 2018-05-11 ENCOUNTER — TRANSFERRED RECORDS (OUTPATIENT)
Dept: HEALTH INFORMATION MANAGEMENT | Facility: CLINIC | Age: 33
End: 2018-05-11

## 2018-05-11 DIAGNOSIS — O20.9 VAGINAL BLEEDING IN PREGNANCY, FIRST TRIMESTER: Primary | ICD-10-CM

## 2018-05-11 PROCEDURE — 76801 OB US < 14 WKS SINGLE FETUS: CPT | Mod: ZF

## 2018-05-11 NOTE — TELEPHONE ENCOUNTER
Pt HCG level returned >6000 -- Kam Kuo advised pt present to clinic for viability U/S. Spoke with patient's  to inform. Pt will present today for U/S in clinic

## 2018-05-11 NOTE — LETTER
2018       RE: Roger Desouza  [address]   [facility] [address]     Dear Colleague,    Thank you for referring your patient, Roger Desouza, to the [facility] at [facility]. Please see a copy of my visit note below.    33 year old female,  with LMP 3/9/18, 9 0/7 weeks  Estimated Date of Delivery: 18  presents for confirmation of dates and assessment of viability. This study was done transabdominally.    Measurements     GS = 16.9 mm = 6 4/ weeks  EGA.   KEVIN = 18.     Fetal/Fetal Cardiac Activity: Absent.       Implantation: Intrauterine.     Cervix = 3.2 cm      Maternal structures appear normal .    Impression: abnormal appearance of GS but not definitive.   Repeat in 5-7 days    EFFIE Koroma

## 2018-05-11 NOTE — PROGRESS NOTES
33 year old female,  with LMP 3/9/18, 9 0/7 weeks  Estimated Date of Delivery: 18  presents for confirmation of dates and assessment of viability. This study was done transabdominally.    Measurements     GS = 16.9 mm = 6 4/ weeks  EGA.   KEVIN = 18.     Fetal/Fetal Cardiac Activity: Absent.       Implantation: Intrauterine.     Cervix = 3.2 cm      Maternal structures appear normal .    Impression: abnormal appearance of GS but not definitive.   Repeat in 5-7 days    EFFIE Koroma

## 2018-05-11 NOTE — TELEPHONE ENCOUNTER
Patient had US in clinic today to evaluate early bleeding. Does not meet criteria for MAB per Pippa Rutledge. Repeat in 7-10 days. Get another bhcg tomorrow. Patient agreeable to this plan and helped arrange follow up US and appointment in clinic.

## 2018-05-14 ENCOUNTER — TELEPHONE (OUTPATIENT)
Dept: OBGYN | Facility: CLINIC | Age: 33
End: 2018-05-14

## 2018-05-14 NOTE — TELEPHONE ENCOUNTER
Patient's , Dacia, called on her behalf to inform patient likely passed a miscarriage over the weekend.  Saji reports pt had moderate-heavy bleeding over weekend including passing clots and tissue. Pt denies pain today, and reports bleeding has subsided.     Pt was being followed for bleeding in early pregnancy which began on 5/10/18. Plan from clinic U/S visit on 5/11 was to repeat U/S in 7-10 days and have repeat HCG on 5/12. Pt did not complete HCG.     Advised pt's  pt can still have this draw to see if HCG is falling as expected or can wait and f/u with home UPT in 2-3 weeks.Gave bleeding precautions and when to present to ED. Informed of s/sx infection and when to call clinic. Patient has no further questions at this time

## 2018-07-16 ENCOUNTER — HOSPITAL ENCOUNTER (OUTPATIENT)
Dept: ULTRASOUND IMAGING | Facility: CLINIC | Age: 33
Discharge: HOME | End: 2018-07-16
Attending: NURSE PRACTITIONER | Admitting: NURSE PRACTITIONER
Payer: COMMERCIAL

## 2018-07-16 DIAGNOSIS — R10.12 ABDOMINAL PAIN, LEFT UPPER QUADRANT: ICD-10-CM

## 2018-07-16 PROCEDURE — 76700 US EXAM ABDOM COMPLETE: CPT

## 2018-09-10 ENCOUNTER — RADIANT APPOINTMENT (OUTPATIENT)
Dept: CT IMAGING | Facility: CLINIC | Age: 33
End: 2018-09-10
Attending: STUDENT IN AN ORGANIZED HEALTH CARE EDUCATION/TRAINING PROGRAM
Payer: COMMERCIAL

## 2018-09-10 DIAGNOSIS — N13.39 OTHER HYDRONEPHROSIS: ICD-10-CM

## 2018-09-10 LAB
CREAT BLD-MCNC: 0.9 MG/DL (ref 0.52–1.04)
GFR SERPL CREATININE-BSD FRML MDRD: 73 ML/MIN/1.7M2
RADIOLOGIST FLAGS: ABNORMAL
RADIOLOGIST FLAGS: ABNORMAL

## 2018-09-10 RX ORDER — IOPAMIDOL 755 MG/ML
112 INJECTION, SOLUTION INTRAVASCULAR ONCE
Status: COMPLETED | OUTPATIENT
Start: 2018-09-10 | End: 2018-09-10

## 2018-09-10 RX ADMIN — IOPAMIDOL 112 ML: 755 INJECTION, SOLUTION INTRAVASCULAR at 15:44

## 2018-09-10 NOTE — DISCHARGE INSTRUCTIONS

## 2018-09-18 ENCOUNTER — PRE VISIT (OUTPATIENT)
Dept: UROLOGY | Facility: CLINIC | Age: 33
End: 2018-09-18

## 2018-09-18 NOTE — TELEPHONE ENCOUNTER
MEDICAL RECORDS REQUEST   [facility]  Urology Clinic  [address] [place] [address]  PHONE: [phone]  Fax: [phone]        FUTURE VISIT INFORMATION                                                   Roger Desouza : 1985 scheduled for future visit at [facility]    APPOINTMENT INFORMATION:    Date: 10/01/2018    Provider:  Viral Hampton    Reason for Visit/Diagnosis: Hydroureter    REFERRAL INFORMATION:    Referring provider:  Olga Lassiter    Specialty: MD    [facility]    Clinic contact number:  [phone]    RECORDS REQUESTED FOR VISIT                                                     NOTES  STATUS/DETAILS   OFFICE NOTE from referring provider  yes   OFFICE NOTE from other specialist  no   DISCHARGE SUMMARY from hospital  no   DISCHARGE REPORT from the ER  no   OPERATIVE REPORT  no   MEDICATION LIST  yes       PRE-VISIT CHECKLIST      Record collection complete Yes   Appointment appropriately scheduled           (right time/right provider) Yes   MyChart activation Yes   Questionnaire complete If no, please explain in process     Completed by: Tiana Noel

## 2018-10-15 ENCOUNTER — OFFICE VISIT (OUTPATIENT)
Dept: UROLOGY | Facility: CLINIC | Age: 33
End: 2018-10-15
Payer: COMMERCIAL

## 2018-10-15 ENCOUNTER — ALLIED HEALTH/NURSE VISIT (OUTPATIENT)
Dept: UROLOGY | Facility: CLINIC | Age: 33
End: 2018-10-15
Payer: COMMERCIAL

## 2018-10-15 ENCOUNTER — TELEPHONE (OUTPATIENT)
Dept: UROLOGY | Facility: CLINIC | Age: 33
End: 2018-10-15

## 2018-10-15 VITALS
WEIGHT: 120.2 LBS | SYSTOLIC BLOOD PRESSURE: 138 MMHG | DIASTOLIC BLOOD PRESSURE: 86 MMHG | HEIGHT: 63 IN | HEART RATE: 97 BPM | BODY MASS INDEX: 21.3 KG/M2

## 2018-10-15 DIAGNOSIS — N13.5 STRICTURE OR KINKING OF URETER: ICD-10-CM

## 2018-10-15 DIAGNOSIS — N32.89 CALCIFICATION OF BLADDER: Primary | ICD-10-CM

## 2018-10-15 DIAGNOSIS — N32.89 CALCIFICATION OF BLADDER: ICD-10-CM

## 2018-10-15 RX ORDER — CEFAZOLIN SODIUM 2 G/50ML
2 SOLUTION INTRAVENOUS
Status: CANCELLED | OUTPATIENT
Start: 2018-10-15

## 2018-10-15 RX ORDER — CEFAZOLIN SODIUM 1 G/50ML
1 INJECTION, SOLUTION INTRAVENOUS SEE ADMIN INSTRUCTIONS
Status: CANCELLED | OUTPATIENT
Start: 2018-10-15

## 2018-10-15 ASSESSMENT — PAIN SCALES - GENERAL: PAINLEVEL: NO PAIN (0)

## 2018-10-15 NOTE — PROGRESS NOTES
"  Name: Roger Desouza    MRN: [id number]   YOB: 1985                 Chief Complaint:   Right hydroureter          History of Present Illness:   Mr. Roger Desouza is a 33 year old female seen in consultation from Dr. Lassiter for right hydroureter. Pt recently had a miscarriage, which prompted a renal ultrasound, which showed an atrophic left kidney. This prompted a CT scan, which revealed right pelviectasis, right hydroureter with a focal stricture in the proximal third of the ureter, and calcifications in the bladder. Patient denies symptoms of LUTS, hematuria, UTIs, flank pain. She was born in [facility] and emigrated in [place].    She says that her urine AFB was negative and her \"blood\" was positive (maybe this was Tb skin test).          Past Medical History:     Past Medical History:   Diagnosis Date     Anemia 2017     Conductive hearing loss 2015     Hearing problem 2015     Hyperthyroidism     Better since surgery     Tinnitus 2018            Past Surgical History:     Past Surgical History:   Procedure Laterality Date     THYROID SURGERY  2010    in [facility]; partial thyroidectomy            Social History:     Social History   Substance Use Topics     Smoking status: Never Smoker     Smokeless tobacco: Never Used     Alcohol use No            Family History:     Family History   Problem Relation Age of Onset     Hypertension Father               Allergies:   No Known Allergies         Medications:     Current Outpatient Prescriptions   Medication Sig     Acetaminophen (TYLENOL PO) Take 325 mg by mouth Reported on 5/10/2017     docusate sodium (COLACE) 100 MG tablet Take 100 mg by mouth daily (Patient not taking: Reported on 10/15/2018)     doxylamine (UNISOM) 25 MG TABS tablet Take 1 tablet (25 mg) by mouth At Bedtime (Patient not taking: Reported on 10/15/2018)     Ferrous Sulfate (IRON) 325 (65 FE) MG tablet Take 1 tablet by mouth daily (with breakfast) (Patient not taking: Reported on 10/15/2018) " "    ibuprofen (ADVIL/MOTRIN) 800 MG tablet Take 1 tablet (800 mg) by mouth every 6 hours as needed for other (cramping) (Patient not taking: Reported on 10/15/2018)     Misc. Devices (BREAST PUMP) MISC 1 each daily as needed (Patient not taking: Reported on 10/15/2018)     omeprazole (PRILOSEC) 20 MG CR capsule Take 1 capsule (20 mg) by mouth daily (Patient not taking: Reported on 10/15/2018)     Prenatal Vit-Fe Fumarate-FA (TRINATE) TABS Take 1 tablet by mouth daily (Patient not taking: Reported on 10/15/2018)     senna-docusate (SENOKOT-S;PERICOLACE) 8.6-50 MG per tablet Take 2 tablets by mouth 2 times daily as needed for constipation (Patient not taking: Reported on 10/15/2018)     No current facility-administered medications for this visit.              Review of Systems:    ROS: 14 point ROS neg other than the symptoms noted above in the HPI.          Physical Exam:   B/P: 138/86, T: Data Unavailable, P: 97, R: Data Unavailable  Estimated body mass index is 21.29 kg/(m^2) as calculated from the following:    Height as of this encounter: 1.6 m (5' 3\").    Weight as of this encounter: 54.5 kg (120 lb 3.2 oz).  General: age-appropriate appearing female in NAD. normal body habitus.  HEENT: Head AT/NC, EOMI, CN Grossly intact  Resp: no respiratory distress, lung sounds clear.  CV: heart rate regular, S1, S2.  Lymph: No cervical, supraclavicular or axillary lymphadenopathy  Back: bony spine is non-tender, flanks are nontender  Abdomen: (not/mild/moderately/severely) obese, soft, non-distended, non-tender. No organomegaly  : deferred  Rectal exam: deferred  LE: no edema.   Neuro: grossly intact  Motor: excellent strength throughout  Skin: clear of rashes or ecchymoses.        Labs:    All laboratory data reviewed with patient  Significant for Cr 0.9      Imaging:    All imaging reviewed with patient.  Significant for CT A/P (9/10/18):  1. Bladder wall thickening and mucosal calcification with intraluminal  debris.  " Cystoscopy recommended. Recommend correlation for possible  schistosomiasis infection, which does lead to bladder calcification.  2. Indeterminant narrowing of the proximal third of the right ureter  with mild thickening of the wall, is indeterminate, but  infectious/neoplastic etiologies should be considered. Recommend  attention at time of cystoscopy as ureteral evaluation there is  recommended. Moderate resulting hydronephrosis on the right.  3. Markedly atrophic left kidney with evidence of some residual  excretion.  4. Significant partially visualized bronchiectasis in the lungs. Chest  CT recommended for further evaluation.      Outside records:    I spent 10 minutes reviewing outside records.           Assessment and Plan:   33 year old female with atrophic left kidney, right pelviectasis, right hydroureter with proximal ureteral narrowing, bladder calcifications. Plan is for cystoscopy, bladder biopsy, right ureteroscopy. Concern for urinary TB and will send morning AFB from urine if not already obtained.    Will get lasix renal scan after procedure.    Joe Carbajal MD  October 15, 2018   =======================================================  As the attending surgeon I, Viral Hampton, interviewed and examined the patient. The plan was developed between me and the patient. My findings and plan are as stated by the resident.    Viral Hampton MD

## 2018-10-15 NOTE — LETTER
"10/15/2018       RE: Rubina Desouza  [address]  [place] [address]     Dear Colleague,    Thank you for referring your patient, Rubina Desouza, to the [facility] UROLOGY AND INST FOR PROSTATE AND UROLOGIC CANCERS at [facility]. Please see a copy of my visit note below.      Name: Roger Desouza    MRN: [id number]   YOB: 1985                 Chief Complaint:   Right hydroureter          History of Present Illness:   Mr. Roger Desouza is a 33 year old female seen in consultation from Dr. Lassiter for right hydroureter. Pt recently had a miscarriage, which prompted a renal ultrasound, which showed an atrophic left kidney. This prompted a CT scan, which revealed right pelviectasis, right hydroureter with a focal stricture in the proximal third of the ureter, and calcifications in the bladder. Patient denies symptoms of LUTS, hematuria, UTIs, flank pain. She was [place] in [facility] and emig[place] in [place].    She says that her urine AFB was negative and her \"blood\" was positive (maybe this was Tb skin test).          Past Medical History:     Past Medical History:   Diagnosis Date     Anemia 2017     Conductive hearing loss 2015     Hearing problem 2015     Hyperthyroidism     Better since surgery     Tinnitus 2018            Past Surgical History:     Past Surgical History:   Procedure Laterality Date     THYROID SURGERY  2010    in [facility]; partial thyroidectomy            Social History:     Social History   Substance Use Topics     Smoking status: Never Smoker     Smokeless tobacco: Never Used     Alcohol use No            Family History:     Family History   Problem Relation Age of Onset     Hypertension Father               Allergies:   No Known Allergies         Medications:     Current Outpatient Prescriptions   Medication Sig     Acetaminophen (TYLENOL PO) Take 325 mg by mouth Reported on 5/10/2017     docusate sodium (COLACE) 100 MG tablet Take 100 mg by mouth " "daily (Patient not taking: Reported on 10/15/2018)     doxylamine (UNISOM) 25 MG TABS tablet Take 1 tablet (25 mg) by mouth At Bedtime (Patient not taking: Reported on 10/15/2018)     Ferrous Sulfate (IRON) 325 (65 FE) MG tablet Take 1 tablet by mouth daily (with breakfast) (Patient not taking: Reported on 10/15/2018)     ibuprofen (ADVIL/MOTRIN) 800 MG tablet Take 1 tablet (800 mg) by mouth every 6 hours as needed for other (cramping) (Patient not taking: Reported on 10/15/2018)     Misc. Devices (BREAST PUMP) MISC 1 each daily as needed (Patient not taking: Reported on 10/15/2018)     omeprazole (PRILOSEC) 20 MG CR capsule Take 1 capsule (20 mg) by mouth daily (Patient not taking: Reported on 10/15/2018)     Prenatal Vit-Fe Fumarate-FA (TRINATE) TABS Take 1 tablet by mouth daily (Patient not taking: Reported on 10/15/2018)     senna-docusate (SENOKOT-S;PERICOLACE) 8.6-50 MG per tablet Take 2 tablets by mouth 2 times daily as needed for constipation (Patient not taking: Reported on 10/15/2018)     No current facility-administered medications for this visit.            Physical Exam:   B/P: 138/86, T: Data Unavailable, P: 97, R: Data Unavailable  Estimated body mass index is 21.29 kg/(m^2) as calculated from the following:    Height as of this encounter: 1.6 m (5' 3\").    Weight as of this encounter: 54.5 kg (120 lb 3.2 oz).  General: age-appropriate appearing female in NAD. normal body habitus.  HEENT: Head AT/NC, EOMI, CN Grossly intact  Resp: no respiratory distress, lung sounds clear.  CV: heart rate regular, S1, S2.  Lymph: No cervical, supraclavicular or axillary lymphadenopathy  Back: bony spine is non-tender, flanks are nontender  Abdomen: (not/mild/moderately/severely) obese, soft, non-distended, non-tender. No organomegaly  : deferred  Rectal exam: deferred  LE: no edema.   Neuro: grossly intact  Motor: excellent strength throughout  Skin: clear of rashes or ecchymoses.        Labs:    All laboratory " data reviewed with patient  Significant for Cr 0.9      Imaging:    All imaging reviewed with patient.  Significant for CT A/P (9/10/18):  1. Bladder wall thickening and mucosal calcification with intraluminal  debris.  Cystoscopy recommended. Recommend correlation for possible  schistosomiasis infection, which does lead to bladder calcification.  2. Indeterminant narrowing of the proximal third of the right ureter  with mild thickening of the wall, is indeterminate, but  infectious/neoplastic etiologies should be considered. Recommend  attention at time of cystoscopy as ureteral evaluation there is  recommended. Moderate resulting hydronephrosis on the right.  3. Markedly atrophic left kidney with evidence of some residual  excretion.  4. Significant partially visualized bronchiectasis in the lungs. Chest  CT recommended for further evaluation.      Outside records:    I spent 10 minutes reviewing outside records.           Assessment and Plan:   33 year old female with atrophic left kidney, right pelviectasis, right hydroureter with proximal ureteral narrowing, bladder calcifications. Plan is for cystoscopy, bladder biopsy, right ureteroscopy. Concern for urinary TB and will send morning AFB from urine if not already obtained.    Will get lasix renal scan after procedure.    Joe Carbajal MD  October 15, 2018   =======================================================  As the attending surgeon I, Viral Hampton, interviewed and examined the patient. The plan was developed between me and the patient. My findings and plan are as stated by the resident.    Viral Hampton MD

## 2018-10-15 NOTE — NURSING NOTE
"Chief Complaint   Patient presents with     Consult     bladder wall thickening,        Blood pressure 138/86, pulse 97, height 1.6 m (5' 3\"), weight 54.5 kg (120 lb 3.2 oz), unknown if currently breastfeeding. Body mass index is 21.29 kg/(m^2).    Patient Active Problem List   Diagnosis     Hyperthyroidism     H/O partial thyroidectomy     Encounter for supervision of other normal pregnancy in third trimester     Glucose intolerance of pregnancy, PASSED 3 HOUR     Anemia     Normal labor      (normal spontaneous vaginal delivery)     Bleeding in early pregnancy       No Known Allergies    Current Outpatient Prescriptions   Medication Sig Dispense Refill     Acetaminophen (TYLENOL PO) Take 325 mg by mouth Reported on 5/10/2017       docusate sodium (COLACE) 100 MG tablet Take 100 mg by mouth daily (Patient not taking: Reported on 10/15/2018) 60 tablet 1     doxylamine (UNISOM) 25 MG TABS tablet Take 1 tablet (25 mg) by mouth At Bedtime (Patient not taking: Reported on 10/15/2018) 30 each 0     Ferrous Sulfate (IRON) 325 (65 FE) MG tablet Take 1 tablet by mouth daily (with breakfast) (Patient not taking: Reported on 10/15/2018) 60 tablet 1     ibuprofen (ADVIL/MOTRIN) 800 MG tablet Take 1 tablet (800 mg) by mouth every 6 hours as needed for other (cramping) (Patient not taking: Reported on 10/15/2018) 90 tablet 1     Misc. Devices (BREAST PUMP) MISC 1 each daily as needed (Patient not taking: Reported on 10/15/2018) 1 each 0     omeprazole (PRILOSEC) 20 MG CR capsule Take 1 capsule (20 mg) by mouth daily (Patient not taking: Reported on 10/15/2018) 90 capsule 1     Prenatal Vit-Fe Fumarate-FA (TRINATE) TABS Take 1 tablet by mouth daily (Patient not taking: Reported on 10/15/2018) 90 tablet 3     senna-docusate (SENOKOT-S;PERICOLACE) 8.6-50 MG per tablet Take 2 tablets by mouth 2 times daily as needed for constipation (Patient not taking: Reported on 10/15/2018) 90 tablet 1       Social History   Substance Use " Topics     Smoking status: Never Smoker     Smokeless tobacco: Never Used     Alcohol use No       CLARE Spicer  10/15/2018  2:09 PM

## 2018-10-15 NOTE — PROGRESS NOTES
Pre Op Teaching Flowsheet       Pre and Post op Patient Education  Relevant Diagnosis:  Calcification of bladder and stricture or kinking of ureter  Teaching Topic:  Pre and post op teaching for Bladder biopsy, right ureteroscopy with possible biopsy  Person Involved in teaching:  Roger Desouza      Motivation Level:  Asks Questions: Yes  Eager to Learn:  Yes  Cooperative: Yes  Receptive (willing/able to accept information):  Yes  Patient demonstrates understanding of the following:  Date and time of surgery:  10/26/18 at 1445  Location of surgery: [facility]- 5th Floor  History and Physical and any other testing necessary prior to surgery: Yes  Required time line for completion of History and Physical and any pre-op testing: Yes    NPO Guidelines: NPO per Anesthesia Guidelines    Patient demonstrates understanding of the following:  Patient understands the need for a responsible adult to drive them home and someone to stay with them for the first 24 hours post-operatively: YES   Pre-op bowel prep: No, not needed  Pre-op showering/scrub information with Hibiclens Soap: Yes  Medications to take the day of surgery:  Per PCP  Blood thinner medications discussed and when to stop (if applicable):  Yes  Diabetes medication management (if applicable):  N/A  Discussed pain control after surgery: pain scale, pain medications and pain management techniques  Infection Prevention: Patient demonstrates understanding of the following:  Patient instructed on hand hygiene:  Yes  Surgical procedure site care taught: Yes  Signs and symptoms of infection taught:  Yes  Wound care will be taught at the time of discharge.  Central venous catheter care will be taught at the time of discharge (if applicable).    Post-op follow-up:  Discussed how to contact the hospital, nurse, and clinic scheduling staff if necessary.    Instructional materials used/given/mailed:  [facility] Booklet, post op teaching  sheet, Map, Soap, and arrival/location information.    Surgical instructions given to patient in clinic: Yes.    Instructional Materials given:  Before your surgery packet , Medications to avoid before surgery , Showering or Bathing instructions before surgery  and What to expect after surgery    Post-op appointment/testing scheduled per MD orders: Yes    Total time with patient: 10 minutes    Sugar Garcia RN, BSN  Urology Care Coordinator

## 2018-10-15 NOTE — MR AVS SNAPSHOT
After Visit Summary   10/15/2018    Rubina Desouza    MRN: [id number]           Patient Information     Date Of Birth          1985        Visit Information        Provider Department      10/15/2018 1:45 PM Viral Hampton MD; [employer] [facility] U[facility] and [facility] for Prostate and Urologic Cancers        Today's Diagnoses     Calcification of bladder    -  1    Stricture or kinking of ureter           Follow-ups after your visit        Your next 10 appointments already scheduled     Oct 26, 2018   Procedure with Viral Hampton MD   [facility] ([facility] [place])    [address] [place] [address]  5th [facility] [address] [phone]           Located in the Clinics [facility] at [address] [place] [address].   parking is very convenient and highly recommended.  is a $6 flat rate fee.  Both  and self parkers should enter the main arrival plaza from [facility]; parking attendants will direct you based on your parking preference.            Nov 12, 2018  1:30 PM CST   (Arrive by 1:15 PM)   Post-Op with Joe Carbajal MD   [facility] and [facility] for Prostate and Urologic Cancers ([facility])    [address] [place] [address]  4th [facility] [address] [phone]              Future tests that were ordered for you today     Open Future Orders        Priority Expected Expires Ordered    NM Lasix renogram Routine  10/15/2019 10/15/2018            Who to contact     Please call your clinic at [phone] to:    Ask questions about your health    Make or cancel appointments    Discuss your medicines    Learn about your test results    Speak to your doctor            Additional Information About Your Visit        MyChart Information     [employer] gives you secure access to your electronic health record. If you see a primary care provider, you can also send  "messages to your care team and make appointments. If you have questions, please call your primary care clinic.  If you do not have a primary care provider, please call [phone] and they will assist you.      [employer] is an electronic gateway that provides easy, online access to your medical records. With [employer], you can request a clinic appointment, read your test results, renew a prescription or communicate with your care team.     To access your existing account, please contact your [facility] or call [phone] for assistance.        Care EveryWhere ID     This is your Care EveryWhere ID. This could be used by other organizations to access your [place] medical records  [id number]        Your Vitals Were     Pulse Height BMI (Body Mass Index)             97 1.6 m (5' 3\") 21.29 kg/m2          Blood Pressure from Last 3 Encounters:   10/15/18 138/86   12/17/17 133/87   12/13/17 118/72    Weight from Last 3 Encounters:   10/15/18 54.5 kg (120 lb 3.2 oz)   01/23/18 50.8 kg (112 lb)   01/19/18 50.8 kg (112 lb)              We Performed the Following     Vika-Operative Worksheet  (Urology General)        Primary Care Provider Office Phone # Fax #    Hali Tubbs [phone]       [facility] [address] [facility] [address]        Equal Access to Services     [facility]: Hadii aad ku hadasho Soomaali, waaxda luqadaha, qaybta kaalmada adeegyada, ynes palomino . So [facility] [phone].    ATENCIÓN: Si habla español, tiene a fox disposición servicios gratuitos de asistencia lingüística. Llame al [phone].    We comply with applicable federal civil rights laws and [place] laws. We do not discriminate on the basis of race, color, national origin, age, disability, sex, sexual orientation, or gender identity.            Thank you!     Thank you for choosing [facility] UROLOGY AND [facility] FOR PROSTATE AND " UROLOGIC CANCERS  for your care. Our goal is always to provide you with excellent care. Hearing back from our patients is one way we can continue to improve our services. Please take a few minutes to complete the written survey that you may receive in the mail after your visit with us. Thank you!             Your Updated Medication List - Protect others around you: Learn how to safely use, store and throw away your medicines at www.disposemymeds.org.          This list is accurate as of 10/15/18  3:57 PM.  Always use your most recent med list.                   Brand Name Dispense Instructions for use Diagnosis    breast pump Misc     1 each    1 each daily as needed    Postpartum care and examination of lactating mother       docusate sodium 100 MG tablet    COLACE    60 tablet    Take 100 mg by mouth daily    Constipation, unspecified constipation type       doxylamine 25 MG Tabs tablet    UNISOM    30 each    Take 1 tablet (25 mg) by mouth At Bedtime    Encounter for supervision of other normal pregnancy in third trimester       ibuprofen 800 MG tablet    ADVIL/MOTRIN    90 tablet    Take 1 tablet (800 mg) by mouth every 6 hours as needed for other (cramping)     (normal spontaneous vaginal delivery)       iron 325 (65 Fe) MG tablet     60 tablet    Take 1 tablet by mouth daily (with breakfast)     (normal spontaneous vaginal delivery)       omeprazole 20 MG CR capsule    priLOSEC    90 capsule    Take 1 capsule (20 mg) by mouth daily    Gastroesophageal reflux disease, esophagitis presence not specified       senna-docusate 8.6-50 MG per tablet    SENOKOT-S;PERICOLACE    90 tablet    Take 2 tablets by mouth 2 times daily as needed for constipation     (normal spontaneous vaginal delivery)       TRINATE Tabs     90 tablet    Take 1 tablet by mouth daily    Encounter for supervision of other normal pregnancy in third trimester, Need for vaccination       TYLENOL PO      Take 325 mg by mouth Reported on  5/10/2017

## 2018-10-15 NOTE — TELEPHONE ENCOUNTER
Patient is scheduled for surgery with Dr. Hampton      Spoke or left message with: scheduled in clinic    Date of Surgery: 10/26/18    Location: [facility] OR    Informed patient they will need an adult  yes    Pre-op with surgeon (if applicable): n/a    H&P: Scheduled with pcp    Additional imaging/appointments: n/a    Surgery packet: given in clinic     Additional comments: n/a

## 2018-10-15 NOTE — MR AVS SNAPSHOT
After Visit Summary   10/15/2018    Rubina Desouza    MRN: [id number]           Patient Information     Date Of Birth          1985        Visit Information        Provider Department      10/15/2018 3:15 PM Nurse, [facility] U[facility] and [facility] for Prostate and Urologic Cancers        Today's Diagnoses     Calcification of bladder    -  1    Stricture or kinking of ureter           Follow-ups after your visit        Your next 10 appointments already scheduled     Oct 15, 2018  3:45 PM CDT   LAB with  LAB   [facility] ([facility])    [address] [place] [address]  1st [facility] [id number] [phone]           Please do not eat 10-12 hours before your appointment if you are coming in fasting for labs on lipids, cholesterol, or glucose (sugar). This does not apply to pregnant women. Water, hot tea and black coffee (with nothing added) are okay. Do not drink other fluids, diet soda or chew gum.            Oct 26, 2018   Procedure with Viral Hampton MD   [facility] ([facility] [place])    [address] [place] [address]  5th [facility] [id number] [phone]           Located in the Clinics and Surgery Center at [address] [place] [address].   parking is very convenient and highly recommended.  is a $6 flat rate fee.  Both  and self parkers should enter the main arrival plaza from [facility]; parking attendants will direct you based on your parking preference.            Nov 12, 2018  1:30 PM CST   (Arrive by 1:15 PM)   Post-Op with Joe Carbajal MD   [facility] Urology and [facility] for Prostate and Urologic Cancers ([facility])    [address] [place] [address]  4th [facility] [id number] [phone]              Future tests that were ordered for you today     Open Future Orders        Priority Expected Expires Ordered    AFB Stain Non Blood  Routine  10/16/2019 10/15/2018            Who to contact     Please call your clinic at [phone] to:    Ask questions about your health    Make or cancel appointments    Discuss your medicines    Learn about your test results    Speak to your doctor            Additional Information About Your Visit        [employer] Information     [employer] gives you secure access to your electronic health record. If you see a primary care provider, you can also send messages to your care team and make appointments. If you have questions, please call your primary care clinic.  If you do not have a primary care provider, please call [phone] and they will assist you.      [employer] is an electronic gateway that provides easy, online access to your medical records. With [employer], you can request a clinic appointment, read your test results, renew a prescription or communicate with your care team.     To access your existing account, please contact your [facility] or call [phone] for assistance.        Care EveryWhere ID     This is your Care EveryWhere ID. This could be used by other organizations to access your [place] medical records  [id number]         Blood Pressure from Last 3 Encounters:   10/15/18 138/86   12/17/17 133/87   12/13/17 118/72    Weight from Last 3 Encounters:   10/15/18 54.5 kg (120 lb 3.2 oz)   01/23/18 50.8 kg (112 lb)   01/19/18 50.8 kg (112 lb)              Today, you had the following     No orders found for display       Primary Care Provider Office Phone # Fax #    Hali Tubbs [phone]       [facility] [address] [facility] [address]        Equal Access to Services     [facility]: Hadii aad ku hadasho Soomaali, waaxda luqadaha, qaybta kaalmada adeegmilli, ynes de la torre. So [facility] [phone].    ATENCIÓN: Si habla español, tiene a fox disposición servicios gratuitos de asistencia  lingüística. Justin al [phone].    We comply with applicable federal civil rights laws and [place] laws. We do not discriminate on the basis of race, color, national origin, age, disability, sex, sexual orientation, or gender identity.            Thank you!     Thank you for choosing [facility] UROLOGY AND [facility] FOR PROSTATE AND UROLOGIC CANCERS  for your care. Our goal is always to provide you with excellent care. Hearing back from our patients is one way we can continue to improve our services. Please take a few minutes to complete the written survey that you may receive in the mail after your visit with us. Thank you!             Your Updated Medication List - Protect others around you: Learn how to safely use, store and throw away your medicines at www.disposemymeds.org.          This list is accurate as of 10/15/18  3:26 PM.  Always use your most recent med list.                   Brand Name Dispense Instructions for use Diagnosis    breast pump Misc     1 each    1 each daily as needed    Postpartum care and examination of lactating mother       docusate sodium 100 MG tablet    COLACE    60 tablet    Take 100 mg by mouth daily    Constipation, unspecified constipation type       doxylamine 25 MG Tabs tablet    UNISOM    30 each    Take 1 tablet (25 mg) by mouth At Bedtime    Encounter for supervision of other normal pregnancy in third trimester       ibuprofen 800 MG tablet    ADVIL/MOTRIN    90 tablet    Take 1 tablet (800 mg) by mouth every 6 hours as needed for other (cramping)     (normal spontaneous vaginal delivery)       iron 325 (65 Fe) MG tablet     60 tablet    Take 1 tablet by mouth daily (with breakfast)     (normal spontaneous vaginal delivery)       omeprazole 20 MG CR capsule    priLOSEC    90 capsule    Take 1 capsule (20 mg) by mouth daily    Gastroesophageal reflux disease, esophagitis presence not specified       senna-docusate 8.6-50 MG per tablet    SENOKOT-S;PERICOLACE    90  tablet    Take 2 tablets by mouth 2 times daily as needed for constipation     (normal spontaneous vaginal delivery)       TRINATE Tabs     90 tablet    Take 1 tablet by mouth daily    Encounter for supervision of other normal pregnancy in third trimester, Need for vaccination       TYLENOL PO      Take 325 mg by mouth Reported on 5/10/2017

## 2018-10-16 ENCOUNTER — TRANSFERRED RECORDS (OUTPATIENT)
Dept: HEALTH INFORMATION MANAGEMENT | Facility: CLINIC | Age: 33
End: 2018-10-16

## 2018-10-19 LAB
ACID FAST STN SPEC QL: NORMAL
ACID FAST STN SPEC QL: NORMAL
SPECIMEN SOURCE: NORMAL

## 2018-10-25 ENCOUNTER — ANESTHESIA EVENT (OUTPATIENT)
Dept: SURGERY | Facility: AMBULATORY SURGERY CENTER | Age: 33
End: 2018-10-25

## 2018-10-26 ENCOUNTER — SURGERY (OUTPATIENT)
Age: 33
End: 2018-10-26

## 2018-10-26 ENCOUNTER — RADIANT APPOINTMENT (OUTPATIENT)
Dept: RADIOLOGY | Facility: AMBULATORY SURGERY CENTER | Age: 33
End: 2018-10-26
Attending: UROLOGY
Payer: COMMERCIAL

## 2018-10-26 ENCOUNTER — HOSPITAL ENCOUNTER (OUTPATIENT)
Facility: AMBULATORY SURGERY CENTER | Age: 33
End: 2018-10-26
Attending: UROLOGY
Payer: COMMERCIAL

## 2018-10-26 ENCOUNTER — ANESTHESIA (OUTPATIENT)
Dept: SURGERY | Facility: AMBULATORY SURGERY CENTER | Age: 33
End: 2018-10-26

## 2018-10-26 VITALS
OXYGEN SATURATION: 100 % | BODY MASS INDEX: 21.26 KG/M2 | HEIGHT: 63 IN | DIASTOLIC BLOOD PRESSURE: 80 MMHG | RESPIRATION RATE: 15 BRPM | HEART RATE: 75 BPM | TEMPERATURE: 98 F | WEIGHT: 120 LBS | SYSTOLIC BLOOD PRESSURE: 118 MMHG

## 2018-10-26 LAB
HCG UR QL: NEGATIVE
INTERNAL QC OK POCT: YES

## 2018-10-26 PROCEDURE — 88305 TISSUE EXAM BY PATHOLOGIST: CPT | Performed by: UROLOGY

## 2018-10-26 RX ORDER — CEFAZOLIN SODIUM 1 G/50ML
1 SOLUTION INTRAVENOUS SEE ADMIN INSTRUCTIONS
Status: DISCONTINUED | OUTPATIENT
Start: 2018-10-26 | End: 2018-10-26 | Stop reason: HOSPADM

## 2018-10-26 RX ORDER — OXYCODONE HYDROCHLORIDE 5 MG/1
5 TABLET ORAL EVERY 4 HOURS PRN
Status: DISCONTINUED | OUTPATIENT
Start: 2018-10-26 | End: 2018-10-27 | Stop reason: HOSPADM

## 2018-10-26 RX ORDER — ONDANSETRON 2 MG/ML
4 INJECTION INTRAMUSCULAR; INTRAVENOUS EVERY 30 MIN PRN
Status: DISCONTINUED | OUTPATIENT
Start: 2018-10-26 | End: 2018-10-27 | Stop reason: HOSPADM

## 2018-10-26 RX ORDER — LIDOCAINE HYDROCHLORIDE 20 MG/ML
INJECTION, SOLUTION INFILTRATION; PERINEURAL PRN
Status: DISCONTINUED | OUTPATIENT
Start: 2018-10-26 | End: 2018-10-26

## 2018-10-26 RX ORDER — ONDANSETRON 2 MG/ML
INJECTION INTRAMUSCULAR; INTRAVENOUS PRN
Status: DISCONTINUED | OUTPATIENT
Start: 2018-10-26 | End: 2018-10-26

## 2018-10-26 RX ORDER — SODIUM CHLORIDE, SODIUM LACTATE, POTASSIUM CHLORIDE, CALCIUM CHLORIDE 600; 310; 30; 20 MG/100ML; MG/100ML; MG/100ML; MG/100ML
INJECTION, SOLUTION INTRAVENOUS CONTINUOUS
Status: DISCONTINUED | OUTPATIENT
Start: 2018-10-26 | End: 2018-10-26 | Stop reason: HOSPADM

## 2018-10-26 RX ORDER — CEFAZOLIN SODIUM 2 G/50ML
2 SOLUTION INTRAVENOUS
Status: DISCONTINUED | OUTPATIENT
Start: 2018-10-26 | End: 2018-10-26 | Stop reason: HOSPADM

## 2018-10-26 RX ORDER — FENTANYL CITRATE 50 UG/ML
25-50 INJECTION, SOLUTION INTRAMUSCULAR; INTRAVENOUS
Status: DISCONTINUED | OUTPATIENT
Start: 2018-10-26 | End: 2018-10-26 | Stop reason: HOSPADM

## 2018-10-26 RX ORDER — NALOXONE HYDROCHLORIDE 0.4 MG/ML
.1-.4 INJECTION, SOLUTION INTRAMUSCULAR; INTRAVENOUS; SUBCUTANEOUS
Status: DISCONTINUED | OUTPATIENT
Start: 2018-10-26 | End: 2018-10-27 | Stop reason: HOSPADM

## 2018-10-26 RX ORDER — ONDANSETRON 4 MG/1
4 TABLET, ORALLY DISINTEGRATING ORAL EVERY 30 MIN PRN
Status: DISCONTINUED | OUTPATIENT
Start: 2018-10-26 | End: 2018-10-27 | Stop reason: HOSPADM

## 2018-10-26 RX ORDER — DEXAMETHASONE SODIUM PHOSPHATE 4 MG/ML
INJECTION, SOLUTION INTRA-ARTICULAR; INTRALESIONAL; INTRAMUSCULAR; INTRAVENOUS; SOFT TISSUE PRN
Status: DISCONTINUED | OUTPATIENT
Start: 2018-10-26 | End: 2018-10-26

## 2018-10-26 RX ORDER — EPHEDRINE SULFATE 50 MG/ML
INJECTION, SOLUTION INTRAMUSCULAR; INTRAVENOUS; SUBCUTANEOUS PRN
Status: DISCONTINUED | OUTPATIENT
Start: 2018-10-26 | End: 2018-10-26

## 2018-10-26 RX ORDER — SODIUM CHLORIDE, SODIUM LACTATE, POTASSIUM CHLORIDE, CALCIUM CHLORIDE 600; 310; 30; 20 MG/100ML; MG/100ML; MG/100ML; MG/100ML
INJECTION, SOLUTION INTRAVENOUS CONTINUOUS
Status: DISCONTINUED | OUTPATIENT
Start: 2018-10-26 | End: 2018-10-27 | Stop reason: HOSPADM

## 2018-10-26 RX ORDER — PROPOFOL 10 MG/ML
INJECTION, EMULSION INTRAVENOUS CONTINUOUS PRN
Status: DISCONTINUED | OUTPATIENT
Start: 2018-10-26 | End: 2018-10-26

## 2018-10-26 RX ORDER — FENTANYL CITRATE 50 UG/ML
INJECTION, SOLUTION INTRAMUSCULAR; INTRAVENOUS PRN
Status: DISCONTINUED | OUTPATIENT
Start: 2018-10-26 | End: 2018-10-26

## 2018-10-26 RX ORDER — PROPOFOL 10 MG/ML
INJECTION, EMULSION INTRAVENOUS PRN
Status: DISCONTINUED | OUTPATIENT
Start: 2018-10-26 | End: 2018-10-26

## 2018-10-26 RX ORDER — ACETAMINOPHEN 325 MG/1
975 TABLET ORAL ONCE
Status: COMPLETED | OUTPATIENT
Start: 2018-10-26 | End: 2018-10-26

## 2018-10-26 RX ORDER — MEPERIDINE HYDROCHLORIDE 25 MG/ML
12.5 INJECTION INTRAMUSCULAR; INTRAVENOUS; SUBCUTANEOUS
Status: DISCONTINUED | OUTPATIENT
Start: 2018-10-26 | End: 2018-10-27 | Stop reason: HOSPADM

## 2018-10-26 RX ADMIN — ONDANSETRON 4 MG: 2 INJECTION INTRAMUSCULAR; INTRAVENOUS at 13:32

## 2018-10-26 RX ADMIN — DEXAMETHASONE SODIUM PHOSPHATE 4 MG: 4 INJECTION, SOLUTION INTRA-ARTICULAR; INTRALESIONAL; INTRAMUSCULAR; INTRAVENOUS; SOFT TISSUE at 13:32

## 2018-10-26 RX ADMIN — SODIUM CHLORIDE, SODIUM LACTATE, POTASSIUM CHLORIDE, CALCIUM CHLORIDE: 600; 310; 30; 20 INJECTION, SOLUTION INTRAVENOUS at 12:48

## 2018-10-26 RX ADMIN — EPHEDRINE SULFATE 10 MG: 50 INJECTION, SOLUTION INTRAMUSCULAR; INTRAVENOUS; SUBCUTANEOUS at 14:02

## 2018-10-26 RX ADMIN — PROPOFOL 120 MCG/KG/MIN: 10 INJECTION, EMULSION INTRAVENOUS at 13:36

## 2018-10-26 RX ADMIN — PROPOFOL 20 MG: 10 INJECTION, EMULSION INTRAVENOUS at 13:38

## 2018-10-26 RX ADMIN — ACETAMINOPHEN 975 MG: 325 TABLET ORAL at 12:48

## 2018-10-26 RX ADMIN — LIDOCAINE HYDROCHLORIDE 40 MG: 20 INJECTION, SOLUTION INFILTRATION; PERINEURAL at 13:30

## 2018-10-26 RX ADMIN — FENTANYL CITRATE 50 MCG: 50 INJECTION, SOLUTION INTRAMUSCULAR; INTRAVENOUS at 13:31

## 2018-10-26 RX ADMIN — PROPOFOL 40 MG: 10 INJECTION, EMULSION INTRAVENOUS at 13:35

## 2018-10-26 RX ADMIN — PROPOFOL 20 MG: 10 INJECTION, EMULSION INTRAVENOUS at 14:09

## 2018-10-26 NOTE — ANESTHESIA PREPROCEDURE EVALUATION
)Anesthesia Pre-Procedure Evaluation    Patient: Rubina Desouza   MRN:     [id number] Gender:   female   Age:    33 year old :      1985        Preoperative Diagnosis: Bladder Mucosa Calcifications, Right Ureteral Stricture   Procedure(s):  Bladder Biopsy, Right Ureteroscopy with Possible Biopsy       Past Medical History:   Diagnosis Date     Anemia      Conductive hearing loss      Hearing problem      Hyperthyroidism     Better since surgery     Tinnitus 2018     Past Surgical History:   Procedure Laterality Date     THYROID SURGERY      in [facility]; partial thyroidectomy       Anesthesia Evaluation     .             ROS/MED HX    ENT/Pulmonary:  - neg pulmonary ROS     Neurologic:     (+)other neuro tinnitus    Cardiovascular:  - neg cardiovascular ROS       METS/Exercise Tolerance:  >4 METS   Hematologic:     (+) Anemia, -      Musculoskeletal:  - neg musculoskeletal ROS       GI/Hepatic:  - neg GI/hepatic ROS       Renal/Genitourinary:  - ROS Renal section negative       Endo:     (+) thyroid problem .      Psychiatric:        (-) psychiatric history   Infectious Disease:         Malignancy:         Other:    (+) No chance of pregnancy no H/O Chronic Pain,                   [facility] AN PHYSICAL EXAM    Lab Results   Component Value Date    WBC 8.5 12/15/2017    HGB 10.1 (L) 2017    HCT 33.9 (L) 12/15/2017     (L) 12/15/2017    TSH 0.87 2017    T4 1.00 2017    T3 157 2017    HCG Negative 10/26/2018       Preop Vitals  BP Readings from Last 3 Encounters:   10/26/18 131/84   10/15/18 138/86   17 133/87    Pulse Readings from Last 3 Encounters:   10/26/18 75   10/15/18 97   17 89      Resp Readings from Last 3 Encounters:   10/26/18 15   17 16   10/17/17 18    SpO2 Readings from Last 3 Encounters:   10/26/18 98%   17 98%      Temp Readings from Last 1 Encounters:   10/26/18 36.7  C (98  F) (Oral)    Ht Readings from Last 1 Encounters:  "  10/26/18 1.6 m (5' 3\")      Wt Readings from Last 1 Encounters:   10/26/18 54.4 kg (120 lb)    Estimated body mass index is 21.26 kg/(m^2) as calculated from the following:    Height as of this encounter: 1.6 m (5' 3\").    Weight as of this encounter: 54.4 kg (120 lb).     LDA:  Peripheral IV 10/26/18 Right Hand (Active)   Site Assessment WDL 10/26/2018 12:54 PM   Line Status Infusing 10/26/2018 12:54 PM   Phlebitis Scale 0-->no symptoms 10/26/2018 12:54 PM   Infiltration Scale 0 10/26/2018 12:54 PM   Number of days:0       Assessment:   ASA SCORE: 2    NPO Status: > 6 hours since completed [employer]   Documentation: H&P complete; Preop Testing complete; Consents complete   Proceeding: Proceed without further delay  Tobacco Use:  NO Active use of Tobacco/UNKNOWN Tobacco use status     Plan:   Anes. Type:  MAC   Pre-Induction: Midazolam IV   Induction:  Not applicable   Airway: Native Airway   Access/Monitoring: PIV   Maintenance: N/a   Emergence: N/a   Logistics: Same Day Surgery     Postop Pain/Sedation Strategy:  Standard-Options: Opioids PRN     PONV Management:  Adult Risk Factors: Female, Non-Smoker, Postop Opioids  Prevention: Ondansetron     CONSENT: Direct conversation   Plan and risks discussed with: Patient                            Ayan Fiore MD  "

## 2018-10-26 NOTE — BRIEF OP NOTE
[facility]    Brief Operative Note    Pre-operative diagnosis: Bladder Mucosa Calcifications, Right Ureteral Stricture  Post-operative diagnosis Narrowed right ureteral orifice, yellow mucosal plaques  Procedure: Procedure(s):  Bladder Biopsy, Right Ureteroscopy with Possible Biopsy  COMBINED CYSTOSCOPY, URETEROSCOPY  Surgeon: Surgeon(s) and Role:     * Viral Hampton MD - Primary     * Joe Carbajal MD  Anesthesia: Monitor Anesthesia Care   Estimated blood loss: None  Drains: None  Specimens:   ID Type Source Tests Collected by Time Destination   1 : Right Kidney Urine for AFB Urine Kidney, Right AFB CULTURE NON BLOOD Viral Hampton MD 10/26/2018  2:05 PM    2 : Bladder Biopsy Culture Tissue Bladder AFB CULTURE NON BLOOD Viral Hampton MD 10/26/2018  2:08 PM    A : Badder Biopsy Path Tissue Bladder SURGICAL PATHOLOGY EXAM Viral Hampton MD 10/26/2018  2:09 PM      Findings:   Diffuse yellow-white plaques throughout the bladder. Narrowed right UO in orthotopic position, RPG showed ureter that easily dilated up to renal pelvis. Left UO visualized, not patent.  Complications: None.  Implants: None.

## 2018-10-26 NOTE — OP NOTE
PRE-PROCEDURE DIAGNOSIS: bladder calcification, right hydronephrosis  POST-PROCEDURE DIAGNOSIS: narrowed right ureteral orifice, yellow/white mucosal plaques  PROCEDURE: cystoscopy, bladder biopsy, right retrograde pyelogram    SURGEON: Viral Hampton  ASSISTANT: Joe Carbajal & Jessica Stauffer    ANESTHESIA: MAC  TUBES/DRAINS: none  SPECIMENS: Bladder biopsy for pathology, bladder biopsy for AFB culture    INDICATIONS: Rubina Desouza is a 33 year old woman with left atrophic kidney, right hydronephrosis, bladder calcifications.     DESCRIPTION OF PROCEDURE:  After informed consent was obtained, the patient was brought to the procedure room where he was placed in the supine position with all pressure points well padded.  After adequate anesthesia and securing of the airway she was prepped and draped in the dorsal lithotomy position. The genitalia were prepped and draped in a sterile fashion. A rigid 20F cystoscope cystoscope was introduced through a well-lubricated urethra.  Bladder was free of diverticuli, cellules, trabeculation, tumors or stones. There was a whitish/yellowish slightly raised plaque observed throughout the bladder. The ureteral orifices were orthotopic in nature, but very difficult to identify as they were quite small. There was significant bulging of the ureter submucosally as it peristalsed. The right ureter was cannulated with a sensor wire and a 5 Urdu open ended stent was passed into the distal ureter. A retrograde pyelogram showed a normal caliber ureter up until the iliac vessels. The ureter was then quite dilated at this point and was easily distensible proximal to this. There was no stricture or narrowing. The open ended stent was removed. Two biopsies were obtained from the posterior bladder wall. One was sent for pathology, the other for AFB culture. The areas were fulgurated. Excellent hemostasis was obtained.     The patient was awakened from anesthesia and transferred to a [facility] and  to the PACU in stable position.    COMPLICATIONS: none  EBL: none    PLAN:  F/u with Mag-3 renal scan    As the attending surgeon I, Viral Hampton, was present and scrubbed throughout the procedure.

## 2018-10-26 NOTE — IP AVS SNAPSHOT
MRN:[id number]                      After Visit Summary   10/26/2018    Rubina Desouza    MRN: [id number]           Thank you!     Thank you for choosing [facility] for your care. Our goal is always to provide you with excellent care. Hearing back from our patients is one way we can continue to improve our services. Please take a few minutes to complete the written survey that you may receive in the mail after you visit with us. Thank you!        Patient Information     Date Of Birth          1985        About your hospital stay     You were admitted on:  October 26, 2018 [facility]    You were discharged on:  October 26, 2018       Who to Call     For medical emergencies, please call 911.  For non-urgent questions about your medical care, please call your primary care provider or clinic, [phone]  For questions related to your surgery, please call your surgery clinic        Attending Provider     Provider Specialty    Viral Hampton MD Urology       Primary Care Provider Office Phone # Fax #    Hali Tubbs [phone]      After Care Instructions     Discharge Instructions       [facility], [facility] - Same-Day [facility]   Adult Discharge Orders & Instructions     For 24 hours after surgery:    Get plenty of rest.  A responsible adult must stay with you for at least 24 hours after you leave the hospital.   Do not drive or use heavy equipment.  If you have weakness or tingling, don't drive or use heavy equipment until this feeling goes away.  Do not drink alcohol for a minimum of 24 hours after surgery.    Avoid strenuous or risky activities.  Ask for help when climbing stairs.   You may feel lightheaded.  IF so, sit for a few minutes before standing.  Have someone help you get up.     If you have nausea (feel sick to your stomach): Drink only clear liquids such as apple juice, juli  jacki, broth or 7-Up.  Rest may also help.  Be sure to drink enough fluids. Move to a regular diet as you feel able.    You may have a slight fever. Call the doctor if your fever is over 100 F (37.7 C) (taken under the tongue) or lasts longer than 24 hours.    You may have a dry mouth, a sore throat, muscle aches or trouble sleeping.  These should go away after 24 hours.    You make take over the counter tylenol or ibuprofen for discomfort.    Call your doctor for any of the followin.  Signs of infection (fever, growing tenderness at the surgery site, a large amount of drainage or bleeding, severe pain, foul-smelling drainage, redness, swelling).  2. It has been over 8 to 10 hours since surgery and you are still not able to urinate (pass water).  3.  Headache for over 24 hours.  4. Temperature > 101F  5. Any other questions or concerns.     Follow up with Dr Carbajal as previously scheduled                  Your next 10 appointments already scheduled     2018  1:30 PM CST   (Arrive by 1:15 PM)   Post-Op with Joe Carbajal MD   [facility] ([facility])    [address] [place] [id number] [phone]              Further instructions from your care team       [facility]  Home Care Following Anesthesia  For 24 hours after surgery:  1. Get plenty of rest.  A responsible adult must stay with you for at least 24 hours after you leave the surgery center.  2. Do not drive or use heavy equipment.  If you have weakness or tingling, don't drive or use heavy equipment until this feeling goes away.   3. Do not drink alcohol.   4. Avoid strenuous or risky activities.  Ask for help when climbing stairs.  5. You may feel lightheaded.  IF so, sit for a few minutes before standing.  Have someone help you get up.   6. If you have nausea (feel sick to your stomach): Drink only clear  liquids such as apple juice, ginger ale, broth or 7-Up.  Rest may also help.  Be sure to drink enough fluids.  Move to a regular diet as you feel able.   7. You may have a slight fever.  Call the doctor if your fever is over 100 F (37.7 C) (taken under the tongue) or lasts longer than 24 hours.  8. You may have a dry mouth, a sore throat, muscle aches or trouble sleeping. These should go away after 24 hours.  9. Do not make important or legal decisions.          Tips for taking pain medications  To get the best pain relief possible, remember these points:    Take pain medications as directed, before pain becomes severe.    Pain medication can upset your stomach: taking it with food may help.    Constipation is a common side effect of pain medication. Drink plenty of  fluids.    Eat foods high in fiber. Take a stool softener if recommended by your doctor or pharmacist.    Do not drink alcohol, drive or operate machinery while taking pain medications.    Ask about other ways to control pain, such as with heat, ice or relaxation.    Tylenol/Acetaminophen Consumption  To help encourage the safe use of acetaminophen, the makers of TYLENOL  have lowered the maximum daily dose for single-ingredient Extra Strength TYLENOL  (acetaminophen) products sold in the [place] from 8 pills per day (4,000 mg) to 6 pills per day (3,000 mg). The dosing interval has also changed from 2 pills every 4-6 hours to 2 pills every 6 hours.    If you feel your pain relief is insufficient, you may take Tylenol/Acetaminophen in addition to your narcotic pain medication.     Be careful not to exceed 3,000 mg of Tylenol/Acetaminophen in a 24 hour period from all sources.    If you are taking extra strength Tylenol/acetaminophen (500 mg), the maximum dose is 6 tablets in 24 hours.    If you are taking regular strength acetaminophen (325 mg), the maximum dose is 9 tablets in 24 hours.    Call a doctor for any of the followin. Signs of infection  "(fever, growing tenderness at the surgery site, a large amount of drainage or bleeding, severe pain, foul-smelling drainage, redness, swelling).  2. It has been over 8 to 10 hours since surgery and you are still not able to urinate (pass water).  3. Headache for over 24 hours.  Your doctor is:  Dr. Viral Badillo, Prostate and Urology: [phone]                  Or dial [phone] and ask for the resident on call for:  Prostate Urology  For emergency care, call the:  [facility]:  [phone] (TTY for hearing impaired: [phone])                  Pending Results     Date and Time Order Name Status Description    10/26/2018 1309 XR SURGERY NEENA FLUORO LESS THAN 5 MIN W STILLS In process             Admission Information     Date & Time Provider Department Dept. Phone    10/26/2018 Viral Hampton MD [facility] [phone]      Your Vitals Were     Blood Pressure Pulse Temperature Respirations Height Weight    131/64 75 97.8  F (36.6  C) (Temporal) 16 1.6 m (5' 3\") 54.4 kg (120 lb)    Pulse Oximetry BMI (Body Mass Index)                99% 21.26 kg/m2          [employer] Information     [employer] gives you secure access to your electronic health record. If you see a primary care provider, you can also send messages to your care team and make appointments. If you have questions, please call your primary care clinic.  If you do not have a primary care provider, please call [phone] and they will assist you.      [employer] is an electronic gateway that provides easy, online access to your medical records. With [employer], you can request a clinic appointment, read your test results, renew a prescription or communicate with your care team.     To access your existing account, please contact your [facility] or call [phone] for assistance.        Care EveryWhere ID     This is your Care EveryWhere ID. This could be used by other " organizations to access your [facility] medical records  [id number]        Equal Access to Services     AWAIS PARSON : Saira Sawyer, linda al, ynes suh. So [facility] [phone].    ATENCIÓN: Si habla español, tiene a fox disposición servicios gratuitos de asistencia lingüística. Llame al [phone].    We comply with applicable federal civil rights laws and [place] laws. We do not discriminate on the basis of race, color, national origin, age, disability, sex, sexual orientation, or gender identity.               Review of your medicines      CONTINUE these medicines which have NOT CHANGED        Dose / Directions    breast pump Misc   Used for:  Postpartum care and examination of lactating mother        Dose:  1 each   1 each daily as needed   Quantity:  1 each   Refills:  0       docusate sodium 100 MG tablet   Commonly known as:  COLACE   Used for:  Constipation, unspecified constipation type        Dose:  100 mg   Take 100 mg by mouth daily   Quantity:  60 tablet   Refills:  1       doxylamine 25 MG Tabs tablet   Commonly known as:  UNISOM   Used for:  Encounter for supervision of other normal pregnancy in third trimester        Dose:  25 mg   Take 1 tablet (25 mg) by mouth At Bedtime   Quantity:  30 each   Refills:  0       ibuprofen 800 MG tablet   Commonly known as:  ADVIL/MOTRIN   Used for:   (normal spontaneous vaginal delivery)        Dose:  800 mg   Take 1 tablet (800 mg) by mouth every 6 hours as needed for other (cramping)   Quantity:  90 tablet   Refills:  1       iron 325 (65 Fe) MG tablet   Used for:   (normal spontaneous vaginal delivery)        Dose:  1 tablet   Take 1 tablet by mouth daily (with breakfast)   Quantity:  60 tablet   Refills:  1       omeprazole 20 MG CR capsule   Commonly known as:  priLOSEC   Used for:  Gastroesophageal reflux disease, esophagitis presence not specified        Dose:  20 mg    Take 1 capsule (20 mg) by mouth daily   Quantity:  90 capsule   Refills:  1       senna-docusate 8.6-50 MG per tablet   Commonly known as:  SENOKOT-S;PERICOLACE   Used for:   (normal spontaneous vaginal delivery)        Dose:  2 tablet   Take 2 tablets by mouth 2 times daily as needed for constipation   Quantity:  90 tablet   Refills:  1       TRINATE Tabs   Used for:  Encounter for supervision of other normal pregnancy in third trimester, Need for vaccination        Dose:  1 tablet   Take 1 tablet by mouth daily   Quantity:  90 tablet   Refills:  3       TYLENOL PO        Dose:  325 mg   Take 325 mg by mouth Reported on 5/10/2017   Refills:  0                Protect others around you: Learn how to safely use, store and throw away your medicines at www.disposemymeds.org.             Medication List: This is a list of all your medications and when to take them. Check marks below indicate your daily home schedule. Keep this list as a reference.      Medications           Morning Afternoon Evening Bedtime As Needed    breast pump Misc   1 each daily as needed                                docusate sodium 100 MG tablet   Commonly known as:  COLACE   Take 100 mg by mouth daily                                doxylamine 25 MG Tabs tablet   Commonly known as:  UNISOM   Take 1 tablet (25 mg) by mouth At Bedtime                                ibuprofen 800 MG tablet   Commonly known as:  ADVIL/MOTRIN   Take 1 tablet (800 mg) by mouth every 6 hours as needed for other (cramping)                                iron 325 (65 Fe) MG tablet   Take 1 tablet by mouth daily (with breakfast)                                omeprazole 20 MG CR capsule   Commonly known as:  priLOSEC   Take 1 capsule (20 mg) by mouth daily                                senna-docusate 8.6-50 MG per tablet   Commonly known as:  SENOKOT-S;PERICOLACE   Take 2 tablets by mouth 2 times daily as needed for constipation                                 TRINATE Tabs   Take 1 tablet by mouth daily                                TYLENOL PO   Take 325 mg by mouth Reported on 5/10/2017   Last time this was given:  975 mg on 10/26/2018 12:48 PM

## 2018-10-26 NOTE — DISCHARGE INSTRUCTIONS
[facility]  Home Care Following Anesthesia  For 24 hours after surgery:  1. Get plenty of rest.  A responsible adult must stay with you for at least 24 hours after you leave the surgery center.  2. Do not drive or use heavy equipment.  If you have weakness or tingling, don't drive or use heavy equipment until this feeling goes away.   3. Do not drink alcohol.   4. Avoid strenuous or risky activities.  Ask for help when climbing stairs.  5. You may feel lightheaded.  IF so, sit for a few minutes before standing.  Have someone help you get up.   6. If you have nausea (feel sick to your stomach): Drink only clear liquids such as apple juice, ginger ale, broth or 7-Up.  Rest may also help.  Be sure to drink enough fluids.  Move to a regular diet as you feel able.   7. You may have a slight fever.  Call the doctor if your fever is over 100 F (37.7 C) (taken under the tongue) or lasts longer than 24 hours.  8. You may have a dry mouth, a sore throat, muscle aches or trouble sleeping. These should go away after 24 hours.  9. Do not make important or legal decisions.          Tips for taking pain medications  To get the best pain relief possible, remember these points:    Take pain medications as directed, before pain becomes severe.    Pain medication can upset your stomach: taking it with food may help.    Constipation is a common side effect of pain medication. Drink plenty of  fluids.    Eat foods high in fiber. Take a stool softener if recommended by your doctor or pharmacist.    Do not drink alcohol, drive or operate machinery while taking pain medications.    Ask about other ways to control pain, such as with heat, ice or relaxation.    Tylenol/Acetaminophen Consumption  To help encourage the safe use of acetaminophen, the makers of TYLENOL  have lowered the maximum daily dose for single-ingredient Extra Strength TYLENOL  (acetaminophen) products sold in the [place] from 8 pills per  day (4,000 mg) to 6 pills per day (3,000 mg). The dosing interval has also changed from 2 pills every 4-6 hours to 2 pills every 6 hours.    If you feel your pain relief is insufficient, you may take Tylenol/Acetaminophen in addition to your narcotic pain medication.     Be careful not to exceed 3,000 mg of Tylenol/Acetaminophen in a 24 hour period from all sources.    If you are taking extra strength Tylenol/acetaminophen (500 mg), the maximum dose is 6 tablets in 24 hours.    If you are taking regular strength acetaminophen (325 mg), the maximum dose is 9 tablets in 24 hours.    Call a doctor for any of the followin. Signs of infection (fever, growing tenderness at the surgery site, a large amount of drainage or bleeding, severe pain, foul-smelling drainage, redness, swelling).  2. It has been over 8 to 10 hours since surgery and you are still not able to urinate (pass water).  3. Headache for over 24 hours.  Your doctor is:  Dr. Viral Badillo, Prostate and Urology: [phone]                  Or dial [phone] and ask for the resident on call for:  Prostate Urology  For emergency care, call the:  [facility]:  [phone] (TTY for hearing impaired: [phone])

## 2018-10-26 NOTE — ANESTHESIA POSTPROCEDURE EVALUATION
Anesthesia POST Procedure Evaluation    Patient: Rubina Desouza   MRN:     [id number] Gender:   female   Age:    33 year old :      1985        Preoperative Diagnosis: Bladder Mucosa Calcifications, Right Ureteral Stricture   Procedure(s):  Bladder Biopsy, Right Ureteroscopy with Possible Biopsy  COMBINED CYSTOSCOPY, URETEROSCOPY   Postop Comments: No value filed.       Anesthesia Type:  MAC    Reportable Event: NO     PAIN: Uncomplicated   Sign Out status: Comfortable, Well controlled pain     PONV: No PONV   Sign Out status:  No Nausea or Vomiting     Neuro/Psych: Uneventful perioperative course   Sign Out Status: Preoperative baseline; Age appropriate mentation     Airway/Resp.: Uneventful perioperative course   Sign Out Status: Non labored breathing, age appropriate RR; Resp. Status within EXPECTED Parameters     CV: Uneventful perioperative course   Sign Out status: Appropriate BP and perfusion indices; Appropriate HR/Rhythm     Disposition:   Sign Out in:  PACU  Disposition:  Phase II; Home  Recovery Course: Uneventful  Follow-Up: Not required           Last Anesthesia Record Vitals:  CRNA VITALS  10/26/2018 1348 - 10/26/2018 1427      10/26/2018             Pulse: 96    SpO2: 100 %    Resp Rate (set): 10          Last PACU/Preop Vitals:  Vitals:    10/26/18 1233 10/26/18 1422   BP: 131/84 131/64   Pulse: 75    Resp: 15 16   Temp: 36.7  C (98  F) 36.6  C (97.8  F)   SpO2: 98% 99%         Electronically Signed By: Ayan Fiore MD, 2018, 2:27 PM

## 2018-10-26 NOTE — ANESTHESIA CARE TRANSFER NOTE
Patient: Rubina Daar    Procedure(s):  Bladder Biopsy, Right Ureteroscopy with Possible Biopsy  COMBINED CYSTOSCOPY, URETEROSCOPY    Diagnosis: Bladder Mucosa Calcifications, Right Ureteral Stricture  Diagnosis Additional Information: No value filed.    Anesthesia Type:   No value filed.     Note:  Airway :Room Air  Patient transferred to:Phase II  Comments: Report to RN    103/64, 16, 76, 98%Handoff Report: Identifed the Patient, Identified the Reponsible Provider, Reviewed the pertinent medical history, Discussed the surgical course, Reviewed Intra-OP anesthesia mangement and issues during anesthesia, Set expectations for post-procedure period and Allowed opportunity for questions and acknowledgement of understanding      Vitals: (Last set prior to Anesthesia Care Transfer)    CRNA VITALS  10/26/2018 1348 - 10/26/2018 1423      10/26/2018             Pulse: 96    SpO2: 100 %    Resp Rate (set): 10                Electronically Signed By: MARCIN Preciado CRNA  October 26, 2018  2:23 PM

## 2018-10-26 NOTE — IP AVS SNAPSHOT
[facility]    [address] [place] [address]    Phone:  [phone]    Fax:  [phone]                                       After Visit Summary   10/26/2018    Rubina Desouza    MRN: [id number]           After Visit Summary Signature Page     I have received my discharge instructions, and my questions have been answered. I have discussed any challenges I see with this plan with the nurse or doctor.    ..........................................................................................................................................  Patient/Patient Representative Signature      ..........................................................................................................................................  Patient Representative Print Name and Relationship to Patient    ..................................................               ................................................  Date                                   Time    ..........................................................................................................................................  Reviewed by Signature/Title    ...................................................              ..............................................  Date                                               Time          22EPIC Rev 08/18

## 2018-10-29 ENCOUNTER — TRANSFERRED RECORDS (OUTPATIENT)
Dept: HEALTH INFORMATION MANAGEMENT | Facility: CLINIC | Age: 33
End: 2018-10-29

## 2018-10-29 LAB
ACID FAST STN SPEC QL: NORMAL
ACID FAST STN SPEC QL: NORMAL
SPECIMEN SOURCE: NORMAL

## 2018-10-30 LAB
ACID FAST STN SPEC QL: NORMAL
COPATH REPORT: NORMAL
SPECIMEN SOURCE: NORMAL

## 2018-10-31 DIAGNOSIS — B65.9 SCHISTOSOMIASIS: Primary | ICD-10-CM

## 2018-10-31 DIAGNOSIS — N30.90 BLADDER INFECTION: Primary | ICD-10-CM

## 2018-10-31 RX ORDER — PRAZIQUANTEL 600 MG/1
600 TABLET, FILM COATED ORAL 3 TIMES DAILY
Qty: 3 TABLET | Refills: 0 | Status: SHIPPED | OUTPATIENT
Start: 2018-10-31 | End: 2018-11-01

## 2018-11-07 ENCOUNTER — PRE VISIT (OUTPATIENT)
Dept: UROLOGY | Facility: CLINIC | Age: 33
End: 2018-11-07

## 2018-11-07 NOTE — TELEPHONE ENCOUNTER
Patient with history of bladder calcification and right hydronephrosis coming in for post operative check up S/P cystoscopy, bladder biopsy, and right retrograde pyelogram. Patient chart reviewed, no need for call, all records available and ready for appointment.  New DX: schistosomiasis

## 2018-11-12 ENCOUNTER — OFFICE VISIT (OUTPATIENT)
Dept: UROLOGY | Facility: CLINIC | Age: 33
End: 2018-11-12
Payer: COMMERCIAL

## 2018-11-12 VITALS — SYSTOLIC BLOOD PRESSURE: 149 MMHG | BODY MASS INDEX: 21.26 KG/M2 | DIASTOLIC BLOOD PRESSURE: 93 MMHG | WEIGHT: 120 LBS

## 2018-11-12 DIAGNOSIS — B65.9 SCHISTOSOMIASIS: ICD-10-CM

## 2018-11-12 NOTE — LETTER
11/12/2018       RE: Rubina Desouza  [address]  [facility] [address]     Dear Colleague,    Thank you for referring your patient, Rubina Desouza, to the [facility]ST FOR PROSTATE AND UROLOGIC CANCERS at [facility]. Please see a copy of my visit note below.      Name: Roger Desouza    MRN: [id number]   YOB: 1985                 Chief Complaint:   Right hydroureter          History of Present Illness:   Mr. Roger Desouza is a 33 year old female recently found to have right hydroureter in a solitary kidney. She recently had a miscarriage, which prompted a renal ultrasound, which showed an atrophic left kidney. This prompted a CT scan, which revealed right pelviectasis, right hydroureter with a focal stricture in the proximal third of the ureter, and calcifications in the bladder. Patient denies symptoms of LUTS, hematuria, UTIs, flank pain. She was born in [facility] and emigrated in 2014.    Recent cystoscopy and bladder biopsies confirmed schistosomiasis. A retrograde pyelogram revealed the proximal stricture was ureteral peristalsis. Her ureteral orifice was quite small and her distal ureter was also narrow. She has completed a treatment of praziquantel.         Past Medical History:     Past Medical History:   Diagnosis Date     Anemia 2017     Conductive hearing loss 2015     Hearing problem 2015     Hyperthyroidism     Better since surgery     Tinnitus 2018            Past Surgical History:     Past Surgical History:   Procedure Laterality Date     CYSTOSCOPY, BIOPSY BLADDER, COMBINED N/A 10/26/2018    Procedure: Bladder Biopsy, Right Ureteroscopy with Possible Biopsy;  Surgeon: Viral Hampton MD;  [facility]: [facility]     CYSTOSCOPY, URETEROSCOPY, COMBINED  10/26/2018    Procedure: COMBINED CYSTOSCOPY, URETEROSCOPY;  Surgeon: Viral Hampton MD;  [facility]     THYROID SURGERY  2010    in [facility]; partial thyroidectomy           "  Social History:     Social History   Substance Use Topics     Smoking status: Never Smoker     Smokeless tobacco: Never Used     Alcohol use No            Family History:     Family History   Problem Relation Age of Onset     Hypertension Father               Allergies:   No Known Allergies         Medications:     Current Outpatient Prescriptions   Medication Sig     Acetaminophen (TYLENOL PO) Take 325 mg by mouth Reported on 5/10/2017     docusate sodium (COLACE) 100 MG tablet Take 100 mg by mouth daily (Patient not taking: Reported on 10/15/2018)     doxylamine (UNISOM) 25 MG TABS tablet Take 1 tablet (25 mg) by mouth At Bedtime (Patient not taking: Reported on 10/15/2018)     Ferrous Sulfate (IRON) 325 (65 FE) MG tablet Take 1 tablet by mouth daily (with breakfast) (Patient not taking: Reported on 10/15/2018)     ibuprofen (ADVIL/MOTRIN) 800 MG tablet Take 1 tablet (800 mg) by mouth every 6 hours as needed for other (cramping) (Patient not taking: Reported on 10/15/2018)     Misc. Devices (BREAST PUMP) MISC 1 each daily as needed (Patient not taking: Reported on 10/15/2018)     omeprazole (PRILOSEC) 20 MG CR capsule Take 1 capsule (20 mg) by mouth daily (Patient not taking: Reported on 10/15/2018)     Prenatal Vit-Fe Fumarate-FA (TRINATE) TABS Take 1 tablet by mouth daily (Patient not taking: Reported on 10/15/2018)     senna-docusate (SENOKOT-S;PERICOLACE) 8.6-50 MG per tablet Take 2 tablets by mouth 2 times daily as needed for constipation (Patient not taking: Reported on 10/15/2018)     No current facility-administered medications for this visit.           Physical Exam:   B/P: 138/86, T: Data Unavailable, P: 97, R: Data Unavailable  Estimated body mass index is 21.26 kg/(m^2) as calculated from the following:    Height as of 10/26/18: 1.6 m (5' 3\").    Weight as of this encounter: 54.4 kg (120 lb).  General: age-appropriate appearing female in NAD. normal body habitus.  HEENT: Head AT/NC, EOMI, CN Grossly " intact  Resp: no respiratory distress, lung sounds clear.  CV: heart rate regular, S1, S2.  Lymph: No cervical, supraclavicular or axillary lymphadenopathy  Back: bony spine is non-tender, flanks are nontender  Abdomen: (not/mild/moderately/severely) obese, soft, non-distended, non-tender. No organomegaly  : deferred  Rectal exam: deferred  LE: no edema.   Neuro: grossly intact  Motor: excellent strength throughout  Skin: clear of rashes or ecchymoses.        Labs:    All laboratory data reviewed with patient  Significant for Cr 0.9      Imaging:    All imaging reviewed with patient.  Significant for CT A/P (9/10/18):  1. Bladder wall thickening and mucosal calcification with intraluminal  debris.  Cystoscopy recommended. Recommend correlation for possible  schistosomiasis infection, which does lead to bladder calcification.  2. Indeterminant narrowing of the proximal third of the right ureter  with mild thickening of the wall, is indeterminate, but  infectious/neoplastic etiologies should be considered. Recommend  attention at time of cystoscopy as ureteral evaluation there is  recommended. Moderate resulting hydronephrosis on the right.  3. Markedly atrophic left kidney with evidence of some residual  excretion.  4. Significant partially visualized bronchiectasis in the lungs. Chest  CT recommended for further evaluation.      Outside records:    I spent 10 minutes reviewing outside records.           Assessment and Plan:   33 year old female with atrophic left kidney, right pelviectasis, right hydroureter, schistosomiasis.     -Discussed importance of follow up with infectious disease  -Will arrange lasix renogram to evaluate for obstruction of right kidney  -Will schedule urodynamics and follow up after to discuss results    Joe Carbajal MD on 11/12/2018 at 2:15 PM

## 2018-11-12 NOTE — PROGRESS NOTES
Name: Roger Desouza    MRN: [id number]   YOB: 1985                 Chief Complaint:   Right hydroureter          History of Present Illness:   Mr. Roger Desouza is a 33 year old female recently found to have right hydroureter in a solitary kidney. She recently had a miscarriage, which prompted a renal ultrasound, which showed an atrophic left kidney. This prompted a CT scan, which revealed right pelviectasis, right hydroureter with a focal stricture in the proximal third of the ureter, and calcifications in the bladder. Patient denies symptoms of LUTS, hematuria, UTIs, flank pain. She was born in [facility] and emigrated in 2014.    Recent cystoscopy and bladder biopsies confirmed schistosomiasis. A retrograde pyelogram revealed the proximal stricture was ureteral peristalsis. Her ureteral orifice was quite small and her distal ureter was also narrow. She has completed a treatment of praziquantel.         Past Medical History:     Past Medical History:   Diagnosis Date     Anemia 2017     Conductive hearing loss 2015     Hearing problem 2015     Hyperthyroidism     Better since surgery     Tinnitus 2018            Past Surgical History:     Past Surgical History:   Procedure Laterality Date     CYSTOSCOPY, BIOPSY BLADDER, COMBINED N/A 10/26/2018    Procedure: Bladder Biopsy, Right Ureteroscopy with Possible Biopsy;  Surgeon: Viral Hampton MD;  [facility]     CYSTOSCOPY, URETEROSCOPY, COMBINED  10/26/2018    Procedure: COMBINED CYSTOSCOPY, URETEROSCOPY;  Surgeon: Viral Hampton MD;  [facility]     THYROID SURGERY  2010    in [facility]; partial thyroidectomy            Social History:     Social History   Substance Use Topics     Smoking status: Never Smoker     Smokeless tobacco: Never Used     Alcohol use No            Family History:     Family History   Problem Relation Age of Onset     Hypertension Father               Allergies:   No Known Allergies         Medications:  "    Current Outpatient Prescriptions   Medication Sig     Acetaminophen (TYLENOL PO) Take 325 mg by mouth Reported on 5/10/2017     docusate sodium (COLACE) 100 MG tablet Take 100 mg by mouth daily (Patient not taking: Reported on 10/15/2018)     doxylamine (UNISOM) 25 MG TABS tablet Take 1 tablet (25 mg) by mouth At Bedtime (Patient not taking: Reported on 10/15/2018)     Ferrous Sulfate (IRON) 325 (65 FE) MG tablet Take 1 tablet by mouth daily (with breakfast) (Patient not taking: Reported on 10/15/2018)     ibuprofen (ADVIL/MOTRIN) 800 MG tablet Take 1 tablet (800 mg) by mouth every 6 hours as needed for other (cramping) (Patient not taking: Reported on 10/15/2018)     Misc. Devices (BREAST PUMP) MISC 1 each daily as needed (Patient not taking: Reported on 10/15/2018)     omeprazole (PRILOSEC) 20 MG CR capsule Take 1 capsule (20 mg) by mouth daily (Patient not taking: Reported on 10/15/2018)     Prenatal Vit-Fe Fumarate-FA (TRINATE) TABS Take 1 tablet by mouth daily (Patient not taking: Reported on 10/15/2018)     senna-docusate (SENOKOT-S;PERICOLACE) 8.6-50 MG per tablet Take 2 tablets by mouth 2 times daily as needed for constipation (Patient not taking: Reported on 10/15/2018)     No current facility-administered medications for this visit.              Review of Systems:    ROS: 14 point ROS neg other than the symptoms noted above in the HPI.          Physical Exam:   B/P: 138/86, T: Data Unavailable, P: 97, R: Data Unavailable  Estimated body mass index is 21.26 kg/(m^2) as calculated from the following:    Height as of 10/26/18: 1.6 m (5' 3\").    Weight as of this encounter: 54.4 kg (120 lb).  General: age-appropriate appearing female in NAD. normal body habitus.  HEENT: Head AT/NC, EOMI, CN Grossly intact  Resp: no respiratory distress, lung sounds clear.  CV: heart rate regular, S1, S2.  Lymph: No cervical, supraclavicular or axillary lymphadenopathy  Back: bony spine is non-tender, flanks are " nontender  Abdomen: (not/mild/moderately/severely) obese, soft, non-distended, non-tender. No organomegaly  : deferred  Rectal exam: deferred  LE: no edema.   Neuro: grossly intact  Motor: excellent strength throughout  Skin: clear of rashes or ecchymoses.        Labs:    All laboratory data reviewed with patient  Significant for Cr 0.9      Imaging:    All imaging reviewed with patient.  Significant for CT A/P (9/10/18):  1. Bladder wall thickening and mucosal calcification with intraluminal  debris.  Cystoscopy recommended. Recommend correlation for possible  schistosomiasis infection, which does lead to bladder calcification.  2. Indeterminant narrowing of the proximal third of the right ureter  with mild thickening of the wall, is indeterminate, but  infectious/neoplastic etiologies should be considered. Recommend  attention at time of cystoscopy as ureteral evaluation there is  recommended. Moderate resulting hydronephrosis on the right.  3. Markedly atrophic left kidney with evidence of some residual  excretion.  4. Significant partially visualized bronchiectasis in the lungs. Chest  CT recommended for further evaluation.      Outside records:    I spent 10 minutes reviewing outside records.           Assessment and Plan:   33 year old female with atrophic left kidney, right pelviectasis, right hydroureter, schistosomiasis.     -Discussed importance of follow up with infectious disease  -Will arrange lasix renogram to evaluate for obstruction of right kidney  -Will schedule urodynamics and follow up after to discuss results    Joe Carbajal MD on 11/12/2018 at 2:15 PM

## 2018-11-12 NOTE — MR AVS SNAPSHOT
After Visit Summary   11/12/2018    Rubina Desouza    MRN: [id number]           Patient Information     Date Of Birth          1985        Visit Information        Provider Department      11/12/2018 1:15 PM Joe Carbajal MD; [employer] [facility] for Prostate and Urologic Cancers        Today's Diagnoses     Schistosomiasis          Care Instructions    Follow up with Dr. Carbajal or Dr. Hampton after urodynamic          Follow-ups after your visit        Your next 10 appointments already scheduled     Nov 30, 2018  3:30 PM CST   (Arrive by 3:15 PM)   New Patient Visit with April Hamilton MD   [facility] ([facility])    [address] [place] [address]  [address]  [facility] [id number] [phone]            Feb 20, 2019  9:00 AM CST   (Arrive by 8:45 AM)   Urodynamics with Tata Hua PA-C   [facility] for Prostate and Urologic Cancers ([facility])    [address] [place] [id number] [phone]            Feb 25, 2019  2:30 PM CST   (Arrive by 2:15 PM)   Return Visit with Joe Carbajal MD   [facility] for Prostate and Urologic Cancers ([facility])    [address] [place] [id number] [phone]              Who to contact     Please call your clinic at [phone] to:    Ask questions about your health    Make or cancel appointments    Discuss your medicines    Learn about your test results    Speak to your doctor            Additional Information About Your Visit        MyChart Information     [employer] gives you secure access to your electronic health record. If you see a primary care provider, you can also send messages to your care team and make appointments. If you have questions, please call your primary care clinic.  If you do not have a  primary care provider, please call [phone] and they will assist you.      [employer] is an electronic gateway that provides easy, online access to your medical records. With [employer], you can request a clinic appointment, read your test results, renew a prescription or communicate with your care team.     To access your existing account, please contact your [facility] or call [phone] for assistance.        Care EveryWhere ID     This is your Care EveryWhere ID. This could be used by other organizations to access your [facility] medical records  [id number]        Your Vitals Were     BMI (Body Mass Index)                   21.26 kg/m2            Blood Pressure from Last 3 Encounters:   11/12/18 (!) 149/93   10/26/18 118/80   10/15/18 138/86    Weight from Last 3 Encounters:   11/12/18 54.4 kg (120 lb)   10/26/18 54.4 kg (120 lb)   10/15/18 54.5 kg (120 lb 3.2 oz)              Today, you had the following     No orders found for display       Primary Care Provider Office Phone # Fax #    Hali Tubbs [phone]       [facility] [address] [facility] [address]        Equal Access to Services     AWAIS PARSON : Hadii barb ku hadasho Soomaali, waaxda luqadaha, qaybta kaalmada adeegyada, ynes de la torre. So [facility] [phone].    ATENCIÓN: Si habla español, tiene a fox disposición servicios gratuitos de asistencia lingüística. Llame al [phone].    We comply with applicable federal civil rights laws and [place] laws. We do not discriminate on the basis of race, color, national origin, age, disability, sex, sexual orientation, or gender identity.            Thank you!     Thank you for choosing [facility] FOR PROSTATE AND UROLOGIC CANCERS  for your care. Our goal is always to provide you with excellent care. Hearing back from our patients is one way we can continue to improve our services.  Please take a few minutes to complete the written survey that you may receive in the mail after your visit with us. Thank you!             Your Updated Medication List - Protect others around you: Learn how to safely use, store and throw away your medicines at www.disposemymeds.org.          This list is accurate as of 18  2:19 PM.  Always use your most recent med list.                   Brand Name Dispense Instructions for use Diagnosis    breast pump Misc     1 each    1 each daily as needed    Postpartum care and examination of lactating mother       docusate sodium 100 MG tablet    COLACE    60 tablet    Take 100 mg by mouth daily    Constipation, unspecified constipation type       doxylamine 25 MG Tabs tablet    UNISOM    30 each    Take 1 tablet (25 mg) by mouth At Bedtime    Encounter for supervision of other normal pregnancy in third trimester       ibuprofen 800 MG tablet    ADVIL/MOTRIN    90 tablet    Take 1 tablet (800 mg) by mouth every 6 hours as needed for other (cramping)     (normal spontaneous vaginal delivery)       iron 325 (65 Fe) MG tablet     60 tablet    Take 1 tablet by mouth daily (with breakfast)     (normal spontaneous vaginal delivery)       omeprazole 20 MG CR capsule    priLOSEC    90 capsule    Take 1 capsule (20 mg) by mouth daily    Gastroesophageal reflux disease, esophagitis presence not specified       senna-docusate 8.6-50 MG per tablet    SENOKOT-S;PERICOLACE    90 tablet    Take 2 tablets by mouth 2 times daily as needed for constipation     (normal spontaneous vaginal delivery)       TRINATE Tabs     90 tablet    Take 1 tablet by mouth daily    Encounter for supervision of other normal pregnancy in third trimester, Need for vaccination       TYLENOL PO      Take 325 mg by mouth Reported on 5/10/2017

## 2018-11-29 ENCOUNTER — TRANSFERRED RECORDS (OUTPATIENT)
Dept: HEALTH INFORMATION MANAGEMENT | Facility: CLINIC | Age: 33
End: 2018-11-29

## 2018-11-30 ENCOUNTER — OFFICE VISIT (OUTPATIENT)
Dept: INFECTIOUS DISEASES | Facility: CLINIC | Age: 33
End: 2018-11-30
Attending: INTERNAL MEDICINE
Payer: COMMERCIAL

## 2018-11-30 VITALS
BODY MASS INDEX: 21.49 KG/M2 | HEART RATE: 86 BPM | TEMPERATURE: 98 F | OXYGEN SATURATION: 100 % | WEIGHT: 121.3 LBS | HEIGHT: 63 IN | DIASTOLIC BLOOD PRESSURE: 85 MMHG | SYSTOLIC BLOOD PRESSURE: 131 MMHG

## 2018-11-30 DIAGNOSIS — A31.0 MAI (MYCOBACTERIUM AVIUM-INTRACELLULARE) (H): ICD-10-CM

## 2018-11-30 DIAGNOSIS — J47.9 BRONCHIECTASIS WITHOUT COMPLICATION (H): ICD-10-CM

## 2018-11-30 DIAGNOSIS — R76.12 POSITIVE QUANTIFERON-TB GOLD TEST: ICD-10-CM

## 2018-11-30 DIAGNOSIS — A31.0 MAI (MYCOBACTERIUM AVIUM-INTRACELLULARE) (H): Primary | ICD-10-CM

## 2018-11-30 DIAGNOSIS — B65.0: ICD-10-CM

## 2018-11-30 LAB — HCG UR QL: NEGATIVE

## 2018-11-30 PROCEDURE — G0463 HOSPITAL OUTPT CLINIC VISIT: HCPCS | Mod: ZF

## 2018-11-30 PROCEDURE — 87389 HIV-1 AG W/HIV-1&-2 AB AG IA: CPT | Performed by: INTERNAL MEDICINE

## 2018-11-30 PROCEDURE — 87116 MYCOBACTERIA CULTURE: CPT | Performed by: INTERNAL MEDICINE

## 2018-11-30 PROCEDURE — 81025 URINE PREGNANCY TEST: CPT | Performed by: INTERNAL MEDICINE

## 2018-11-30 PROCEDURE — 36415 COLL VENOUS BLD VENIPUNCTURE: CPT | Performed by: INTERNAL MEDICINE

## 2018-11-30 ASSESSMENT — PAIN SCALES - GENERAL: PAINLEVEL: NO PAIN (0)

## 2018-11-30 NOTE — PROGRESS NOTES
[facility]  Infectious Disease Clinic Note:  New Patient     Patient:  Rubina Desouza, Date of birth 1985, Medical record number [id number]  Date of Visit:  12/01/2018  Consult requested by Dr. Tubbs  for evaluation of PENNY in urine    This note was attested by Dr. Mcgraw  ATTESTATION   I interviewed and examined the patient myself. I also reviewed the chart and discussed the case with Dr. Hamilton.   I agree with the assessment and plan as written which reflects my overall impression and recommendations.       Sidra Mcgraw  Seen and examined 11/30/2018  Attested on 12/5/2018   Pager: [phone]           Assessment and Recommendations:   Recommendations:  - HIV combo and AFB blood cultures today  - CT chest to evaluate lung disease seen on prior CT abdomen--completed on 12/1 and shows lower lobe bronchiectasis.  Urine pregnancy test ordered for prior to scan and negative.  - Induced AFB from sputum x3 to evaluate for pulmonary PENNY  - Recommend discussion with outpatient OB/gyn providers regarding possibility of additional evaluation for genital TB with history of multiple miscarriages and difficulty in getting pregnant.  Message left for Dr. Tubbs to discuss.  - Consider obtaining additional biopsy to evaluate process in left kidney, for which TB is in the differential.  Message sent to urology provider to discuss.  - Will attempt to obtain additional records from [facility]    Assessment: Ms. Desouza is a 34 y/o Greek woman who was referred for evaluation of single culture of PENNY from urine found during evaluation of right hydroureter with atrophic left kidney.  Additional issues include a history of recently treated schistosomiasis of the bladder (diagnosed with eggs seen on bladder path 102/6/18), positive Quantiferon with no history of TB treatment, and bronchiectasis noted incidentally on chest CT without accompanying pulmonary symptoms.  She also reports a history of 2 prior  miscarriages and a work up of some kind both in [place] around 2008 and again in the [place] in [place] for difficulty conceiving, which raises concern for possible  TB.    ID problems:  # Single positive culture of PENNY from urine: At this time, seems most likely to be a contaminant as it is growing only a single urine culture with 2 negative cultures obtained at [facility] and 3 additional negative cultures (2 from urine, 1 from bladder biopsy) obtained in October 2018 in the [facility] system.  # Atrophic left kidney: Etiology of this unclear but includes urinary TB.  # Positive QuantiFERON: Consider treatment for LTBI as she has not received any treatment but would not want to embark on treatment without ruling out active TB.  With her history of atrophic kidney and infertility, need to consider  TB.  #Urinary schistosomiasis: Diagnosed with eczema seen on bladder biopsy but with negative urine O and P per outpatient provider at [facility].  Received appropriate treatment with 3 doses of praziquantel over 1 day on 10/31/18.  Schistosoma haematobium has been described as a cause of hydronephrosis in immigrants from [place] (Hammad PM.  Schistosomiasis - An Unusual Cause of Ureteral ObstructionA Case History and PerspectiveClin Med Res. 2004)  # Bronchiectasis: Incidental finding on CT abd/pelvis without associated symptoms.   With history of growth of PENNY from urine, would consider whether she could have PENNY in lungs.  Also evaluating for disseminated disease with AFB blood cultures.    Follow up scheduled for 1 month.    Patient discussed with Dr. Mcgraw.    April Hamilton MD, PhD  Adult & Pediatric Infectious Diseases Fellow PGY7, [facility]  Pager: [phone]           History of the Infectious Disease lllness:   Rubina Desouza is a 33-year-old Greek woman who presents for an initial consultation requested by Dr. Tubbs at the [facility] ([facility]) for  growth of Mycobacterium avium from a single  urine culture and a bladder biopsy positive for Schistosoma egg.  She presents today with limited records from clinic including a single positive AFB urine culture that grew Mycobacterium avium, and a positive QuantiFERON test.     Patient reports that she establish care in [place] in 2017 after moving here from [place].  She was born in [place] but immigrated to [facility] around 1991 and lived there until approximately 2009.  At that time she moved to [place] where she resided until 2014, at which point she immigrated to the [place]  We do not have any medical records prior to 2017.  Per patient report, she was seen by a doctor in [facility] in 2008 due to difficulty conceiving.  Around this time, she had an ultrasound that showed absence of her left kidney.  She was also noted to have a thyroid nodule, which warranted partial thyroidectomy which was performed in [place] in 2010.  In 2011 she conceived her first child, a daughter, who is alive and healthy.  It seemed that she was trying to become pregnant again and did have some investigation of her difficulty in conceiving in 2016, although this likely would have been done in [place] and we do not have records of this.  Her description suggests possible laparoscopy versus hysteroscopy.  In January 2017 she had a miscarriage for which she received care in the [facility] system and for which records are documented in care everywhere.  In December 2017, she had a normal pregnancy with delivery of a healthy male infant.  She had another miscarriage in May 2018.    Shortly after this miscarriage she reports feeling a pain in the right side of her lower abdomen.  As result of this, she had imaging studies done which again revealed the atrophic left kidney but also a right hydroureter.  She was referred to urology at [facility] and underwent cystoscopy, right ureteroscopy and bladder biopsy on 10/26/18.   TB was in the differential so urine AFB cultures  were obtained prior to this procedure as well as additional intraoperative AFB cultures of both urine and bladder tissue.  After her bladder pathology revealed Schistosoma eggs, she was treated with praziquantel x3 doses over 1 day.  Patient confirms today that she took this medication.    Review of Systems:  CONSTITUTIONAL:  Occasional chills.  No fevers. No night sweats.  EYES: negative for icterus  ENT:  negative for hearing loss, tinnitus or sore throat  RESPIRATORY:  negative for cough, sputum, dyspnea  CARDIOVASCULAR:  negative for chest pain, palpitations  GASTROINTESTINAL:  negative for nausea, vomiting, diarrhea or constipation  GENITOURINARY:  negative for dysuria  HEME:  No easy bruising  INTEGUMENT:  negative for rash or pruritus  NEURO:  Negative for headache    Past Medical History:   Diagnosis Date     Anemia 2017     Conductive hearing loss 2015     Hearing problem 2015     Hyperthyroidism     Better since surgery     Tinnitus 2018       Past Surgical History:   Procedure Laterality Date     CYSTOSCOPY, BIOPSY BLADDER, COMBINED N/A 10/26/2018    Procedure: Bladder Biopsy, Right Ureteroscopy with Possible Biopsy;  Surgeon: Viral Hampton MD;  Location: [facility] OR     CYSTOSCOPY, URETEROSCOPY, COMBINED  10/26/2018    Procedure: COMBINED CYSTOSCOPY, URETEROSCOPY;  Surgeon: Viral Hampton MD;  Location: [facility] OR     THYROID SURGERY  2010    in [facility]; partial thyroidectomy       Family History   Problem Relation Age of Onset     Hypertension Father        Social History     Social History Narrative    [place] to [place] in [place].   with 2 y/o son and 7 year daughter.  Moved from [facility] to [facility] in [place], [id number] in [place], [id number] in [place], then [place] in 2017.  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  No travel back to [place] since immigration.     Social History   Substance Use Topics     Smoking status: Never Smoker     Smokeless tobacco: Never Used     Alcohol use  No       Immunization History   Administered Date(s) Administered     Influenza Vaccine IM 3yrs+ 4 Valent IIV4 10/31/2017     TDAP Vaccine (Boostrix) 2017       Patient Active Problem List   Diagnosis     Hyperthyroidism     H/O partial thyroidectomy     Encounter for supervision of other normal pregnancy in third trimester     Glucose intolerance of pregnancy, PASSED 3 HOUR     Anemia     Normal labor      (normal spontaneous vaginal delivery)     Bleeding in early pregnancy     Schistosomiasis       Current Outpatient Prescriptions   Medication Sig     Acetaminophen (TYLENOL PO) Take 325 mg by mouth Reported on 5/10/2017     docusate sodium (COLACE) 100 MG tablet Take 100 mg by mouth daily (Patient not taking: Reported on 10/15/2018)     doxylamine (UNISOM) 25 MG TABS tablet Take 1 tablet (25 mg) by mouth At Bedtime (Patient not taking: Reported on 10/15/2018)     Ferrous Sulfate (IRON) 325 (65 FE) MG tablet Take 1 tablet by mouth daily (with breakfast) (Patient not taking: Reported on 10/15/2018)     ibuprofen (ADVIL/MOTRIN) 800 MG tablet Take 1 tablet (800 mg) by mouth every 6 hours as needed for other (cramping) (Patient not taking: Reported on 10/15/2018)     Misc. Devices (BREAST PUMP) MISC 1 each daily as needed (Patient not taking: Reported on 10/15/2018)     omeprazole (PRILOSEC) 20 MG CR capsule Take 1 capsule (20 mg) by mouth daily (Patient not taking: Reported on 10/15/2018)     Prenatal Vit-Fe Fumarate-FA (TRINATE) TABS Take 1 tablet by mouth daily (Patient not taking: Reported on 10/15/2018)     senna-docusate (SENOKOT-S;PERICOLACE) 8.6-50 MG per tablet Take 2 tablets by mouth 2 times daily as needed for constipation (Patient not taking: Reported on 10/15/2018)     No current facility-administered medications for this visit.      Facility-Administered Medications Ordered in Other Visits   Medication     iopamidol (ISOVUE-370) solution 59 mL       No Known Allergies           Physical Exam:  "  Vitals were reviewed.  All vitals stable  /85  Pulse 86  Temp 98  F (36.7  C) (Oral)  Ht 1.6 m (5' 3\")  Wt 55 kg (121 lb 4.8 oz)  LMP 11/16/2018  SpO2 100%  BMI 21.49 kg/m2    Exam:  GENERAL:  well-developed, well-nourished, alert, oriented, in no acute distress.  HEENT:  Head is normocephalic, atraumatic   EYES:  Eyes have anicteric sclerae.    ENT:  Oropharynx is moist without exudates or ulcers.  NECK:  Supple.  No adenopathy  LUNGS:  Clear to auscultation.  CARDIOVASCULAR:  Regular rate and rhythm with no murmurs, gallops or rubs.  ABDOMEN:  Normal bowel sounds, soft, mild-suprapubic tenderness.  SKIN:  No acute rashes.    NEUROLOGIC:  Grossly nonfocal.         Laboratory Data:     Metabolic Studies       Recent Labs   Lab Test  09/10/18   1540   GFRESTIMATED  73       Hematology Studies      Recent Labs   Lab Test  12/16/17   0710  12/15/17   1139  11/07/17   1216 10/12/17  09/06/17   1125  04/26/17   1030  01/18/17   1053   WBC   --   8.5  10.3   --   9.5  6.2  6.2   ANEU   --   5.5   --    --    --   3.6  3.6   ALYM   --   2.0   --    --    --   1.9  1.7   ANNETTE   --   0.7   --    --    --   0.5  0.5   AEOS   --   0.3   --    --    --   0.2  0.5   HGB  10.1*  11.7  11.4*  10.3*  10.8*  11.8  12.6   HCT   --   33.9*  34.4*   --   32.1*  34.1*  36.5   PLT   --   135*  165   --   193  209  177         Microbiology:  HIV   4/26/17 negative    AFB cultures   9/17/18 Urine ([facility]): PENNY (MICs amikacin 32, clarithromycin 1, linezolid 4, moxifloxacin 1)   9/2017 Urine ([facility]): 2 additional cultures finalized as negative   10/15/18 Urine: Negative to date   10/26/18 Urine: negative to date   10/26/18 Bladder biopsy: negative to date    Pathology   102/6/18: Bladder, biopsy: Schistosomiasis; lamina propria with numerous calcified schistosoma   hematobium eggs.  Benign urothelium. Negative for malignancy     CT abd/pelvis 9/10/18:  IMPRESSION:   1. Bladder wall thickening and mucosal calcification with " intraluminal  debris.  Cystoscopy recommended. Recommend correlation for possible  schistosomiasis infection, which does lead to bladder calcification.  2. Indeterminant narrowing of the proximal third of the right ureter  with mild thickening of the wall, is indeterminate, but  infectious/neoplastic etiologies should be considered. Recommend  attention at time of cystoscopy as ureteral evaluation there is  recommended. Moderate resulting hydronephrosis on the right.  3. Markedly atrophic left kidney with evidence of some residual  excretion.  4. Significant partially visualized bronchiectasis in the lungs. Chest  CT recommended for further evaluation.    CT chest 12/1/18:  Chest:   The visualized thyroid is unremarkable. Heart size is normal. The  great vessels are normal in caliber and appearance. There is no  pericardial effusion. No mediastinal, hilar, or axillary  lymphadenopathy. The central tracheobronchial tree is patent.     Lungs:  There is no focal airspace consolidation, pleural effusion, or  pneumothorax. No suspicious pulmonary nodules or masses. Bilateral  lower lung predominant bronchiectasis without bronchial wall  thickening. Mosaic attenuation in the left lower lobe, unchanged  compared to 9/10/2018.     Limited evaluation of the upper abdomen is within normal limits. No  acute osseus abnormality or suspicious bony lesion.  Lower lobe predominant bronchiectasis with mosaic  attenuation in the left lower lobe. In this clinical setting, these  findings may represent chronic infection with PENNY.

## 2018-11-30 NOTE — LETTER
11/30/2018      RE: Rubina Desouza  [address]  [facility] [address]       [facility]  Infectious Disease Clinic Note:  New Patient     Patient:  Rubina Desouza, Date of birth 1985, Medical record number [id number]  Date of Visit:  12/01/2018  Consult requested by Dr. Tubbs  for evaluation of PENNY in urine    This note was attested by Dr. Mcgraw  ATTESTATION   I interviewed and examined the patient myself. I also reviewed the chart and discussed the case with Dr. Hamilton.   I agree with the assessment and plan as written which reflects my overall impression and recommendations.       Sidra Mcgraw  Seen and examined 11/30/2018  Attested on 12/5/2018   Pager: [phone]           Assessment and Recommendations:   Recommendations:  - HIV combo and AFB blood cultures today  - CT chest to evaluate lung disease seen on prior CT abdomen--completed on 12/1 and shows lower lobe bronchiectasis.  Urine pregnancy test ordered for prior to scan and negative.  - Induced AFB from sputum x3 to evaluate for pulmonary PENNY  - Recommend discussion with outpatient OB/gyn providers regarding possibility of additional evaluation for genital TB with history of multiple miscarriages and difficulty in getting pregnant.  Message left for Dr. Tubbs to discuss.  - Consider obtaining additional biopsy to evaluate process in left kidney, for which TB is in the differential.  Message sent to urology provider to discuss.  - Will attempt to obtain additional records from [facility]    Assessment: Ms. Desouza is a 34 y/o [place] woman who was referred for evaluation of single culture of PENNY from urine found during evaluation of right hydroureter with atrophic left kidney.  Additional issues include a history of recently treated schistosomiasis of the bladder (diagnosed with eggs seen on bladder path 102/6/18), positive Quantiferon with no history of TB treatment, and bronchiectasis noted incidentally on chest  CT without accompanying pulmonary symptoms.  She also reports a history of 2 prior miscarriages and a work up of some kind both in [facility] around 2008 and again in the [place] in [place] for difficulty conceiving, which raises concern for possible  TB.    ID problems:  # Single positive culture of PENNY from urine: At this time, seems most likely to be a contaminant as it is growing only a single urine culture with 2 negative cultures obtained at [facility] and 3 additional negative cultures (2 from urine, 1 from bladder biopsy) obtained in October 2018 in the [facility] system.  # Atrophic left kidney: Etiology of this unclear but includes urinary TB.  # Positive QuantiFERON: Consider treatment for LTBI as she has not received any treatment but would not want to embark on treatment without ruling out active TB.  With her history of atrophic kidney and infertility, need to consider  TB.  #Urinary schistosomiasis: Diagnosed with eczema seen on bladder biopsy but with negative urine O and P per outpatient provider at [facility].  Received appropriate treatment with 3 doses of praziquantel over 1 day on 10/31/18.  Schistosoma haematobium has been described as a cause of hydronephrosis in immigrants from [place] ([place]ammad PM.  Schistosomiasis - An Unusual Cause of Ureteral ObstructionA Case History and PerspectiveClin Med Res. 2004)  # Bronchiectasis: Incidental finding on CT abd/pelvis without associated symptoms.   With history of growth of PENNY from urine, would consider whether she could have PENNY in lungs.  Also evaluating for disseminated disease with AFB blood cultures.    Follow up scheduled for 1 month.    Patient discussed with Dr. Mcgraw.    April Hamilton MD, PhD  Adult & Pediatric Infectious Diseases Fellow PGY7, [facility]  Pager: [phone]           History of the Infectious Disease lllness:   Rubina Desouza is a 33-year-old [place] woman who presents for an initial consultation requested by Dr. Tubbs at the  [facility]) for  growth of Mycobacterium avium from a single urine culture and a bladder biopsy positive for Schistosoma egg.  She presents today with limited records from clinic including a single positive AFB urine culture that grew Mycobacterium avium, and a positive QuantiFERON test.     Patient reports that she establish care in [place] in 2017 after moving here from [place].  She was born in [place] but immigrated to [facility] around 1991 and lived there until approximately 2009.  At that time she moved to [place] where she resided until 2014, at which point she immigrated to the [place]  We do not have any medical records prior to 2017.  Per patient report, she was seen by a doctor in [facility] in 2008 due to difficulty conceiving.  Around this time, she had an ultrasound that showed absence of her left kidney.  She was also noted to have a thyroid nodule, which warranted partial thyroidectomy which was performed in [place] in 2010.  In 2011 she conceived her first child, a daughter, who is alive and healthy.  It seemed that she was trying to become pregnant again and did have some investigation of her difficulty in conceiving in 2016, although this likely would have been done in [place] and we do not have records of this.  Her description suggests possible laparoscopy versus hysteroscopy.  In January 2017 she had a miscarriage for which she received care in the [facility] system and for which records are documented in care everywhere.  In December 2017, she had a normal pregnancy with delivery of a healthy male infant.  She had another miscarriage in May 2018.    Shortly after this miscarriage she reports feeling a pain in the right side of her lower abdomen.  As result of this, she had imaging studies done which again revealed the atrophic left kidney but also a right hydroureter.  She was referred to urology at [facility] and underwent cystoscopy, right  ureteroscopy and bladder biopsy on 10/26/18.   TB was in the differential so urine AFB cultures were obtained prior to this procedure as well as additional intraoperative AFB cultures of both urine and bladder tissue.  After her bladder pathology revealed Schistosoma eggs, she was treated with praziquantel x3 doses over 1 day.  Patient confirms today that she took this medication.    Review of Systems:  CONSTITUTIONAL:  Occasional chills.  No fevers. No night sweats.  EYES: negative for icterus  ENT:  negative for hearing loss, tinnitus or sore throat  RESPIRATORY:  negative for cough, sputum, dyspnea  CARDIOVASCULAR:  negative for chest pain, palpitations  GASTROINTESTINAL:  negative for nausea, vomiting, diarrhea or constipation  GENITOURINARY:  negative for dysuria  HEME:  No easy bruising  INTEGUMENT:  negative for rash or pruritus  NEURO:  Negative for headache    Past Medical History:   Diagnosis Date     Anemia 2017     Conductive hearing loss 2015     Hearing problem 2015     Hyperthyroidism     Better since surgery     Tinnitus 2018       Past Surgical History:   Procedure Laterality Date     CYSTOSCOPY, BIOPSY BLADDER, COMBINED N/A 10/26/2018    Procedure: Bladder Biopsy, Right Ureteroscopy with Possible Biopsy;  Surgeon: Viral Hampton MD;  Location: [facility] OR     CYSTOSCOPY, URETEROSCOPY, COMBINED  10/26/2018    Procedure: COMBINED CYSTOSCOPY, URETEROSCOPY;  Surgeon: Viral Hampton MD;  Location: [facility] OR     THYROID SURGERY  2010    in [facility]; partial thyroidectomy       Family History   Problem Relation Age of Onset     Hypertension Father        Social History     Social History Narrative    [place] to [place] in [place].   with 2 y/o son and 7 year daughter.  Moved from [facility] to [facility] in 1991, [id number] in [place], [id number] in [place], then [place] in 2017.  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  No travel back to [place] since immigration.     Social History    Substance Use Topics     Smoking status: Never Smoker     Smokeless tobacco: Never Used     Alcohol use No       Immunization History   Administered Date(s) Administered     Influenza Vaccine IM 3yrs+ 4 Valent IIV4 10/31/2017     TDAP Vaccine (Boostrix) 2017       Patient Active Problem List   Diagnosis     Hyperthyroidism     H/O partial thyroidectomy     Encounter for supervision of other normal pregnancy in third trimester     Glucose intolerance of pregnancy, PASSED 3 HOUR     Anemia     Normal labor      (normal spontaneous vaginal delivery)     Bleeding in early pregnancy     Schistosomiasis       Current Outpatient Prescriptions   Medication Sig     Acetaminophen (TYLENOL PO) Take 325 mg by mouth Reported on 5/10/2017     docusate sodium (COLACE) 100 MG tablet Take 100 mg by mouth daily (Patient not taking: Reported on 10/15/2018)     doxylamine (UNISOM) 25 MG TABS tablet Take 1 tablet (25 mg) by mouth At Bedtime (Patient not taking: Reported on 10/15/2018)     Ferrous Sulfate (IRON) 325 (65 FE) MG tablet Take 1 tablet by mouth daily (with breakfast) (Patient not taking: Reported on 10/15/2018)     ibuprofen (ADVIL/MOTRIN) 800 MG tablet Take 1 tablet (800 mg) by mouth every 6 hours as needed for other (cramping) (Patient not taking: Reported on 10/15/2018)     Misc. Devices (BREAST PUMP) MISC 1 each daily as needed (Patient not taking: Reported on 10/15/2018)     omeprazole (PRILOSEC) 20 MG CR capsule Take 1 capsule (20 mg) by mouth daily (Patient not taking: Reported on 10/15/2018)     Prenatal Vit-Fe Fumarate-FA (TRINATE) TABS Take 1 tablet by mouth daily (Patient not taking: Reported on 10/15/2018)     senna-docusate (SENOKOT-S;PERICOLACE) 8.6-50 MG per tablet Take 2 tablets by mouth 2 times daily as needed for constipation (Patient not taking: Reported on 10/15/2018)     No current facility-administered medications for this visit.      Facility-Administered Medications Ordered in Other Visits  "  Medication     iopamidol (ISOVUE-370) solution 59 mL       No Known Allergies           Physical Exam:   Vitals were reviewed.  All vitals stable  /85  Pulse 86  Temp 98  F (36.7  C) (Oral)  Ht 1.6 m (5' 3\")  Wt 55 kg (121 lb 4.8 oz)  LMP 11/16/2018  SpO2 100%  BMI 21.49 kg/m2    Exam:  GENERAL:  well-developed, well-nourished, alert, oriented, in no acute distress.  HEENT:  Head is normocephalic, atraumatic   EYES:  Eyes have anicteric sclerae.    ENT:  Oropharynx is moist without exudates or ulcers.  NECK:  Supple.  No adenopathy  LUNGS:  Clear to auscultation.  CARDIOVASCULAR:  Regular rate and rhythm with no murmurs, gallops or rubs.  ABDOMEN:  Normal bowel sounds, soft, mild-suprapubic tenderness.  SKIN:  No acute rashes.    NEUROLOGIC:  Grossly nonfocal.         Laboratory Data:     Metabolic Studies       Recent Labs   Lab Test  09/10/18   1540   GFRESTIMATED  73       Hematology Studies      Recent Labs   Lab Test  12/16/17   0710  12/15/17   1139  11/07/17   1216 10/12/17  09/06/17   1125  04/26/17   1030  01/18/17   1053   WBC   --   8.5  10.3   --   9.5  6.2  6.2   ANEU   --   5.5   --    --    --   3.6  3.6   ALYM   --   2.0   --    --    --   1.9  1.7   ANNETTE   --   0.7   --    --    --   0.5  0.5   AEOS   --   0.3   --    --    --   0.2  0.5   HGB  10.1*  11.7  11.4*  10.3*  10.8*  11.8  12.6   HCT   --   33.9*  34.4*   --   32.1*  34.1*  36.5   PLT   --   135*  165   --   193  209  177         Microbiology:  HIV   4/26/17 negative    AFB cultures   [facility] PENNY (MICs amikacin 32, clarithromycin 1, linezolid 4, moxifloxacin 1)   9/2017 Urine ([facility]): 2 additional cultures finalized as negative   10/15/18 Urine: Negative to date   10/26/18 Urine: negative to date   10/26/18 Bladder biopsy: negative to date    Pathology   102/6/18: Bladder, biopsy: Schistosomiasis; lamina propria with numerous calcified schistosoma   hematobium eggs.  Benign urothelium. Negative for malignancy "     CT abd/pelvis 9/10/18:  IMPRESSION:   1. Bladder wall thickening and mucosal calcification with intraluminal  debris.  Cystoscopy recommended. Recommend correlation for possible  schistosomiasis infection, which does lead to bladder calcification.  2. Indeterminant narrowing of the proximal third of the right ureter  with mild thickening of the wall, is indeterminate, but  infectious/neoplastic etiologies should be considered. Recommend  attention at time of cystoscopy as ureteral evaluation there is  recommended. Moderate resulting hydronephrosis on the right.  3. Markedly atrophic left kidney with evidence of some residual  excretion.  4. Significant partially visualized bronchiectasis in the lungs. Chest  CT recommended for further evaluation.    CT chest 12/1/18:  Chest:   The visualized thyroid is unremarkable. Heart size is normal. The  great vessels are normal in caliber and appearance. There is no  pericardial effusion. No mediastinal, hilar, or axillary  lymphadenopathy. The central tracheobronchial tree is patent.     Lungs:  There is no focal airspace consolidation, pleural effusion, or  pneumothorax. No suspicious pulmonary nodules or masses. Bilateral  lower lung predominant bronchiectasis without bronchial wall  thickening. Mosaic attenuation in the left lower lobe, unchanged  compared to 9/10/2018.     Limited evaluation of the upper abdomen is within normal limits. No  acute osseus abnormality or suspicious bony lesion.  Lower lobe predominant bronchiectasis with mosaic  attenuation in the left lower lobe. In this clinical setting, these  findings may represent chronic infection with PENNY.    April Hamilton MD

## 2018-11-30 NOTE — NURSING NOTE
"Chief Complaint   Patient presents with     Consult     schistosomiasis      /85  Pulse 86  Temp 98  F (36.7  C) (Oral)  Ht 1.6 m (5' 3\")  Wt 55 kg (121 lb 4.8 oz)  LMP 11/16/2018  SpO2 100%  BMI 21.49 kg/m2  Reynaldo Okeefe, LEON    "

## 2018-11-30 NOTE — MR AVS SNAPSHOT
After Visit Summary   11/30/2018    Rubina Desouza    MRN: [id number]           Patient Information     Date Of Birth          1985        Visit Information        Provider Department      11/30/2018 3:15 PM April Hamilton MD; [employer] [facility] and Infectious Diseases        Today's Diagnoses     PENNY (mycobacterium avium-intracellulare) (H)    -  1    Positive QuantiFERON-TB Gold test        Bronchiectasis without complication (H)          Care Instructions    1. Schedule induced sputum  2. Schedule chest CT  3. Blood test for AFB culture            Follow-ups after your visit        Follow-up notes from your care team     Return in about 4 weeks (around 12/28/2018).      Your next 10 appointments already scheduled     Dec 01, 2018  2:40 PM CST   CT CHEST W CONTRAST with UCCT1   [facility] CT ([facility])    [address] [address] [phone]           How do I prepare for my exam? (Food and drink instructions) **You will have contrast for this exam.** Do not eat or drink for 2 hours before your exam. If you need to take medicine, you may take it with small sips of water. (We may ask you to take liquid medicine as well.)  The day before your exam, drink extra fluids at least six 8-ounce glasses (unless your doctor tells you to restrict your fluids).  How do I prepare for my exam? (Other instructions) Patients over 70 or patients with diabetes or kidney problems: If you haven t had a blood test (creatinine test) within the last 30 days, the Cardiologist/Radiologist may require you to get this test prior to your exam.  What should I wear: Please wear loose clothing, such as a sweat suit or jogging clothes.  Avoid snaps, zippers and other metal. We may ask you to undress and put on a hospital gown.  How long does the exam take: Most scans take less than 20 minutes.  What should I bring:  Please bring any scans or X-rays taken at other hospitals, if similar tests were done. Also bring a list of your medicines, including vitamins, minerals and over-the-counter drugs. It is safest to leave personal items at home.  Do I need a :  No  is needed.  What do I need to tell my doctor? Be sure to tell your doctor: * If you have any allergies. * If there s any chance you are pregnant. * If you are breastfeeding. * If you have diabetes as your medication may need to be adjusted for this exam.  What should I do after the exam: No restrictions, You may resume normal activities.  What is this test: A CT (computed tomography) scan is a series of pictures that allows us to look inside your body. The scanner creates images of the body in cross sections, much like slices of bread. This helps us see any problems more clearly. You may receive contrast (X-ray dye) before or during your scan. Contrast is given through an IV (small needle in your arm).  Who should I call with questions: If you have any questions, please call the Imaging Department where you will have your exam. Directions, parking instructions, and other information is available on our website, [email or web address].            Dec 03, 2018  1:00 PM CST   SPUTUM INDUCTION with UC PFL PENT   [facility] ([facility])    [address] [place] [address]   [phone]            Dec 05, 2018  2:30 PM CST   SPUTUM INDUCTION with UC PFL PENT   [facility] ([facility])    [address] [address]   [phone]            Dec 07, 2018  2:30 PM CST   SPUTUM INDUCTION with UC PFL PENT   [facility] ([facility])    [address] [address]   [phone]            Dec 28, 2018  2:30 PM CST   (Arrive by 2:15 PM)    Return Visit with April Hamilton MD   [facility] and Infectious Diseases ([facility])    [address]  [address]  [facility] [address] [phone]            Feb 20, 2019  9:00 AM CST   (Arrive by 8:45 AM)   Urodynamics with Tata Hua PA-C   [facility] ([facility])    [address]  4th Floor  [place] [id number] [phone]            Feb 25, 2019  2:30 PM CST   (Arrive by 2:15 PM)   Return Visit with Joe Carbajal MD   [facility] Prostate and Urologic Cancers ([facility])    [address]  4th Floor  [facility] [id number] [phone]              Future tests that were ordered for you today     Open Future Orders        Priority Expected Expires Ordered    Blood Culture AFB Routine  11/30/2019 11/30/2018    AFB Culture Non Blood [DTF355] Routine  5/29/2019 11/30/2018    AFB Stain Non Blood [[id number]] Routine  5/29/2019 11/30/2018    AFB Culture Non Blood [[id number]] Routine  5/29/2019 11/30/2018    AFB Stain Non Blood [[id number]] Routine  5/29/2019 11/30/2018    AFB Culture Non Blood [[id number]] Routine  5/29/2019 11/30/2018    AFB Stain Non Blood [[id number]] Routine  5/29/2019 11/30/2018    CT Chest w Contrast Routine  11/30/2019 11/30/2018    HIV Antigen Antibody Combo Routine  11/30/2019 11/30/2018    HCG qualitative urine Routine  11/30/2019 11/30/2018            Who to contact     If you have questions or need follow up information about today's clinic visit or your schedule please contact [facility] AND INFECTIOUS DISEASES directly at [phone].  Normal or non-critical lab and imaging results will be communicated to you by MyChart, letter or phone within 4 business days after the clinic has received the results. If you do not hear from us within 7 days, please contact the clinic  "through [employer] or phone. If you have a critical or abnormal lab result, we will notify you by phone as soon as possible.  Submit refill requests through [employer] or call your pharmacy and they will forward the refill request to us. Please allow 3 business days for your refill to be completed.          Additional Information About Your Visit        [employer] Information     [employer] gives you secure access to your electronic health record. If you see a primary care provider, you can also send messages to your care team and make appointments. If you have questions, please call your primary care clinic.  If you do not have a primary care provider, please call [phone] and they will assist you.        Care EveryWhere ID     This is your Care EveryWhere ID. This could be used by other organizations to access your [place] medical records  [id number]        Your Vitals Were     Pulse Temperature Height Last Period Pulse Oximetry BMI (Body Mass Index)    86 98  F (36.7  C) (Oral) 1.6 m (5' 3\") 11/16/2018 100% 21.49 kg/m2       Blood Pressure from Last 3 Encounters:   11/30/18 131/85   11/12/18 (!) 149/93   10/26/18 118/80    Weight from Last 3 Encounters:   11/30/18 55 kg (121 lb 4.8 oz)   11/12/18 54.4 kg (120 lb)   10/26/18 54.4 kg (120 lb)               Primary Care Provider Office Phone # Fax #    Hali Tubbs [phone]       [facility] [address] [facility] [address]        Equal Access to Services     AWAIS PARSON : Hadii barb montesinos hadasho Sohomerali, waaxda luqadaha, qaybta kaalmada ani, ynes de la torre. So [facility] [phone].    ATENCIÓN: Si habla español, tiene a fox disposición servicios gratuitos de asistencia lingüística. Llame al [phone].    We comply with applicable federal civil rights laws and [place] laws. We do not discriminate on the basis of race, color, national origin, age, disability, sex, sexual orientation, or " gender identity.            Thank you!     Thank you for choosing [facility] AND INFECTIOUS DISEASES  for your care. Our goal is always to provide you with excellent care. Hearing back from our patients is one way we can continue to improve our services. Please take a few minutes to complete the written survey that you may receive in the mail after your visit with us. Thank you!             Your Updated Medication List - Protect others around you: Learn how to safely use, store and throw away your medicines at www.disposemymeds.org.          This list is accurate as of 18  5:41 PM.  Always use your most recent med list.                   Brand Name Dispense Instructions for use Diagnosis    breast pump Misc     1 each    1 each daily as needed    Postpartum care and examination of lactating mother       docusate sodium 100 MG tablet    COLACE    60 tablet    Take 100 mg by mouth daily    Constipation, unspecified constipation type       doxylamine 25 MG Tabs tablet    UNISOM    30 each    Take 1 tablet (25 mg) by mouth At Bedtime    Encounter for supervision of other normal pregnancy in third trimester       ibuprofen 800 MG tablet    ADVIL/MOTRIN    90 tablet    Take 1 tablet (800 mg) by mouth every 6 hours as needed for other (cramping)     (normal spontaneous vaginal delivery)       iron 325 (65 Fe) MG tablet     60 tablet    Take 1 tablet by mouth daily (with breakfast)     (normal spontaneous vaginal delivery)       omeprazole 20 MG DR capsule    priLOSEC    90 capsule    Take 1 capsule (20 mg) by mouth daily    Gastroesophageal reflux disease, esophagitis presence not specified       senna-docusate 8.6-50 MG tablet    SENOKOT-S/PERICOLACE    90 tablet    Take 2 tablets by mouth 2 times daily as needed for constipation     (normal spontaneous vaginal delivery)       TRINATE Tabs     90 tablet    Take 1 tablet by mouth daily    Encounter for supervision of other normal pregnancy  in third trimester, Need for vaccination       TYLENOL PO      Take 325 mg by mouth Reported on 5/10/2017

## 2018-12-01 ENCOUNTER — RADIANT APPOINTMENT (OUTPATIENT)
Dept: CT IMAGING | Facility: CLINIC | Age: 33
End: 2018-12-01
Attending: INTERNAL MEDICINE
Payer: COMMERCIAL

## 2018-12-01 DIAGNOSIS — A31.0 MAI (MYCOBACTERIUM AVIUM-INTRACELLULARE) (H): ICD-10-CM

## 2018-12-01 DIAGNOSIS — J47.9 BRONCHIECTASIS WITHOUT COMPLICATION (H): ICD-10-CM

## 2018-12-01 DIAGNOSIS — R76.12 POSITIVE QUANTIFERON-TB GOLD TEST: ICD-10-CM

## 2018-12-01 RX ORDER — IOPAMIDOL 755 MG/ML
59 INJECTION, SOLUTION INTRAVASCULAR ONCE
Status: COMPLETED | OUTPATIENT
Start: 2018-12-01 | End: 2018-12-01

## 2018-12-01 RX ADMIN — IOPAMIDOL 59 ML: 755 INJECTION, SOLUTION INTRAVASCULAR at 15:15

## 2018-12-01 NOTE — DISCHARGE INSTRUCTIONS

## 2018-12-03 ENCOUNTER — TELEPHONE (OUTPATIENT)
Dept: INFECTIOUS DISEASES | Facility: CLINIC | Age: 33
End: 2018-12-03

## 2018-12-03 LAB — HIV 1+2 AB+HIV1 P24 AG SERPL QL IA: NONREACTIVE

## 2018-12-03 NOTE — TELEPHONE ENCOUNTER
Called Medical Records at [facility] to get the clinic notes, ID cultures and studies from their facility. Writer was told that they would fax those over to the clinic.      Sandi Llanos RN

## 2018-12-08 LAB
MYCOBACTERIUM SPEC CULT: NORMAL
MYCOBACTERIUM SPEC CULT: NORMAL
SPECIMEN SOURCE: NORMAL

## 2018-12-22 LAB
MYCOBACTERIUM SPEC CULT: NORMAL
SPECIMEN SOURCE: NORMAL
SPECIMEN SOURCE: NORMAL

## 2019-01-16 ENCOUNTER — OFFICE VISIT (OUTPATIENT)
Dept: OBGYN | Facility: CLINIC | Age: 34
End: 2019-01-16
Attending: ADVANCED PRACTICE MIDWIFE
Payer: COMMERCIAL

## 2019-01-16 ENCOUNTER — ANCILLARY PROCEDURE (OUTPATIENT)
Dept: ULTRASOUND IMAGING | Facility: CLINIC | Age: 34
End: 2019-01-16
Attending: ADVANCED PRACTICE MIDWIFE
Payer: COMMERCIAL

## 2019-01-16 VITALS — BODY MASS INDEX: 19.44 KG/M2 | HEIGHT: 64 IN | WEIGHT: 113.9 LBS

## 2019-01-16 DIAGNOSIS — Z34.91 ENCOUNTER FOR SUPERVISION OF NORMAL PREGNANCY IN FIRST TRIMESTER, UNSPECIFIED GRAVIDITY: ICD-10-CM

## 2019-01-16 DIAGNOSIS — Z36.89 ENCOUNTER FOR FETAL ANATOMIC SURVEY: ICD-10-CM

## 2019-01-16 DIAGNOSIS — O09.91 SUPERVISION OF HIGH RISK PREGNANCY IN FIRST TRIMESTER: Primary | ICD-10-CM

## 2019-01-16 DIAGNOSIS — E89.0 H/O PARTIAL THYROIDECTOMY: ICD-10-CM

## 2019-01-16 PROBLEM — O99.810 GLUCOSE INTOLERANCE OF PREGNANCY: Status: RESOLVED | Noted: 2017-09-08 | Resolved: 2019-01-16

## 2019-01-16 PROBLEM — O20.9 BLEEDING IN EARLY PREGNANCY: Status: RESOLVED | Noted: 2018-05-10 | Resolved: 2019-01-16

## 2019-01-16 PROBLEM — D64.9 ANEMIA: Status: RESOLVED | Noted: 2017-09-08 | Resolved: 2019-01-16

## 2019-01-16 PROBLEM — Z37.9 NORMAL LABOR: Status: RESOLVED | Noted: 2017-12-15 | Resolved: 2019-01-16

## 2019-01-16 PROBLEM — Z34.83 ENCOUNTER FOR SUPERVISION OF OTHER NORMAL PREGNANCY IN THIRD TRIMESTER: Status: RESOLVED | Noted: 2017-07-05 | Resolved: 2019-01-16

## 2019-01-16 LAB
ABO + RH BLD: NORMAL
ABO + RH BLD: NORMAL
BASOPHILS # BLD AUTO: 0 10E9/L (ref 0–0.2)
BASOPHILS NFR BLD AUTO: 0.2 %
BLD GP AB SCN SERPL QL: NORMAL
BLOOD BANK CMNT PATIENT-IMP: NORMAL
DEPRECATED CALCIDIOL+CALCIFEROL SERPL-MC: 31 UG/L (ref 20–75)
DIFFERENTIAL METHOD BLD: ABNORMAL
EOSINOPHIL # BLD AUTO: 0.2 10E9/L (ref 0–0.7)
EOSINOPHIL NFR BLD AUTO: 3 %
ERYTHROCYTE [DISTWIDTH] IN BLOOD BY AUTOMATED COUNT: 12.5 % (ref 10–15)
HBV SURFACE AB SERPL IA-ACNC: 538.46 M[IU]/ML
HBV SURFACE AG SERPL QL IA: NONREACTIVE
HCT VFR BLD AUTO: 34.5 % (ref 35–47)
HGB BLD-MCNC: 11.8 G/DL (ref 11.7–15.7)
HIV 1+2 AB+HIV1 P24 AG SERPL QL IA: NONREACTIVE
IMM GRANULOCYTES # BLD: 0 10E9/L (ref 0–0.4)
IMM GRANULOCYTES NFR BLD: 0.2 %
LYMPHOCYTES # BLD AUTO: 2 10E9/L (ref 0.8–5.3)
LYMPHOCYTES NFR BLD AUTO: 33.2 %
MCH RBC QN AUTO: 29.7 PG (ref 26.5–33)
MCHC RBC AUTO-ENTMCNC: 34.2 G/DL (ref 31.5–36.5)
MCV RBC AUTO: 87 FL (ref 78–100)
MONOCYTES # BLD AUTO: 0.5 10E9/L (ref 0–1.3)
MONOCYTES NFR BLD AUTO: 7.6 %
NEUTROPHILS # BLD AUTO: 3.4 10E9/L (ref 1.6–8.3)
NEUTROPHILS NFR BLD AUTO: 55.8 %
NRBC # BLD AUTO: 0 10*3/UL
NRBC BLD AUTO-RTO: 0 /100
PLATELET # BLD AUTO: 214 10E9/L (ref 150–450)
RBC # BLD AUTO: 3.97 10E12/L (ref 3.8–5.2)
SPECIMEN EXP DATE BLD: NORMAL
T4 FREE SERPL-MCNC: 1.84 NG/DL (ref 0.76–1.46)
TSH SERPL DL<=0.005 MIU/L-ACNC: <0.01 MU/L (ref 0.4–4)
WBC # BLD AUTO: 6 10E9/L (ref 4–11)

## 2019-01-16 PROCEDURE — 36415 COLL VENOUS BLD VENIPUNCTURE: CPT | Performed by: ADVANCED PRACTICE MIDWIFE

## 2019-01-16 PROCEDURE — 87086 URINE CULTURE/COLONY COUNT: CPT | Performed by: ADVANCED PRACTICE MIDWIFE

## 2019-01-16 PROCEDURE — 87389 HIV-1 AG W/HIV-1&-2 AB AG IA: CPT | Performed by: ADVANCED PRACTICE MIDWIFE

## 2019-01-16 PROCEDURE — 84439 ASSAY OF FREE THYROXINE: CPT | Performed by: ADVANCED PRACTICE MIDWIFE

## 2019-01-16 PROCEDURE — 86762 RUBELLA ANTIBODY: CPT | Performed by: ADVANCED PRACTICE MIDWIFE

## 2019-01-16 PROCEDURE — 86780 TREPONEMA PALLIDUM: CPT | Performed by: ADVANCED PRACTICE MIDWIFE

## 2019-01-16 PROCEDURE — 76801 OB US < 14 WKS SINGLE FETUS: CPT

## 2019-01-16 PROCEDURE — G0463 HOSPITAL OUTPT CLINIC VISIT: HCPCS

## 2019-01-16 PROCEDURE — G0499 HEPB SCREEN HIGH RISK INDIV: HCPCS | Performed by: ADVANCED PRACTICE MIDWIFE

## 2019-01-16 PROCEDURE — 86850 RBC ANTIBODY SCREEN: CPT | Performed by: ADVANCED PRACTICE MIDWIFE

## 2019-01-16 PROCEDURE — 86900 BLOOD TYPING SEROLOGIC ABO: CPT | Performed by: ADVANCED PRACTICE MIDWIFE

## 2019-01-16 PROCEDURE — 86901 BLOOD TYPING SEROLOGIC RH(D): CPT | Performed by: ADVANCED PRACTICE MIDWIFE

## 2019-01-16 PROCEDURE — 82306 VITAMIN D 25 HYDROXY: CPT | Performed by: ADVANCED PRACTICE MIDWIFE

## 2019-01-16 PROCEDURE — 86787 VARICELLA-ZOSTER ANTIBODY: CPT | Performed by: ADVANCED PRACTICE MIDWIFE

## 2019-01-16 PROCEDURE — 86706 HEP B SURFACE ANTIBODY: CPT | Performed by: ADVANCED PRACTICE MIDWIFE

## 2019-01-16 PROCEDURE — 85025 COMPLETE CBC W/AUTO DIFF WBC: CPT | Performed by: ADVANCED PRACTICE MIDWIFE

## 2019-01-16 PROCEDURE — 84443 ASSAY THYROID STIM HORMONE: CPT | Performed by: ADVANCED PRACTICE MIDWIFE

## 2019-01-16 RX ORDER — PYRIDOXINE HCL (VITAMIN B6) 25 MG
25 TABLET ORAL 3 TIMES DAILY
Qty: 270 TABLET | Refills: 3 | Status: SHIPPED | OUTPATIENT
Start: 2019-01-16 | End: 2019-04-04

## 2019-01-16 RX ORDER — GLYCERIN/MINERAL OIL
1 LOTION (ML) TOPICAL DAILY
Qty: 30 TABLET | Refills: 3 | Status: SHIPPED | OUTPATIENT
Start: 2019-01-16 | End: 2019-05-30

## 2019-01-16 ASSESSMENT — PAIN SCALES - GENERAL: PAINLEVEL: NO PAIN (0)

## 2019-01-16 ASSESSMENT — MIFFLIN-ST. JEOR: SCORE: 1203.71

## 2019-01-16 NOTE — LETTER
2019       RE: Rubina Desouza  [address]  [place] [address]     Dear Colleague,    Thank you for referring your patient, Rubina Desouza, to the [facility] at [facility]. Please see a copy of my visit note below.    [facility] OB Intake note  Subjective   33 year old woman presents to clinic for initiation of OB care.  Patient's last menstrual period was 2018.  at 10w0d by Estimated Date of Delivery: Aug 14, 2019 based on US.  Reviewed dating ultrasound. Pregnancy is planned.      - Symptoms since LMP include nausea.  Patient has tried these relief measures: diet modification.  - Genetic/Infection questionnaire completed, risks include none.  Have you traveled during the pregnancy?No  Have your sexual partner(s) travelled during the pregnancy?No    - Current Medications   Current Outpatient Medications   Medication Sig Dispense Refill     doxylamine (UNISOM SLEEPTABS) 25 MG TABS tablet Take 0.5 tablets (12.5 mg) by mouth At Bedtime 30 tablet 1     Prenatal Vit-Fe Fumarate-FA (SM PRENATAL VITAMINS) 28-0.8 MG TABS Take 1 tablet by mouth daily 30 tablet 3     vitamin B6 (VITAMIN  B-6) 25 MG tablet Take 1 tablet (25 mg) by mouth 3 times daily 270 tablet 3     doxylamine (UNISOM) 25 MG TABS tablet Take 1 tablet (25 mg) by mouth At Bedtime (Patient not taking: Reported on 10/15/2018) 30 each 0     Prenatal Vit-Fe Fumarate-FA (TRINATE) TABS Take 1 tablet by mouth daily (Patient not taking: Reported on 10/15/2018) 90 tablet 3         - Co-morbids   Past Medical History:   Diagnosis Date     Anemia 2017     Conductive hearing loss 2015     Hearing problem      Hyperthyroidism     Better since surgery     Tinnitus      - Risk for GDM -  No risk identified  - The patient does not h/o Pre Eclampsia, Current multi fetal gestation, Pre Gestational Diabetes (Type 1 or Type 2), chronic hypertension, renal disease, Autoimmune disease  (systematic lupus erythematosus, antiphospholipid syndrome) so WILL NOT start low dose aspirin (81mg) starting between 12 and 28 weeks to prevent preeclampsia.  - The patient  does not have a history of spontaneous  birth so  WILL NOT consider progesterone starting at 16-20 weeks and/or serial transvaginal cervical length ultrasounds from 16-24 weeks.     PERSONAL/SOCIAL HISTORY  Lives with their family.  Employment: Unemployed.  Her job involves light activity .  Additional items: None    Objective  -VS: reviewed and within normal limits   -General appearance: no acute distress, patient is comfortable   NEUROLOGICAL/PSYCHIATRIC   - Orientated x 3   -Mood and affect: normal     Assessment/Plan:  33 year old  10w0d weeks of pregnancy with KEVIN of Aug 14, 2019 by ultrasound.     Orders Placed This Encounter   Procedures     Vitamin D Deficiency     CBC with platelets differential     Hepatitis B Surface Antibody     Hepatitis B surface antigen     HIV Antigen Antibody Combo     Rubella Antibody IgG Quantitative     Treponema Abs w Reflex to RPR and Titer     TSH     Thyroxine, Free     Varicella Zoster Virus Antibody IgG     MAT [facility] REFERRAL-PREGNANCY     ABO/Rh type and screen     - Oriented to Practice, types of care, and how to reach a provider.  Pt prefers CNM care.  - Patient received 1st trimester new OB education packet complete with aide of [employer] booklet including information on genetic screening test options.    - Patient desires level II ultrasound which was ordered.  - Patient was encouraged to start prenatal vitamins as tolerated.    - Patient was sent to lab for routine OB labs including TSH, T4, varicella.     - Pregnancy concerns to be addressed by provider at new OB exam include: needs GC/CT at next visit, prefers pap postpartum.  - Pt to RTO for NOB visit in 2-3 weeks and prn if questions or concerns    Again, thank you for allowing me to participate in the care  of your patient.      Sincerely,    MARCIN Whitehead CNM

## 2019-01-16 NOTE — PROGRESS NOTES
[facility] OB Intake note  Subjective   33 year old woman presents to clinic for initiation of OB care.  Patient's last menstrual period was 2018.  at 10w0d by Estimated Date of Delivery: Aug 14, 2019 based on US.  Reviewed dating ultrasound. Pregnancy is planned.      - Symptoms since LMP include nausea.  Patient has tried these relief measures: diet modification.  - Genetic/Infection questionnaire completed, risks include none.  Have you traveled during the pregnancy?No  Have your sexual partner(s) travelled during the pregnancy?No    - Current Medications   Current Outpatient Medications   Medication Sig Dispense Refill     doxylamine (UNISOM SLEEPTABS) 25 MG TABS tablet Take 0.5 tablets (12.5 mg) by mouth At Bedtime 30 tablet 1     Prenatal Vit-Fe Fumarate-FA (SM PRENATAL VITAMINS) 28-0.8 MG TABS Take 1 tablet by mouth daily 30 tablet 3     vitamin B6 (VITAMIN  B-6) 25 MG tablet Take 1 tablet (25 mg) by mouth 3 times daily 270 tablet 3     doxylamine (UNISOM) 25 MG TABS tablet Take 1 tablet (25 mg) by mouth At Bedtime (Patient not taking: Reported on 10/15/2018) 30 each 0     Prenatal Vit-Fe Fumarate-FA (TRINATE) TABS Take 1 tablet by mouth daily (Patient not taking: Reported on 10/15/2018) 90 tablet 3         - Co-morbids   Past Medical History:   Diagnosis Date     Anemia      Conductive hearing loss      Hearing problem      Hyperthyroidism     Better since surgery     Tinnitus      - Risk for GDM -  No risk identified  - The patient does not h/o Pre Eclampsia, Current multi fetal gestation, Pre Gestational Diabetes (Type 1 or Type 2), chronic hypertension, renal disease, Autoimmune disease (systematic lupus erythematosus, antiphospholipid syndrome) so WILL NOT start low dose aspirin (81mg) starting between 12 and 28 weeks to prevent preeclampsia.  - The patient  does not have a history of spontaneous  birth so  WILL NOT consider progesterone starting at 16-20 weeks and/or serial  transvaginal cervical length ultrasounds from 16-24 weeks.     PERSONAL/SOCIAL HISTORY  Lives with their family.  Employment: Unemployed.  Her job involves light activity .  Additional items: None    Objective  -VS: reviewed and within normal limits   -General appearance: no acute distress, patient is comfortable   NEUROLOGICAL/PSYCHIATRIC   - Orientated x 3   -Mood and affect: normal     Assessment/Plan:  33 year old  10w0d weeks of pregnancy with KEVIN of Aug 14, 2019 by ultrasound.     Orders Placed This Encounter   Procedures     Vitamin D Deficiency     CBC with platelets differential     Hepatitis B Surface Antibody     Hepatitis B surface antigen     HIV Antigen Antibody Combo     Rubella Antibody IgG Quantitative     Treponema Abs w Reflex to RPR and Titer     TSH     Thyroxine, Free     Varicella Zoster Virus Antibody IgG     MAT FETAL MED CTR REFERRAL-PREGNANCY     ABO/Rh type and screen     - Oriented to Practice, types of care, and how to reach a provider.  Pt prefers CNM care.  - Patient received 1st trimester new OB education packet complete with aide of [employer] booklet including information on genetic screening test options.    - Patient desires level II ultrasound which was ordered.  - Patient was encouraged to start prenatal vitamins as tolerated.    - Patient was sent to lab for routine OB labs including TSH, T4, varicella.     - Pregnancy concerns to be addressed by provider at new OB exam include: needs GC/CT at next visit, prefers pap postpartum.  - Pt to RTO for NOB visit in 2-3 weeks and prn if questions or concerns

## 2019-01-17 ENCOUNTER — TELEPHONE (OUTPATIENT)
Dept: OBGYN | Facility: CLINIC | Age: 34
End: 2019-01-17

## 2019-01-17 PROBLEM — E05.90 HYPERTHYROIDISM: Status: ACTIVE | Noted: 2017-02-02

## 2019-01-17 PROBLEM — O09.299: Status: ACTIVE | Noted: 2019-01-17

## 2019-01-17 LAB
BACTERIA SPEC CULT: NORMAL
Lab: NORMAL
RUBV IGG SERPL IA-ACNC: 140 IU/ML
SPECIMEN SOURCE: NORMAL
T PALLIDUM AB SER QL: NONREACTIVE
VZV IGG SER QL IA: 4.8 AI (ref 0–0.8)

## 2019-01-17 NOTE — TELEPHONE ENCOUNTER
Request from Chas Noel to call patient to facilitate appointment with Dr. Barton or Dr. Seema Cervantes due to abnormal thyroid function levels.     Called pt via  services but reached voicemail. Left message for patient to call back.

## 2019-01-17 NOTE — TELEPHONE ENCOUNTER
Spoke to Rubina with [facility]  and explained midwives want her to see IM for her abnormal thyroid labs.Transferred to  to schedule.Pt indicated understanding and agreed with plan.

## 2019-01-18 ENCOUNTER — TELEPHONE (OUTPATIENT)
Facility: CLINIC | Age: 34
End: 2019-01-18

## 2019-01-18 ENCOUNTER — OFFICE VISIT (OUTPATIENT)
Dept: INTERNAL MEDICINE | Facility: CLINIC | Age: 34
End: 2019-01-18
Attending: INTERNAL MEDICINE
Payer: COMMERCIAL

## 2019-01-18 VITALS
DIASTOLIC BLOOD PRESSURE: 78 MMHG | HEIGHT: 63 IN | HEART RATE: 86 BPM | SYSTOLIC BLOOD PRESSURE: 125 MMHG | BODY MASS INDEX: 20.02 KG/M2 | WEIGHT: 113 LBS

## 2019-01-18 DIAGNOSIS — Z3A.10 10 WEEKS GESTATION OF PREGNANCY: ICD-10-CM

## 2019-01-18 DIAGNOSIS — R79.89 ELEVATED SERUM FREE T4 LEVEL: Primary | ICD-10-CM

## 2019-01-18 LAB
T3FREE SERPL-MCNC: 4 PG/ML (ref 2.3–4.2)
T4 FREE SERPL-MCNC: 1.71 NG/DL (ref 0.76–1.46)
TSH SERPL DL<=0.005 MIU/L-ACNC: <0.01 MU/L (ref 0.4–4)

## 2019-01-18 PROCEDURE — 83520 IMMUNOASSAY QUANT NOS NONAB: CPT | Performed by: INTERNAL MEDICINE

## 2019-01-18 PROCEDURE — 84443 ASSAY THYROID STIM HORMONE: CPT | Performed by: INTERNAL MEDICINE

## 2019-01-18 PROCEDURE — 84445 ASSAY OF TSI GLOBULIN: CPT | Performed by: INTERNAL MEDICINE

## 2019-01-18 PROCEDURE — G0463 HOSPITAL OUTPT CLINIC VISIT: HCPCS | Mod: ZF

## 2019-01-18 PROCEDURE — 36415 COLL VENOUS BLD VENIPUNCTURE: CPT | Performed by: INTERNAL MEDICINE

## 2019-01-18 PROCEDURE — 84439 ASSAY OF FREE THYROXINE: CPT | Performed by: INTERNAL MEDICINE

## 2019-01-18 PROCEDURE — 84481 FREE ASSAY (FT-3): CPT | Performed by: INTERNAL MEDICINE

## 2019-01-18 ASSESSMENT — PAIN SCALES - GENERAL: PAINLEVEL: NO PAIN (0)

## 2019-01-18 ASSESSMENT — MIFFLIN-ST. JEOR: SCORE: 1191.69

## 2019-01-18 NOTE — PROGRESS NOTES
SUBJECTIVE:  Rubina Desouza is a 33 year old female  at 10w2d with pmh of goiter who comes in for evaluation of possible hyperthyroidism in pregnancy.  She notes that she had part of her thyroid gland removed in  in [place].  At that time she states that it was swollen and that she felt palpitations and had a headache.  Since that time, she has not had any headaches, heart palpitations, diarrhea, hot flashes/heat intolerance, vision changes, tremors.    In her prior pregnancy in , she initially had an elevated free T4 and suppressed TSH.  She was seen by Dr. Ghosh in endocrinology at that time.  It was felt that she likely had a history of hyperthyroidism and not Graves' disease which was treated by partial thyroidectomy.  With her prior pregnancy it was thought that her changes in thyroid hormone reduced to the effects of hCG on her thyroid gland.  Antibodies were tested and they were negative.  She did go on to have normal thyroid levels the remainder of her pregnancy.    Past Medical History:   Diagnosis Date     Anemia      Conductive hearing loss      Hearing problem      Hyperthyroidism     Better since surgery     Tinnitus 2018     Past Surgical History:   Procedure Laterality Date     CYSTOSCOPY, BIOPSY BLADDER, COMBINED N/A 10/26/2018    Procedure: Bladder Biopsy, Right Ureteroscopy with Possible Biopsy;  Surgeon: Viral Hampton MD;  [facility]     CYSTOSCOPY, URETEROSCOPY, COMBINED  10/26/2018    Procedure: COMBINED CYSTOSCOPY, URETEROSCOPY;  Surgeon: Viral Hampton MD;  Location: [facility] OR     THYROID SURGERY      in [facility]; partial thyroidectomy     Family History   Problem Relation Age of Onset     Hypertension Father      Social History     Tobacco Use     Smoking status: Never Smoker     Smokeless tobacco: Never Used   Substance Use Topics     Alcohol use: No     Alcohol/week: 0.0 oz     Drug use: No  "        Medications and allergies reviewed by me today.       Review Of Systems    10 point ROS of systems including Constitutional, Eyes, Respiratory, Cardiovascular, Pulmonary, Gastroenterology, Genitourinary, Integumentary, Musculoskeletal, Psychiatric were all negative except for pertinent positives noted in my HPI.    OBJECTIVE:    /78   Pulse 86   Ht 1.6 m (5' 3\")   Wt 51.3 kg (113 lb)   LMP 11/16/2018   BMI 20.02 kg/m     Wt Readings from Last 1 Encounters:   01/18/19 51.3 kg (113 lb)       General: Pleasant female, in NAD  ENT: TMs normal bilaterally, oropharynx clear, MMM  Neck:  No LAD, goiter visible/palpable, no bruits,no thyroid nodules, no carotid bruits  Resp: lungs CAB  CV: Heart RRR, no MRG  Abd: Soft, NT, ND, nl bowel sounds, no HSM  Ext: WWP, no LE edema  Skin: warm, dry, no rash  Neuro: AOX3, no focal deficits     ASSESSMENT/PLAN:    Rubina was seen today for establish care.  I will go ahead and repeat her thyroid levels today, as well as getting a TSI and thyrotropin receptor antibody.  I suspect that her elevated free T4 is likely due to the fact of hCG on the thyroid gland, as was the case in her last pregnancy.  I suspect that her levels will improve with time as her pregnancy progresses.  However given the fact that hyperthyroidism requires close monitoring in pregnancy, I will refer her to endocrinology to ensure they have the same clinical impression.    Diagnoses and all orders for this visit:    Elevated serum free T4 level  10 weeks gestation of pregnancy  -     Thyroid stimulating immunoglobulin  -     TSH  -     T4 free  -     T3 Free  -     Thyrotropin Receptor Antibody  -     ENDOCRINOLOGY ADULT REFERRAL         Pt should return to clinic for f/u with me in PRN    Swetha Cervantes MD  01/23/19    "

## 2019-01-18 NOTE — NURSING NOTE
Chief Complaint   Patient presents with     Establish Care     OB 10 weeks 2 day thyroid check   Neha Spencer LPN

## 2019-01-18 NOTE — LETTER
RE: Rubina Desouza  [address]  [facility] [address]     Dear Colleague,    Thank you for referring your patient, Rubina Desouza, to the [facility]  at [facility] Please see a copy of my visit note below.                           SUBJECTIVE:  Rubina Desouza is a 33 year old female  at 10w2d with pmh of goiter who comes in for evaluation of possible hyperthyroidism in pregnancy.  She notes that she had part of her thyroid gland removed in  in [place].  At that time she states that it was swollen and that she felt palpitations and had a headache.  Since that time, she has not had any headaches, heart palpitations, diarrhea, hot flashes/heat intolerance, vision changes, tremors.    In her prior pregnancy in , she initially had an elevated free T4 and suppressed TSH.  She was seen by Dr. Ghosh in endocrinology at that time.  It was felt that she likely had a history of hyperthyroidism and not Graves' disease which was treated by partial thyroidectomy.  With her prior pregnancy it was thought that her changes in thyroid hormone reduced to the effects of hCG on her thyroid gland.  Antibodies were tested and they were negative.  She did go on to have normal thyroid levels the remainder of her pregnancy.    Past Medical History:   Diagnosis Date     Anemia      Conductive hearing loss 2015     Hearing problem      Hyperthyroidism     Better since surgery     Tinnitus 2018     Past Surgical History:   Procedure Laterality Date     CYSTOSCOPY, BIOPSY BLADDER, COMBINED N/A 10/26/2018    Procedure: Bladder Biopsy, Right Ureteroscopy with Possible Biopsy;  Surgeon: Viral Hampton MD;  Location:  OR     CYSTOSCOPY, URETEROSCOPY, COMBINED  10/26/2018    Procedure: COMBINED CYSTOSCOPY, URETEROSCOPY;  Surgeon: Viral Hampton MD;  [facility]     THYROID SURGERY      in [facility]; partial thyroidectomy  "    Family History   Problem Relation Age of Onset     Hypertension Father      Social History     Tobacco Use     Smoking status: Never Smoker     Smokeless tobacco: Never Used   Substance Use Topics     Alcohol use: No     Alcohol/week: 0.0 oz     Drug use: No     Medications and allergies reviewed by me today.     OBJECTIVE:    /78   Pulse 86   Ht 1.6 m (5' 3\")   Wt 51.3 kg (113 lb)   LMP 11/16/2018   BMI 20.02 kg/m      Wt Readings from Last 1 Encounters:   01/18/19 51.3 kg (113 lb)       General: Pleasant female, in NAD  ENT: TMs normal bilaterally, oropharynx clear, MMM  Neck:  No LAD, goiter visible/palpable, no bruits,no thyroid nodules, no carotid bruits  Resp: lungs CAB  CV: Heart RRR, no MRG  Abd: Soft, NT, ND, nl bowel sounds, no HSM  Ext: WWP, no LE edema  Skin: warm, dry, no rash  Neuro: AOX3, no focal deficits     ASSESSMENT/PLAN:    Rubina was seen today for establish care.  I will go ahead and repeat her thyroid levels today, as well as getting a TSI and thyrotropin receptor antibody.  I suspect that her elevated free T4 is likely due to the fact of hCG on the thyroid gland, as was the case in her last pregnancy.  I suspect that her levels will improve with time as her pregnancy progresses.  However given the fact that hyperthyroidism requires close monitoring in pregnancy, I will refer her to endocrinology to ensure they have the same clinical impression.    Diagnoses and all orders for this visit:    Elevated serum free T4 level  10 weeks gestation of pregnancy  -     Thyroid stimulating immunoglobulin  -     TSH  -     T4 free  -     T3 Free  -     Thyrotropin Receptor Antibody  -     ENDOCRINOLOGY ADULT REFERRAL         Pt should return to clinic for f/u with me in PRN    Swetha Cervantes MD  01/23/19  "

## 2019-01-19 NOTE — TELEPHONE ENCOUNTER
"----- Message from Swetha Cervantes MD sent at 1/18/2019 12:53 PM CST -----  Regarding: hyperthyroid, pregnant  Javier Chauhan-    I'm hoping to get this pt seen soon by endocrine. She is 10 wks pregnant and is hyperthyroid. She is asymptomatic at this time. Had partial thyroidectomy for \"swollen thyroid\" in 2010 in [place]. With pregnancy in 2017, also initially hyperthyroid. Was seen by Dr. Morataya who felt that it was likely due to goiter and hcg influence on thyroid. It did resolve throughout the pregnancy.     However, her free t4 is a little higher than it was back then, and given the implications of untreated hyperthyroidism, I feel it would be appropriate for her to be seen by endo to just confirm that this is likely to improve and does not need treatment at this time.     ThanksSwetha  "

## 2019-01-19 NOTE — TELEPHONE ENCOUNTER
To schedulers : please schedule with consult service (or open GUILLERMO) within the week.  She is a follow up patient for our clinic so she could be booked with any endocrinologist who has an open followup space .    Gissel Howard MD  Endocrine triage

## 2019-01-22 LAB — TSH RECEP AB SER-ACNC: <1 IU/L

## 2019-01-23 ENCOUNTER — TELEPHONE (OUTPATIENT)
Facility: CLINIC | Age: 34
End: 2019-01-23

## 2019-01-23 LAB — TSI SER-ACNC: <1 TSI INDEX

## 2019-01-23 NOTE — TELEPHONE ENCOUNTER
To schedulers : please schedule with consult service (or open GUILLERMO) within 2 weeks.    Gissel Howard MD  Endocrine triage

## 2019-01-24 ENCOUNTER — OFFICE VISIT (OUTPATIENT)
Dept: ENDOCRINOLOGY | Facility: CLINIC | Age: 34
End: 2019-01-24
Payer: COMMERCIAL

## 2019-01-24 VITALS
HEART RATE: 78 BPM | SYSTOLIC BLOOD PRESSURE: 126 MMHG | HEIGHT: 63 IN | DIASTOLIC BLOOD PRESSURE: 80 MMHG | WEIGHT: 112.4 LBS | BODY MASS INDEX: 19.91 KG/M2

## 2019-01-24 DIAGNOSIS — E05.90 HYPERTHYROIDISM COMPLICATING PREGNANCY, FIRST TRIMESTER: ICD-10-CM

## 2019-01-24 DIAGNOSIS — O99.281 HYPERTHYROIDISM COMPLICATING PREGNANCY, FIRST TRIMESTER: ICD-10-CM

## 2019-01-24 DIAGNOSIS — E05.90 HYPERTHYROIDISM: Primary | ICD-10-CM

## 2019-01-24 ASSESSMENT — MIFFLIN-ST. JEOR: SCORE: 1195.09

## 2019-01-24 ASSESSMENT — PAIN SCALES - GENERAL: PAINLEVEL: NO PAIN (0)

## 2019-01-24 NOTE — LETTER
2019       RE: Rubina Deosuza  [address]  [facility] [address]     Dear Colleague,    Thank you for referring your patient, Rubina Desouza, to the [facility] ENDOCRINOLOGY at [facility]. Please see a copy of my visit note below.         Endocrinology Note         Rubina is a 33 year old female presents today for hyperthyroidism in the setting of pregnancy.    HPI  Rubina is a 33 year old female presents today for  who present here for evaluation of hyperthyroidism in the setting of pregnancy. She is currently 11 weeks pregnant. Conversation is done via [employer] .    She was previously seen  in 2017 when she was pregnant with her second child and developed gestational hyperthyroidism that improved and normalized during remaining pregnancy. Chart reviewed, she did have partial thyroidectomy in  in [place]. It was thought to be done due to thyroid gland swollen. Since surgery, she has not required medication.    With this current pregnancy, she reports feeling well.  She has had some headache but improved with Tylenol. She denied having heart palpitations, diarrhea, constipation, cold/heat intolerance, vision changes, tremors, neck swelling. She has had some morning sickness with pregnancy but is getting better.    Lab on 19 showed suppressed TSH with FT4 in 1.7-1.8 range. TSI was negative.    Her children are 7 and 1.5 years old.    Past Medical History  Past Medical History:   Diagnosis Date     Anemia 2017     Conductive hearing loss 2015     Hearing problem 2015     Hyperthyroidism     Better since surgery     Tinnitus 2018   hx of partial thyroidectomy in  in [place]  Gestational hyperthyroidism in 2017  Hx of miscarriages x2    Allergies  No Known Allergies     Medications  Current Outpatient Medications   Medication Sig Dispense Refill     doxylamine (UNISOM SLEEPTABS) 25 MG TABS tablet Take 0.5 tablets (12.5  "mg) by mouth At Bedtime 30 tablet 1     Prenatal Vit-Fe Fumarate-FA ( PRENATAL VITAMINS) 28-0.8 MG TABS Take 1 tablet by mouth daily 30 tablet 3     vitamin B6 (VITAMIN  B-6) 25 MG tablet Take 1 tablet (25 mg) by mouth 3 times daily 270 tablet 3     Family History  family history includes Hypertension in her father.   No thyroid disease in the family    Social History  Social History     Tobacco Use     Smoking status: Never Smoker     Smokeless tobacco: Never Used   Substance Use Topics     Alcohol use: No     Alcohol/week: 0.0 oz     Drug use: No   stay at home mother  No smoking, no EtOH, no illicit drug    ROS  Constitutional: no weight change, good energy  Eyes: no vision change, diplopia or red eyes   Neck: no difficulty swallowing, no choking, no neck pain, no neck swelling  Cardiovascular: no chest pain, palpitations  Respiratory: no dyspnea, cough, shortness of breath or wheezing   GI: some nausea, no vomiting, diarrhea or constipation, no abdominal pain   : no change in urine, no dysuria or hematuria  Musculoskeletal: no joint or muscle pain or swelling   Integumentary: no concerning lesion  Neuro: no loss of strength or sensation, no numbness or tingling, no tremor, no dizziness, +intermittent headache   Endo: no polyuria or polydipsia, no temperature intolerance   Heme/Lymph: no concerning bumps, no bleeding problems   Allergy: no environmental allergies   Psych: no depression or anxiety, no sleep problems.    Physical Exam  /80   Pulse 78   Ht 1.61 m (5' 3.39\")   Wt 51 kg (112 lb 6.4 oz)   LMP 11/16/2018   BMI 19.67 kg/m     Body mass index is 19.67 kg/m .  Constitutional: no distress, comfortable, pleasant   Eyes: anicteric, normal extra-ocular movements, no lid lag or retraction  Neck: + 2-3 times thyroid gland, smooth, no nodule appreciated left>right  Cardiovascular: regular rate and rhythm, normal S1 and S2, no murmurs  Respiratory: clear to auscultation, no wheezes or crackles, " normal breath sounds   Gastrointestinal:  nontender, no hepatomegaly, no masses   Musculoskeletal: no edema   Skin: no concerning lesions, no jaundice   Neurological: cranial nerves intact, 2+ reflexes at patella, normal gait, no tremor on outstretched hands bilaterally  Psychological: appropriate mood   Lymphatic: no cervical  lymphadenopathy.      RESULTS  I have personally reviewed labs and images.        ASSESSMENT:    Rubina is a 33 year old female presents today for  who present here for evaluation of hyperthyroidism in the setting of pregnancy. She is currently 11 weeks pregnant.    1) hyperthyroidism in the setting of first trimester pregnancy: she is clinically euthyroid and biochemically mild hyperthyroid with suppressed TSH and mild elevation of FT4. Thyroid function likely reflects the changes in thyroid hormone from the effects of hCG on her thyroid gland in normal pregnancy. Antibody for Graves' disease is negative. She did have previous similar finding during previous pregnancy in 2017 with normalization of thyroid level throughout remaining pregnancy.  - will require close monitoring during pregnancy.    2) 11 weeks pregnancy    PLAN:   - repeat TSH, FT4, TT4 and TT3 in 4 weeks  - f/u appointment rico Mast MD     Division of Diabetes and Endocrinology  Department of Medicine  [phone]

## 2019-01-24 NOTE — PROGRESS NOTES
Endocrinology Note         Rubina is a 33 year old female presents today for hyperthyroidism in the setting of pregnancy.    HPI  Rubina is a 33 year old female presents today for  who present here for evaluation of hyperthyroidism in the setting of pregnancy. She is currently 11 weeks pregnant. Conversation is done via [facility] .    She was previously seen  in 2017 when she was pregnant with her second child and developed gestational hyperthyroidism that improved and normalized during remaining pregnancy. Chart reviewed, she did have partial thyroidectomy in  in [place]. It was thought to be done due to thyroid gland swollen. Since surgery, she has not required medication.    With this current pregnancy, she reports feeling well.  She has had some headache but improved with Tylenol. She denied having heart palpitations, diarrhea, constipation, cold/heat intolerance, vision changes, tremors, neck swelling. She has had some morning sickness with pregnancy but is getting better.    Lab on 19 showed suppressed TSH with FT4 in 1.7-1.8 range. TSI was negative.    Her children are 7 and 1.5 years old.    Past Medical History  Past Medical History:   Diagnosis Date     Anemia 2017     Conductive hearing loss 2015     Hearing problem 2015     Hyperthyroidism     Better since surgery     Tinnitus 2018   hx of partial thyroidectomy in  in [place]  Gestational hyperthyroidism in 2017  Hx of miscarriages x2    Allergies  No Known Allergies     Medications  Current Outpatient Medications   Medication Sig Dispense Refill     doxylamine (UNISOM SLEEPTABS) 25 MG TABS tablet Take 0.5 tablets (12.5 mg) by mouth At Bedtime 30 tablet 1     Prenatal Vit-Fe Fumarate-FA (SM PRENATAL VITAMINS) 28-0.8 MG TABS Take 1 tablet by mouth daily 30 tablet 3     vitamin B6 (VITAMIN  B-6) 25 MG tablet Take 1 tablet (25 mg) by mouth 3 times daily 270 tablet 3     Family History  family history  "includes Hypertension in her father.   No thyroid disease in the family    Social History  Social History     Tobacco Use     Smoking status: Never Smoker     Smokeless tobacco: Never Used   Substance Use Topics     Alcohol use: No     Alcohol/week: 0.0 oz     Drug use: No   stay at home mother  No smoking, no EtOH, no illicit drug    ROS  Constitutional: no weight change, good energy  Eyes: no vision change, diplopia or red eyes   Neck: no difficulty swallowing, no choking, no neck pain, no neck swelling  Cardiovascular: no chest pain, palpitations  Respiratory: no dyspnea, cough, shortness of breath or wheezing   GI: some nausea, no vomiting, diarrhea or constipation, no abdominal pain   : no change in urine, no dysuria or hematuria  Musculoskeletal: no joint or muscle pain or swelling   Integumentary: no concerning lesion  Neuro: no loss of strength or sensation, no numbness or tingling, no tremor, no dizziness, +intermittent headache   Endo: no polyuria or polydipsia, no temperature intolerance   Heme/Lymph: no concerning bumps, no bleeding problems   Allergy: no environmental allergies   Psych: no depression or anxiety, no sleep problems.    Physical Exam  /80   Pulse 78   Ht 1.61 m (5' 3.39\")   Wt 51 kg (112 lb 6.4 oz)   LMP 11/16/2018   BMI 19.67 kg/m    Body mass index is 19.67 kg/m .  Constitutional: no distress, comfortable, pleasant   Eyes: anicteric, normal extra-ocular movements, no lid lag or retraction  Neck: + 2-3 times thyroid gland, smooth, no nodule appreciated left>right  Cardiovascular: regular rate and rhythm, normal S1 and S2, no murmurs  Respiratory: clear to auscultation, no wheezes or crackles, normal breath sounds   Gastrointestinal:  nontender, no hepatomegaly, no masses   Musculoskeletal: no edema   Skin: no concerning lesions, no jaundice   Neurological: cranial nerves intact, 2+ reflexes at patella, normal gait, no tremor on outstretched hands bilaterally  Psychological: " appropriate mood   Lymphatic: no cervical  lymphadenopathy.      RESULTS  I have personally reviewed labs and images.        ASSESSMENT:    Rubina is a 33 year old female presents today for  who present here for evaluation of hyperthyroidism in the setting of pregnancy. She is currently 11 weeks pregnant.    1) hyperthyroidism in the setting of first trimester pregnancy: she is clinically euthyroid and biochemically mild hyperthyroid with suppressed TSH and mild elevation of FT4. Thyroid function likely reflects the changes in thyroid hormone from the effects of hCG on her thyroid gland in normal pregnancy. Antibody for Graves' disease is negative. She did have previous similar finding during previous pregnancy in 2017 with normalization of thyroid level throughout remaining pregnancy.  - will require close monitoring during pregnancy.    2) 11 weeks pregnancy    PLAN:   - repeat TSH, FT4, TT4 and TT3 in 4 weeks  - f/u appointment rico Mast MD     Division of Diabetes and Endocrinology  Department of Medicine  [phone]

## 2019-01-24 NOTE — PATIENT INSTRUCTIONS
- repeat lab for thyroid next 4 weeks (third week of February)  - I will let you know with result.    If you have any questions, please do not hesitate to call [phone] and ask for Endocrinology clinic.      After clinic hours, please contact [phone] and ask for Endocrinologist-on call    Sincerely,    Humaira Mast MD  Endocrinology

## 2019-02-04 ENCOUNTER — OFFICE VISIT (OUTPATIENT)
Dept: OBGYN | Facility: CLINIC | Age: 34
End: 2019-02-04
Attending: ADVANCED PRACTICE MIDWIFE
Payer: COMMERCIAL

## 2019-02-04 VITALS
HEART RATE: 80 BPM | HEIGHT: 63 IN | WEIGHT: 111.7 LBS | BODY MASS INDEX: 19.79 KG/M2 | DIASTOLIC BLOOD PRESSURE: 78 MMHG | SYSTOLIC BLOOD PRESSURE: 121 MMHG

## 2019-02-04 DIAGNOSIS — O09.91 SUPERVISION OF HIGH RISK PREGNANCY IN FIRST TRIMESTER: Primary | ICD-10-CM

## 2019-02-04 PROCEDURE — 87491 CHLMYD TRACH DNA AMP PROBE: CPT | Performed by: ADVANCED PRACTICE MIDWIFE

## 2019-02-04 PROCEDURE — G0463 HOSPITAL OUTPT CLINIC VISIT: HCPCS | Mod: ZF

## 2019-02-04 PROCEDURE — 87591 N.GONORRHOEAE DNA AMP PROB: CPT | Performed by: ADVANCED PRACTICE MIDWIFE

## 2019-02-04 RX ORDER — CALCIUM CARBONATE 500 MG/1
1 TABLET, CHEWABLE ORAL 2 TIMES DAILY
Qty: 150 TABLET | Refills: 1 | Status: SHIPPED | OUTPATIENT
Start: 2019-02-04 | End: 2019-06-12

## 2019-02-04 ASSESSMENT — PAIN SCALES - GENERAL: PAINLEVEL: NO PAIN (0)

## 2019-02-04 ASSESSMENT — ANXIETY QUESTIONNAIRES
6. BECOMING EASILY ANNOYED OR IRRITABLE: NOT AT ALL
7. FEELING AFRAID AS IF SOMETHING AWFUL MIGHT HAPPEN: NOT AT ALL
2. NOT BEING ABLE TO STOP OR CONTROL WORRYING: NOT AT ALL
GAD7 TOTAL SCORE: 0
5. BEING SO RESTLESS THAT IT IS HARD TO SIT STILL: NOT AT ALL
1. FEELING NERVOUS, ANXIOUS, OR ON EDGE: NOT AT ALL
3. WORRYING TOO MUCH ABOUT DIFFERENT THINGS: NOT AT ALL

## 2019-02-04 ASSESSMENT — PATIENT HEALTH QUESTIONNAIRE - PHQ9
5. POOR APPETITE OR OVEREATING: NOT AT ALL
SUM OF ALL RESPONSES TO PHQ QUESTIONS 1-9: 0

## 2019-02-04 ASSESSMENT — MIFFLIN-ST. JEOR: SCORE: 1191.99

## 2019-02-04 NOTE — PROGRESS NOTES
SUBJECTIVE:   Rubina is a 33 year old female who presents to clinic for a new OB visit.   at 12w4d with Estimated Date of Delivery: Aug 14, 2019 based on US. Feels well. Has started PNV.     She has not had bleeding since her LMP.   She has had nausea resulting in bilious Weight loss has not occurred.   This was a planned pregnancy.   FOB is involved   OTHER CONCERNS: has some reflux,     ===========================================   ROS: 10 point ROS neg other than the symptoms noted above in the HPI.      PSYCHIATRIC:  Denies mood changes  PHQ-9 score:    PHQ-9 SCORE 2017   PHQ-9 Total Score 3     RENETTA-7 SCORE 2017   Total Score 0     Past History:  Her past medical history   Past Medical History:   Diagnosis Date     Anemia 2017     Conductive hearing loss 2015     Hearing problem      Hyperthyroidism     Better since surgery     Tinnitus    .   She has a history of  shoulder dystocia and hyperthyroidism  Since her last LMP she denies use of alcohol, tobacco and street drugs.    HISTORY:  Family History   Problem Relation Age of Onset     Hypertension Father      Social History     Socioeconomic History     Marital status:      Spouse name: Saji     Number of children: Not on file     Years of education: Not on file     Highest education level: Not on file   Social Needs     Financial resource strain: Not on file     Food insecurity - worry: Not on file     Food insecurity - inability: Not on file     Transportation needs - medical: Not on file     Transportation needs - non-medical: Not on file   Occupational History     Employer: UNEMPLOYED   Tobacco Use     Smoking status: Never Smoker     Smokeless tobacco: Never Used   Substance and Sexual Activity     Alcohol use: No     Alcohol/week: 0.0 oz     Drug use: No     Sexual activity: Yes     Partners: Male     Birth control/protection: None   Other Topics Concern     Not on file   Social History Narrative    [place]ig[place]  to [place] in .   with 2 y/o son and 7 year daughter.  Moved from [facility] to [facility] in , [id number] in [place], [phone] in [place], then [place] in 2017.  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  No travel back to [place] since immigration.     Current Outpatient Medications   Medication Sig     calcium carbonate (TUMS) 500 MG chewable tablet Take 1 tablet (500 mg) by mouth 2 times daily     doxylamine (UNISOM SLEEPTABS) 25 MG TABS tablet Take 0.5 tablets (12.5 mg) by mouth At Bedtime (Patient not taking: Reported on 2019)     Prenatal Vit-Fe Fumarate-FA ( PRENATAL VITAMINS) 28-0.8 MG TABS Take 1 tablet by mouth daily     vitamin B6 (VITAMIN  B-6) 25 MG tablet Take 1 tablet (25 mg) by mouth 3 times daily     No current facility-administered medications for this visit.      No Known Allergies    ============================================  MEDICAL HISTORY  Past Medical History:   Diagnosis Date     Anemia      Conductive hearing loss      Hearing problem 2015     Hyperthyroidism     Better since surgery     Tinnitus 2018     Past Surgical History:   Procedure Laterality Date     CYSTOSCOPY, BIOPSY BLADDER, COMBINED N/A 10/26/2018    Procedure: Bladder Biopsy, Right Ureteroscopy with Possible Biopsy;  Surgeon: Viral Hampton MD;  Location:  OR     CYSTOSCOPY, URETEROSCOPY, COMBINED  10/26/2018    Procedure: COMBINED CYSTOSCOPY, URETEROSCOPY;  Surgeon: Viral Hampton MD;  [facility]     TH[facility]ROID SURGERY      in [facility]; partial thyroidectomy       Obstetric History       T2      L2     SAB2   TAB0   Ectopic0   Multiple0   Live Births2       # Outcome Date GA Lbr Joe/2nd Weight Sex Delivery Anes PTL Lv   5 Current            4 2018           3 Term 12/15/17 41w3d 01:30 / 00:18 3.657 kg (8 lb 1 oz) M Vag-Spont Nitrous N BRAVO      Name: MOJGANBABYBishop IJABO      Apgar1:  8                Apgar5: 9   2 SAB 17           1 Term 12  "40w1d  3.1 kg (6 lb 13.4 oz) F  None  BRAVO        GYN History-  Abnormal Pap Smears: Denies                         Cervical procedures: Denies                         History of STI: Denies    I personally reviewed the past social/family/medical and surgical history on the date of service.   I reviewed lab work done at Intake visit with patient.    EXAM:  /78   Pulse 80   Ht 1.61 m (5' 3.39\")   Wt 50.7 kg (111 lb 11.2 oz)   LMP 2018   BMI 19.54 kg/m     EXAM:  GENERAL:  Pleasant pregnant female, alert, cooperative and well groomed.  SKIN:  Warm and dry, without lesions or rashes  HEAD: Symmetrical features.  MOUTH:  Buccal mucosa pink, moist without lesions.  Teeth in good repair.    NECK:  Thyroid is enlarged and has a nodule on the right.  Lymph nodes not palpable.   LUNGS:  Clear to auscultation.  BREAST:    No dominant, fixed or suspicious masses are noted.  No skin or nipple changes or axillary nodes.   Nipples everted.      HEART:  RRR without murmur.  ABDOMEN: Soft without masses , tenderness or organomegaly.  No CVA tenderness.  Uterus palpable at size equal to dates.  No scars noted.. Fetal heart tones present.  MUSCULOSKELETAL:  Full range of motion  EXTREMITIES:  No edema. No significant varicosities.   PELVIC EXAM: deferred  WET PREP:Not done  GC/CHLAMYDIA CULTURE OBTAINED:YES    Requests PAP be done in PP course.      ASSESSMENT:  33 year old , 12w4d weeks of pregnancy with KEVIN of Aug 14, 2019 by US  Intrauterine pregnancy 12w4d size is consistent with dates  Genetic Screening: Level 2 Ultrasound only    ICD-10-CM    1. Supervision of high risk pregnancy in first trimester O09.91 Gonorrhea PCR     Chlamydia PCR (Clinic Collect)     calcium carbonate (TUMS) 500 MG chewable tablet     PLAN:  - Reviewed use of triage nurse line and contacting the on-call provider after hours for an urgent need such as fever, vagina bleeding, bladder or vaginal infection, rupture of membranes, "  or term labor.    - Reviewed best evidence for: weight gain for her weight and height for pregnancy:  Based on pre-pregnancy weight of 111.7 and Body mass index is 19.54 kg/m . RECOMMENDED WEIGHT GAIN: 25-35 lbs.  -If BMI>=30, weight gain/exercise handout given and reviewed. BMI entered on problem list with plan for possible referrals and  testing  - Reviewed healthy diet and foods to avoid; exercise and activity during pregnancy; avoiding exposure to toxoplasmosis; and maintenance of a generally healthy lifestyle.   - Discussed the harms, benefits, side effects and alternative therapies for current prescribed and OTC medications.  - Has comp US scheduled for 3/20/19.  - Reviewed healthy lifestyle modifications for N/V in pregnancy. She will try these in addition to taking her Vit B6 and her Unisom regularly.  - Has standing order for THS for one month per endocrine who is managing this.   - All pt's questions discussed and answered.  Pt verbalized understanding of and agreement to plan of care.   - Continue scheduled prenatal care and prn if questions or concerns    Rowan Figueroa CNM

## 2019-02-04 NOTE — LETTER
RE: Rubina Desouza  [address]  [facility] [address]     Dear Colleague,    Thank you for referring your patient, Rubina Desouza, to the [facility] at [facility]. Please see a copy of my visit note below.    SUBJECTIVE:   Rubina is a 33 year old female who presents to clinic for a new OB visit.   at 12w4d with Estimated Date of Delivery: Aug 14, 2019 based on US. Feels well. Has started PNV.     She has not had bleeding since her LMP.   She has had nausea resulting in bilious Weight loss has not occurred.   This was a planned pregnancy.   FOB is involved   OTHER CONCERNS: has some reflux,     ===========================================  PSYCHIATRIC:  Denies mood changes  PHQ-9 score:    PHQ-9 SCORE 2017   PHQ-9 Total Score 3     RENETTA-7 SCORE 2017   Total Score 0     Past History:  Her past medical history   Past Medical History:   Diagnosis Date     Anemia 2017     Conductive hearing loss 2015     Hearing problem 2015     Hyperthyroidism     Better since surgery     Tinnitus 2018   .   She has a history of  shoulder dystocia and hyperthyroidism  Since her last LMP she denies use of alcohol, tobacco and street drugs.    HISTORY:  Family History   Problem Relation Age of Onset     Hypertension Father      Social History     Socioeconomic History     Marital status:      Spouse name: Saji     Number of children: Not on file     Years of education: Not on file     Highest education level: Not on file   Social Needs     Financial resource strain: Not on file     Food insecurity - worry: Not on file     Food insecurity - inability: Not on file     Transportation needs - medical: Not on file     Transportation needs - non-medical: Not on file   Occupational History     Employer: UNEMPLOYED   Tobacco Use     Smoking status: Never Smoker     Smokeless tobacco: Never Used   Substance and Sexual Activity     Alcohol use:  No     Alcohol/week: 0.0 oz     Drug use: No     Sexual activity: Yes     Partners: Male     Birth control/protection: None   Other Topics Concern     Not on file   Social History Narrative    [place] to [place] in .   with 2 y/o son and 7 year daughter.  Moved from [facility] to [facility] in , [id number] in [place], [phone] in [place], then [place] in 2017.  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  No travel back to [place] since immigration.     Current Outpatient Medications   Medication Sig     calcium carbonate (TUMS) 500 MG chewable tablet Take 1 tablet (500 mg) by mouth 2 times daily     doxylamine (UNISOM SLEEPTABS) 25 MG TABS tablet Take 0.5 tablets (12.5 mg) by mouth At Bedtime (Patient not taking: Reported on 2019)     Prenatal Vit-Fe Fumarate-FA ( PRENATAL VITAMINS) 28-0.8 MG TABS Take 1 tablet by mouth daily     vitamin B6 (VITAMIN  B-6) 25 MG tablet Take 1 tablet (25 mg) by mouth 3 times daily     No current facility-administered medications for this visit.      No Known Allergies    ============================================  MEDICAL HISTORY  Past Medical History:   Diagnosis Date     Anemia      Conductive hearing loss      Hearing problem      Hyperthyroidism     Better since surgery     Tinnitus 2018     Past Surgical History:   Procedure Laterality Date     CYSTOSCOPY, BIOPSY BLADDER, COMBINED N/A 10/26/2018    Procedure: Bladder Biopsy, Right Ureteroscopy with Possible Biopsy;  Surgeon: Viral Hampton MD;  Location: [facility] OR     CYSTOSCOPY, URETEROSCOPY, COMBINED  10/26/2018    Procedure: COMBINED CYSTOSCOPY, URETEROSCOPY;  Surgeon: Viral Hampton MD;  Location: [facility] OR     THYROID SURGERY      in [facility]; partial thyroidectomy       Obstetric History       T2      L2     SAB2   TAB0   Ectopic0   Multiple0   Live Births2       # Outcome Date GA Lbr Joe/2nd Weight Sex Delivery Anes PTL Lv   5 Current            4 2018     "       3 Term 12/15/17 41w3d 01:30 / 00:18 3.657 kg (8 lb 1 oz) M Vag-Spont Nitrous N BRAVO      Name: MARLY ULRICH      Apgar1:  8                Apgar5: 9   2 SAB 17           1 Term 12 40w1d  3.1 kg (6 lb 13.4 oz) F  None  BRAVO        GYN History-  Abnormal Pap Smears: Denies                         Cervical procedures: Denies                         History of STI: Denies    I personally reviewed the past social/family/medical and surgical history on the date of service.   I reviewed lab work done at Intake visit with patient.    EXAM:  /78   Pulse 80   Ht 1.61 m (5' 3.39\")   Wt 50.7 kg (111 lb 11.2 oz)   LMP 2018   BMI 19.54 kg/m      EXAM:  GENERAL:  Pleasant pregnant female, alert, cooperative and well groomed.  SKIN:  Warm and dry, without lesions or rashes  HEAD: Symmetrical features.  MOUTH:  Buccal mucosa pink, moist without lesions.  Teeth in good repair.    NECK:  Thyroid is enlarged and has a nodule on the right.  Lymph nodes not palpable.   LUNGS:  Clear to auscultation.  BREAST:    No dominant, fixed or suspicious masses are noted.  No skin or nipple changes or axillary nodes.   Nipples everted.      HEART:  RRR without murmur.  ABDOMEN: Soft without masses , tenderness or organomegaly.  No CVA tenderness.  Uterus palpable at size equal to dates.  No scars noted.. Fetal heart tones present.  MUSCULOSKELETAL:  Full range of motion  EXTREMITIES:  No edema. No significant varicosities.   PELVIC EXAM: deferred  WET PREP:Not done  GC/CHLAMYDIA CULTURE OBTAINED:YES    Requests PAP be done in PP course.      ASSESSMENT:  33 year old , 12w4d weeks of pregnancy with KEVIN of Aug 14, 2019 by US  Intrauterine pregnancy 12w4d size is consistent with dates  Genetic Screening: Level 2 Ultrasound only    ICD-10-CM    1. Supervision of high risk pregnancy in first trimester O09.91 Gonorrhea PCR     Chlamydia PCR (Clinic Collect)     calcium carbonate (TUMS) 500 MG chewable " tablet     PLAN:  - Reviewed use of triage nurse line and contacting the on-call provider after hours for an urgent need such as fever, vagina bleeding, bladder or vaginal infection, rupture of membranes,  or term labor.    - Reviewed best evidence for: weight gain for her weight and height for pregnancy:  Based on pre-pregnancy weight of 111.7 and Body mass index is 19.54 kg/m . RECOMMENDED WEIGHT GAIN: 25-35 lbs.  -If BMI>=30, weight gain/exercise handout given and reviewed. BMI entered on problem list with plan for possible referrals and  testing  - Reviewed healthy diet and foods to avoid; exercise and activity during pregnancy; avoiding exposure to toxoplasmosis; and maintenance of a generally healthy lifestyle.   - Discussed the harms, benefits, side effects and alternative therapies for current prescribed and OTC medications.  - Has comp US scheduled for 3/20/19.  - Reviewed healthy lifestyle modifications for N/V in pregnancy. She will try these in addition to taking her Vit B6 and her Unisom regularly.  - Has standing order for THS for one month per endocrine who is managing this.   - All pt's questions discussed and answered.  Pt verbalized understanding of and agreement to plan of care.   - Continue scheduled prenatal care and prn if questions or concerns    Rowan Figueroa CNM

## 2019-02-05 LAB
C TRACH DNA SPEC QL NAA+PROBE: NEGATIVE
N GONORRHOEA DNA SPEC QL NAA+PROBE: NEGATIVE
SPECIMEN SOURCE: NORMAL
SPECIMEN SOURCE: NORMAL

## 2019-02-05 ASSESSMENT — ANXIETY QUESTIONNAIRES: GAD7 TOTAL SCORE: 0

## 2019-02-08 ENCOUNTER — PRE VISIT (OUTPATIENT)
Dept: UROLOGY | Facility: CLINIC | Age: 34
End: 2019-02-08

## 2019-02-08 NOTE — TELEPHONE ENCOUNTER
Patient with history of atrophic left kidney, right pelviectasis, right hydroureter, and schistosomiasis coming in for review of UDS. Patient to get renogram prior. Message sent to get this set up.

## 2019-02-15 ENCOUNTER — TELEPHONE (OUTPATIENT)
Dept: UROLOGY | Facility: CLINIC | Age: 34
End: 2019-02-15

## 2019-02-15 NOTE — TELEPHONE ENCOUNTER
Pt called, UDS and follow up appointment cancelled due to pregnancy. Pt will call back after she delivers to reschedule.

## 2019-02-15 NOTE — TELEPHONE ENCOUNTER
[facility] Call Center    Phone Message    May a detailed message be left on voicemail: yes    Reason for Call: Other: Pt has questions about upcoming appt on 2/20, I could not understand what she was asking, please call pt to discuss upcoming appt     Action Taken: Message routed to:  [facility] ([facility] Urology Clinic

## 2019-03-04 ENCOUNTER — OFFICE VISIT (OUTPATIENT)
Dept: OBGYN | Facility: CLINIC | Age: 34
End: 2019-03-04
Attending: ADVANCED PRACTICE MIDWIFE
Payer: COMMERCIAL

## 2019-03-04 VITALS
HEIGHT: 63 IN | HEART RATE: 93 BPM | BODY MASS INDEX: 19.99 KG/M2 | DIASTOLIC BLOOD PRESSURE: 74 MMHG | SYSTOLIC BLOOD PRESSURE: 118 MMHG | WEIGHT: 112.8 LBS

## 2019-03-04 DIAGNOSIS — O99.281 HYPERTHYROIDISM COMPLICATING PREGNANCY, FIRST TRIMESTER: ICD-10-CM

## 2019-03-04 DIAGNOSIS — E05.90 HYPERTHYROIDISM COMPLICATING PREGNANCY, FIRST TRIMESTER: ICD-10-CM

## 2019-03-04 DIAGNOSIS — O09.92 SUPERVISION OF HIGH RISK PREGNANCY IN SECOND TRIMESTER: Primary | ICD-10-CM

## 2019-03-04 DIAGNOSIS — E05.90 HYPERTHYROIDISM: ICD-10-CM

## 2019-03-04 LAB
T3 SERPL-MCNC: 113 NG/DL (ref 60–181)
T4 FREE SERPL-MCNC: 0.97 NG/DL (ref 0.76–1.46)
T4 SERPL-MCNC: 12.4 UG/DL (ref 4.5–13.9)
TSH SERPL DL<=0.005 MIU/L-ACNC: 0.03 MU/L (ref 0.4–4)

## 2019-03-04 PROCEDURE — 36415 COLL VENOUS BLD VENIPUNCTURE: CPT | Performed by: INTERNAL MEDICINE

## 2019-03-04 PROCEDURE — 84436 ASSAY OF TOTAL THYROXINE: CPT | Performed by: INTERNAL MEDICINE

## 2019-03-04 PROCEDURE — G0463 HOSPITAL OUTPT CLINIC VISIT: HCPCS

## 2019-03-04 PROCEDURE — 84443 ASSAY THYROID STIM HORMONE: CPT | Performed by: INTERNAL MEDICINE

## 2019-03-04 PROCEDURE — 84439 ASSAY OF FREE THYROXINE: CPT | Performed by: INTERNAL MEDICINE

## 2019-03-04 PROCEDURE — 84480 ASSAY TRIIODOTHYRONINE (T3): CPT | Performed by: INTERNAL MEDICINE

## 2019-03-04 ASSESSMENT — MIFFLIN-ST. JEOR: SCORE: 1196.98

## 2019-03-04 NOTE — PROGRESS NOTES
"Subjective:      33 year old  at 16w5d presents for a routine prenatal appointment.         Denies cramping/contractions, vaginal bleeding, discharge or leakage of fluid. Reports +fetal movement.  No HA, vision changes, ruq/epigastric pain.       Patient concerns: Feeling well overall but still is experiencing some nausea.   Has level 2 ultrasound on 3/20/19- will be finding out boy or girl.    Per endocrinology needs thyroid labs 4 weeks from  labs. Standing orders are in place. Pt agreeable to completion today.     Objective:  Vitals:    19 0951   BP: 118/74   Pulse: 93   Weight: 51.2 kg (112 lb 12.8 oz)   Height: 1.61 m (5' 3.39\")       See OB flowsheet    Assessment/Plan     Encounter Diagnoses   Name Primary?     Supervision of high risk pregnancy in second trimester Yes     Hyperthyroidism      - Reviewed total weight gain, encouraged continued healthy diet and exercise.      - Reviewed why/how to contact provider.      Patient education/orders or handouts today:  fetal movement and level 2 u/s scheduled    Thyroid labs today- orders in place by endocrinology.  Level 2 ultrasound scheduled for 3/20/19.    Continue scheduled prenatal care, RTC in 4 weeks and prn if questions or concerns.      Sebastian Mcdonough, MARCIN, CNSIGRID   "

## 2019-03-04 NOTE — LETTER
"3/4/2019       RE: Rubina Desouza  [address]  [facility] [address]     Dear Colleague,    Thank you for referring your patient, Rubina Desouza, to the [facility] at [facility]. Please see a copy of my visit note below.    Subjective:      33 year old  at 16w5d presents for a routine prenatal appointment.         Denies cramping/contractions, vaginal bleeding, discharge or leakage of fluid. Reports +fetal movement.  No HA, vision changes, ruq/epigastric pain.       Patient concerns: Feeling well overall but still is experiencing some nausea.   Has level 2 ultrasound on 3/20/19- will be finding out boy or girl.    Per endocrinology needs thyroid labs 4 weeks from  labs. Standing orders are in place. Pt agreeable to completion today.     Objective:  Vitals:    19 0951   BP: 118/74   Pulse: 93   Weight: 51.2 kg (112 lb 12.8 oz)   Height: 1.61 m (5' 3.39\")       See OB flowsheet    Assessment/Plan     Encounter Diagnoses   Name Primary?     Supervision of high risk pregnancy in second trimester Yes     Hyperthyroidism      - Reviewed total weight gain, encouraged continued healthy diet and exercise.      - Reviewed why/how to contact provider.      Patient education/orders or handouts today:  fetal movement and level 2 u/s scheduled    Thyroid labs today- orders in place by endocrinology.  Level 2 ultrasound scheduled for 3/20/19.    Continue scheduled prenatal care, RTC in 4 weeks and prn if questions or concerns.      MARCIN Velasquez, CNSIGRID     "

## 2019-03-05 ENCOUNTER — TELEPHONE (OUTPATIENT)
Dept: ENDOCRINOLOGY | Facility: CLINIC | Age: 34
End: 2019-03-05

## 2019-03-05 DIAGNOSIS — E05.90 HYPERTHYROIDISM: Primary | ICD-10-CM

## 2019-03-05 NOTE — TELEPHONE ENCOUNTER
She is currently 16 weeks pregnant. Abnormal thyroid test is from gestational hyperthyroidism.  She is doing well.    Will repeat lab lab in 4-6 weeks.  Discussed with pt.    Humaira Mast MD  Division of Diabetes and Endocrinology  Department of Medicine  [phone]

## 2019-03-18 ENCOUNTER — PRE VISIT (OUTPATIENT)
Dept: MATERNAL FETAL MEDICINE | Facility: CLINIC | Age: 34
End: 2019-03-18

## 2019-03-20 ENCOUNTER — OFFICE VISIT (OUTPATIENT)
Dept: MATERNAL FETAL MEDICINE | Facility: CLINIC | Age: 34
End: 2019-03-20
Attending: OBSTETRICS & GYNECOLOGY
Payer: COMMERCIAL

## 2019-03-20 ENCOUNTER — HOSPITAL ENCOUNTER (OUTPATIENT)
Dept: ULTRASOUND IMAGING | Facility: CLINIC | Age: 34
Discharge: HOME | End: 2019-03-20
Attending: OBSTETRICS & GYNECOLOGY | Admitting: OBSTETRICS & GYNECOLOGY
Payer: COMMERCIAL

## 2019-03-20 DIAGNOSIS — E05.90 HYPERTHYROIDISM AFFECTING PREGNANCY IN SECOND TRIMESTER: Primary | ICD-10-CM

## 2019-03-20 DIAGNOSIS — O40.9XX0 POLYHYDRAMNIOS AFFECTING PREGNANCY: ICD-10-CM

## 2019-03-20 DIAGNOSIS — O99.282 HYPERTHYROIDISM AFFECTING PREGNANCY IN SECOND TRIMESTER: Primary | ICD-10-CM

## 2019-03-20 DIAGNOSIS — O26.90 PREGNANCY RELATED CONDITION, UNSPECIFIED TRIMESTER: ICD-10-CM

## 2019-03-20 PROCEDURE — 76811 OB US DETAILED SNGL FETUS: CPT

## 2019-03-20 NOTE — NURSING NOTE
In person  present at 1120. Liban Julio from [facility] ID #[id number].  Amber Dugan RN

## 2019-03-20 NOTE — PROGRESS NOTES
"Please see \"Imaging\" tab under \"Chart Review\" for details of today's US at the [facility].    Kaleb Valdivia MD  Maternal-Fetal Medicine      "

## 2019-03-20 NOTE — NURSING NOTE
D: Ipad   used for to days ultrasound and doctor visit. [facility] ID [id number].   Amber Dugan RN

## 2019-04-04 ENCOUNTER — OFFICE VISIT (OUTPATIENT)
Dept: INTERPRETER SERVICES | Facility: CLINIC | Age: 34
End: 2019-04-04
Payer: COMMERCIAL

## 2019-04-04 ENCOUNTER — HOSPITAL ENCOUNTER (OUTPATIENT)
Dept: ULTRASOUND IMAGING | Facility: CLINIC | Age: 34
Discharge: HOME | End: 2019-04-04
Attending: OBSTETRICS & GYNECOLOGY | Admitting: OBSTETRICS & GYNECOLOGY
Payer: COMMERCIAL

## 2019-04-04 ENCOUNTER — OFFICE VISIT (OUTPATIENT)
Dept: OBGYN | Facility: CLINIC | Age: 34
End: 2019-04-04
Attending: ADVANCED PRACTICE MIDWIFE
Payer: COMMERCIAL

## 2019-04-04 ENCOUNTER — OFFICE VISIT (OUTPATIENT)
Dept: MATERNAL FETAL MEDICINE | Facility: CLINIC | Age: 34
End: 2019-04-04
Attending: OBSTETRICS & GYNECOLOGY
Payer: COMMERCIAL

## 2019-04-04 VITALS
WEIGHT: 115.9 LBS | DIASTOLIC BLOOD PRESSURE: 79 MMHG | BODY MASS INDEX: 20.54 KG/M2 | HEART RATE: 73 BPM | SYSTOLIC BLOOD PRESSURE: 119 MMHG | HEIGHT: 63 IN

## 2019-04-04 DIAGNOSIS — O09.92 SUPERVISION OF HIGH RISK PREGNANCY IN SECOND TRIMESTER: Primary | ICD-10-CM

## 2019-04-04 DIAGNOSIS — O40.9XX0 POLYHYDRAMNIOS AFFECTING PREGNANCY: ICD-10-CM

## 2019-04-04 DIAGNOSIS — O40.2XX0 POLYHYDRAMNIOS IN SECOND TRIMESTER, SINGLE OR UNSPECIFIED FETUS: ICD-10-CM

## 2019-04-04 DIAGNOSIS — E05.90 HYPERTHYROIDISM: ICD-10-CM

## 2019-04-04 DIAGNOSIS — E89.0 H/O PARTIAL THYROIDECTOMY: ICD-10-CM

## 2019-04-04 DIAGNOSIS — E05.90 HYPERTHYROIDISM AFFECTING PREGNANCY IN SECOND TRIMESTER: Primary | ICD-10-CM

## 2019-04-04 DIAGNOSIS — E05.90 HYPERTHYROIDISM: Primary | ICD-10-CM

## 2019-04-04 DIAGNOSIS — O99.282 HYPERTHYROIDISM AFFECTING PREGNANCY IN SECOND TRIMESTER: Primary | ICD-10-CM

## 2019-04-04 LAB
T3 SERPL-MCNC: 127 NG/DL (ref 60–181)
T4 FREE SERPL-MCNC: 1.12 NG/DL (ref 0.76–1.46)
TSH SERPL DL<=0.005 MIU/L-ACNC: 0.66 MU/L (ref 0.4–4)

## 2019-04-04 PROCEDURE — 36415 COLL VENOUS BLD VENIPUNCTURE: CPT | Performed by: INTERNAL MEDICINE

## 2019-04-04 PROCEDURE — G0463 HOSPITAL OUTPT CLINIC VISIT: HCPCS | Mod: ZF

## 2019-04-04 PROCEDURE — 84443 ASSAY THYROID STIM HORMONE: CPT | Performed by: INTERNAL MEDICINE

## 2019-04-04 PROCEDURE — 84439 ASSAY OF FREE THYROXINE: CPT | Performed by: INTERNAL MEDICINE

## 2019-04-04 PROCEDURE — 76815 OB US LIMITED FETUS(S): CPT

## 2019-04-04 PROCEDURE — 84480 ASSAY TRIIODOTHYRONINE (T3): CPT | Performed by: INTERNAL MEDICINE

## 2019-04-04 PROCEDURE — T1013 SIGN LANG/ORAL INTERPRETER: HCPCS | Mod: U3,ZF

## 2019-04-04 ASSESSMENT — PAIN SCALES - GENERAL: PAINLEVEL: NO PAIN (0)

## 2019-04-04 ASSESSMENT — MIFFLIN-ST. JEOR: SCORE: 1211.04

## 2019-04-04 NOTE — PROGRESS NOTES
"Subjective:      33 year old  at 21w1d presents for a routine prenatal appointment.         Denies cramping/contractions, vaginal bleeding, discharge or leakage of fluid. Reports +fetal movement.  No HA, vision changes, ruq/epigastric pain.       Patient concerns: Feeling well overall. Had MFM ultrasound today that was performed d/t previous finding of polyhydramnios. AKIRA WNL- no polyhydramnios. Agreeable to thyroid labs today- plan per endo.     Having a boy! States her daughter was disappointed because she wanted a sister.     Objective:  Vitals:    19 1047   BP: 119/79   Pulse: 73   Weight: 52.6 kg (115 lb 14.4 oz)   Height: 1.61 m (5' 3.39\")       See OB flowsheet    Assessment/Plan     Encounter Diagnosis   Name Primary?     Supervision of high risk pregnancy in second trimester Yes     - Reviewed total weight gain, encouraged continued healthy diet and exercise.      - Reviewed why/how to contact provider.      Patient education/orders or handouts today:  PTL signs/symptoms, fetal movement and Plan for EOB visit w labs    Reviewed MFM ultrasound. AKIRA WNL- no polyhydramnios.  Thyroid labs today per endocrinology.  Per MF, serial growth ultrasounds with abnormal thyroid labs.  Plan EOB next visit.     Continue scheduled prenatal care, RTC in 5-6 weeks for EOB and prn if questions or concerns.      Sebastian Mcdonough, MARCIN, CNM   "

## 2019-04-04 NOTE — LETTER
"2019       RE: Rubina Desouza  [address]  [facility] [address]     Dear Colleague,    Thank you for referring your patient, Rubina Desouza, to [facility] Please see a copy of my visit note below.    Subjective:      33 year old  at 21w1d presents for a routine prenatal appointment.         Denies cramping/contractions, vaginal bleeding, discharge or leakage of fluid. Reports +fetal movement.  No HA, vision changes, ruq/epigastric pain.       Patient concerns: Feeling well overall. Had MFM ultrasound today that was performed d/t previous finding of polyhydramnios. AKIRA WNL- no polyhydramnios. Agreeable to thyroid labs today- plan per endo.     Having a boy! States her daughter was disappointed because she wanted a sister.     Objective:  Vitals:    19 1047   BP: 119/79   Pulse: 73   Weight: 52.6 kg (115 lb 14.4 oz)   Height: 1.61 m (5' 3.39\")       See OB flowsheet    Assessment/Plan     Encounter Diagnosis   Name Primary?     Supervision of high risk pregnancy in second trimester Yes     - Reviewed total weight gain, encouraged continued healthy diet and exercise.      - Reviewed why/how to contact provider.      Patient education/orders or handouts today:  PTL signs/symptoms, fetal movement and Plan for EOB visit w labs    Reviewed MFM ultrasound. AKIRA WNL- no polyhydramnios.  Thyroid labs today per endocrinology.  Per MFM, serial growth ultrasounds with abnormal thyroid labs.  Plan EOB next visit.     Continue scheduled prenatal care, RTC in 5-6 weeks for EOB and prn if questions or concerns.      Sebastian Mcdonough, MARCIN, CNM       "

## 2019-04-04 NOTE — PROGRESS NOTES
"Please see \"Imaging\" tab under \"Chart Review\" for details of today's US.    Crissy Farooq, DO    "

## 2019-04-11 PROBLEM — E89.0 H/O PARTIAL THYROIDECTOMY: Status: ACTIVE | Noted: 2017-05-10

## 2019-05-02 ENCOUNTER — OFFICE VISIT (OUTPATIENT)
Dept: OBGYN | Facility: CLINIC | Age: 34
End: 2019-05-02
Attending: MIDWIFE
Payer: COMMERCIAL

## 2019-05-02 VITALS
HEART RATE: 90 BPM | WEIGHT: 117 LBS | DIASTOLIC BLOOD PRESSURE: 78 MMHG | SYSTOLIC BLOOD PRESSURE: 116 MMHG | BODY MASS INDEX: 20.47 KG/M2

## 2019-05-02 DIAGNOSIS — E05.90 HYPERTHYROIDISM: ICD-10-CM

## 2019-05-02 DIAGNOSIS — O09.299: ICD-10-CM

## 2019-05-02 DIAGNOSIS — O99.810 ABNORMAL MATERNAL GLUCOSE TOLERANCE, ANTEPARTUM: Primary | ICD-10-CM

## 2019-05-02 DIAGNOSIS — D50.8 OTHER IRON DEFICIENCY ANEMIA: ICD-10-CM

## 2019-05-02 DIAGNOSIS — O09.91 SUPERVISION OF HIGH RISK PREGNANCY IN FIRST TRIMESTER: Primary | ICD-10-CM

## 2019-05-02 PROBLEM — N26.1 ATROPHY OF LEFT KIDNEY: Status: ACTIVE | Noted: 2019-05-02

## 2019-05-02 PROBLEM — B65.9 SCHISTOSOMIASIS: Status: RESOLVED | Noted: 2018-11-12 | Resolved: 2019-05-02

## 2019-05-02 LAB
DEPRECATED CALCIDIOL+CALCIFEROL SERPL-MC: 60 UG/L (ref 20–75)
ERYTHROCYTE [DISTWIDTH] IN BLOOD BY AUTOMATED COUNT: 14.5 % (ref 10–15)
FERRITIN SERPL-MCNC: 33 NG/ML (ref 12–150)
GLUCOSE 1H P 50 G GLC PO SERPL-MCNC: 139 MG/DL (ref 60–129)
HBA1C MFR BLD: 6.2 % (ref 0–5.6)
HCT VFR BLD AUTO: 32.2 % (ref 35–47)
HCV AB SERPL QL IA: NONREACTIVE
HGB BLD-MCNC: 10.9 G/DL (ref 11.7–15.7)
IRON SATN MFR SERPL: 36 % (ref 15–46)
IRON SERPL-MCNC: 120 UG/DL (ref 35–180)
MCH RBC QN AUTO: 31.1 PG (ref 26.5–33)
MCHC RBC AUTO-ENTMCNC: 33.9 G/DL (ref 31.5–36.5)
MCV RBC AUTO: 92 FL (ref 78–100)
PLATELET # BLD AUTO: 207 10E9/L (ref 150–450)
RBC # BLD AUTO: 3.51 10E12/L (ref 3.8–5.2)
T PALLIDUM AB SER QL: NONREACTIVE
T4 FREE SERPL-MCNC: 1.09 NG/DL (ref 0.76–1.46)
TIBC SERPL-MCNC: 336 UG/DL (ref 240–430)
TSH SERPL DL<=0.005 MIU/L-ACNC: 0.61 MU/L (ref 0.4–4)
WBC # BLD AUTO: 9.2 10E9/L (ref 4–11)

## 2019-05-02 PROCEDURE — 83550 IRON BINDING TEST: CPT | Performed by: MIDWIFE

## 2019-05-02 PROCEDURE — 86780 TREPONEMA PALLIDUM: CPT | Performed by: MIDWIFE

## 2019-05-02 PROCEDURE — 86803 HEPATITIS C AB TEST: CPT | Performed by: MIDWIFE

## 2019-05-02 PROCEDURE — 83540 ASSAY OF IRON: CPT | Performed by: MIDWIFE

## 2019-05-02 PROCEDURE — 85027 COMPLETE CBC AUTOMATED: CPT | Performed by: MIDWIFE

## 2019-05-02 PROCEDURE — 84439 ASSAY OF FREE THYROXINE: CPT | Performed by: MIDWIFE

## 2019-05-02 PROCEDURE — 82950 GLUCOSE TEST: CPT | Performed by: MIDWIFE

## 2019-05-02 PROCEDURE — 36415 COLL VENOUS BLD VENIPUNCTURE: CPT | Performed by: MIDWIFE

## 2019-05-02 PROCEDURE — 82306 VITAMIN D 25 HYDROXY: CPT | Performed by: MIDWIFE

## 2019-05-02 PROCEDURE — 83036 HEMOGLOBIN GLYCOSYLATED A1C: CPT | Performed by: MIDWIFE

## 2019-05-02 PROCEDURE — 82728 ASSAY OF FERRITIN: CPT | Performed by: MIDWIFE

## 2019-05-02 PROCEDURE — G0463 HOSPITAL OUTPT CLINIC VISIT: HCPCS | Mod: ZF

## 2019-05-02 PROCEDURE — 84443 ASSAY THYROID STIM HORMONE: CPT | Performed by: MIDWIFE

## 2019-05-02 RX ORDER — FERROUS SULFATE 325(65) MG
325 TABLET, DELAYED RELEASE (ENTERIC COATED) ORAL DAILY
Qty: 90 TABLET | Refills: 1 | Status: SHIPPED | OUTPATIENT
Start: 2019-05-02 | End: 2024-01-17

## 2019-05-02 RX ORDER — LANOLIN ALCOHOL/MO/W.PET/CERES
1000 CREAM (GRAM) TOPICAL DAILY
Status: DISCONTINUED | OUTPATIENT
Start: 2019-05-02 | End: 2019-07-11

## 2019-05-02 RX ORDER — ASCORBIC ACID 250 MG
250 TABLET,CHEWABLE ORAL DAILY
Qty: 90 TABLET | Refills: 3 | Status: SHIPPED | OUTPATIENT
Start: 2019-05-02 | End: 2024-01-17

## 2019-05-02 ASSESSMENT — PAIN SCALES - GENERAL: PAINLEVEL: NO PAIN (0)

## 2019-05-02 NOTE — LETTER
RE: Rubina Desouza  [address]  [facility] [address]     Dear Colleague,    Thank you for referring your patient, Rubina Desouza, to the [facility] at [facility]. Please see a copy of my visit note below.     34 year old, , 25w1d,   The patient presents with the following concerns: here to do glucose test  With kandace    PHQ-9 SCORE 2017   PHQ-9 Total Score 3 0     Education completed today includes breast feeding, [facility] hand out , contraception, counting movements, signs of pre-term labor, when to present to birthplace, post partum depression, getting enough iron and labor induction.  Birth preferences reviewed: Medicated  Nitrous 3Labor support:      Feeding plans :    Contraception planned:  None unsure reveiwed LARC   The following labs were ordered today:       GCT, CBC w platelets, Vitamin D, Hep C and Anti-treponema  Water birth consent form was not given.  Blood type:   ABO   Date Value Ref Range Status   2019 O  Final     RH(D)   Date Value Ref Range Status   2019 Pos  Final     Antibody Screen   Date Value Ref Range Status   2019 Neg  Final   , Rhogam  was notgiven.  TDAP  was notgiven. REVIEWED for next visit   A/P:  Encounter Diagnoses   Name Primary?     Supervision of high risk pregnancy in first trimester Yes     Hyperthyroidism      Hx of shoulder dystocia, prior pregnancy, currently pregnant      Orders Placed This Encounter   Procedures     Hemoglobin A1c     25- OH-Vitamin D     Glucose 1 Hour     CBC with platelets     Hepatitis C antibody     Treponema Abs w Reflex to RPR and Titer     Growth US in 1 month hx abnormal thyroid labs, hx shoulder dystocia with broken clavical   Continue scheduled prenatal care    Again, thank you for allowing me to participate in the care of your patient.      Sincerely,    MARCIN Buckner CNM

## 2019-05-02 NOTE — PROGRESS NOTES
34 year old, , 25w1d,   The patient presents with the following concerns: here to do glucose test  With kandace    PHQ-9 SCORE 2017   PHQ-9 Total Score 3 0     Education completed today includes breast feeding, [facility] hand out , contraception, counting movements, signs of pre-term labor, when to present to birthplace, post partum depression, getting enough iron and labor induction.  Birth preferences reviewed: Medicated  Nitrous 3Labor support:      Feeding plans :    Contraception planned:  None unsure reveiwed LARC   The following labs were ordered today:       GCT, CBC w platelets, Vitamin D, Hep C and Anti-treponema  Water birth consent form was not given.  Blood type:   ABO   Date Value Ref Range Status   2019 O  Final     RH(D)   Date Value Ref Range Status   2019 Pos  Final     Antibody Screen   Date Value Ref Range Status   2019 Neg  Final   , Rhogam  was notgiven.  TDAP  was notgiven. REVIEWED for next visit   A/P:  Encounter Diagnoses   Name Primary?     Supervision of high risk pregnancy in first trimester Yes     Hyperthyroidism      Hx of shoulder dystocia, prior pregnancy, currently pregnant      Orders Placed This Encounter   Procedures     Hemoglobin A1c     25- OH-Vitamin D     Glucose 1 Hour     CBC with platelets     Hepatitis C antibody     Treponema Abs w Reflex to RPR and Titer     Growth US in 1 month hx abnormal thyroid labs, hx shoulder dystocia with broken clavical   Continue scheduled prenatal care  MARCIN Buckner CNM

## 2019-05-02 NOTE — NURSING NOTE
Chief Complaint   Patient presents with     Prenatal Care     25 weeks and 1 day   Neha Spencer LPN

## 2019-05-03 ENCOUNTER — ANCILLARY PROCEDURE (OUTPATIENT)
Dept: ULTRASOUND IMAGING | Facility: CLINIC | Age: 34
End: 2019-05-03
Attending: INTERNAL MEDICINE
Payer: COMMERCIAL

## 2019-05-03 ENCOUNTER — TELEPHONE (OUTPATIENT)
Dept: ENDOCRINOLOGY | Facility: CLINIC | Age: 34
End: 2019-05-03

## 2019-05-03 DIAGNOSIS — N13.30 UNSPECIFIED HYDRONEPHROSIS: ICD-10-CM

## 2019-05-03 DIAGNOSIS — N18.2 CHRONIC KIDNEY DISEASE, STAGE II (MILD): ICD-10-CM

## 2019-05-03 DIAGNOSIS — J47.9 BRONCHIECTASIS, UNCOMPLICATED (H): ICD-10-CM

## 2019-05-03 NOTE — TELEPHONE ENCOUNTER
Results for PRICILLA ULRICH (MRN [id number]) as of 5/3/2019 12:47   Ref. Range 4/4/2019 11:29 5/2/2019 10:53   T4 Free Latest Ref Range: 0.76 - 1.46 ng/dL 1.12 1.09   Triiodothyronine (T3) Latest Ref Range: 60 - 181 ng/dL 127    TSH Latest Ref Range: 0.40 - 4.00 mU/L 0.66 0.61     Called pt and inform normal TFT.  Previous abnormal TFT is from gestational hyperthyroidism  Patient is doing well.  No need to recheck lab    Humaira Mast MD  Division of Diabetes and Endocrinology  Department of Medicine  [phone]

## 2019-05-05 ENCOUNTER — TELEPHONE (OUTPATIENT)
Dept: OBGYN | Facility: CLINIC | Age: 34
End: 2019-05-05

## 2019-05-05 NOTE — TELEPHONE ENCOUNTER
Diabetes Education Scheduling Outreach #1:    Call to patient to schedule. Patient declined to schedule and  Asked for provider to call her back regarding her test results.        Li Toledo  [place] [facility]  Diabetes and Nutrition Scheduling

## 2019-05-06 ENCOUNTER — TELEPHONE (OUTPATIENT)
Dept: OBGYN | Facility: CLINIC | Age: 34
End: 2019-05-06

## 2019-05-06 DIAGNOSIS — O99.810 ABNORMAL MATERNAL GLUCOSE TOLERANCE, ANTEPARTUM: Primary | ICD-10-CM

## 2019-05-06 NOTE — TELEPHONE ENCOUNTER
Spoke with Rubina with [place] . Discussed lab results and high risk for gestational diabetes.  Agreeable to complete 3 hr GTT.  RN will call to assist with lab scheduling. -MARCIN Whitehead CNM

## 2019-05-06 NOTE — TELEPHONE ENCOUNTER
See result note.Rubina prefers to do 3 hr GTT,refusing to start checking her blood sugars,sending this to midwife pool

## 2019-05-06 NOTE — TELEPHONE ENCOUNTER
Midwife, Chas Noel, called and spoke with Rubina who agreed to have 3 hr glucose test.  Rubina spoke with another RN and received instruction and has a lab appointment.

## 2019-05-06 NOTE — TELEPHONE ENCOUNTER
Pt was referred to Diabetes Education by midwife (see result note) due to results of 1 hr glucose and A1c.  Received note from Monika Orosco, Diabetes Educator, that pt wants to speak with provider regarding results before scheduling with Diabetes Ed and that she likely has DM2 vs GDM.    Forwarding to midwife pool.

## 2019-05-06 NOTE — TELEPHONE ENCOUNTER
Spoke to Rubina with  and scheduled her for 3 hr gtt this We 5/8/19, instruction given.Pt indicated understanding and agreed with plan.

## 2019-05-08 DIAGNOSIS — O99.810 ABNORMAL MATERNAL GLUCOSE TOLERANCE, ANTEPARTUM: ICD-10-CM

## 2019-05-08 LAB
GLUCOSE 1H P 100 G GLC PO SERPL-MCNC: 169 MG/DL (ref 60–179)
GLUCOSE 2H P 100 G GLC PO SERPL-MCNC: 190 MG/DL (ref 60–154)
GLUCOSE 3H P 100 G GLC PO SERPL-MCNC: 148 MG/DL (ref 60–139)
GLUCOSE P FAST SERPL-MCNC: 78 MG/DL (ref 60–94)

## 2019-05-08 PROCEDURE — 36415 COLL VENOUS BLD VENIPUNCTURE: CPT | Performed by: ADVANCED PRACTICE MIDWIFE

## 2019-05-08 PROCEDURE — 82952 GTT-ADDED SAMPLES: CPT | Performed by: ADVANCED PRACTICE MIDWIFE

## 2019-05-08 PROCEDURE — 82951 GLUCOSE TOLERANCE TEST (GTT): CPT | Performed by: ADVANCED PRACTICE MIDWIFE

## 2019-05-09 PROBLEM — O24.410 DIET CONTROLLED GESTATIONAL DIABETES MELLITUS (GDM): Status: ACTIVE | Noted: 2019-05-09

## 2019-05-10 ENCOUNTER — TELEPHONE (OUTPATIENT)
Dept: OBGYN | Facility: CLINIC | Age: 34
End: 2019-05-10

## 2019-05-10 NOTE — TELEPHONE ENCOUNTER
Spoke with Rubina about 3 hour glucose and needing to see diabetic education. Patient agreeable. She has supplies already. Message sent to endo pool to contact patient to schedule.

## 2019-05-20 ENCOUNTER — TELEPHONE (OUTPATIENT)
Dept: OBGYN | Facility: CLINIC | Age: 34
End: 2019-05-20

## 2019-05-20 DIAGNOSIS — O24.410 DIET CONTROLLED GESTATIONAL DIABETES MELLITUS (GDM) IN SECOND TRIMESTER: Primary | ICD-10-CM

## 2019-05-20 NOTE — TELEPHONE ENCOUNTER
Called patient with . Patient declined scheduling at this time. Patient was given phone number to call back and schedule when she is ready.

## 2019-05-21 ENCOUNTER — ALLIED HEALTH/NURSE VISIT (OUTPATIENT)
Dept: EDUCATION SERVICES | Facility: CLINIC | Age: 34
End: 2019-05-21
Payer: COMMERCIAL

## 2019-05-21 DIAGNOSIS — O24.410 DIET CONTROLLED GESTATIONAL DIABETES MELLITUS (GDM): ICD-10-CM

## 2019-05-21 RX ORDER — LANCETS
EACH MISCELLANEOUS
Qty: 204 EACH | Refills: 11 | Status: ON HOLD | OUTPATIENT
Start: 2019-05-21 | End: 2019-08-13

## 2019-05-21 NOTE — PROGRESS NOTES
Diabetes Self-Management Education & Support    Gestational Diabetes Self-Management Education & Support    SUBJECTIVE/OBJECTIVE:  Presents for education related to gestational diabetes.    Accompanied by: Self, (Speaks French, reads French)  Diabetes management related comments/concerns: (3rd pregnancy, first with GDM)    Cultural Influences/Ethnic Background:  French  3rd pregnancy.  She has a 2 children, age 7 years and 16 months.    Estimated Date of Delivery: Aug 14, 2019    1 hour OGTT  Lab Results   Component Value Date    GLU1 139 (H) 2019       3 hour OGTT    Fasting  Lab Results   Component Value Date    GLF 78 2019       1 hour  Lab Results   Component Value Date    GL1 169 2019       2 hour  Lab Results   Component Value Date    GL2 190 (H) 2019       3 hour  Lab Results   Component Value Date    GL3 148 (H) 2019       Lifestyle and Health Behaviors:  Pre-pregnancy weight (lbs): 125  Exercise:: Currently not exercising  Barrier to exercise: None  Meals include: Breakfast, Lunch, Dinner  Beverages: Water, Tea, Milk, Juice  Cultural/Taoist diet restrictions?: Yes  Biggest challenges to healthy eating: None  Pre-bo vitamin?: Yes  Supplements?: No  Experiencing nausea?: No    Healthy Coping:  Emotional response to diabetes: Concern for health and well-being, Uncertain  Informal Support system:: Spouse, Friends    Current Management:  Taking medications for gestational diabetes?: No  Difficulty affording diabetes management supplies?: No    ASSESSMENT:    Rubina is orginally from [place], left there during the civil war in  with her parents to [facility].  She lived in [place] from  to .  She met her  there and they moved to [place] in .  Her daughter was born in [place].  In  they emig[place] to the [place]  They have been living in [place] since.      This is her 3rd pregnancy.  She states that she tested positive for GDM with  pregnancy #2 on the initial screening but on the 3 hour test, she passed.      Of concern is that her weight has actually dropped from 125 lbs to 119 lbs today, she states.  This is reported differently that the OB/GYN note of 2/4/2019 which states that her pre-pregnancy weight was 111 lbs.    She states that she has just not had an appetite, and can't eat very large amounts of food.  She eats a fairly traditional Jamaican diet.  She drinks tea with 1 teaspoon of sugar twice a day.  She drinks a smoothie that she makes from an avocado, a christiano, some berries or pineapple and 1 teaspoon of sugar with water a couple of times a week between lunch and dinner.      Breakfast generally consists of one of the following:  Jamaican enjera 1-2 pieces with a meat and vegetable sauce or  1 serving of oatmeal, made from oats, not pre-packaged, with 1 teaspoon of sugar or   2 slices of whole wheat toast with eggs and tomatoes.     She often snacks on fruit in between meals.     Lunch is generally a small portion of spaghetti or rice.  She says that she hasn't been able to eat much at a time.     Between lunch and dinner she may have one of these fruit/avocado smoothies, or she may have some yogurt.  She also has tea in the afternoon with 1 teaspoon of sugar.      She drinks 2 glasses of milk daily.       INTERVENTION:  Patient was instructed on Contour Next One meter and was able to provide an accurate return demonstration. Patient's blood glucose reading today was 106 mg/dL about an hour and a half after eating breakfast.      Educational topics covered today:  GDM diagnosis, pathophysiology, Risks and Complications of GDM, Means of controlling GDM, Using a Blood Glucose Monitor, Blood Glucose Goals, Logging and Interpreting Glucose Results, Ketone Testing, When to Call a Diabetes Educator or OB Provider, Healthy Eating During Pregnancy, Meal Planning for GDM, and Physical Activity    Educational materials provided today:   Jamaican  version of diabetes in pregnancy  Polish Carb counting Book  Polish guide to checking blood glucoses  Polish meal planning guide.      Contour Next USB meter kit    Pt verbalized understanding of concepts discussed and recommendations provided today.     PLAN:  Check glucose 4 times daily, before breakfast and 1 hour after each meal.     Physical activity recommended: 10-15 minutes of walking three times a day.  Follow up appointment in two weeks.        Time Spent: 60 minutes  Encounter Type: Individual    Any diabetes medication dose changes were made via the CDE Protocol and Collaborative Practice Agreement with the patient's referring provider. A copy of this encounter was shared with the provider.

## 2019-05-28 ENCOUNTER — TELEPHONE (OUTPATIENT)
Dept: OBGYN | Facility: CLINIC | Age: 34
End: 2019-05-28

## 2019-05-30 ENCOUNTER — ANCILLARY PROCEDURE (OUTPATIENT)
Dept: ULTRASOUND IMAGING | Facility: CLINIC | Age: 34
End: 2019-05-30
Attending: MIDWIFE
Payer: COMMERCIAL

## 2019-05-30 ENCOUNTER — OFFICE VISIT (OUTPATIENT)
Dept: OBGYN | Facility: CLINIC | Age: 34
End: 2019-05-30
Attending: MIDWIFE
Payer: COMMERCIAL

## 2019-05-30 VITALS
DIASTOLIC BLOOD PRESSURE: 78 MMHG | HEIGHT: 63 IN | HEART RATE: 89 BPM | WEIGHT: 118.8 LBS | BODY MASS INDEX: 21.05 KG/M2 | SYSTOLIC BLOOD PRESSURE: 116 MMHG

## 2019-05-30 DIAGNOSIS — O09.91 SUPERVISION OF HIGH RISK PREGNANCY IN FIRST TRIMESTER: ICD-10-CM

## 2019-05-30 DIAGNOSIS — O09.299: ICD-10-CM

## 2019-05-30 DIAGNOSIS — Z23 NEED FOR TDAP VACCINATION: ICD-10-CM

## 2019-05-30 DIAGNOSIS — O09.93 HRP (HIGH RISK PREGNANCY), THIRD TRIMESTER: Primary | ICD-10-CM

## 2019-05-30 PROCEDURE — 90472 IMMUNIZATION ADMIN EACH ADD: CPT | Mod: ZF

## 2019-05-30 PROCEDURE — 76816 OB US FOLLOW-UP PER FETUS: CPT

## 2019-05-30 PROCEDURE — 90715 TDAP VACCINE 7 YRS/> IM: CPT | Mod: ZF

## 2019-05-30 PROCEDURE — 25000128 H RX IP 250 OP 636: Mod: ZF

## 2019-05-30 PROCEDURE — G0463 HOSPITAL OUTPT CLINIC VISIT: HCPCS

## 2019-05-30 PROCEDURE — 90471 IMMUNIZATION ADMIN: CPT

## 2019-05-30 RX ORDER — GLYCERIN/MINERAL OIL
1 LOTION (ML) TOPICAL DAILY
Qty: 30 TABLET | Refills: 3 | Status: SHIPPED | OUTPATIENT
Start: 2019-05-30 | End: 2019-09-20

## 2019-05-30 ASSESSMENT — MIFFLIN-ST. JEOR: SCORE: 1213

## 2019-05-30 NOTE — PROGRESS NOTES
"Subjective:      34 year old  at 29w1d presents for a routine prenatal appointment. Growth US completed today 67% EFW 1484gms         Denies vaginal bleeding,  leakage of fluid, or change in vaginal discharge.  Denies contractions.  Reports reguar fetal movement.     No HA, visual changes, RUQ or epigastric pain.   Patient concerns: Continues to check blood glucose QID Fasting reported to be between 75-93 and 2hr post parandial 109-116, follows up with diabetic education and has appointment with Dr. Barton in 2 weeks.  Feeling well overall.  Tdap given today.  Objective:  Vitals:    19 1027   BP: 116/78   BP Location: Left arm   Patient Position: Chair   Pulse: 89   Weight: 53.9 kg (118 lb 12.8 oz)   Height: 1.6 m (5' 3\")    See OB flowsheet    Assessment/Plan     Encounter Diagnoses   Name Primary?     HRP (high risk pregnancy), third trimester Yes                PHQ-9 SCORE 2017   PHQ-9 Total Score 3 0     PHQ-9 SCORE 2017   PHQ-9 Total Score 3 0       Reinforced daily fetal movement counts.  Reviewed why/how to contact provider if headache/visual changes/RUQ or epigastric pain, decreased fetal movement, vaginal bleeding, leakage of fluid.   Return to clinic in 2 week and prn if questions or concerns.     MARCIN Rock CNM  "

## 2019-05-30 NOTE — LETTER
"2019       RE: Rubina Desouza  [address]  [facility] [address]     Dear Colleague,    Thank you for referring your patient, Rubina Desouza, to the [facility] at [facility]. Please see a copy of my visit note below.    Subjective:      34 year old  at 29w1d presents for a routine prenatal appointment. Growth US completed today 67% EFW 1484gms         Denies vaginal bleeding,  leakage of fluid, or change in vaginal discharge.  Denies contractions.  Reports reguar fetal movement.     No HA, visual changes, RUQ or epigastric pain.   Patient concerns: Continues to check blood glucose QID Fasting reported to be between 75-93 and 2hr post parandial 109-116, follows up with diabetic education and has appointment with Dr. Barton in 2 weeks.  Feeling well overall.  Tdap given today.  Objective:  Vitals:    19 1027   BP: 116/78   BP Location: Left arm   Patient Position: Chair   Pulse: 89   Weight: 53.9 kg (118 lb 12.8 oz)   Height: 1.6 m (5' 3\")    See OB flowsheet    Assessment/Plan     Encounter Diagnoses   Name Primary?     HRP (high risk pregnancy), third trimester Yes                PHQ-9 SCORE 2017   PHQ-9 Total Score 3 0     PHQ-9 SCORE 2017   PHQ-9 Total Score 3 0       Reinforced daily fetal movement counts.  Reviewed why/how to contact provider if headache/visual changes/RUQ or epigastric pain, decreased fetal movement, vaginal bleeding, leakage of fluid.   Return to clinic in 2 week and prn if questions or concerns.     Aliyah Stephenson, MARCIN CNM    "

## 2019-06-06 ENCOUNTER — ALLIED HEALTH/NURSE VISIT (OUTPATIENT)
Dept: EDUCATION SERVICES | Facility: CLINIC | Age: 34
End: 2019-06-06
Payer: COMMERCIAL

## 2019-06-06 DIAGNOSIS — O24.410 DIET CONTROLLED GESTATIONAL DIABETES MELLITUS (GDM): Primary | ICD-10-CM

## 2019-06-06 NOTE — PATIENT INSTRUCTIONS
Diabetes Instructions:    Check urine ketones on the first voiding of the day.    Make sure you compare it to the chart after 15 seconds.     If trace or higher for more than 2 days, call your diabetes person at the clinic or call me.    Try to work in some snacks along with your meals.      All of your numbers today look great.  Keep monitoring just as you have been !    Call if questions.    JOSHUA LarsonN, RN, CDE  Diabetes   [facility]  [facility] [address], [place] [address]  Email:  [email or web address]  Phone:  [phone]

## 2019-06-06 NOTE — Clinical Note
I saw Rubina in follow up today and everything looks good.  Wondering what my role is in follow up, as it sounds like your clinic is managing these gestationals.  She told me that she will be seeing Dr. Barton and I presume you have a diabetes educator in the clinic as well?  Let me know how you want to proceed.  Thanks.

## 2019-06-07 VITALS — WEIGHT: 119.6 LBS | BODY MASS INDEX: 21.19 KG/M2

## 2019-06-12 ENCOUNTER — OFFICE VISIT (OUTPATIENT)
Dept: INTERNAL MEDICINE | Facility: CLINIC | Age: 34
End: 2019-06-12
Attending: INTERNAL MEDICINE
Payer: COMMERCIAL

## 2019-06-12 ENCOUNTER — HOSPITAL ENCOUNTER (OUTPATIENT)
Dept: GENERAL RADIOLOGY | Facility: CLINIC | Age: 34
Discharge: HOME | End: 2019-06-12
Attending: INTERNAL MEDICINE | Admitting: INTERNAL MEDICINE
Payer: COMMERCIAL

## 2019-06-12 ENCOUNTER — OFFICE VISIT (OUTPATIENT)
Dept: OBGYN | Facility: CLINIC | Age: 34
End: 2019-06-12
Attending: ADVANCED PRACTICE MIDWIFE
Payer: COMMERCIAL

## 2019-06-12 VITALS
DIASTOLIC BLOOD PRESSURE: 76 MMHG | HEART RATE: 87 BPM | BODY MASS INDEX: 21.26 KG/M2 | SYSTOLIC BLOOD PRESSURE: 114 MMHG | WEIGHT: 120 LBS

## 2019-06-12 VITALS
DIASTOLIC BLOOD PRESSURE: 81 MMHG | HEIGHT: 63 IN | HEART RATE: 101 BPM | BODY MASS INDEX: 21.25 KG/M2 | WEIGHT: 119.9 LBS | SYSTOLIC BLOOD PRESSURE: 118 MMHG

## 2019-06-12 DIAGNOSIS — O24.410 DIET CONTROLLED GESTATIONAL DIABETES MELLITUS (GDM), ANTEPARTUM: ICD-10-CM

## 2019-06-12 DIAGNOSIS — B65.0: ICD-10-CM

## 2019-06-12 DIAGNOSIS — O24.410 DIET CONTROLLED GESTATIONAL DIABETES MELLITUS (GDM) IN THIRD TRIMESTER: Primary | ICD-10-CM

## 2019-06-12 DIAGNOSIS — O09.90 SUPERVISION OF HIGH RISK PREGNANCY, ANTEPARTUM: Primary | ICD-10-CM

## 2019-06-12 DIAGNOSIS — R09.89 ABNORMAL LUNG SOUNDS: ICD-10-CM

## 2019-06-12 DIAGNOSIS — O09.299: ICD-10-CM

## 2019-06-12 LAB
ALBUMIN UR-MCNC: NEGATIVE MG/DL
APPEARANCE UR: CLEAR
BACTERIA #/AREA URNS HPF: ABNORMAL /HPF
BILIRUB UR QL STRIP: NEGATIVE
COLOR UR AUTO: ABNORMAL
GLUCOSE UR STRIP-MCNC: NEGATIVE MG/DL
HGB UR QL STRIP: NEGATIVE
KETONES UR STRIP-MCNC: NEGATIVE MG/DL
LEUKOCYTE ESTERASE UR QL STRIP: ABNORMAL
NITRATE UR QL: NEGATIVE
PH UR STRIP: 6.5 PH (ref 5–7)
RBC #/AREA URNS AUTO: 1 /HPF (ref 0–2)
SOURCE: ABNORMAL
SP GR UR STRIP: 1 (ref 1–1.03)
SQUAMOUS #/AREA URNS AUTO: 1 /HPF (ref 0–1)
UROBILINOGEN UR STRIP-MCNC: NORMAL MG/DL (ref 0–2)
WBC #/AREA URNS AUTO: 1 /HPF (ref 0–5)

## 2019-06-12 PROCEDURE — 81001 URINALYSIS AUTO W/SCOPE: CPT | Performed by: INTERNAL MEDICINE

## 2019-06-12 PROCEDURE — 88112 CYTOPATH CELL ENHANCE TECH: CPT | Performed by: INTERNAL MEDICINE

## 2019-06-12 PROCEDURE — 71046 X-RAY EXAM CHEST 2 VIEWS: CPT

## 2019-06-12 PROCEDURE — G0463 HOSPITAL OUTPT CLINIC VISIT: HCPCS | Mod: ZF,25

## 2019-06-12 RX ORDER — LANCETS
EACH MISCELLANEOUS
Qty: 102 EACH | Refills: 3 | Status: ON HOLD | OUTPATIENT
Start: 2019-06-12 | End: 2019-08-13

## 2019-06-12 RX ORDER — LANCING DEVICE/LANCETS
KIT MISCELLANEOUS
Qty: 1 EACH | Refills: 0 | Status: SHIPPED | OUTPATIENT
Start: 2019-06-12 | End: 2019-07-11

## 2019-06-12 RX ORDER — LANCETS
EACH MISCELLANEOUS
Qty: 204 EACH | Refills: 11 | Status: CANCELLED | OUTPATIENT
Start: 2019-06-12

## 2019-06-12 ASSESSMENT — ANXIETY QUESTIONNAIRES
7. FEELING AFRAID AS IF SOMETHING AWFUL MIGHT HAPPEN: NOT AT ALL
GAD7 TOTAL SCORE: 0
5. BEING SO RESTLESS THAT IT IS HARD TO SIT STILL: NOT AT ALL
3. WORRYING TOO MUCH ABOUT DIFFERENT THINGS: NOT AT ALL
2. NOT BEING ABLE TO STOP OR CONTROL WORRYING: NOT AT ALL
1. FEELING NERVOUS, ANXIOUS, OR ON EDGE: NOT AT ALL
6. BECOMING EASILY ANNOYED OR IRRITABLE: NOT AT ALL

## 2019-06-12 ASSESSMENT — PATIENT HEALTH QUESTIONNAIRE - PHQ9
5. POOR APPETITE OR OVEREATING: NOT AT ALL
SUM OF ALL RESPONSES TO PHQ QUESTIONS 1-9: 0

## 2019-06-12 ASSESSMENT — MIFFLIN-ST. JEOR: SCORE: 1217.86

## 2019-06-12 ASSESSMENT — PAIN SCALES - GENERAL
PAINLEVEL: NO PAIN (0)
PAINLEVEL: NO PAIN (0)

## 2019-06-12 NOTE — LETTER
2019       RE: Rubina Desouza  [address]  [facility] [address]     Dear Colleague,    Thank you for referring your patient, Rubina Desouza, to the [facility]  at [facility]. Please see a copy of my visit note below.  [facility]    HPI:  Rubina Desouza is a 34 year old  at Unknown with a new diagnosis of gestational diabetes.     She has seen diabetes education.   She does not have a history of gestational diabetes.   She does not have a history of Type 2 Diabetes.   She does not have a family history of Type 2 Diabetes.   She does not have a history of pre-term labor.   She does not have a history of chronic hypertension, gestational hypertension or pre-eclampsia.   She does not have a history of LGA (infant >10 lbs).     She is checking her blood sugars regularly, including Fasting and 2-hours post-prandial.    Blood sugars are as follows: (complete table or insert clinical media)    Date Fasting Post-Breakfast Post-Lunch Post-Dinner    86       81 88 89 93   6/10  83 104 104    87 77 105 100      94 101                   Diabetes Symptoms:   none      Past Medical History:   Diagnosis Date     Anemia 2017     Conductive hearing loss 2015     Hearing problem 2015     Hyperthyroidism     Better since surgery     Tinnitus      Past Surgical History:   Procedure Laterality Date     CYSTOSCOPY, BIOPSY BLADDER, COMBINED N/A 10/26/2018    Procedure: Bladder Biopsy, Right Ureteroscopy with Possible Biopsy;  Surgeon: Viral Hampton MD;  Location: [facility]     CYSTOSCOPY, URETEROSCOPY, COMBINED  10/26/2018    Procedure: COMBINED CYSTOSCOPY, URETEROSCOPY;  Surgeon: Viral Hampton MD;  [facility]     THYROID SURGERY      in [facility]; partial thyroidectomy     Family History   Problem Relation Age of Onset     Hypertension Father      Social History     Socioeconomic History      Marital status:      Spouse name: Saji     Number of children: Not on file     Years of education: Not on file     Highest education level: Not on file   Occupational History     Employer: UNEMPLOYED   Social Needs     Financial resource strain: Not on file     Food insecurity:     Worry: Not on file     Inability: Not on file     Transportation needs:     Medical: Not on file     Non-medical: Not on file   Tobacco Use     Smoking status: Never Smoker     Smokeless tobacco: Never Used   Substance and Sexual Activity     Alcohol use: No     Alcohol/week: 0.0 oz     Drug use: No     Sexual activity: Yes     Partners: Male     Birth control/protection: None   Lifestyle     Physical activity:     Days per week: Not on file     Minutes per session: Not on file     Stress: Not on file   Relationships     Social connections:     Talks on phone: Not on file     Gets together: Not on file     Attends Worship service: Not on file     Active member of club or organization: Not on file     Attends meetings of clubs or organizations: Not on file     Relationship status: Not on file     Intimate partner violence:     Fear of current or ex partner: Not on file     Emotionally abused: Not on file     Physically abused: Not on file     Forced sexual activity: Not on file   Other Topics Concern     Not on file   Social History Narrative    [place] to [place] in [place].   with 2 y/o son and 7 year daughter.  Moved from [place] to [facility] in [place], [id number] in [place], [phone] in [place], then [place] in 2017.  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  No travel back to [place] since immigration.       10 point ROS of systems including Constitutional, Eyes, Respiratory, Cardiovascular, Gastroenterology, Genitourinary, Integumentary, Muscularskeletal, Psychiatric were all negative except for pertinent positives noted in my HPI.    Physical Exam:   /76   Pulse 87   Wt 54.4 kg (120 lb)   LMP 11/16/2018    Breastfeeding? No   BMI 21.26 kg/m     Gen: Pleasant female, in NAD  Eyes: Anicteric sclera, EOMI  ENT: Oropharynx clear, MMM  Neck: no LAD  Resp: crackles - left mid-posterior lung fields  CV: Heart RRR, no MRG  Abd: Gravid abdomen, non-tender, nl bowel sounds  Ext: WWP, no LE edema  Skin: Warm, dry, no rash  Neuro: AOx3, no focal deficits      Assessment:     Rubina Desouza is a 34 year old  at Unknown with a new diagnosis of gestational diabetes. She has the following risk factors that indicate early need for insulin therapy, including: Age >30. This is not 6 or 7 risk factors. After reviewing her blood sugars, greater than 50% are at target. Based on this, I recommend Continued dietary and life-style modifications.     Plan:   Continue dietary modifications, regular exercise as able  Continue to check blood glucose 4x daily  Bring glucose log to all appointments  Needs 2-hr GTT at 6 wks post-partum  Turn in blood glucose log to clinic RN in 1 week(s)    Abnormal lung sounds.  Reviewed past medical history with patient, including history of Mycobacterium infection.  Recommend obtaining chest x-ray to exclude worsening of disease.  Patient was advised on safety of imaging and pregnancy.  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  Reviewed prior history with patient.  Given biopsy consistent with Schistosoma and prior treatment, recommend urinalysis to exclude ongoing hematuria.  Patient will need follow-up with urology with repeat bladder visualization and possible biopsy to confirm cure.  -     Routine UA with microscopic; Future  -     Cytology non gyn        Amairani Barton MD

## 2019-06-12 NOTE — PROGRESS NOTES
pcr   [facility]    HPI:  Rubina Desouza is a 34 year old  at Unknown with a new diagnosis of gestational diabetes.     She has seen diabetes education.   She does not have a history of gestational diabetes.   She does not have a history of Type 2 Diabetes.   She does not have a family history of Type 2 Diabetes.   She does not have a history of pre-term labor.   She does not have a history of chronic hypertension, gestational hypertension or pre-eclampsia.   She does not have a history of LGA (infant >10 lbs).     She is checking her blood sugars regularly, including Fasting and 2-hours post-prandial.    Blood sugars are as follows: (complete table or insert clinical media)    Date Fasting Post-Breakfast Post-Lunch Post-Dinner    86       81 88 89 93   6/10  83 104 104    87 77 105 100      94 101                   Diabetes Symptoms:   none      Past Medical History:   Diagnosis Date     Anemia 2017     Conductive hearing loss 2015     Hearing problem 2015     Hyperthyroidism     Better since surgery     Tinnitus      Past Surgical History:   Procedure Laterality Date     CYSTOSCOPY, BIOPSY BLADDER, COMBINED N/A 10/26/2018    Procedure: Bladder Biopsy, Right Ureteroscopy with Possible Biopsy;  Surgeon: Viral Hampton MD;  [facility]     CYSTOSCOPY, URETEROSCOPY, COMBINED  10/26/2018    Procedure: COMBINED CYSTOSCOPY, URETEROSCOPY;  Surgeon: Viral Hampton MD;  [facility]     TH[facility]ROID SURGERY      in [facility]; partial thyroidectomy     Family History   Problem Relation Age of Onset     Hypertension Father      Social History     Socioeconomic History     Marital status:      Spouse name: Saji     Number of children: Not on file     Years of education: Not on file     Highest education level: Not on file   Occupational History     Employer: UNEMPLOYED   Social Needs     Financial resource strain: Not on file      Food insecurity:     Worry: Not on file     Inability: Not on file     Transportation needs:     Medical: Not on file     Non-medical: Not on file   Tobacco Use     Smoking status: Never Smoker     Smokeless tobacco: Never Used   Substance and Sexual Activity     Alcohol use: No     Alcohol/week: 0.0 oz     Drug use: No     Sexual activity: Yes     Partners: Male     Birth control/protection: None   Lifestyle     Physical activity:     Days per week: Not on file     Minutes per session: Not on file     Stress: Not on file   Relationships     Social connections:     Talks on phone: Not on file     Gets together: Not on file     Attends Anglican service: Not on file     Active member of club or organization: Not on file     Attends meetings of clubs or organizations: Not on file     Relationship status: Not on file     Intimate partner violence:     Fear of current or ex partner: Not on file     Emotionally abused: Not on file     Physically abused: Not on file     Forced sexual activity: Not on file   Other Topics Concern     Not on file   Social History Narrative    [place] to [place] in [place].   with 2 y/o son and 7 year daughter.  Moved from [facility] to [place] in 1991, [phone] in [place], [phone] in [place], then [place] in 2017.  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  No travel back to [place] since immigration.       10 point ROS of systems including Constitutional, Eyes, Respiratory, Cardiovascular, Gastroenterology, Genitourinary, Integumentary, Muscularskeletal, Psychiatric were all negative except for pertinent positives noted in my HPI.    Physical Exam:   /76   Pulse 87   Wt 54.4 kg (120 lb)   LMP 11/16/2018   Breastfeeding? No   BMI 21.26 kg/m    Gen: Pleasant female, in NAD  Eyes: Anicteric sclera, EOMI  ENT: Oropharynx clear, MMM  Neck: no LAD  Resp: crackles - left mid-posterior lung fields  CV: Heart RRR, no MRG  Abd: Gravid abdomen, non-tender, nl bowel sounds  Ext: WWP,  no LE edema  Skin: Warm, dry, no rash  Neuro: AOx3, no focal deficits      Assessment:     Rubina Desouza is a 34 year old  at Unknown with a new diagnosis of gestational diabetes. She has the following risk factors that indicate early need for insulin therapy, including: Age >30. This is not 6 or 7 risk factors. After reviewing her blood sugars, greater than 50% are at target. Based on this, I recommend Continued dietary and life-style modifications.     Plan:   Continue dietary modifications, regular exercise as able  Continue to check blood glucose 4x daily  Bring glucose log to all appointments  Needs 2-hr GTT at 6 wks post-partum  Turn in blood glucose log to clinic RN in 1 week(s)    Abnormal lung sounds.  Reviewed past medical history with patient, including history of Mycobacterium infection.  Recommend obtaining chest x-ray to exclude worsening of disease.  Patient was advised on safety of imaging and pregnancy.  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  Reviewed prior history with patient.  Given biopsy consistent with Schistosoma and prior treatment, recommend urinalysis to exclude ongoing hematuria.  Patient will need follow-up with urology with repeat bladder visualization and possible biopsy to confirm cure.  -     Routine UA with microscopic; Future  -     Cytology non gyn        Amairani Barton MD

## 2019-06-12 NOTE — PROGRESS NOTES
"Subjective:      34 year old  at 31w0d presentst for a routine prenatal appointment.    No vaginal bleeding or leakage of fluid.  No contractions. Daily fetal movement.       No HA, visual changes, RUQ or epigastric pain.   Patient concerns:  Feeling well overall. Met with Dr. Barton today regarding GDM. Blood sugars are well-controlled.   Reviewed TDAP Previously given  Objective:  Vitals:    19 1129   BP: 118/81   Pulse: 101   Weight: 54.4 kg (119 lb 14.4 oz)   Height: 1.6 m (5' 2.99\")   See ob flowsheet    Assessment/Plan     Encounter Diagnoses   Name Primary?     Supervision of high risk pregnancy - [facility] CNM Yes     Diet controlled gestational diabetes mellitus (GDM), antepartum      Hx of shoulder dystocia, prior pregnancy, currently pregnant        ABO   Date Value Ref Range Status   2019 O  Final     RH(D)   Date Value Ref Range Status   2019 Pos  Final     Antibody Screen   Date Value Ref Range Status   2019 Neg  Final   Rhogam  was not given.    - Reviewed total weight gain, encouraged continued healthy diet and exercise.   Reviewed importance of daily fetal kick count and why/how to contact provider.    - Reviewed why/how to contact provider if headache/visual changes/RUQ or epigastric pain, decreased fetal movement, vaginal bleeding, leakage of fluid or more than 4 contractions in an hour.    Return to clinic in 2 weeks and prn if questions or concerns.     MARCIN HarrisonM      "

## 2019-06-12 NOTE — LETTER
"2019       RE: Rubina Desouza  [address]  [facility] [address]     Dear Colleague,    Thank you for referring your patient, Rubina Desouza, to the [facility] at [facility]. Please see a copy of my visit note below.    Subjective:      34 year old  at 31w0d presentst for a routine prenatal appointment.    No vaginal bleeding or leakage of fluid.  No contractions. Daily fetal movement.       No HA, visual changes, RUQ or epigastric pain.   Patient concerns:  Feeling well overall. Met with Dr. Barton today regarding GDM. Blood sugars are well-controlled.   Reviewed TDAP Previously given  Objective:  Vitals:    19 1129   BP: 118/81   Pulse: 101   Weight: 54.4 kg (119 lb 14.4 oz)   Height: 1.6 m (5' 2.99\")   See ob flowsheet    Assessment/Plan     Encounter Diagnoses   Name Primary?     Supervision of high risk pregnancy - [facility] CNM Yes     Diet controlled gestational diabetes mellitus (GDM), antepartum      Hx of shoulder dystocia, prior pregnancy, currently pregnant        ABO   Date Value Ref Range Status   2019 O  Final     RH(D)   Date Value Ref Range Status   2019 Pos  Final     Antibody Screen   Date Value Ref Range Status   2019 Neg  Final   Rhogam  was not given.    - Reviewed total weight gain, encouraged continued healthy diet and exercise.   Reviewed importance of daily fetal kick count and why/how to contact provider.    - Reviewed why/how to contact provider if headache/visual changes/RUQ or epigastric pain, decreased fetal movement, vaginal bleeding, leakage of fluid or more than 4 contractions in an hour.    Return to clinic in 2 weeks and prn if questions or concerns.     MARCIN Harrison CNM          "

## 2019-06-13 LAB — COPATH REPORT: NORMAL

## 2019-06-13 ASSESSMENT — ANXIETY QUESTIONNAIRES: GAD7 TOTAL SCORE: 0

## 2019-06-24 ENCOUNTER — TELEPHONE (OUTPATIENT)
Dept: OBGYN | Facility: CLINIC | Age: 34
End: 2019-06-24

## 2019-06-24 NOTE — TELEPHONE ENCOUNTER
Called to Rubina with Jim  on the line to review blood glucose log. Patient states she does not write down blood sugar readings on her log and tells me that she does not know how to access past readings on her meter. Attempted to direct her over the phone with , but this was difficult and not successful.     Patient has visit with provider on 6/26. Asked her to please bring meter to visit so that the past week's glucoses can be reviewed and passed on the primary/pharm D for review. Patient states understanding and agreement.

## 2019-06-26 ENCOUNTER — OFFICE VISIT (OUTPATIENT)
Dept: OBGYN | Facility: CLINIC | Age: 34
End: 2019-06-26
Attending: ADVANCED PRACTICE MIDWIFE
Payer: COMMERCIAL

## 2019-06-26 VITALS
DIASTOLIC BLOOD PRESSURE: 78 MMHG | WEIGHT: 121.1 LBS | HEART RATE: 77 BPM | BODY MASS INDEX: 21.46 KG/M2 | HEIGHT: 63 IN | SYSTOLIC BLOOD PRESSURE: 122 MMHG

## 2019-06-26 DIAGNOSIS — O09.90 SUPERVISION OF HIGH RISK PREGNANCY, ANTEPARTUM: Primary | ICD-10-CM

## 2019-06-26 PROCEDURE — T1013 SIGN LANG/ORAL INTERPRETER: HCPCS | Mod: U3,ZF

## 2019-06-26 PROCEDURE — G0463 HOSPITAL OUTPT CLINIC VISIT: HCPCS | Mod: ZF

## 2019-06-26 ASSESSMENT — MIFFLIN-ST. JEOR: SCORE: 1223.44

## 2019-06-26 ASSESSMENT — PAIN SCALES - GENERAL: PAINLEVEL: NO PAIN (0)

## 2019-06-26 NOTE — PROGRESS NOTES
"Subjective:      34 year old  at 33w0d presents for a routine prenatal appointment.   Denies vaginal bleeding or leakage of fluid.  Denies contractions. Appropriate fetal movement.      No HA, visual changes, RUQ or epigastric pain.   Patient concerns: feeling well overall.   - Questions re: results of x-ray and urine test from visit with Dr. Barton    GDM:   Current therapy:   Nutritional Therapy  She is checking her blood sugars regularly, including 1-hour post-prandial.    Blood sugars are as follows:  Date Fasting Post-Breakfast Post-Lunch Post-Dinner    84       82 114 108 126    83 102 107 118    86  100 115    83 100 101 111     Objective:  Vitals:    19 1026   BP: 122/78   Pulse: 77   Weight: 54.9 kg (121 lb 1.6 oz)   Height: 1.6 m (5' 3\")   See ob flowsheet    Assessment/Plan:  - Reviewed importance of daily fetal kick count and why/how to contact provider.  - Reviewed why/how to contact provider if headache/visual changes/RUQ or epigastric pain, decreased fetal movement, vaginal bleeding, leakage of fluid or more than 4 contractions in an hour.  - Patient education/orders or handouts today: PTL signs/symptoms   - Reviewed normal results from X-ray and UA, no follow-up per Mick's result message.  - Reviewed GBS screening at 35-36 wks.  - Growth ultrasound at next visit  - Return to clinic in 2 weeks and prn if questions or concerns.     I, Elliott Sy, MS3, am serving as a scribe to document services personally performed by CNM based on the provider's statements to me.\" -   Elliott Sy MS3    The encounter was performed by me and scribed by the student. The scribed note accurately reflects my personal services and decisions made by me. MARCIN Whitehead CNM  "

## 2019-06-26 NOTE — LETTER
"2019       RE: Rubina Desouza  [address]  [facility] [address]     Dear Colleague,    Thank you for referring your patient, Rubina Desouza, to the [facility] at [facility]. Please see a copy of my visit note below.    Subjective:      34 year old  at 33w0d presents for a routine prenatal appointment.   Denies vaginal bleeding or leakage of fluid.  Denies contractions. Appropriate fetal movement.      No HA, visual changes, RUQ or epigastric pain.   Patient concerns: feeling well overall.   - Questions re: results of x-ray and urine test from visit with Dr. Barton    GDM:   Current therapy:   Nutritional Therapy  She is checking her blood sugars regularly, including 1-hour post-prandial.    Blood sugars are as follows:  Date Fasting Post-Breakfast Post-Lunch Post-Dinner    84       82 114 108 126    83 102 107 118    86  100 115    83 100 101 111     Objective:  Vitals:    19 1026   BP: 122/78   Pulse: 77   Weight: 54.9 kg (121 lb 1.6 oz)   Height: 1.6 m (5' 3\")   See ob flowsheet    Assessment/Plan:  - Reviewed importance of daily fetal kick count and why/how to contact provider.  - Reviewed why/how to contact provider if headache/visual changes/RUQ or epigastric pain, decreased fetal movement, vaginal bleeding, leakage of fluid or more than 4 contractions in an hour.  - Patient education/orders or handouts today: PTL signs/symptoms   - Reviewed normal results from X-ray and UA, no follow-up per Mick's result message.  - Reviewed GBS screening at 35-36 wks.  - Growth ultrasound at next visit  - Return to clinic in 2 weeks and prn if questions or concerns.     I, Elliott Sy, MS3, am serving as a scribe to document services personally performed by CNM based on the provider's statements to me.\" -   Elliott Sy MS3    The encounter was performed by me and scribed by the student. The scribed note accurately " reflects my personal services and decisions made by me. MARCIN WhiteheadM

## 2019-07-11 ENCOUNTER — ANCILLARY PROCEDURE (OUTPATIENT)
Dept: ULTRASOUND IMAGING | Facility: CLINIC | Age: 34
End: 2019-07-11
Attending: ADVANCED PRACTICE MIDWIFE
Payer: COMMERCIAL

## 2019-07-11 ENCOUNTER — OFFICE VISIT (OUTPATIENT)
Dept: OBGYN | Facility: CLINIC | Age: 34
End: 2019-07-11
Attending: ADVANCED PRACTICE MIDWIFE
Payer: COMMERCIAL

## 2019-07-11 VITALS
HEIGHT: 63 IN | WEIGHT: 122.9 LBS | BODY MASS INDEX: 21.78 KG/M2 | DIASTOLIC BLOOD PRESSURE: 82 MMHG | HEART RATE: 94 BPM | SYSTOLIC BLOOD PRESSURE: 124 MMHG

## 2019-07-11 DIAGNOSIS — O09.90 SUPERVISION OF HIGH RISK PREGNANCY, ANTEPARTUM: ICD-10-CM

## 2019-07-11 DIAGNOSIS — O09.299: ICD-10-CM

## 2019-07-11 DIAGNOSIS — O24.410 DIET CONTROLLED GESTATIONAL DIABETES MELLITUS (GDM) IN THIRD TRIMESTER: ICD-10-CM

## 2019-07-11 DIAGNOSIS — O24.410 DIET CONTROLLED GESTATIONAL DIABETES MELLITUS (GDM), ANTEPARTUM: ICD-10-CM

## 2019-07-11 DIAGNOSIS — O09.90 SUPERVISION OF HIGH RISK PREGNANCY, ANTEPARTUM: Primary | ICD-10-CM

## 2019-07-11 PROBLEM — D69.6 THROMBOCYTOPENIA (H): Status: ACTIVE | Noted: 2019-07-11

## 2019-07-11 LAB
ANION GAP SERPL CALCULATED.3IONS-SCNC: 7 MMOL/L (ref 3–14)
BUN SERPL-MCNC: 9 MG/DL (ref 7–30)
CALCIUM SERPL-MCNC: 8.8 MG/DL (ref 8.5–10.1)
CHLORIDE SERPL-SCNC: 110 MMOL/L (ref 94–109)
CO2 SERPL-SCNC: 20 MMOL/L (ref 20–32)
CREAT SERPL-MCNC: 0.86 MG/DL (ref 0.52–1.04)
ERYTHROCYTE [DISTWIDTH] IN BLOOD BY AUTOMATED COUNT: 13.4 % (ref 10–15)
GFR SERPL CREATININE-BSD FRML MDRD: 89 ML/MIN/{1.73_M2}
GLUCOSE SERPL-MCNC: 108 MG/DL (ref 70–99)
HCT VFR BLD AUTO: 35 % (ref 35–47)
HGB BLD-MCNC: 11.5 G/DL (ref 11.7–15.7)
MCH RBC QN AUTO: 30.5 PG (ref 26.5–33)
MCHC RBC AUTO-ENTMCNC: 32.9 G/DL (ref 31.5–36.5)
MCV RBC AUTO: 93 FL (ref 78–100)
PLATELET # BLD AUTO: 132 10E9/L (ref 150–450)
POTASSIUM SERPL-SCNC: 3.5 MMOL/L (ref 3.4–5.3)
RBC # BLD AUTO: 3.77 10E12/L (ref 3.8–5.2)
SODIUM SERPL-SCNC: 137 MMOL/L (ref 133–144)
WBC # BLD AUTO: 7 10E9/L (ref 4–11)

## 2019-07-11 PROCEDURE — 76816 OB US FOLLOW-UP PER FETUS: CPT

## 2019-07-11 PROCEDURE — 80048 BASIC METABOLIC PNL TOTAL CA: CPT | Performed by: ADVANCED PRACTICE MIDWIFE

## 2019-07-11 PROCEDURE — 36415 COLL VENOUS BLD VENIPUNCTURE: CPT | Performed by: ADVANCED PRACTICE MIDWIFE

## 2019-07-11 PROCEDURE — G0463 HOSPITAL OUTPT CLINIC VISIT: HCPCS | Mod: ZF

## 2019-07-11 PROCEDURE — 85027 COMPLETE CBC AUTOMATED: CPT | Performed by: ADVANCED PRACTICE MIDWIFE

## 2019-07-11 RX ORDER — LANCING DEVICE/LANCETS
KIT MISCELLANEOUS
Qty: 1 EACH | Refills: 3 | Status: ON HOLD | OUTPATIENT
Start: 2019-07-11 | End: 2019-08-13

## 2019-07-11 ASSESSMENT — MIFFLIN-ST. JEOR: SCORE: 1231.6

## 2019-07-11 NOTE — LETTER
"2019       RE: Rubina Desouza  [address]  [facility] [address]     Dear Colleague,    Thank you for referring your patient, Rubina Desouza, to the [facility] at [facility]. Please see a copy of my visit note below.    Subjective:      34 year old  at 35w1d presents for a routine prenatal appointment.  No vaginal bleeding,  leakage of fluid, or change in vaginal discharge.  No contractions.  Positive fetal movement. No HA, visual changes, RUQ or epigastric pain.   Patient concerns:  - Feeling tired and having fast heart beat with exertion such as vacuuming and moving around the house. Concerned it is related to her low hemoglobin. Symptoms resolve with rest.    - Requests medication refills    Objective:  Vitals:    19 1031   BP: 124/82   BP Location: Left arm   Patient Position: Chair   Pulse: 94   Weight: 55.7 kg (122 lb 14.4 oz)   Height: 1.6 m (5' 3\")    See OB flowsheet    Fastin 78, 83, 79, 82 (0 abnormal)  2 hour PP:  106 115, 91, 111, 102, 110, 106, 108 (0 abnormal)    Assessment/Plan     Encounter Diagnoses   Name Primary?     Supervision of high risk pregnancy - [facility] CNM Yes     Diet controlled gestational diabetes mellitus (GDM), antepartum      Hx of shoulder dystocia, prior pregnancy, currently pregnant      Diet controlled gestational diabetes mellitus (GDM) in third trimester      Orders Placed This Encounter   Procedures     CBC with Platelets     Basic Metabolic Panel     Orders Placed This Encounter   Medications     blood glucose (ACCU-CHEK FASTCLIX) lancing device     Sig: Lancing device to be used with lancets.     Dispense:  1 each     Refill:  3     Please dispense 1 box     PHQ-9 SCORE 2017   PHQ-9 Total Score 3 0 0     GBS screening: plan for next visit  Birth preferences reviewed: Interested in medications, not desiring epidural or nitrous oxide  Labor signs discussed. Reinforced daily " fetal movement counts.  Reviewed why/how to contact provider if headache/visual changes/RUQ or epigastric pain, decreased fetal movement, vaginal bleeding, leakage of fluid.  Okay to decrease blood glucose checks to bid if all remain normal  Discussed hx of shoulder dystocia with broken clavicle. Offered primary  for this hx and reviewed increased risk for shoulder dystocia and fetal complications with this delivery. Pt verbalizes understanding of our conversation and declines planned ; strongly desires vaginal birth.   CBC and BMP today. Advised to follow up if fatigue symptoms worsen. Reviewed variations of normal fatigue in 3rd trimester pregnancy.     Return to clinic in 1 week and prn if questions or concerns.     I, Chas Wang, am serving as a scribe to document services personally performed by CNM based on the provider's statements to me. - Chas Wang, [facility] [employer] [facility] Student    I agree with the note completed by the CNM Student, except for changes made by me. The encounter was performed by me and scribed by the student. The scribed note accurately reflects my personal services and decisions made by me.    Shaina Gibson, MARCIN FREDERICK

## 2019-07-11 NOTE — PROGRESS NOTES
"Subjective:      34 year old  at 35w1d presents for a routine prenatal appointment.  No vaginal bleeding,  leakage of fluid, or change in vaginal discharge.  No contractions.  Positive fetal movement. No HA, visual changes, RUQ or epigastric pain.   Patient concerns:  - Feeling tired and having fast heart beat with exertion such as vacuuming and moving around the house. Concerned it is related to her low hemoglobin. Symptoms resolve with rest.    - Requests medication refills    Objective:  Vitals:    19 1031   BP: 124/82   BP Location: Left arm   Patient Position: Chair   Pulse: 94   Weight: 55.7 kg (122 lb 14.4 oz)   Height: 1.6 m (5' 3\")    See OB flowsheet    Fastin 78, 83, 79, 82 (0 abnormal)  2 hour PP:  106 115, 91, 111, 102, 110, 106, 108 (0 abnormal)    Assessment/Plan     Encounter Diagnoses   Name Primary?     Supervision of high risk pregnancy - [facility] CNM Yes     Diet controlled gestational diabetes mellitus (GDM), antepartum      Hx of shoulder dystocia, prior pregnancy, currently pregnant      Diet controlled gestational diabetes mellitus (GDM) in third trimester      Orders Placed This Encounter   Procedures     CBC with Platelets     Basic Metabolic Panel     Orders Placed This Encounter   Medications     blood glucose (ACCU-CHEK FASTCLIX) lancing device     Sig: Lancing device to be used with lancets.     Dispense:  1 each     Refill:  3     Please dispense 1 box     PHQ-9 SCORE 2017   PHQ-9 Total Score 3 0 0     GBS screening: plan for next visit  Birth preferences reviewed: Interested in medications, not desiring epidural or nitrous oxide  Labor signs discussed. Reinforced daily fetal movement counts.  Reviewed why/how to contact provider if headache/visual changes/RUQ or epigastric pain, decreased fetal movement, vaginal bleeding, leakage of fluid.  Okay to decrease blood glucose checks to bid if all remain normal  Discussed hx of shoulder dystocia with " broken clavicle. Offered primary  for this hx and reviewed increased risk for shoulder dystocia and fetal complications with this delivery. Pt verbalizes understanding of our conversation and declines planned ; strongly desires vaginal birth.   CBC and BMP today. Advised to follow up if fatigue symptoms worsen. Reviewed variations of normal fatigue in 3rd trimester pregnancy.     Return to clinic in 1 week and prn if questions or concerns.     I, Chas Wang, am serving as a scribe to document services personally performed by CNM based on the provider's statements to me. - Chas Wang, [facility] Student    I agree with the note completed by the CNM Student, except for changes made by me. The encounter was performed by me and scribed by the student. The scribed note accurately reflects my personal services and decisions made by me.    Shaina Gibson, MARCIN FREDERICK

## 2019-07-22 ENCOUNTER — OFFICE VISIT (OUTPATIENT)
Dept: OBGYN | Facility: CLINIC | Age: 34
End: 2019-07-22
Attending: MIDWIFE
Payer: COMMERCIAL

## 2019-07-22 VITALS
DIASTOLIC BLOOD PRESSURE: 83 MMHG | SYSTOLIC BLOOD PRESSURE: 132 MMHG | HEIGHT: 63 IN | HEART RATE: 84 BPM | WEIGHT: 122.5 LBS | BODY MASS INDEX: 21.71 KG/M2

## 2019-07-22 DIAGNOSIS — O09.90 SUPERVISION OF HIGH RISK PREGNANCY, ANTEPARTUM: Primary | ICD-10-CM

## 2019-07-22 LAB
ERYTHROCYTE [DISTWIDTH] IN BLOOD BY AUTOMATED COUNT: 13.4 % (ref 10–15)
HCT VFR BLD AUTO: 35.6 % (ref 35–47)
HGB BLD-MCNC: 12.1 G/DL (ref 11.7–15.7)
MCH RBC QN AUTO: 31.6 PG (ref 26.5–33)
MCHC RBC AUTO-ENTMCNC: 34 G/DL (ref 31.5–36.5)
MCV RBC AUTO: 93 FL (ref 78–100)
PLATELET # BLD AUTO: 151 10E9/L (ref 150–450)
RBC # BLD AUTO: 3.83 10E12/L (ref 3.8–5.2)
WBC # BLD AUTO: 8.9 10E9/L (ref 4–11)

## 2019-07-22 PROCEDURE — 85027 COMPLETE CBC AUTOMATED: CPT | Performed by: MIDWIFE

## 2019-07-22 PROCEDURE — 36415 COLL VENOUS BLD VENIPUNCTURE: CPT | Performed by: MIDWIFE

## 2019-07-22 PROCEDURE — G0463 HOSPITAL OUTPT CLINIC VISIT: HCPCS | Mod: ZF

## 2019-07-22 PROCEDURE — 87653 STREP B DNA AMP PROBE: CPT | Performed by: MIDWIFE

## 2019-07-22 ASSESSMENT — PATIENT HEALTH QUESTIONNAIRE - PHQ9
SUM OF ALL RESPONSES TO PHQ QUESTIONS 1-9: 0
5. POOR APPETITE OR OVEREATING: NOT AT ALL

## 2019-07-22 ASSESSMENT — ANXIETY QUESTIONNAIRES
IF YOU CHECKED OFF ANY PROBLEMS ON THIS QUESTIONNAIRE, HOW DIFFICULT HAVE THESE PROBLEMS MADE IT FOR YOU TO DO YOUR WORK, TAKE CARE OF THINGS AT HOME, OR GET ALONG WITH OTHER PEOPLE: NOT DIFFICULT AT ALL
GAD7 TOTAL SCORE: 0
3. WORRYING TOO MUCH ABOUT DIFFERENT THINGS: NOT AT ALL
7. FEELING AFRAID AS IF SOMETHING AWFUL MIGHT HAPPEN: NOT AT ALL
1. FEELING NERVOUS, ANXIOUS, OR ON EDGE: NOT AT ALL
2. NOT BEING ABLE TO STOP OR CONTROL WORRYING: NOT AT ALL
5. BEING SO RESTLESS THAT IT IS HARD TO SIT STILL: NOT AT ALL
6. BECOMING EASILY ANNOYED OR IRRITABLE: NOT AT ALL

## 2019-07-22 ASSESSMENT — PAIN SCALES - GENERAL: PAINLEVEL: NO PAIN (0)

## 2019-07-22 ASSESSMENT — MIFFLIN-ST. JEOR: SCORE: 1229.66

## 2019-07-22 NOTE — LETTER
"2019       RE: Rubina Desouza  [address]  [facility] [address]     Dear Colleague,    Thank you for referring your patient, Rubina Desouza, to the [facility] at [facility]. Please see a copy of my visit note below.    Subjective:     34 year old  at 36w5d presents for a routine prenatal appointment.  Denies vaginal bleeding, leakage of fluid, or change in vaginal discharge. No contractions. Positive fetal movement. No HA, visual changes, RUQ or epigastric pain.   Patient concerns: None. Feeling well overall. Has been checking blood sugars BID- all fasting values below 95, all post-prandial values below 120 since last visit.     Platelets 132 on     Objective:  Vitals:    19 1016   BP: 132/83   Pulse: 84   Weight: 55.6 kg (122 lb 8 oz)   Height: 1.6 m (5' 2.99\")    See OB flowsheet    Assessment/Plan     Encounter Diagnosis   Name Primary?     Supervision of high risk pregnancy - [facility] CNM Yes     Orders Placed This Encounter   Procedures     CBC with platelets     No orders of the defined types were placed in this encounter.      PHQ-9 SCORE 2017   PHQ-9 Total Score 3 0 0     GBS screening: Obtained.  Birth preferences reviewed: Medicated  Labor signs discussed. Reinforced daily fetal movement counts.  Reviewed why/how to contact provider if headache/visual changes/RUQ or epigastric pain, decreased fetal movement, vaginal bleeding, leakage of fluid.  Continue BID blood sugar checks as GDM is well-controlled.   Return to clinic in 1 week and prn if questions or concerns.     I, Nohemi Hurst, am serving as a scribe; to document services personally performed by MARCIN Buckner, CNM based on data collection and the provider's statements to me.     Nohemi Hurst, RN, DNP Student   The encounter was performed by me and scribed by the SNM. The scribed note accurately reflects my personal services and decisions made " by donato Rutledge CNM APRN

## 2019-07-22 NOTE — PROGRESS NOTES
"Subjective:     34 year old  at 36w5d presents for a routine prenatal appointment.  Denies vaginal bleeding, leakage of fluid, or change in vaginal discharge. No contractions. Positive fetal movement. No HA, visual changes, RUQ or epigastric pain.   Patient concerns: None. Feeling well overall. Has been checking blood sugars BID- all fasting values below 95, all post-prandial values below 120 since last visit.     Platelets 132 on     Objective:  Vitals:    19 1016   BP: 132/83   Pulse: 84   Weight: 55.6 kg (122 lb 8 oz)   Height: 1.6 m (5' 2.99\")    See OB flowsheet    Assessment/Plan     Encounter Diagnosis   Name Primary?     Supervision of high risk pregnancy - [facility] CNM Yes     Orders Placed This Encounter   Procedures     CBC with platelets     No orders of the defined types were placed in this encounter.      PHQ-9 SCORE 2017   PHQ-9 Total Score 3 0 0     GBS screening: Obtained.  Birth preferences reviewed: Medicated  Labor signs discussed. Reinforced daily fetal movement counts.  Reviewed why/how to contact provider if headache/visual changes/RUQ or epigastric pain, decreased fetal movement, vaginal bleeding, leakage of fluid.  Continue BID blood sugar checks as GDM is well-controlled.   Return to clinic in 1 week and prn if questions or concerns.     I, Nohemi Hurst, am serving as a scribe; to document services personally performed by MARCIN Buckner, OTONIEL based on data collection and the provider's statements to me.     Nohemi Hurst RN, DNP Student   The encounter was performed by me and scribed by the SNM. The scribed note accurately reflects my personal services and decisions made by me.     Pippa Rutledge CNM APRN     "

## 2019-07-23 LAB
GP B STREP DNA SPEC QL NAA+PROBE: NEGATIVE
SPECIMEN SOURCE: NORMAL

## 2019-07-23 ASSESSMENT — ANXIETY QUESTIONNAIRES: GAD7 TOTAL SCORE: 0

## 2019-07-29 ENCOUNTER — OFFICE VISIT (OUTPATIENT)
Dept: OBGYN | Facility: CLINIC | Age: 34
End: 2019-07-29
Attending: ADVANCED PRACTICE MIDWIFE
Payer: COMMERCIAL

## 2019-07-29 VITALS
BODY MASS INDEX: 22.32 KG/M2 | HEART RATE: 88 BPM | HEIGHT: 63 IN | SYSTOLIC BLOOD PRESSURE: 123 MMHG | DIASTOLIC BLOOD PRESSURE: 76 MMHG | WEIGHT: 126 LBS

## 2019-07-29 DIAGNOSIS — D69.6 THROMBOCYTOPENIA (H): ICD-10-CM

## 2019-07-29 DIAGNOSIS — O09.90 SUPERVISION OF HIGH RISK PREGNANCY, ANTEPARTUM: Primary | ICD-10-CM

## 2019-07-29 DIAGNOSIS — O24.410 DIET CONTROLLED GESTATIONAL DIABETES MELLITUS (GDM), ANTEPARTUM: ICD-10-CM

## 2019-07-29 PROCEDURE — T1013 SIGN LANG/ORAL INTERPRETER: HCPCS | Mod: U3,ZF

## 2019-07-29 PROCEDURE — G0463 HOSPITAL OUTPT CLINIC VISIT: HCPCS

## 2019-07-29 ASSESSMENT — MIFFLIN-ST. JEOR: SCORE: 1245.53

## 2019-07-29 ASSESSMENT — PAIN SCALES - GENERAL: PAINLEVEL: NO PAIN (0)

## 2019-07-29 NOTE — LETTER
"2019       RE: Rubina Desouza  [address]  [place] [facility] [address]     Dear Colleague,    Thank you for referring your patient, Rubina Desouza, to the [facility] at [facility]. Please see a copy of my visit note below.    Subjective:      34 year old  at 37w5d presents for a routine prenatal appointment.         Denies vaginal bleeding,  leakage of fluid, or change in vaginal discharge.  No contractions.  Good fetal movement.     No HA, visual changes, RUQ or epigastric pain.   Patient concerns: Feeling well overall. Ready for baby.    Blood Sugars  Fastin, 74, 76, 72, 79, 83, 82 (0 abnormal)  2 hour postprandial: 103, 118, 101, 111, 119, 91, 105, 91, 99 (0 abnormal)    Objective:  Vitals:    19 1125   BP: 123/76   Pulse: 88   Weight: 57.2 kg (126 lb)   Height: 1.6 m (5' 2.99\")    See OB flowsheet    Assessment/Plan     Encounter Diagnoses   Name Primary?     Supervision of high risk pregnancy - [facility] CNM Yes     Thrombocytopenia (H)      Diet controlled gestational diabetes mellitus (GDM), antepartum        PHQ-9 SCORE 2019   PHQ-9 Total Score 0 0 0     GBS screening: negative result reviewed    Labor signs discussed. Reinforced daily fetal movement counts.  Reviewed why/how to contact provider if headache/visual changes/RUQ or epigastric pain, decreased fetal movement, vaginal bleeding, leakage of fluid.  Return to clinic in 1 week and prn if questions or concerns.     I, DANE Aponte am serving as a scribe; to document services personally performed by  Shaina Gibson CNM based on data collection and the provider's statements to me.     DANE Aponte    I agree with the note completed by the OTONIEL Student, except for changes made by me. The encounter was performed by me and scribed by the student. The scribed note accurately reflects my personal services and decisions made by me.    Shaina Gibson, " APRN CNM

## 2019-07-29 NOTE — PROGRESS NOTES
"Subjective:      34 year old  at 37w5d presents for a routine prenatal appointment.         Denies vaginal bleeding,  leakage of fluid, or change in vaginal discharge.  No contractions.  Good fetal movement.     No HA, visual changes, RUQ or epigastric pain.   Patient concerns: Feeling well overall. Ready for baby.    Blood Sugars  Fastin, 74, 76, 72, 79, 83, 82 (0 abnormal)  2 hour postprandial: 103, 118, 101, 111, 119, 91, 105, 91, 99 (0 abnormal)    Objective:  Vitals:    19 1125   BP: 123/76   Pulse: 88   Weight: 57.2 kg (126 lb)   Height: 1.6 m (5' 2.99\")    See OB flowsheet    Assessment/Plan     Encounter Diagnoses   Name Primary?     Supervision of high risk pregnancy - [facility] CNM Yes     Thrombocytopenia (H)      Diet controlled gestational diabetes mellitus (GDM), antepartum        PHQ-9 SCORE 2019   PHQ-9 Total Score 0 0 0     GBS screening: negative result reviewed    Labor signs discussed. Reinforced daily fetal movement counts.  Reviewed why/how to contact provider if headache/visual changes/RUQ or epigastric pain, decreased fetal movement, vaginal bleeding, leakage of fluid.  Return to clinic in 1 week and prn if questions or concerns.     I, DANE Aponte am serving as a scribe; to document services personally performed by  Shaina Gibson CNM based on data collection and the provider's statements to me.     DANE Aponte    I agree with the note completed by the OTONIEL Student, except for changes made by me. The encounter was performed by me and scribed by the student. The scribed note accurately reflects my personal services and decisions made by me.    MARCIN Harrison CNM      "

## 2019-08-06 ENCOUNTER — OFFICE VISIT (OUTPATIENT)
Dept: OBGYN | Facility: CLINIC | Age: 34
End: 2019-08-06
Attending: ADVANCED PRACTICE MIDWIFE
Payer: COMMERCIAL

## 2019-08-06 VITALS
WEIGHT: 127.6 LBS | BODY MASS INDEX: 23.48 KG/M2 | DIASTOLIC BLOOD PRESSURE: 87 MMHG | HEIGHT: 62 IN | HEART RATE: 90 BPM | SYSTOLIC BLOOD PRESSURE: 129 MMHG

## 2019-08-06 DIAGNOSIS — O09.90 SUPERVISION OF HIGH RISK PREGNANCY, ANTEPARTUM: Primary | ICD-10-CM

## 2019-08-06 DIAGNOSIS — O24.410 DIET CONTROLLED GESTATIONAL DIABETES MELLITUS (GDM) IN THIRD TRIMESTER: ICD-10-CM

## 2019-08-06 PROCEDURE — G0463 HOSPITAL OUTPT CLINIC VISIT: HCPCS | Mod: ZF

## 2019-08-06 ASSESSMENT — MIFFLIN-ST. JEOR: SCORE: 1237.04

## 2019-08-06 NOTE — PROGRESS NOTES
"Subjective:     34 year old  at 38w6d presents for routine prenatal visit.   Denies vaginal bleeding or leakage of fluid.  Denies contractions.  + fetal movement.       No HA, visual changes, RUQ or epigastric pain.   Patient concerns: Feeling well overall.    - Questions re: circumcision at [facility] clinic   - Desires epidural in labor, did not feel like nitrous helped    GDM:   Current therapy:  Nutritional Therapy  She is checking her blood sugars regularly BID, including Fasting and 2-hours post-prandial.    Blood sugars are as follows:   Date Fasting Post-Dinner    81 124    75 104   8/1 75 83   8/2 73 101   8/3 78 97   8/4 81 82   8/5 73 102     Objective:  Vitals:    19 1031   BP: 129/87   BP Location: Left arm   Patient Position: Chair   Pulse: 90   Weight: 57.9 kg (127 lb 9.6 oz)   Height: 1.575 m (5' 2\")   See OB flowsheet    Assessment/Plan:   Encounter Diagnoses   Name Primary?     Supervision of high risk pregnancy - [facility] CNM Yes     Diet controlled gestational diabetes mellitus (GDM) in third trimester      - Reviewed recommendation for IOL/delivery prior to 40w6d d/t GDM.    - Reviewed why/how to contact provider if headache/visual changes/RUQ or epigastric pain, decreased fetal movement, vaginal bleeding, leakage of fluid or strong/regular contractions.  - Patient education/orders or handouts today: Sign/symptoms of labor, When to call for labor or other concerns, BPP and Induction of labor   - Discussed BPP and growth at next visit. Pt agreeable.  - Return to clinic in 1 week and prn if questions or concerns.   MARCIN WhiteheadM  "

## 2019-08-06 NOTE — LETTER
"2019       RE: Rubina Desouza  [address]  [facility] [address]     Dear Colleague,    Thank you for referring your patient, Rubina Desouza, to the [facility] at [facility]. Please see a copy of my visit note below.    Subjective:     34 year old  at 38w6d presents for routine prenatal visit.   Denies vaginal bleeding or leakage of fluid.  Denies contractions.  + fetal movement.       No HA, visual changes, RUQ or epigastric pain.   Patient concerns: Feeling well overall.    - Questions re: circumcision at [facility] clinic   - Desires epidural in labor, did not feel like nitrous helped    GDM:   Current therapy:  Nutritional Therapy  She is checking her blood sugars regularly BID, including Fasting and 2-hours post-prandial.    Blood sugars are as follows:   Date Fasting Post-Dinner    81 124    75 104   8/1 75 83   8/2 73 101   8/3 78 97   8/4 81 82   8/5 73 102     Objective:  Vitals:    19 1031   BP: 129/87   BP Location: Left arm   Patient Position: Chair   Pulse: 90   Weight: 57.9 kg (127 lb 9.6 oz)   Height: 1.575 m (5' 2\")   See OB flowsheet    Assessment/Plan:   Encounter Diagnoses   Name Primary?     Supervision of high risk pregnancy - [facility] CNM Yes     Diet controlled gestational diabetes mellitus (GDM) in third trimester      - Reviewed recommendation for IOL/delivery prior to 40w6d d/t GDM.    - Reviewed why/how to contact provider if headache/visual changes/RUQ or epigastric pain, decreased fetal movement, vaginal bleeding, leakage of fluid or strong/regular contractions.  - Patient education/orders or handouts today: Sign/symptoms of labor, When to call for labor or other concerns, BPP and Induction of labor   - Discussed BPP and growth at next visit. Pt agreeable.  - Return to clinic in 1 week and prn if questions or concerns.     MARCIN Whitehead CNM        "

## 2019-08-11 ENCOUNTER — TELEPHONE (OUTPATIENT)
Dept: OBGYN | Facility: CLINIC | Age: 34
End: 2019-08-11

## 2019-08-11 ENCOUNTER — ANESTHESIA (OUTPATIENT)
Dept: OBGYN | Facility: CLINIC | Age: 34
End: 2019-08-11
Payer: COMMERCIAL

## 2019-08-11 ENCOUNTER — ANESTHESIA EVENT (OUTPATIENT)
Dept: OBGYN | Facility: CLINIC | Age: 34
End: 2019-08-11
Payer: COMMERCIAL

## 2019-08-11 ENCOUNTER — HOSPITAL ENCOUNTER (INPATIENT)
Facility: CLINIC | Age: 34
LOS: 3 days | Discharge: HOME | End: 2019-08-14
Attending: ADVANCED PRACTICE MIDWIFE | Admitting: ADVANCED PRACTICE MIDWIFE
Payer: COMMERCIAL

## 2019-08-11 LAB
ABO + RH BLD: NORMAL
ABO + RH BLD: NORMAL
ALT SERPL W P-5'-P-CCNC: 17 U/L (ref 0–50)
ALT SERPL W P-5'-P-CCNC: 18 U/L (ref 0–50)
AST SERPL W P-5'-P-CCNC: 23 U/L (ref 0–45)
AST SERPL W P-5'-P-CCNC: 25 U/L (ref 0–45)
BASOPHILS # BLD AUTO: 0 10E9/L (ref 0–0.2)
BASOPHILS NFR BLD AUTO: 0.1 %
BLD GP AB SCN SERPL QL: NORMAL
BLOOD BANK CMNT PATIENT-IMP: NORMAL
CREAT SERPL-MCNC: 0.89 MG/DL (ref 0.52–1.04)
CREAT UR-MCNC: 27 MG/DL
DIFFERENTIAL METHOD BLD: ABNORMAL
EOSINOPHIL # BLD AUTO: 0.4 10E9/L (ref 0–0.7)
EOSINOPHIL NFR BLD AUTO: 5.8 %
ERYTHROCYTE [DISTWIDTH] IN BLOOD BY AUTOMATED COUNT: 13.2 % (ref 10–15)
ERYTHROCYTE [DISTWIDTH] IN BLOOD BY AUTOMATED COUNT: 13.3 % (ref 10–15)
GFR SERPL CREATININE-BSD FRML MDRD: 85 ML/MIN/{1.73_M2}
GLUCOSE BLDC GLUCOMTR-MCNC: 113 MG/DL (ref 70–99)
GLUCOSE BLDC GLUCOMTR-MCNC: 140 MG/DL (ref 70–99)
GLUCOSE BLDC GLUCOMTR-MCNC: 79 MG/DL (ref 70–99)
HCT VFR BLD AUTO: 35.7 % (ref 35–47)
HCT VFR BLD AUTO: 37.2 % (ref 35–47)
HGB BLD-MCNC: 11.7 G/DL (ref 11.7–15.7)
HGB BLD-MCNC: 12.6 G/DL (ref 11.7–15.7)
IMM GRANULOCYTES # BLD: 0.1 10E9/L (ref 0–0.4)
IMM GRANULOCYTES NFR BLD: 1.6 %
LYMPHOCYTES # BLD AUTO: 2.1 10E9/L (ref 0.8–5.3)
LYMPHOCYTES NFR BLD AUTO: 27.3 %
MCH RBC QN AUTO: 31.3 PG (ref 26.5–33)
MCH RBC QN AUTO: 31.5 PG (ref 26.5–33)
MCHC RBC AUTO-ENTMCNC: 32.8 G/DL (ref 31.5–36.5)
MCHC RBC AUTO-ENTMCNC: 33.9 G/DL (ref 31.5–36.5)
MCV RBC AUTO: 92 FL (ref 78–100)
MCV RBC AUTO: 96 FL (ref 78–100)
MONOCYTES # BLD AUTO: 0.8 10E9/L (ref 0–1.3)
MONOCYTES NFR BLD AUTO: 9.8 %
NEUTROPHILS # BLD AUTO: 4.2 10E9/L (ref 1.6–8.3)
NEUTROPHILS NFR BLD AUTO: 55.4 %
NRBC # BLD AUTO: 0 10*3/UL
NRBC BLD AUTO-RTO: 0 /100
PLATELET # BLD AUTO: 102 10E9/L (ref 150–450)
PLATELET # BLD AUTO: 122 10E9/L (ref 150–450)
PROT UR-MCNC: 0.28 G/L
PROT/CREAT 24H UR: 1.03 G/G CR (ref 0–0.2)
RBC # BLD AUTO: 3.72 10E12/L (ref 3.8–5.2)
RBC # BLD AUTO: 4.03 10E12/L (ref 3.8–5.2)
SPECIMEN EXP DATE BLD: NORMAL
URATE SERPL-MCNC: 8.3 MG/DL (ref 2.6–6)
WBC # BLD AUTO: 7.7 10E9/L (ref 4–11)
WBC # BLD AUTO: 8.5 10E9/L (ref 4–11)

## 2019-08-11 PROCEDURE — 84450 TRANSFERASE (AST) (SGOT): CPT | Performed by: STUDENT IN AN ORGANIZED HEALTH CARE EDUCATION/TRAINING PROGRAM

## 2019-08-11 PROCEDURE — 12000001 ZZH R&B MED SURG/OB UMMC

## 2019-08-11 PROCEDURE — 3E0R3BZ INTRODUCTION OF ANESTHETIC AGENT INTO SPINAL CANAL, PERCUTANEOUS APPROACH: ICD-10-PCS | Performed by: ANESTHESIOLOGY

## 2019-08-11 PROCEDURE — 25800030 ZZH RX IP 258 OP 636: Performed by: ADVANCED PRACTICE MIDWIFE

## 2019-08-11 PROCEDURE — 25000125 ZZHC RX 250: Performed by: ANESTHESIOLOGY

## 2019-08-11 PROCEDURE — 36415 COLL VENOUS BLD VENIPUNCTURE: CPT | Performed by: STUDENT IN AN ORGANIZED HEALTH CARE EDUCATION/TRAINING PROGRAM

## 2019-08-11 PROCEDURE — 84550 ASSAY OF BLOOD/URIC ACID: CPT | Performed by: ADVANCED PRACTICE MIDWIFE

## 2019-08-11 PROCEDURE — 85027 COMPLETE CBC AUTOMATED: CPT | Performed by: STUDENT IN AN ORGANIZED HEALTH CARE EDUCATION/TRAINING PROGRAM

## 2019-08-11 PROCEDURE — 00000146 ZZHCL STATISTIC GLUCOSE BY METER IP

## 2019-08-11 PROCEDURE — 85025 COMPLETE CBC W/AUTO DIFF WBC: CPT | Performed by: ADVANCED PRACTICE MIDWIFE

## 2019-08-11 PROCEDURE — 86901 BLOOD TYPING SEROLOGIC RH(D): CPT | Performed by: ADVANCED PRACTICE MIDWIFE

## 2019-08-11 PROCEDURE — 84450 TRANSFERASE (AST) (SGOT): CPT | Performed by: ADVANCED PRACTICE MIDWIFE

## 2019-08-11 PROCEDURE — 25000132 ZZH RX MED GY IP 250 OP 250 PS 637: Performed by: ADVANCED PRACTICE MIDWIFE

## 2019-08-11 PROCEDURE — 84156 ASSAY OF PROTEIN URINE: CPT | Performed by: ADVANCED PRACTICE MIDWIFE

## 2019-08-11 PROCEDURE — 72200001 ZZH LABOR CARE VAGINAL DELIVERY SINGLE

## 2019-08-11 PROCEDURE — 00HU33Z INSERTION OF INFUSION DEVICE INTO SPINAL CANAL, PERCUTANEOUS APPROACH: ICD-10-PCS | Performed by: ANESTHESIOLOGY

## 2019-08-11 PROCEDURE — 84460 ALANINE AMINO (ALT) (SGPT): CPT | Performed by: STUDENT IN AN ORGANIZED HEALTH CARE EDUCATION/TRAINING PROGRAM

## 2019-08-11 PROCEDURE — 25000128 H RX IP 250 OP 636: Performed by: ANESTHESIOLOGY

## 2019-08-11 PROCEDURE — 0HQ9XZZ REPAIR PERINEUM SKIN, EXTERNAL APPROACH: ICD-10-PCS | Performed by: ADVANCED PRACTICE MIDWIFE

## 2019-08-11 PROCEDURE — 25800030 ZZH RX IP 258 OP 636: Performed by: STUDENT IN AN ORGANIZED HEALTH CARE EDUCATION/TRAINING PROGRAM

## 2019-08-11 PROCEDURE — 84460 ALANINE AMINO (ALT) (SGPT): CPT | Performed by: ADVANCED PRACTICE MIDWIFE

## 2019-08-11 PROCEDURE — 25000125 ZZHC RX 250: Performed by: ADVANCED PRACTICE MIDWIFE

## 2019-08-11 PROCEDURE — 86900 BLOOD TYPING SEROLOGIC ABO: CPT | Performed by: ADVANCED PRACTICE MIDWIFE

## 2019-08-11 PROCEDURE — 25000128 H RX IP 250 OP 636: Performed by: STUDENT IN AN ORGANIZED HEALTH CARE EDUCATION/TRAINING PROGRAM

## 2019-08-11 PROCEDURE — 86850 RBC ANTIBODY SCREEN: CPT | Performed by: ADVANCED PRACTICE MIDWIFE

## 2019-08-11 PROCEDURE — 82565 ASSAY OF CREATININE: CPT | Performed by: STUDENT IN AN ORGANIZED HEALTH CARE EDUCATION/TRAINING PROGRAM

## 2019-08-11 RX ORDER — NIFEDIPINE 10 MG/1
10 CAPSULE ORAL
Status: DISCONTINUED | OUTPATIENT
Start: 2019-08-11 | End: 2019-08-14 | Stop reason: HOSPADM

## 2019-08-11 RX ORDER — SODIUM CHLORIDE, SODIUM LACTATE, POTASSIUM CHLORIDE, CALCIUM CHLORIDE 600; 310; 30; 20 MG/100ML; MG/100ML; MG/100ML; MG/100ML
10-125 INJECTION, SOLUTION INTRAVENOUS CONTINUOUS
Status: DISCONTINUED | OUTPATIENT
Start: 2019-08-11 | End: 2019-08-14 | Stop reason: HOSPADM

## 2019-08-11 RX ORDER — IBUPROFEN 800 MG/1
800 TABLET, FILM COATED ORAL
Status: DISCONTINUED | OUTPATIENT
Start: 2019-08-11 | End: 2019-08-11

## 2019-08-11 RX ORDER — NALBUPHINE HYDROCHLORIDE 10 MG/ML
2.5-5 INJECTION, SOLUTION INTRAMUSCULAR; INTRAVENOUS; SUBCUTANEOUS EVERY 6 HOURS PRN
Status: DISCONTINUED | OUTPATIENT
Start: 2019-08-11 | End: 2019-08-11

## 2019-08-11 RX ORDER — OXYCODONE HYDROCHLORIDE 5 MG/1
5 TABLET ORAL EVERY 4 HOURS PRN
Status: DISCONTINUED | OUTPATIENT
Start: 2019-08-11 | End: 2019-08-14 | Stop reason: HOSPADM

## 2019-08-11 RX ORDER — BISACODYL 10 MG
10 SUPPOSITORY, RECTAL RECTAL DAILY PRN
Status: DISCONTINUED | OUTPATIENT
Start: 2019-08-13 | End: 2019-08-14 | Stop reason: HOSPADM

## 2019-08-11 RX ORDER — OXYTOCIN/0.9 % SODIUM CHLORIDE 30/500 ML
340 PLASTIC BAG, INJECTION (ML) INTRAVENOUS CONTINUOUS PRN
Status: DISCONTINUED | OUTPATIENT
Start: 2019-08-11 | End: 2019-08-14 | Stop reason: HOSPADM

## 2019-08-11 RX ORDER — MISOPROSTOL 200 UG/1
TABLET ORAL
Status: DISCONTINUED
Start: 2019-08-11 | End: 2019-08-11 | Stop reason: HOSPADM

## 2019-08-11 RX ORDER — EPHEDRINE SULFATE 50 MG/ML
INJECTION, SOLUTION INTRAMUSCULAR; INTRAVENOUS; SUBCUTANEOUS
Status: DISCONTINUED
Start: 2019-08-11 | End: 2019-08-11 | Stop reason: HOSPADM

## 2019-08-11 RX ORDER — METHYLERGONOVINE MALEATE 0.2 MG/ML
200 INJECTION INTRAVENOUS
Status: DISCONTINUED | OUTPATIENT
Start: 2019-08-11 | End: 2019-08-11

## 2019-08-11 RX ORDER — OXYTOCIN 10 [USP'U]/ML
10 INJECTION, SOLUTION INTRAMUSCULAR; INTRAVENOUS
Status: DISCONTINUED | OUTPATIENT
Start: 2019-08-11 | End: 2019-08-11

## 2019-08-11 RX ORDER — LANOLIN 100 %
OINTMENT (GRAM) TOPICAL
Status: DISCONTINUED | OUTPATIENT
Start: 2019-08-11 | End: 2019-08-14 | Stop reason: HOSPADM

## 2019-08-11 RX ORDER — LIDOCAINE HYDROCHLORIDE AND EPINEPHRINE 15; 5 MG/ML; UG/ML
INJECTION, SOLUTION EPIDURAL PRN
Status: DISCONTINUED | OUTPATIENT
Start: 2019-08-11 | End: 2019-08-12 | Stop reason: HOSPADM

## 2019-08-11 RX ORDER — NALOXONE HYDROCHLORIDE 0.4 MG/ML
.1-.4 INJECTION, SOLUTION INTRAMUSCULAR; INTRAVENOUS; SUBCUTANEOUS
Status: DISCONTINUED | OUTPATIENT
Start: 2019-08-11 | End: 2019-08-11

## 2019-08-11 RX ORDER — ACETAMINOPHEN 325 MG/1
650 TABLET ORAL EVERY 4 HOURS PRN
Status: DISCONTINUED | OUTPATIENT
Start: 2019-08-11 | End: 2019-08-14 | Stop reason: HOSPADM

## 2019-08-11 RX ORDER — NICOTINE POLACRILEX 4 MG
15-30 LOZENGE BUCCAL
Status: DISCONTINUED | OUTPATIENT
Start: 2019-08-11 | End: 2019-08-11

## 2019-08-11 RX ORDER — FENTANYL CITRATE 50 UG/ML
50-100 INJECTION, SOLUTION INTRAMUSCULAR; INTRAVENOUS
Status: DISCONTINUED | OUTPATIENT
Start: 2019-08-11 | End: 2019-08-11

## 2019-08-11 RX ORDER — MAGNESIUM SULFATE IN WATER 40 MG/ML
2 INJECTION, SOLUTION INTRAVENOUS CONTINUOUS
Status: DISCONTINUED | OUTPATIENT
Start: 2019-08-11 | End: 2019-08-12

## 2019-08-11 RX ORDER — MAGNESIUM SULFATE HEPTAHYDRATE 40 MG/ML
4 INJECTION, SOLUTION INTRAVENOUS ONCE
Status: COMPLETED | OUTPATIENT
Start: 2019-08-11 | End: 2019-08-11

## 2019-08-11 RX ORDER — OXYTOCIN/0.9 % SODIUM CHLORIDE 30/500 ML
100 PLASTIC BAG, INJECTION (ML) INTRAVENOUS CONTINUOUS
Status: DISCONTINUED | OUTPATIENT
Start: 2019-08-11 | End: 2019-08-14 | Stop reason: HOSPADM

## 2019-08-11 RX ORDER — HYDROCORTISONE 2.5 %
CREAM (GRAM) TOPICAL 3 TIMES DAILY PRN
Status: DISCONTINUED | OUTPATIENT
Start: 2019-08-11 | End: 2019-08-14 | Stop reason: HOSPADM

## 2019-08-11 RX ORDER — IBUPROFEN 800 MG/1
800 TABLET, FILM COATED ORAL EVERY 6 HOURS PRN
Status: DISCONTINUED | OUTPATIENT
Start: 2019-08-11 | End: 2019-08-14 | Stop reason: HOSPADM

## 2019-08-11 RX ORDER — NALOXONE HYDROCHLORIDE 0.4 MG/ML
.1-.4 INJECTION, SOLUTION INTRAMUSCULAR; INTRAVENOUS; SUBCUTANEOUS
Status: DISCONTINUED | OUTPATIENT
Start: 2019-08-11 | End: 2019-08-14 | Stop reason: HOSPADM

## 2019-08-11 RX ORDER — LORAZEPAM 2 MG/ML
2 INJECTION INTRAMUSCULAR
Status: DISCONTINUED | OUTPATIENT
Start: 2019-08-11 | End: 2019-08-14 | Stop reason: HOSPADM

## 2019-08-11 RX ORDER — OXYTOCIN 10 [USP'U]/ML
10 INJECTION, SOLUTION INTRAMUSCULAR; INTRAVENOUS
Status: DISCONTINUED | OUTPATIENT
Start: 2019-08-11 | End: 2019-08-14 | Stop reason: HOSPADM

## 2019-08-11 RX ORDER — CARBOPROST TROMETHAMINE 250 UG/ML
250 INJECTION, SOLUTION INTRAMUSCULAR
Status: DISCONTINUED | OUTPATIENT
Start: 2019-08-11 | End: 2019-08-11

## 2019-08-11 RX ORDER — ACETAMINOPHEN 325 MG/1
650 TABLET ORAL EVERY 4 HOURS PRN
Status: DISCONTINUED | OUTPATIENT
Start: 2019-08-11 | End: 2019-08-11

## 2019-08-11 RX ORDER — DEXTROSE MONOHYDRATE 25 G/50ML
25-50 INJECTION, SOLUTION INTRAVENOUS
Status: DISCONTINUED | OUTPATIENT
Start: 2019-08-11 | End: 2019-08-11

## 2019-08-11 RX ORDER — FENTANYL CITRATE 50 UG/ML
25 INJECTION, SOLUTION INTRAMUSCULAR; INTRAVENOUS ONCE
Status: COMPLETED | OUTPATIENT
Start: 2019-08-11 | End: 2019-08-11

## 2019-08-11 RX ORDER — NICOTINE POLACRILEX 4 MG
15-30 LOZENGE BUCCAL
Status: DISCONTINUED | OUTPATIENT
Start: 2019-08-11 | End: 2019-08-14 | Stop reason: HOSPADM

## 2019-08-11 RX ORDER — LABETALOL 20 MG/4 ML (5 MG/ML) INTRAVENOUS SYRINGE
80 EVERY 10 MIN PRN
Status: DISCONTINUED | OUTPATIENT
Start: 2019-08-11 | End: 2019-08-14 | Stop reason: HOSPADM

## 2019-08-11 RX ORDER — LABETALOL 20 MG/4 ML (5 MG/ML) INTRAVENOUS SYRINGE
40 EVERY 10 MIN PRN
Status: DISCONTINUED | OUTPATIENT
Start: 2019-08-11 | End: 2019-08-14 | Stop reason: HOSPADM

## 2019-08-11 RX ORDER — OXYTOCIN/0.9 % SODIUM CHLORIDE 30/500 ML
1-24 PLASTIC BAG, INJECTION (ML) INTRAVENOUS CONTINUOUS
Status: DISCONTINUED | OUTPATIENT
Start: 2019-08-11 | End: 2019-08-11

## 2019-08-11 RX ORDER — MAGNESIUM SULFATE HEPTAHYDRATE 40 MG/ML
4 INJECTION, SOLUTION INTRAVENOUS
Status: DISCONTINUED | OUTPATIENT
Start: 2019-08-11 | End: 2019-08-14 | Stop reason: HOSPADM

## 2019-08-11 RX ORDER — EPHEDRINE SULFATE 50 MG/ML
5 INJECTION, SOLUTION INTRAMUSCULAR; INTRAVENOUS; SUBCUTANEOUS
Status: DISCONTINUED | OUTPATIENT
Start: 2019-08-11 | End: 2019-08-11

## 2019-08-11 RX ORDER — OXYCODONE AND ACETAMINOPHEN 5; 325 MG/1; MG/1
1 TABLET ORAL
Status: DISCONTINUED | OUTPATIENT
Start: 2019-08-11 | End: 2019-08-11

## 2019-08-11 RX ORDER — FENTANYL/BUPIVACAINE/NS/PF 2-1250MCG
PLASTIC BAG, INJECTION (ML) INJECTION
Status: COMPLETED
Start: 2019-08-11 | End: 2019-08-11

## 2019-08-11 RX ORDER — SODIUM CHLORIDE, SODIUM LACTATE, POTASSIUM CHLORIDE, CALCIUM CHLORIDE 600; 310; 30; 20 MG/100ML; MG/100ML; MG/100ML; MG/100ML
INJECTION, SOLUTION INTRAVENOUS CONTINUOUS
Status: DISCONTINUED | OUTPATIENT
Start: 2019-08-11 | End: 2019-08-11

## 2019-08-11 RX ORDER — LIDOCAINE 40 MG/G
CREAM TOPICAL
Status: DISCONTINUED | OUTPATIENT
Start: 2019-08-11 | End: 2019-08-11

## 2019-08-11 RX ORDER — LABETALOL 20 MG/4 ML (5 MG/ML) INTRAVENOUS SYRINGE
20 EVERY 10 MIN PRN
Status: DISCONTINUED | OUTPATIENT
Start: 2019-08-11 | End: 2019-08-14 | Stop reason: HOSPADM

## 2019-08-11 RX ORDER — AMOXICILLIN 250 MG
1 CAPSULE ORAL 2 TIMES DAILY
Status: DISCONTINUED | OUTPATIENT
Start: 2019-08-11 | End: 2019-08-14 | Stop reason: HOSPADM

## 2019-08-11 RX ORDER — OXYTOCIN/0.9 % SODIUM CHLORIDE 30/500 ML
100-340 PLASTIC BAG, INJECTION (ML) INTRAVENOUS CONTINUOUS PRN
Status: COMPLETED | OUTPATIENT
Start: 2019-08-11 | End: 2019-08-11

## 2019-08-11 RX ORDER — LIDOCAINE HYDROCHLORIDE 10 MG/ML
INJECTION, SOLUTION EPIDURAL; INFILTRATION; INTRACAUDAL; PERINEURAL
Status: DISCONTINUED
Start: 2019-08-11 | End: 2019-08-11 | Stop reason: HOSPADM

## 2019-08-11 RX ORDER — DEXTROSE MONOHYDRATE 25 G/50ML
25-50 INJECTION, SOLUTION INTRAVENOUS
Status: DISCONTINUED | OUTPATIENT
Start: 2019-08-11 | End: 2019-08-14 | Stop reason: HOSPADM

## 2019-08-11 RX ORDER — ONDANSETRON 2 MG/ML
4 INJECTION INTRAMUSCULAR; INTRAVENOUS EVERY 6 HOURS PRN
Status: DISCONTINUED | OUTPATIENT
Start: 2019-08-11 | End: 2019-08-11

## 2019-08-11 RX ORDER — CALCIUM GLUCONATE 94 MG/ML
1 INJECTION, SOLUTION INTRAVENOUS
Status: DISCONTINUED | OUTPATIENT
Start: 2019-08-11 | End: 2019-08-14 | Stop reason: HOSPADM

## 2019-08-11 RX ORDER — AMOXICILLIN 250 MG
2 CAPSULE ORAL 2 TIMES DAILY
Status: DISCONTINUED | OUTPATIENT
Start: 2019-08-11 | End: 2019-08-14 | Stop reason: HOSPADM

## 2019-08-11 RX ORDER — MAGNESIUM SULFATE HEPTAHYDRATE 500 MG/ML
4 INJECTION, SOLUTION INTRAMUSCULAR; INTRAVENOUS
Status: DISCONTINUED | OUTPATIENT
Start: 2019-08-11 | End: 2019-08-14 | Stop reason: HOSPADM

## 2019-08-11 RX ADMIN — IBUPROFEN 800 MG: 800 TABLET, FILM COATED ORAL at 18:49

## 2019-08-11 RX ADMIN — Medication 5 MG: at 09:37

## 2019-08-11 RX ADMIN — SODIUM CHLORIDE, POTASSIUM CHLORIDE, SODIUM LACTATE AND CALCIUM CHLORIDE: 600; 310; 30; 20 INJECTION, SOLUTION INTRAVENOUS at 07:52

## 2019-08-11 RX ADMIN — LABETALOL 20 MG/4 ML (5 MG/ML) INTRAVENOUS SYRINGE 20 MG: at 20:45

## 2019-08-11 RX ADMIN — MAGNESIUM SULFATE IN WATER 4 G: 40 INJECTION, SOLUTION INTRAVENOUS at 20:50

## 2019-08-11 RX ADMIN — MAGNESIUM SULFATE IN WATER 2 G/HR: 40 INJECTION, SOLUTION INTRAVENOUS at 21:35

## 2019-08-11 RX ADMIN — SODIUM CHLORIDE, POTASSIUM CHLORIDE, SODIUM LACTATE AND CALCIUM CHLORIDE 75 ML/HR: 600; 310; 30; 20 INJECTION, SOLUTION INTRAVENOUS at 20:50

## 2019-08-11 RX ADMIN — SODIUM CHLORIDE, POTASSIUM CHLORIDE, SODIUM LACTATE AND CALCIUM CHLORIDE: 600; 310; 30; 20 INJECTION, SOLUTION INTRAVENOUS at 09:47

## 2019-08-11 RX ADMIN — LIDOCAINE HYDROCHLORIDE,EPINEPHRINE BITARTRATE 3 ML: 15; .005 INJECTION, SOLUTION EPIDURAL; INFILTRATION; INTRACAUDAL; PERINEURAL at 08:52

## 2019-08-11 RX ADMIN — Medication 340 ML/HR: at 16:48

## 2019-08-11 RX ADMIN — Medication 12 ML/HR: at 08:52

## 2019-08-11 RX ADMIN — FENTANYL CITRATE 25 MCG: 50 INJECTION, SOLUTION INTRAMUSCULAR; INTRAVENOUS at 08:52

## 2019-08-11 RX ADMIN — Medication 2 MILLI-UNITS/MIN: at 12:00

## 2019-08-11 NOTE — PLAN OF CARE
Patient comfortable after her epidural. Did have one episode of hypotension after epidural- FHR had a prolonged decel x 3 minutes at the same time. Both resolved with IV fluids and one dose of ephedrine. Contractions spacing out, SVE reveals minimal change in 4 hours. Per CNM, pitocin ordered. Patient agrees with plan. Pitocin started at 1200.  with moderate variability. Accels present, decels not present. Blanco reveals contractions every 1-4 minutes palpating moderate. Will continue to titrate pitocin as needed. Anticipate .

## 2019-08-11 NOTE — ANESTHESIA PREPROCEDURE EVALUATION
Anesthesia Pre-Procedure Evaluation    Patient: Rubina Desouza   MRN:     [id number] Gender:   female   Age:    34 year old :      1985        Preoperative Diagnosis: * No pre-op diagnosis entered *   * No procedures listed *     Past Medical History:   Diagnosis Date     Anemia      Conductive hearing loss      Hearing problem      Hyperthyroidism     Better since surgery     Tinnitus       Past Surgical History:   Procedure Laterality Date     CYSTOSCOPY, BIOPSY BLADDER, COMBINED N/A 10/26/2018    Procedure: Bladder Biopsy, Right Ureteroscopy with Possible Biopsy;  Surgeon: Viral Hampton MD;  [facility]     CYSTOSCOPY, URETEROSCOPY, COMBINED  10/26/2018    Procedure: COMBINED CYSTOSCOPY, URETEROSCOPY;  Surgeon: Viral Hampton MD;  [facility]     THYROID SURGERY      in [facility]; partial thyroidectomy          Anesthesia Evaluation       history and physical reviewed .             ROS/MED HX    ENT/Pulmonary:  - neg pulmonary ROS     Neurologic:  - neg neurologic ROS     Cardiovascular:  - neg cardiovascular ROS       METS/Exercise Tolerance:     Hematologic:  - neg hematologic  ROS   (+) Anemia, -      Musculoskeletal:  - neg musculoskeletal ROS       GI/Hepatic:  - neg GI/hepatic ROS       Renal/Genitourinary:  - ROS Renal section negative       Endo: Comment: Gestational diabetes - neg endo ROS   (+) thyroid problem  hyperthyroidism, .      Psychiatric:  - neg psychiatric ROS       Infectious Disease:  - neg infectious disease ROS       Malignancy:      - no malignancy   Other:    - neg other ROS                 JZG FV AN PHYSICAL EXAM    LABS:  CBC:   Lab Results   Component Value Date    WBC 7.7 2019    WBC 8.9 2019    HGB 12.6 2019    HGB 12.1 2019    HCT 37.2 2019    HCT 35.6 2019     (L) 2019     2019     BMP:   Lab Results   Component Value Date     2019    POTASSIUM 3.5  "07/11/2019    CHLORIDE 110 (H) 07/11/2019    CO2 20 07/11/2019    BUN 9 07/11/2019    CR 0.86 07/11/2019     (H) 07/11/2019     COAGS: No results found for: PTT, INR, FIBR  POC:   Lab Results   Component Value Date    BGM 79 08/11/2019    HCG Negative 11/30/2018     OTHER:   Lab Results   Component Value Date    A1C 6.2 (H) 05/02/2019    GERALD 8.8 07/11/2019    ALT 18 08/11/2019    AST 25 08/11/2019    TSH 0.61 05/02/2019    T4 1.09 05/02/2019    T3 127 04/04/2019        Preop Vitals    BP Readings from Last 3 Encounters:   08/11/19 126/74   08/06/19 129/87   07/29/19 123/76    Pulse Readings from Last 3 Encounters:   08/06/19 90   07/29/19 88   07/22/19 84      Resp Readings from Last 3 Encounters:   08/11/19 18   10/26/18 15   12/17/17 16    SpO2 Readings from Last 3 Encounters:   08/11/19 100%   11/30/18 100%   10/26/18 100%      Temp Readings from Last 1 Encounters:   08/11/19 36.5  C (97.7  F) (Oral)    Ht Readings from Last 1 Encounters:   08/06/19 1.575 m (5' 2\")      Wt Readings from Last 1 Encounters:   08/06/19 57.9 kg (127 lb 9.6 oz)    Estimated body mass index is 23.34 kg/m  as calculated from the following:    Height as of 8/6/19: 1.575 m (5' 2\").    Weight as of 8/6/19: 57.9 kg (127 lb 9.6 oz).     LDA:        JZG FV AN PLAN NO PONV RULE        Thrombocytopenia         Paul Fernandez MD  "

## 2019-08-11 NOTE — PLAN OF CARE
Patient needing frequent position changes due to intermittent LDs. LDs did subside with position changes. Patient called out feeling the urge to push, patient was found to be completely dilated. Patient began pushing at 1631 and  of a viable infant male at 1640. . Will closely monitor for 2 hours before transferring to [facility].

## 2019-08-11 NOTE — PLAN OF CARE
Data: Patient presented to [facility] at 0646.   Reason for maternal/fetal assessment per patient is Rule Out Labor  .  Patient is a . Prenatal record reviewed.      OB History    Para Term  AB Living   5 2 2 0 2 2   SAB TAB Ectopic Multiple Live Births   2 0 0 0 2      # Outcome Date GA Lbr Joe/2nd Weight Sex Delivery Anes PTL Lv   5 Current            4 SAB 2018           3 Term 12/15/17 41w3d 01:30 / 00:18 3.657 kg (8 lb 1 oz) M Vag-Spont Nitrous N BRAVO      Complications: Shoulder Dystocia      Name: MARLY ULRICH IJABO      Apgar1: 8  Apgar5: 9   2 SAB 17           1 Term 12 40w1d  3.1 kg (6 lb 13.4 oz) F  None  BRAOV   . Medical history:   Past Medical History:   Diagnosis Date     Anemia      Conductive hearing loss      Hearing problem      Hyperthyroidism     Better since surgery     Tinnitus    . Gestational Age 39w4d. VSS. Fetal movement present. Patient denies cramping, backache, vaginal discharge, pelvic pressure, UTI symptoms, GI problems, bloody show, vaginal bleeding, edema, headache, visual disturbances, epigastric or URQ pain, abdominal pain, rupture of membranes. Support persons  present.  Action: Verbal consent for EFM. Triage assessment completed. EFM applied for uterine and fetal assessment. Fetal assessment: Presumed adequate fetal oxygenation documented (see flow record).   Response: REGINA Noel CNM informed of pt arrival. Plan per provider is admit pt to Labor and Delivery, see orders. Patient verbalized agreement with plan. Patient transferred to room 0442 ambulatory, oriented to room and call light.

## 2019-08-11 NOTE — PROGRESS NOTES
Blood pressure 133/78, temperature 97.6  F (36.4  C), temperature source Oral, resp. rate 18, last menstrual period 11/16/2018, SpO2 100 %, not currently breastfeeding.    General appearance: comfortable with labor epidural. Agreeable to SVE.  CONTRACTIONS: moderate and every 6-7 minutes  Pitocin- none,  Antibiotics- none  FETAL HEART TONES: continuous EFM- baseline 125 with moderate variability and positive accelerations. No decelerations.  ROM: not ruptured  PELVIC EXAM: 5.5/ 70%/ -1     ASSESSMENT:  ==============  IUP @ 39w4d in early labor   Fetal Heart Rate Tracing category one  GBS- negative  GDMA, blood sugars wnl  Normotensive    PLAN:  ===========  Discussed contraction pattern and cervical change. Labor augmentation with Pitocin vs. AROM reviewed with pt. Pt would like to proceed with pitocin augmentation at this time.   Frequent position changes to facilitate labor with epidural anesthesia.  Reevaluate in 2-4 hours MARCIN Witt CNM

## 2019-08-11 NOTE — ANESTHESIA PROCEDURE NOTES
Combined Spinal/Epidural procedure note    Staff  Anesthesiologist:  Paul Fernandez MD  Location:  OB  Procedure start time:  8/11/2019 8:31 AM  Procedure end time:  8/11/2019 8:52 AM    Timeout  patient identified, IV checked, site marked, risks and benefits discussed, informed consent, monitors and equipment checked, pre-op evaluation, at physician/surgeon's request and post-op pain management    Correct Patient: Yes    Correct Position: Yes    Correct Site: Yes    Correct Procedure: Yes    Correct Laterality:  Yes  Site Marked:  Yes    Procedure details  Procedure:  Combined Spinal/Epidural (CSE)  Position:  Sitting  ASA:  2  Sterile Prep: Betadine, mask and sterile gloves    Insertion site:  L3-4  Local skin infiltration:  2% lidocaine  amount (mL):  3  Approach:  Midline  Epidural Needle Type:  Dawsonuhtrish  Needle gauge (G):  17  Injection Technique:  LORT saline  TABITHA at (cm):  5.5  Attempts:  1  Redirects:  1  Spinal Needle (G):  25  Spinal Needle Type:  Lindy  Catheter gauge (G):  19  Catheter threaded easily: Yes    Threaded to skin (cm) :  9  Threaded in epidural space (cm):  3.5  Paresthesias:  Yes and Resolved  CSF with Epidural needle: No    CSF with Spinal needle: Yes    Aspiration negative for Heme or CSF: Yes    Test dose (mL):  3  Local anesthetic for test dose:  Lidocaine 1.5% w/ 1:200,000 epinephrine  Test dose negative for signs of intravascular, subdural or intrathecal injection: Yes

## 2019-08-11 NOTE — PROGRESS NOTES
Blood pressure 130/63, pulse 84, temperature 98.5  F (36.9  C), temperature source Oral, resp. rate 18, last menstrual period 2018, SpO2 99 %, not currently breastfeeding.    General appearance: comfortable with labor epidural. Reports intermittent pressure, unchanged from previous feeling. Cousins bedside.  CONTRACTIONS: moderate to strong and every 2-4.5 minutes  Pitocin- 5 mu/min.,  Antibiotics- none  FETAL HEART TONES: continuous EFM- baseline 140 with moderate variability and positive accelerations. Had late decelerations x 2-3, resolved with repositioning and moderate variability maintained throughout.  Tracing returned to Category I.  ROM: clear fluid, SROM x 1.5 hours  PELVIC EXAM: 8-9cm / 100%/ 0    ASSESSMENT:  ==============  IUP @ 39w4d in active labor   Fetal Heart Rate Tracing category one  GBS- negative  Normotensive  GDMA1    PLAN:  ===========  Continue to monitor fetal well-being, intrauterine resuscitative measures as needed.  Frequent position changes to facilitate labor with epidural anesthesia.  Continue labor augmentation with Pitocin   Anticipate   Reevaluate in 2-4 hours MARCIN Witt CNM

## 2019-08-11 NOTE — L&D DELIVERY NOTE
DELIVERY NOTE:  Rubina Desouza is a 34 year old  at 39w4d who presented to labor floor with c/o contractions. Pt had elevated blood pressures on admission and pre-eclampsia labs were completed, which showed elevated pr/cr ratio and uric acid. Pt received labor epidural for adequate relief. Due to spacing of contractions, labor was augmented with Pitocin. Pt SROM for clear fluid at 1345. Pt felt strong urge to push and was found to be complete. On-call MD on unit for delivery d/t history of shoulder dystocia.  Pushed effectively with CNM and RN coaching. FHR with decels with pushing effort,  moderate variability throughout. Head delivered OA and restituted to LEA .  Shoulders easily delivered under maternal effort.  Live male  delivered at 1640 over an intact perineum under epidural anesthesia.  Spontaneous breath, vigorous cry, well flexed, HR>100. Infant directly to maternal abdomen, skin to skin. Delayed cord clamping for > 1 minutes then clamped x2 and cut by CNM. 20 units of pitocin infusing after baby with pt's consent. Cord blood obtained for typing.  Intact placenta spontaneously delivered via Suazo at 1647.  3 vessel cord. Fundus midline and firm with massage. Vagina, perineum, and rectum inspected, found to be intact. Small laceration anteriorly, which was hemostatic.  Repaired with 4-0 vicryl for good approximation with one interrupted stitch.  Mother and infant stable; continued skin to skin. Good family bonding observed.     Delivery Note:   IUP at 39 weeks gestation delivered on 2019.     delivery of a viable Male infant.  Weight : pending  Apgars of 9 at 1 minute and 9 at 5 minutes.  Labor was augmented.  Medications administered  in labor:  Pain Rx Epidural; Antibiotics No; Other n/a  Perineum: Minor anterior laceration, repaired.  Placenta-mechanism: spontaneous, intact,  with a 3 vessel cord. IV oxytocin was given After delivery of baby  Quantitative Blood Loss was 100cc.    Complications of labor and delivery: Category two FHT tracing, Gestational Diabetes and Preeclampsia without severe features  Anticipated Discharge Date: 19  Birth attendants: OTONIEL Suarez APRN CNM    Delivery Summary    Jilliangem Desouza MRN# [id number]   Age: 34 year old YOB: 1985     ASSESSMENT & PLAN:          Labor Event Times    Labor onset date:  19 Onset time:   3:00 AM      Labor Events     labor?:  No   steroids:  None     Rupture date/time: 19 1345   Rupture type:  Spontaneous rupture of membranes occuring during spontaneous labor or augmentation  Fluid color:  Clear  Fluid odor:  Normal     Delivery/Placenta Date and Time    Delivery Date:  19 Delivery Time:   4:40 PM      Apgars    Living status:  Living   1 Minute 5 Minute 10 Minute 15 Minute 20 Minute   Skin color: 1  1       Heart rate: 2  2       Reflex irritability: 2  2       Muscle tone: 2  2       Respiratory effort: 2  2       Total: 9  9       Apgars assigned by:  INDER HARMAN RNC     Cord    Vessels:  3 Vessels    Cord Blood Disposition:  Lab       [place] Resuscitation    [place] Care at Delivery:  Spontaneous cry, to mother for skin to skin     Skin to Skin and Feeding Plan    Skin to skin initiation date/time: 1841    Skin to skin with:  Mother  Skin to skin end date/time: 1841    How do you plan to feed your baby:  Breastfeeding     Labor Events and Shoulder Dystocia    Fetal Tracing Prior to Delivery:  Category 2  Shoulder dystocia present?:  Neg     Delivery (Maternal) (Provider to Complete) (315245)    Episiotomy:  None  Perineal lacerations:  None    Labial laceration:  bilateral Repaired?:  Yes      Blood Loss  Mother: Lyly Rubina #[id number]   Start of Mother's Information    IO Blood Loss  19 0300 - 19 1704    None           End of Mother's Information  Mother: Rubina Desouza #[id number]         Delivery - Provider to Complete (380329)     Delivering clinician:  Chas Noel APRN CNM  CNM Care:  Exclusive CNM care in labor  Attempted Delivery Types (Choose all that apply):  Spontaneous Vaginal Delivery  Delivery Type (Choose the 1 that will go to the Birth History):  Vaginal, Spontaneous          Placenta    Delayed Cord Clamping:  Done  Removal:  Spontaneous  Comments:  PAULY, Lakeisha, kay  Disposition:  Hospital disposal     Presentation and Position    Presentation:  Vertex  Position:  Left Occiput Anterior           MARCIN Whitehead CNM

## 2019-08-11 NOTE — TELEPHONE ENCOUNTER
34 year old  at 39w4d contacted CNM on call with reported q5-10 min contractions via her .   May have some leaking, no VB,  Unsure about fetal movement. Reviewed GBS neg.   Noted history of shoulder dystocia.  Pt with GDM, thrombocytepenia.  GBS neg.  Recommended come to hospital for assessment.  Charge RN busy, unit clerk updated, will pass along pt en route to hospital.  Yaquelin BURCH, OTONIEL

## 2019-08-11 NOTE — H&P
ADMIT NOTE  =================  39w4d    Rubina Desouza is a 34 year old female with an Patient's last menstrual period was 2018. and Estimated Date of Delivery: Aug 14, 2019 [facility] 2019 at 7:06 AM in early labor.     HPI  ================  Pt reports contractions that became uncomfortable at 0300, q 10 minutes. Reports some mucous-like discharge, but denies leaking of fluid or bleeding. + fetal movement.    Contractions- moderate  Fetal movement- active  ROM- no   Vaginal bleeding- none  GBS- negative  FOB- is involved, present.  Other labor support- none    Weight gain- 127 - 113 lbs, Total weight gain- 14 lbs  Height- 62 in  BMI- 21  First prenatal visit at 10 weeks, Total visits- 12    PROBLEM LIST  =================  Patient Active Problem List    Diagnosis Date Noted     Labor and delivery, indication for care 2019     Priority: Medium     Thrombocytopenia (H) 2019     Priority: Medium     19 - plt 123  19: plt 151   19: plan to check on admission        Diet controlled gestational diabetes mellitus (GDM) 2019     Priority: Medium     Atrophy of left kidney 2019     Priority: Medium     Has renal US CSC 5/3       Hx of shoulder dystocia, prior pregnancy, currently pregnant 2019     Priority: Medium     1 min shoulder dystocia broken collar bone   19 - pt counseled on risk of repeat shoulder dystocia with or without fetal injury. Offered primary . She declines.        Supervision of high risk pregnancy - [facility] CNM 2019     Priority: Medium     HR ROUNDS- please review.  Is CNM care appropriate?  Guidelines say hx of shoulder dystocia and injury require MD care.  Patient also with GDM this pregnancy. Per Karina DONG 19 - appropriate for CNM cares, MD back up per unit protocol if shoulder dystocia occurs  Last pap , prefers pap postpartum  Desires CNM care, declines 1st trimester screening, level II  ordered  Level 2 scheduled for 3/20/19    5/30/19: USEGA = 29 5/7 weeks.  EFW = 1484 grams, 67 % for 29 weeks.   MVP = 7.7cm.  FHR = 147bpm        H/O partial thyroidectomy 05/10/2017     Priority: Medium     19: Thyroid labs today per endocrine    Per MFM, Would recommend serial growth US if patient is TFTs are abnormal.       Hyperthyroidism 2017     Priority: Medium     S/p partial thyroidectomy    19- TSH < 0.01, T4 1.84  3/4- thyroid labs repeated  2019 - thyroid labs done,  Normal amniotic fluid.    Per MFM - serial growth US if TFTs are abnormal.         HISTORIES  ============  No Known Allergies  Past Medical History:   Diagnosis Date     Anemia      Conductive hearing loss      Hearing problem      Hyperthyroidism     Better since surgery     Tinnitus      Past Surgical History:   Procedure Laterality Date     CYSTOSCOPY, BIOPSY BLADDER, COMBINED N/A 10/26/2018    Procedure: Bladder Biopsy, Right Ureteroscopy with Possible Biopsy;  Surgeon: Viral Hampton MD;  [facility]     CYSTOSCOPY, URETEROSCOPY, COMBINED  10/26/2018    Procedure: COMBINED CYSTOSCOPY, URETEROSCOPY;  Surgeon: Viral Hampton MD;  [facility]     TH[facility]ROID SURGERY      in [facility]; partial thyroidectomy   .  Family History   Problem Relation Age of Onset     Hypertension Father      Social History     Tobacco Use     Smoking status: Never Smoker     Smokeless tobacco: Never Used   Substance Use Topics     Alcohol use: No     Alcohol/week: 0.0 oz     OB History    Para Term  AB Living   5 2 2 0 2 2   SAB TAB Ectopic Multiple Live Births   2 0 0 0 2      # Outcome Date GA Lbr Joe/2nd Weight Sex Delivery Anes PTL Lv   5 Current            4 2018           3 Term 12/15/17 41w3d 01:30 / 00:18 3.657 kg (8 lb 1 oz) M Vag-Spont Nitrous N BRAVO      Complications: Shoulder Dystocia      Name: MARLY ULRICH IJABO      Apgar1: 8  Apgar5: 9   2 SAB 17           1  Term 12 40w1d  3.1 kg (6 lb 13.4 oz) F  None  BRAVO      LABS:   ===========  Prenatal Labs:  Rhogam not indicated   Lab Results   Component Value Date    ABO O 2019    RH Pos 2019    AS Neg 2019    HEPBANG Nonreactive 2019    TREPAB Negative 12/15/2017    HGB 12.1 2019     Rubella immune  Lab Results   Component Value Date    GBS Negative 2019     Other labs:  No results found for this or any previous visit (from the past 24 hour(s)).    ROS  =========  Pt denies significant respiratory, cardiovacular, GI, or muscular/skeletalcomplaints.    See RN data base ROS.     PHYSICAL EXAM:  ===============  LMP 2018   General appearance: uncomfortable with contractions, coping well  GENERAL APPEARANCE: healthy, alert and no distress  RESP: normal respiratory effort  CV: regular rates & rhythm  ABDOMEN:  soft, nontender, no epigastric pain  SKIN: no suspicious lesions or rashes  NEURO: Denies headache, blurred vision, other vision changes  PSYCH: mentation appears normal. and affect normal/bright  Legs: Reflexes normal bilaterally and No clonus bilaterally     Abdomen: gravid, vertex fetus per Leopold's, non-tender between contractions.   Cephalic presentation confirmed by BSUS  EFW-  7.5-8 lbs.   CONTRACTIONS: moderate and every 2-7 minutes  FETAL HEART TONES: continuous EFM- baseline 135 with moderate variability and positive accelerations. No decelerations.  PELVIC EXAM: 4/ 90%/ Mid/ soft/-2   BLOODY SHOW: no   ROM: no  FLUID: none  AMNISURE: not done    ASSESSMENT:  ==============  IUP @ 39w4d admitted in early labor   NST NOT DONE  Fetal Heart Rate - category one  GBS- negative  GDMA1, well-controlled  Hx of thrombocytopenia  Hyperthyroidism  Hx of shoulder dystocia  Elevated BP    Patient Active Problem List   Diagnosis     Hyperthyroidism     H/O partial thyroidectomy     Supervision of high risk pregnancy - [facility] CNM     Hx of shoulder dystocia, prior pregnancy,  currently pregnant     Atrophy of left kidney     Diet controlled gestational diabetes mellitus (GDM)     Thrombocytopenia (H)     Labor and delivery, indication for care      PLAN:  ===========  Admit - see IP orders  Pre-e labs d/t elevated blood pressures.  Pain medication options reviewed. Pt is interested in epidural. Will notify anesthesia.  Ambulation, hydration, position changes, birthing ball and tub options to facilitate labor reviewed with pt .  Anticipate   MARCIN WhiteheadM

## 2019-08-11 NOTE — CONSULTS
"Brief IP Diabetes Consult Note:    Rubina is a 34 year old  who is 39 weeks pregnant who was admitted 2019 for onset of labor.     She has no history of pre gestational diabetes. She endorsed a positive GCT in her second pregnancy, but \"passed\" a follow up GTT.    This pregnancy:  She had a one hour GTT 19 with 1 hour PP   Follow up 3 hour GTT was positive for postprandial hyperglycemia at 1, 2, and 3 hours. Fasting Glucose was 78.     [facility] and remained diet controlled throughout her pregnancy, with fasting glucoses <90 and postprandial values <120.     Upon this admission, BG 79.    Recommendations:  -PRN IV insulin per OB Intrapartum protocol during active labor  -BG checks q2h during labor recommended.     Following delivery: recommend initiation of moderate intensity sliding scale TID AC and HS, with BG monitoring TID AC and HS.       Full consult to follow on  if glycemic control not continued post-delivery.       Recommend patient follow up in 6 weeks for repeat glucose tolerance test, and receive annual A1c screening, due to the increased risk of TIIDM development in women with GDM history.        Neha Mills PA-C  Diabetes Management Service  Pager [phone]        "

## 2019-08-12 ENCOUNTER — OFFICE VISIT (OUTPATIENT)
Dept: INTERPRETER SERVICES | Facility: CLINIC | Age: 34
End: 2019-08-12

## 2019-08-12 LAB
ALT SERPL W P-5'-P-CCNC: 19 U/L (ref 0–50)
AST SERPL W P-5'-P-CCNC: 32 U/L (ref 0–45)
CREAT SERPL-MCNC: 0.84 MG/DL (ref 0.52–1.04)
ERYTHROCYTE [DISTWIDTH] IN BLOOD BY AUTOMATED COUNT: 13.2 % (ref 10–15)
GFR SERPL CREATININE-BSD FRML MDRD: >90 ML/MIN/{1.73_M2}
HCT VFR BLD AUTO: 37.7 % (ref 35–47)
HGB BLD-MCNC: 13 G/DL (ref 11.7–15.7)
MAGNESIUM SERPL-MCNC: 7.3 MG/DL (ref 1.6–2.3)
MAGNESIUM SERPL-MCNC: 8.8 MG/DL (ref 1.6–2.3)
MCH RBC QN AUTO: 32 PG (ref 26.5–33)
MCHC RBC AUTO-ENTMCNC: 34.5 G/DL (ref 31.5–36.5)
MCV RBC AUTO: 93 FL (ref 78–100)
PLATELET # BLD AUTO: 136 10E9/L (ref 150–450)
RBC # BLD AUTO: 4.06 10E12/L (ref 3.8–5.2)
WBC # BLD AUTO: 11.3 10E9/L (ref 4–11)

## 2019-08-12 PROCEDURE — 84450 TRANSFERASE (AST) (SGOT): CPT | Performed by: STUDENT IN AN ORGANIZED HEALTH CARE EDUCATION/TRAINING PROGRAM

## 2019-08-12 PROCEDURE — 84460 ALANINE AMINO (ALT) (SGPT): CPT | Performed by: STUDENT IN AN ORGANIZED HEALTH CARE EDUCATION/TRAINING PROGRAM

## 2019-08-12 PROCEDURE — 25800030 ZZH RX IP 258 OP 636: Performed by: STUDENT IN AN ORGANIZED HEALTH CARE EDUCATION/TRAINING PROGRAM

## 2019-08-12 PROCEDURE — 82565 ASSAY OF CREATININE: CPT | Performed by: STUDENT IN AN ORGANIZED HEALTH CARE EDUCATION/TRAINING PROGRAM

## 2019-08-12 PROCEDURE — 12000001 ZZH R&B MED SURG/OB UMMC

## 2019-08-12 PROCEDURE — 85027 COMPLETE CBC AUTOMATED: CPT | Performed by: STUDENT IN AN ORGANIZED HEALTH CARE EDUCATION/TRAINING PROGRAM

## 2019-08-12 PROCEDURE — 85018 HEMOGLOBIN: CPT | Performed by: STUDENT IN AN ORGANIZED HEALTH CARE EDUCATION/TRAINING PROGRAM

## 2019-08-12 PROCEDURE — 25000132 ZZH RX MED GY IP 250 OP 250 PS 637: Performed by: ADVANCED PRACTICE MIDWIFE

## 2019-08-12 PROCEDURE — 36415 COLL VENOUS BLD VENIPUNCTURE: CPT | Performed by: STUDENT IN AN ORGANIZED HEALTH CARE EDUCATION/TRAINING PROGRAM

## 2019-08-12 PROCEDURE — 83735 ASSAY OF MAGNESIUM: CPT | Performed by: STUDENT IN AN ORGANIZED HEALTH CARE EDUCATION/TRAINING PROGRAM

## 2019-08-12 PROCEDURE — 25000128 H RX IP 250 OP 636: Performed by: STUDENT IN AN ORGANIZED HEALTH CARE EDUCATION/TRAINING PROGRAM

## 2019-08-12 PROCEDURE — 36415 COLL VENOUS BLD VENIPUNCTURE: CPT | Performed by: OBSTETRICS & GYNECOLOGY

## 2019-08-12 PROCEDURE — 83735 ASSAY OF MAGNESIUM: CPT | Performed by: OBSTETRICS & GYNECOLOGY

## 2019-08-12 PROCEDURE — T1013 SIGN LANG/ORAL INTERPRETER: HCPCS | Mod: U3

## 2019-08-12 RX ORDER — DEXTROSE MONOHYDRATE 25 G/50ML
25-50 INJECTION, SOLUTION INTRAVENOUS
Status: DISCONTINUED | OUTPATIENT
Start: 2019-08-12 | End: 2019-08-14

## 2019-08-12 RX ORDER — NICOTINE POLACRILEX 4 MG
15-30 LOZENGE BUCCAL
Status: DISCONTINUED | OUTPATIENT
Start: 2019-08-12 | End: 2019-08-14

## 2019-08-12 RX ADMIN — SENNOSIDES AND DOCUSATE SODIUM 2 TABLET: 8.6; 5 TABLET ORAL at 09:17

## 2019-08-12 RX ADMIN — IBUPROFEN 800 MG: 800 TABLET, FILM COATED ORAL at 20:07

## 2019-08-12 RX ADMIN — SENNOSIDES AND DOCUSATE SODIUM 1 TABLET: 8.6; 5 TABLET ORAL at 19:40

## 2019-08-12 RX ADMIN — SODIUM CHLORIDE, POTASSIUM CHLORIDE, SODIUM LACTATE AND CALCIUM CHLORIDE 75 ML/HR: 600; 310; 30; 20 INJECTION, SOLUTION INTRAVENOUS at 10:04

## 2019-08-12 RX ADMIN — IBUPROFEN 800 MG: 800 TABLET, FILM COATED ORAL at 14:31

## 2019-08-12 RX ADMIN — IBUPROFEN 800 MG: 800 TABLET, FILM COATED ORAL at 06:31

## 2019-08-12 RX ADMIN — MAGNESIUM SULFATE IN WATER 2 G/HR: 40 INJECTION, SOLUTION INTRAVENOUS at 07:29

## 2019-08-12 RX ADMIN — ACETAMINOPHEN 650 MG: 325 TABLET, FILM COATED ORAL at 23:24

## 2019-08-12 NOTE — CONSULTS
Brief Inpatient Diabetes Note:  This note is to resolve the consult request.    Received consult to determine transition plan for glycemic management from labor to postpartum    Adalgisa Mckenna is a 34 year old female  at [facility]w4d [facility].  History of thrombocytopenia, atrophy of left kidney, partial thyroidectomy.  Dx with gestational diabetes, diet controlled during pregnancy.   Now s/p  on 2019.    Gestational Diabetes: diet controlled  Blood sugars yesterday   orderd blood sugars monitoring during postpartum. Talked with bedside RN, Ms Desouza has been eating and unable to obtain a blood sugar.  No need for further blood sugar monitoring at discharge    Plan for Discharge:  Follow-up in 6 weeks using 75 gram oral glucose tolerance test  Will require lifelong screening for the development of diabetes or prediabetes at least every 3 years.    Dejah Reece CNP  Inpatient Diabetes Service  Pager [phone]  Job code pager [phone]

## 2019-08-12 NOTE — DISCHARGE SUMMARY
[facility] Discharge Summary    Rubina Desouza MRN# [id number]   Age: 34 year old YOB: 1985     Date of Admission:  2019  Date of Discharge:  2019  Admitting Physician:  MARCIN Gonzalez CNM  Discharge Physician:  Sari Avendaño MD    Admit Dx:   - Intrauterine pregnancy at 39w4d   - Thrombocytopenia  - History of shoulder dystocia  - History of thyroidectomy     Discharge Dx:  - Same as above, s/p   - Preeclampsia with severe features    Procedures:  - Spontaneous vaginal delivery  - Epidural analgesia  - Magnesium sulfate     Admit HPI/Labor Course:  Rubina Desouza is a 34 year old  at 39w4d who presented to labor floor with c/o contractions. Pt had elevated blood pressures on admission and pre-eclampsia labs were completed, which showed elevated pr/cr ratio and uric acid. Pt received labor epidural for adequate relief. Due to spacing of contractions, labor was augmented with Pitocin. Pt SROM for clear fluid at 1345. Pt felt strong urge to push and was found to be complete. On-call MD on unit for delivery d/t history of shoulder dystocia.  Pushed effectively with CNM and RN coaching. FHR with decels with pushing effort,  moderate variability throughout. Head delivered OA and restituted to LEA .  Shoulders easily delivered under maternal effort.  Live male  delivered at 1640 over an intact perineum under epidural anesthesia.  Spontaneous breath, vigorous cry, well flexed, HR>100. Infant directly to maternal abdomen, skin to skin. Delayed cord clamping for > 1 minutes then clamped x2 and cut by CNM. 20 units of pitocin infusing after baby with pt's consent. Cord blood obtained for typing.  Intact placenta spontaneously delivered via Su[place] at 1647.  3 vessel cord. Fundus midline and firm with massage. Vagina, perineum, and rectum inspected, found to be intact. Small laceration anteriorly, which was hemostatic.  Repaired with 4-0 vicryl for good  approximation with one interrupted stitch.  Mother and infant stable; continued skin to skin. Good family bonding observed.     Please see her Admission H&P and Delivery Summary for further details.    Postpartum Course:  Her postpartum course was complicated by sustained severe range blood pressures on PPD#0. After diagnosis of severe preeclampsia, she was transferred from the [facility] to MD service. She received labetalol 20mg x1 and was started on magnesium for seizure prophylaxis for 24 hours. She was started on Procardia 30 mg daily which provided good control for her BPs.    On PPD#3, she was meeting all of her postpartum goals and deemed stable for discharge. She was voiding without difficulty, tolerating a regular diet without nausea and vomiting, her pain was well controlled on oral pain medicines and her lochia was appropriate. Her hemoglobin prior to delivery was 12.6 and after delivery was 11.7. Her Rh status was positive, and Rhogam was not indicated.     Discharge Medications:  Current Discharge Medication List      START taking these medications    Details   acetaminophen (TYLENOL) 325 MG tablet Take 2 tablets (650 mg) by mouth every 6 hours as needed for mild pain Start after Delivery.  Qty: 100 tablet, Refills: 0    Associated Diagnoses:  (normal spontaneous vaginal delivery)      ibuprofen (ADVIL/MOTRIN) 600 MG tablet Take 1 tablet (600 mg) by mouth every 6 hours as needed for moderate pain Start after delivery  Qty: 60 tablet, Refills: 0    Associated Diagnoses:  (normal spontaneous vaginal delivery)      NIFEdipine ER OSMOTIC (ADALAT CC) 30 MG 24 hr tablet Take 1 tablet (30 mg) by mouth daily  Qty: 60 tablet, Refills: 0    Associated Diagnoses:  (normal spontaneous vaginal delivery)      senna-docusate (SENOKOT-S/PERICOLACE) 8.6-50 MG tablet Take 1 tablet by mouth daily Start after delivery.  Qty: 100 tablet, Refills: 0    Associated Diagnoses:  (normal spontaneous vaginal delivery)          CONTINUE these medications which have NOT CHANGED    Details   ascorbic acid (VITAMIN C) 250 MG CHEW chewable tablet Take 1 tablet (250 mg) by mouth daily  Qty: 90 tablet, Refills: 3    Associated Diagnoses: Abnormal maternal glucose tolerance, antepartum; Other iron deficiency anemia      ferrous sulfate (FE TABS) 325 (65 Fe) MG EC tablet Take 1 tablet (325 mg) by mouth daily  Qty: 90 tablet, Refills: 1    Associated Diagnoses: Abnormal maternal glucose tolerance, antepartum; Other iron deficiency anemia      Prenatal Vit-Fe Fumarate-FA (SM PRENATAL VITAMINS) 28-0.8 MG TABS Take 1 tablet by mouth daily  Qty: 30 tablet, Refills: 3    Associated Diagnoses: Supervision of high risk pregnancy in first trimester      ranitidine (ZANTAC) 150 MG tablet Take 1 tablet (150 mg) by mouth 2 times daily  Qty: 60 tablet, Refills: 3    Associated Diagnoses: Supervision of high risk pregnancy in first trimester         STOP taking these medications       acetone urine (KETOSTIX) test strip Comments:   Reason for Stopping:         blood glucose (ACCU-CHEK FASTCLIX) lancing device Comments:   Reason for Stopping:         blood glucose (NO BRAND SPECIFIED) test strip Comments:   Reason for Stopping:         blood glucose monitoring (ACCU-CHEK FASTCLIX) lancets Comments:   Reason for Stopping:         blood glucose monitoring (ACCU-CHEK FASTCLIX) lancets Comments:   Reason for Stopping:                 Discharge/Disposition:  Rubina Desouza was discharged to home in stable condition with the following instructions/medications:  1) Call for temperature > 100.4, bright red vaginal bleeding >1 pad an hour x 2 hours, foul smelling vaginal discharge, pain not controlled by usual oral pain meds, persistent nausea and vomiting not controlled on medications  2) She desired to discuss contraception at her postpartum visit  3) For feeding she decided to breastfeed.  4) She was instructed to follow-up with her primary OB in 6 weeks for a  routine postpartum visit at [facility] and in 1 week for a BP check.    Irvin Gallegos MD  Obstetrics & Gynecology, PGY3    [facility] staff:  Appreciate note by Dr. Gallegos.  I have seen and examined the patient without the resident. I have reviewed, edited, and agree with the note.      Sari Avendaño MD, FACOG

## 2019-08-12 NOTE — PROGRESS NOTES
Transfer of Care    Patient transferred to MD team from [facility] team after sustained severe range blood pressures meeting criteria for preeclampsia with severe features.     Patient seen with Algerian .     S: Feeling okay after delivery. Reports mild headache, no visual changes. No RUQ pain. Denies cp, sob, n/v.     O:  Patient Vitals for the past 24 hrs:   BP Temp Temp src Pulse Heart Rate Resp SpO2   08/11/19 2232 133/80 -- -- -- 72 16 100 %   08/11/19 2135 124/76 98.2  F (36.8  C) Oral -- 74 16 100 %   08/11/19 2130 121/79 -- -- -- 71 16 100 %   08/11/19 2125 130/77 -- -- -- 66 16 100 %   08/11/19 2120 130/79 -- -- -- 79 16 100 %   08/11/19 2115 (!) 141/84 -- -- -- 68 16 100 %   08/11/19 2110 (!) 143/81 -- -- -- 62 18 100 %   08/11/19 2105 (!) 151/83 -- -- -- 69 18 100 %   08/11/19 2101 (!) 157/94 -- -- -- 61 18 100 %   08/11/19 2055 (!) 145/92 -- -- -- 66 18 100 %   08/11/19 2048 (!) 142/86 -- -- -- 61 18 100 %   08/11/19 2012 (!) 170/89 -- -- -- 66 -- --   08/11/19 1957 (!) 162/91 98  F (36.7  C) Oral -- 54 18 100 %   08/11/19 1833 (!) 155/95 -- -- -- -- -- 100 %   08/11/19 1818 (!) 140/100 -- -- -- -- -- 100 %   08/11/19 1802 (!) 139/96 -- -- -- -- -- 100 %   08/11/19 1749 (!) 140/80 -- -- -- -- 20 100 %   08/11/19 1733 (!) 145/83 -- -- -- -- -- 99 %   08/11/19 1719 (!) 141/78 -- -- -- -- -- 99 %   08/11/19 1718 (!) 138/105 -- -- -- -- -- --   08/11/19 1705 133/81 -- -- -- -- -- 100 %   08/11/19 1647 (!) 139/95 -- -- -- -- -- 98 %   08/11/19 1630 -- -- -- -- -- -- 98 %   08/11/19 1600 130/80 -- -- -- -- -- 98 %   08/11/19 1545 (!) 161/80 98.7  F (37.1  C) Oral 83 -- 18 99 %   08/11/19 1530 -- -- -- -- -- 18 98 %   08/11/19 1505 -- -- -- -- -- -- 99 %   08/11/19 1500 -- -- -- -- -- 18 99 %   08/11/19 1430 -- 98.5  F (36.9  C) Oral 84 -- 18 100 %   08/11/19 1400 -- -- -- -- -- -- 100 %   08/11/19 1345 -- -- -- -- -- 18 100 %   08/11/19 1330 -- -- -- -- -- 18 100 %   08/11/19 1300 -- -- -- -- -- 18 100 %    19 1230 -- -- -- -- -- 18 96 %   19 1217 130/63 -- -- -- -- -- 99 %   19 1200 -- -- -- -- -- 18 98 %   19 1130 -- -- -- -- -- 18 99 %   19 1116 128/85 97.7  F (36.5  C) Oral -- -- 18 99 %   19 1100 -- -- -- -- -- 18 98 %   19 1048 136/83 -- -- -- -- -- 98 %   19 1030 -- -- -- -- -- -- 99 %   19 1018 133/78 97.6  F (36.4  C) Oral -- -- 18 100 %   19 1000 -- -- -- -- -- 18 100 %   19 0947 118/75 -- -- -- -- -- 100 %   19 0943 121/75 -- -- -- -- -- 100 %   19 0939 138/77 -- -- -- -- -- 99 %   19 0937 109/64 -- -- -- -- -- --   1935 (!) 5932 -- -- -- -- -- 99 %   1933 (!) 60 -- -- -- -- -- 99 %   19 0929 138/71 -- -- -- -- -- 100 %   19 0924 110/73 -- -- -- -- -- 99 %   1919 117/74 -- -- -- -- -- 100 %   19 0913 127/80 -- -- -- -- -- 100 %   19 0908 124/73 -- -- -- -- -- 100 %   19 0904 111/69 -- -- -- -- -- 100 %   19 0900 -- -- -- -- -- -- 100 %   19 0857 94/64 -- -- -- -- -- 100 %   19 0856 126/74 -- -- -- -- -- --   19 0854 110/61 -- -- -- -- -- 100 %   19 0851 (!) 150/83 -- -- -- -- -- 100 %   19 0849 (!) 142/84 -- -- -- -- -- 100 %   19 0847 (!) 155/86 -- -- -- -- -- 100 %   19 0830 -- -- -- -- -- 18 99 %   19 0732 130/84 -- -- -- -- -- --   19 0727 139/85 -- -- -- -- -- --   19 0713 (!) 147/93 -- -- -- -- -- --   19 0656 (!) 159/88 97.7  F (36.5  C) Oral -- 80 18 --     Gen: appears well, in NAD  CV: RRR  Pulm: CTAB, no wheezes/crackles  Abd: soft, nontender  Ext: +2 reflexes to b/l biceps/patellar, 1 beat clonus    A/P:  33yo PPD#0 s/p  with CNM service. Diagnosed with preE w/o SF on admission to hospital, and now w/ SF due to sustained severe range pressures.     PreE w/SF  - start magnesium 4g LD>2g/h  - s/p labetalol 20mg x1   - HELLP labs   - Cr 0.89, stable from baseline    - plt 102 (122  on admission)   - o/w normal  - repeat HELLP labs/mag level in AM  - strict I/O  - daily weights    Mary Roa MD  OB/Gyn PGY-3  8/11/2019

## 2019-08-12 NOTE — PLAN OF CARE
VSS except for BPs elevated. Severe-range x2, IV labetalol given x1. Magnesium loading dose given at 2050. +2 reflexes, 1 beat of clonus. Had slight HA upon arrival but pt denies currently. Denies SOB, chest pain, epigastric pain,  Postpartum checks WDL. Up with SBA, voiding without difficulty. Cramping pain managed with ibuprofen. Breastfeeding but infant disinterested. Hand-expressing colostrum and spoon feeding infant.

## 2019-08-12 NOTE — PROVIDER NOTIFICATION
Dr. Lopez notified of: Patient's Magnesium level came down to 7.3. Do you want me to change anything. And when would you lke me to turn it off? It was started at 2050.

## 2019-08-12 NOTE — PROGRESS NOTES
Notified by RN of severe range blood pressures sustained, 170/89 and 162/91.  Report given to Dr. Roa and Dr. Okeefe.  Plan transfer of care at this time to MD team.    -MARCIN Whitehead CNM

## 2019-08-12 NOTE — PROGRESS NOTES
OB/GYN Postpartum Note     S: Feels well. Pain controlled on Tylenol and Ibuprofen. Bleeding moderate. Ambulating without dizziness. Voiding spontaneously. Tolerating regular diet without nausea or vomiting. Patient has no RUQ pain, vision changes, persistent headache and having no issues breastfeeding.     O:   Vitals          Vitals:     19 2130 19 2135 19 2232 19 0015   BP: 121/79 124/76 133/80 122/75   Pulse:       74   Resp: 16 16 16 16   Temp:   98.2  F (36.8  C)   97.6  F (36.4  C)   TempSrc:   Oral   Oral   SpO2: 100% 100% 100% 100%         General: Alert and oriented, appears well, in NAD  CV: regular rate  Resp: nonlabored breathing, no crackles or rales noted  Abdomen: soft, nontender, non-distended  Extremities: No edema in BLE, DTR 2+        A: Ms. Rubina Desouza is a 34 year old  PPD #1 s/p . Pregnancy c/b preE w/ SF diagnosed on PPD#0, transferred from [facility] service at that time. BP were sustained in severe ranges of 162/91 and 170/89.      P:     PreE w/SF  - continue magnesium 4g LD>2g/h  - s/p labetalol 20mg x1   - HELLP labs               - Cr 0.89, stable from baseline                - plt 102 (122 on admission)               - o/w normal  - repeat HELLP labs/mag level pending this morning   - strict I/O  - daily weights     Postpartum:  Heme 12.6 > QBL 237cc > 11.7  Pain: tylenol, ibuprofen  : voiding w/o difficulty   Feeding: Breastfeeding  Contraception: Following up with Midwife at 6 week postpartum for contraception.  PNC: Rh pos, rubella immune - no MMR or rhogam indicated     Disposition: Home PPD#2-3 pending BP control    Kirt Leblanc MS3      Resident/Fellow Attestation   I, Mary Roa, was present with the medical student who participated in the service and in the documentation of the note.  I have verified the history and personally performed the physical exam and medical decision making.  I agree with the assessment and plan of care as documented in  the note.          Mary Roa MD  PGY3  Date of Service (when I saw the patient): 08/12/19    /75   Pulse 71   Temp 98.5  F (36.9  C) (Oral)   Resp 16   Wt 53.9 kg (118 lb 14.4 oz)   LMP 11/16/2018   SpO2 100%   Breastfeeding? Unknown   BMI 21.75 kg/m        The patient was seen and examined by me separately from the team.  I have reviewed and agree with the above note.  She is doing well with no concerns.  Reviewed why the magnesium is needed, plan to continue for 24h from delivery.  Her repeat magnesium level is pending at this time.    Natalie Lopez MD, FACOG

## 2019-08-12 NOTE — PLAN OF CARE
Data: Rubina Desouza transferred to 7121 via wheelchair at 1930. Baby transferred via parent's arms.  Action: Receiving unit notified of transfer: Yes. Patient and family notified of room change. Report given to RAINE Devlin at 1930. Belongings sent to receiving unit. Accompanied by Registered Nurse. Oriented patient to surroundings. Call light within reach. ID bands double-checked with receiving RN.  Response: Patient tolerated transfer and is stable.

## 2019-08-12 NOTE — PLAN OF CARE
VSS. BP WNL. Pt voiding without difficulty and diuresing. Denies pain throughout shift. Up ad luigi with supervision. Breastfeeding well on demand and bonding appropriately with infant.

## 2019-08-12 NOTE — PROVIDER NOTIFICATION
08/11/19 2012   Vitals   BP (!) 170/89   Heart Rate 66   Provider Notification   Provider Name/Title Jj   Method of Notification Phone   Request Evaluate-Remote   Notification Reason Vital Signs Change   Pt has had two severe range BPs.

## 2019-08-12 NOTE — PLAN OF CARE
Patient's vss, neuro reflexes WDL. Magnesium level 7.3 at 1500, per MD, Infusion stopped at 1630 pm. Patient continues to have a slight headache. Patient is diuresing well.  Denies other preeclampsia symptoms. Bonding well with infant. Will continue to monitor and with plan of care.

## 2019-08-12 NOTE — PLAN OF CARE
Patient arrived to [facility] unit via wheelchair at 1930,with belongings, accompanied by family, with infant in arms. Received report from RAINE Galeana and checked bands. Unit and room orientation completd. Call light given; no concerns present at this time. Continue with plan of care.

## 2019-08-13 ENCOUNTER — OFFICE VISIT (OUTPATIENT)
Dept: INTERPRETER SERVICES | Facility: CLINIC | Age: 34
End: 2019-08-13
Payer: COMMERCIAL

## 2019-08-13 LAB
ALT SERPL W P-5'-P-CCNC: 21 U/L (ref 0–50)
AST SERPL W P-5'-P-CCNC: 29 U/L (ref 0–45)
CREAT SERPL-MCNC: 0.9 MG/DL (ref 0.52–1.04)
ERYTHROCYTE [DISTWIDTH] IN BLOOD BY AUTOMATED COUNT: 13.3 % (ref 10–15)
GFR SERPL CREATININE-BSD FRML MDRD: 83 ML/MIN/{1.73_M2}
GLUCOSE BLDC GLUCOMTR-MCNC: 78 MG/DL (ref 70–99)
HCT VFR BLD AUTO: 35.7 % (ref 35–47)
HGB BLD-MCNC: 11.9 G/DL (ref 11.7–15.7)
MCH RBC QN AUTO: 31.6 PG (ref 26.5–33)
MCHC RBC AUTO-ENTMCNC: 33.3 G/DL (ref 31.5–36.5)
MCV RBC AUTO: 95 FL (ref 78–100)
PLATELET # BLD AUTO: 145 10E9/L (ref 150–450)
RBC # BLD AUTO: 3.76 10E12/L (ref 3.8–5.2)
WBC # BLD AUTO: 9.7 10E9/L (ref 4–11)

## 2019-08-13 PROCEDURE — 25000128 H RX IP 250 OP 636: Performed by: STUDENT IN AN ORGANIZED HEALTH CARE EDUCATION/TRAINING PROGRAM

## 2019-08-13 PROCEDURE — 12000001 ZZH R&B MED SURG/OB UMMC

## 2019-08-13 PROCEDURE — 36415 COLL VENOUS BLD VENIPUNCTURE: CPT | Performed by: STUDENT IN AN ORGANIZED HEALTH CARE EDUCATION/TRAINING PROGRAM

## 2019-08-13 PROCEDURE — T1013 SIGN LANG/ORAL INTERPRETER: HCPCS | Mod: U3

## 2019-08-13 PROCEDURE — 25000132 ZZH RX MED GY IP 250 OP 250 PS 637: Performed by: STUDENT IN AN ORGANIZED HEALTH CARE EDUCATION/TRAINING PROGRAM

## 2019-08-13 PROCEDURE — 00000146 ZZHCL STATISTIC GLUCOSE BY METER IP

## 2019-08-13 PROCEDURE — 84460 ALANINE AMINO (ALT) (SGPT): CPT | Performed by: STUDENT IN AN ORGANIZED HEALTH CARE EDUCATION/TRAINING PROGRAM

## 2019-08-13 PROCEDURE — 25000132 ZZH RX MED GY IP 250 OP 250 PS 637: Performed by: ADVANCED PRACTICE MIDWIFE

## 2019-08-13 PROCEDURE — 85027 COMPLETE CBC AUTOMATED: CPT | Performed by: STUDENT IN AN ORGANIZED HEALTH CARE EDUCATION/TRAINING PROGRAM

## 2019-08-13 PROCEDURE — 84450 TRANSFERASE (AST) (SGOT): CPT | Performed by: STUDENT IN AN ORGANIZED HEALTH CARE EDUCATION/TRAINING PROGRAM

## 2019-08-13 PROCEDURE — 82565 ASSAY OF CREATININE: CPT | Performed by: STUDENT IN AN ORGANIZED HEALTH CARE EDUCATION/TRAINING PROGRAM

## 2019-08-13 RX ORDER — NIFEDIPINE 30 MG/1
30 TABLET, EXTENDED RELEASE ORAL ONCE
Status: DISCONTINUED | OUTPATIENT
Start: 2019-08-13 | End: 2019-08-13

## 2019-08-13 RX ORDER — HYDRALAZINE HYDROCHLORIDE 20 MG/ML
10 INJECTION INTRAMUSCULAR; INTRAVENOUS
Status: DISCONTINUED | OUTPATIENT
Start: 2019-08-13 | End: 2019-08-14 | Stop reason: HOSPADM

## 2019-08-13 RX ORDER — NIFEDIPINE 30 MG/1
30 TABLET, EXTENDED RELEASE ORAL DAILY
Status: DISCONTINUED | OUTPATIENT
Start: 2019-08-13 | End: 2019-08-13

## 2019-08-13 RX ORDER — IBUPROFEN 600 MG/1
600 TABLET, FILM COATED ORAL EVERY 6 HOURS PRN
Qty: 60 TABLET | Refills: 0 | Status: SHIPPED | OUTPATIENT
Start: 2019-08-13 | End: 2024-01-17

## 2019-08-13 RX ORDER — NIFEDIPINE 30 MG/1
60 TABLET, EXTENDED RELEASE ORAL DAILY
Status: DISCONTINUED | OUTPATIENT
Start: 2019-08-14 | End: 2019-08-13

## 2019-08-13 RX ORDER — AMOXICILLIN 250 MG
1 CAPSULE ORAL DAILY
Qty: 100 TABLET | Refills: 0 | Status: SHIPPED | OUTPATIENT
Start: 2019-08-13 | End: 2024-01-17

## 2019-08-13 RX ORDER — NIFEDIPINE 30 MG/1
30 TABLET, EXTENDED RELEASE ORAL DAILY
Status: DISCONTINUED | OUTPATIENT
Start: 2019-08-14 | End: 2019-08-14 | Stop reason: HOSPADM

## 2019-08-13 RX ORDER — HYDRALAZINE HYDROCHLORIDE 20 MG/ML
5 INJECTION INTRAMUSCULAR; INTRAVENOUS
Status: DISCONTINUED | OUTPATIENT
Start: 2019-08-13 | End: 2019-08-14 | Stop reason: HOSPADM

## 2019-08-13 RX ORDER — ACETAMINOPHEN 325 MG/1
650 TABLET ORAL EVERY 6 HOURS PRN
Qty: 100 TABLET | Refills: 0 | Status: SHIPPED | OUTPATIENT
Start: 2019-08-13

## 2019-08-13 RX ADMIN — NIFEDIPINE 10 MG: 10 CAPSULE ORAL at 19:35

## 2019-08-13 RX ADMIN — SENNOSIDES AND DOCUSATE SODIUM 2 TABLET: 8.6; 5 TABLET ORAL at 09:25

## 2019-08-13 RX ADMIN — IBUPROFEN 800 MG: 800 TABLET, FILM COATED ORAL at 09:25

## 2019-08-13 RX ADMIN — IBUPROFEN 800 MG: 800 TABLET, FILM COATED ORAL at 02:57

## 2019-08-13 RX ADMIN — HYDRALAZINE HYDROCHLORIDE 5 MG: 20 INJECTION INTRAMUSCULAR; INTRAVENOUS at 20:44

## 2019-08-13 RX ADMIN — ACETAMINOPHEN 650 MG: 325 TABLET, FILM COATED ORAL at 06:03

## 2019-08-13 RX ADMIN — NIFEDIPINE 30 MG: 30 TABLET, FILM COATED, EXTENDED RELEASE ORAL at 17:43

## 2019-08-13 RX ADMIN — ACETAMINOPHEN 650 MG: 325 TABLET, FILM COATED ORAL at 19:57

## 2019-08-13 RX ADMIN — IBUPROFEN 800 MG: 800 TABLET, FILM COATED ORAL at 14:58

## 2019-08-13 NOTE — PLAN OF CARE
"Patient having some elevated blood pressures this shift- denies s/s preeclampsia. Nifedipine 30mg ordered and given this evening. Patient states she had some kidney issues before delivery and the doctor told her to \"come back when the baby is born for further testing.\" When writer asked to further describe  the kidney issues, patient stated \"one kidney is smaller than the other.\" Patient is concerned that her prior kidney issues are not being taken into account. Reassured patient that no matter what is causing high BPs, we need to get her BP under control due to risk of seizure, stroke, etc.  Postpartum checks WNL. Will continue to closely monitor BPs.  "

## 2019-08-13 NOTE — PLAN OF CARE
Data: BP mildly elevated overnight MD notified awaiting possibly starting PO antihypertensives. Postpartum checks within normal limits - see flow record. Patient eating and drinking normally. Patient able to empty bladder independently and is up ambulating. Strict I/O maintained. No apparent signs of infection. Perineum  healing well. Patient performing self cares and is able to care for infant. FBG 78.   Action: Patient medicated during the shift for cramping. Pt complaints of intermittent headache. See MAR. Patient reassessed within 1 hour after each medication and pain was improved - patient stated she was comfortable. Patient education done about breastfeeding and encouraged rest and relaxation. See flow record.  Response: Positive attachment behaviors observed with infant.   Plan: Anticipate discharge today pending blood pressure control.

## 2019-08-13 NOTE — PROVIDER NOTIFICATION
"   08/12/19 5417   Provider Notification   Provider Name/Title G3    Method of Notification Electronic Page   Request Evaluate-Remote   Notification Reason Vital Signs Change   7121 I.D please call to discuss BP and symptoms +3 reflexes no clonus, /102 pt sustaining mildly elevated BPs now severe range repeat is 142/91. Mg+ discontinued early due to Mg+ level elevated.   Pt reports increased HA that has come back now and was gone since 1900. Thank you Keyana [id number]     Mary called back and advised the 130/102 is likely an \"odd\" BP because the pulse pressure is so low. Writer voiced concerns with pt spike in BPs from previous trend. Per Mary they would likely start an oral antihypertensive however they are not treatable BPs. Writer will continue to assess BPs every 4 hours per protocol unless directed otherwise by MD.   "

## 2019-08-14 ENCOUNTER — OFFICE VISIT (OUTPATIENT)
Dept: INTERPRETER SERVICES | Facility: CLINIC | Age: 34
End: 2019-08-14

## 2019-08-14 VITALS
SYSTOLIC BLOOD PRESSURE: 151 MMHG | OXYGEN SATURATION: 100 % | HEART RATE: 88 BPM | RESPIRATION RATE: 16 BRPM | TEMPERATURE: 98.4 F | DIASTOLIC BLOOD PRESSURE: 94 MMHG | BODY MASS INDEX: 20.7 KG/M2 | WEIGHT: 113.2 LBS

## 2019-08-14 PROCEDURE — 25000132 ZZH RX MED GY IP 250 OP 250 PS 637: Performed by: ADVANCED PRACTICE MIDWIFE

## 2019-08-14 PROCEDURE — 25000132 ZZH RX MED GY IP 250 OP 250 PS 637: Performed by: STUDENT IN AN ORGANIZED HEALTH CARE EDUCATION/TRAINING PROGRAM

## 2019-08-14 RX ORDER — NIFEDIPINE 30 MG
30 TABLET, EXTENDED RELEASE ORAL DAILY
Qty: 60 TABLET | Refills: 0 | Status: SHIPPED | OUTPATIENT
Start: 2019-08-14 | End: 2024-01-17

## 2019-08-14 RX ADMIN — ACETAMINOPHEN 650 MG: 325 TABLET, FILM COATED ORAL at 11:54

## 2019-08-14 RX ADMIN — NIFEDIPINE 30 MG: 30 TABLET, FILM COATED, EXTENDED RELEASE ORAL at 07:57

## 2019-08-14 RX ADMIN — ACETAMINOPHEN 650 MG: 325 TABLET, FILM COATED ORAL at 01:00

## 2019-08-14 RX ADMIN — ACETAMINOPHEN 650 MG: 325 TABLET, FILM COATED ORAL at 07:11

## 2019-08-14 RX ADMIN — ACETAMINOPHEN 650 MG: 325 TABLET, FILM COATED ORAL at 15:42

## 2019-08-14 NOTE — PROGRESS NOTES
OB/GYN Postpartum Note     S:   Feels well. Denies any pain. Bleeding is light. Ambulating without dizziness. Voiding spontaneously. Patient has no RUQ pain, vision changes. Says she has a persistent headache, that seems related to her neck tightness. She denies having issues breastfeeding.     O:   Patient Vitals for the past 24 hrs:   BP Temp Temp src Pulse Heart Rate Resp   19 0400 109/80 98.3  F (36.8  C) Oral 88 -- 18   19 0030 132/85 98  F (36.7  C) Oral 71 -- 18   19 2230 134/85 -- -- 76 -- --   19 2131 119/77 -- -- 74 -- --   19 2055 125/72 -- -- 78 -- --   19 (!) 167/89 -- -- 54 -- --   19 (!) 171/96 -- -- (!) 48 -- --   19 1905 (!) 161/85 -- -- (!) 49 -- --   19 1850 (!) 160/91 -- -- -- -- --   19 1720 (!) 153/93 -- -- 59 -- --   19 1657 (!) 151/93 97.6  F (36.4  C) Oral 128 -- 18   19 1136 135/82 98.5  F (36.9  C) Oral 64 -- 16   19 0719 122/54 97.9  F (36.6  C) Oral -- 56 14     General: Alert and oriented, appears well, in NAD  CV: regular rate  Resp: nonlabored breathing  Abdomen: soft, nontender, non-distended, fundus firm 2 cm below umbilicus  Extremities: No edema in BLE, DTR 2+        A: Ms. Rubina Desouza is a 34 year old  PPD#3 s/p . Pregnancy c/b preE w/ SF diagnosed on PPD#0, transferred from [facility] service at that time. Now s/p magnesium for seizure prophylaxis.    P:  PreE w/SF  - s/p 24h magnesium   - s/p labetalol 20mg x1 (on PPD#0), recurrent sustained severe range BP's overnight requiring IR Nifedipine 10 mg and 5 mg Hydralazine IV with significant improvement in BP's.   -D#2 30 mg Procardia XL. BP's normotensive mostly overnight, but again 140/90 this am.  Continue to monitor BP's closely and uptitrate prn.   - HELLP labs               - Cr 0.89>0.84, stable from baseline                - plt 122>102>136                - o/w normal  - daily weights, strict I&O     Postpartum:  Heme 12.6 > QBL  237cc >11.7  Pain: tylenol, ibuprofen  : voiding w/o difficulty   Feeding: Breastfeeding  Contraception: Following up with Midwife at 6 week postpartum for contraception.  PNC: Rh pos, rubella immune - no MMR or rhogam indicated     Disposition: Pending improvement in BP's- possibly this PM or tomorrow    Patty Hidalgo MD  Ob/Gyn PGY-3    [facility] staff:  Appreciate note by Dr. Hidalgo.  I have seen and examined the patient without the resident. I have reviewed, edited, and agree with the note.      Sari Avendaño MD, FACOG  8/14/2019  8:42 AM

## 2019-08-14 NOTE — PLAN OF CARE
Patient was discharged to home. Discharge medications were given and gone over and the discharge instructions. All questions were answered. Patients  with patient and  for discharge.

## 2019-08-14 NOTE — PLAN OF CARE
Pt is resting and going on with normal breast feeding duties. Pt is stable with elevated BPs and low HRs. At 1900 pt was given 10 mg Procardia (fast release), BP checked 30 min after was 171/96 HR 48 Left arm. 167/89 HR 54 right arm. MD notified and Hydralazine 5 mg IV was ordered and administered. BP checked 10 min after - 125/72 HR 78; 30 min - 117/77 HR 74 and 1hr 30 min after 134/85 HR76. MD notified and is order to take BP Q4H tonight. Pt is on straight I&O and wt. Will continue with plan of care

## 2019-08-14 NOTE — PROVIDER NOTIFICATION
08/13/19 2042   Provider Notification   Provider Name/Title Dr. Roa G3   Method of Notification Electronic Page   Notification Reason Other  (should I give the 30mg Procardia XL?)   Giving 5mg hydralazine now. Nurse should also give the scheduled 30mg Procardia XL (due at 1930), right?    -Dr. Roa returned page: plan to hold Procardia 30mg until we see how BP is affected after 5mg IV hydralazine.

## 2019-08-14 NOTE — PROVIDER NOTIFICATION
08/14/19 1420   Provider Notification   Provider Name/Title Dr. Avendaño   Method of Notification Electronic Page   Request Evaluate in Person   Notification Reason Other   Pt is very eager to d/c and would like to see you. Thanks!

## 2019-08-14 NOTE — PROGRESS NOTES
CNM courtesy visit  Spouse in room pt hopes for discharge today if BP improved   Baby is breastfeeding well

## 2019-08-14 NOTE — PLAN OF CARE
Pt has remained stable this shift with no severe range blood pressures. Pt did complain of a headache earlier part of my shift but took tylenol and that gave her relief. Pt has no complaints of right upper quadrant pain,blurred vision  or headache now. Pt said she would let me know when she wants ibuprofen. Pt is having good output tonight. Pt is breastfeeding her baby boy and doing a great job. Will continue with plan of care and assist as needed.

## 2019-08-14 NOTE — PROVIDER NOTIFICATION
08/14/19 1450   Provider Notification   Provider Name/Title Dr. Avendaño   Method of Notification Electronic Page   Request Evaluate in Person   Notification Reason Other   Pt would really like to see you as soon as possible. She is very eager to d/c to her kids because her  works tomorrow.

## 2019-08-14 NOTE — PROGRESS NOTES
Notified of sustained SR pressures; patient refusing 10mg IR nifedipine.     Discussed with patient with Tajik . Patient is otherwise asymptomatic and feels well. Reviewed recommendation for treating BPs due to risk of stroke with SR BPs. Discussed likelihood of needing to increase long-acting Procardia but can monitor pressures this evening prior to increasing as it was just given at 5pm today.     IV in place but patient has low resting heart rate so will treat with IR nifedipine. HEL labs ordered.     Mary Roa MD  OB/Gyn PGY-3  8/13/2019

## 2019-08-14 NOTE — PROVIDER NOTIFICATION
08/13/19 2030   Provider Notification   Provider Name/Title Dr. Roa G3   Method of Notification Electronic Page   Notification Reason Other  (clarification on hydralzaine amount to give)   Just wanting to clarify dose of hydralazine to be given. Over the phone you said 10mg hydralazine but order set says to start with 5mg. Do you want us to start with 5mg or give 10mg right away? Thanks!

## 2019-08-14 NOTE — PLAN OF CARE
Blood pressures 141/91, 138/91, and 130/89.  Procardia XL given as ordered.  Denies headache, vision changes, epigastric pain, or any changes in her overall pain.  Bilateral lower extremity DTR's are 2+. No clonus noted.  Report some uterine cramping with breastfeeding.  Voiding large amounts and ambulating to the bathroom without difficulty.  I&O is being recorded.  Independent with baby cares.   here at 0900, pt reports no needs and is aware that she can request an  if needed.

## 2019-09-20 ENCOUNTER — OFFICE VISIT (OUTPATIENT)
Dept: OBGYN | Facility: CLINIC | Age: 34
End: 2019-09-20
Attending: ADVANCED PRACTICE MIDWIFE
Payer: COMMERCIAL

## 2019-09-20 VITALS
SYSTOLIC BLOOD PRESSURE: 123 MMHG | BODY MASS INDEX: 20.89 KG/M2 | WEIGHT: 113.5 LBS | DIASTOLIC BLOOD PRESSURE: 81 MMHG | HEIGHT: 62 IN | HEART RATE: 93 BPM

## 2019-09-20 DIAGNOSIS — Z12.4 CERVICAL CANCER SCREENING: ICD-10-CM

## 2019-09-20 DIAGNOSIS — Z30.011 ENCOUNTER FOR BCP (BIRTH CONTROL PILLS) INITIAL PRESCRIPTION: ICD-10-CM

## 2019-09-20 DIAGNOSIS — E05.90 HYPERTHYROIDISM: ICD-10-CM

## 2019-09-20 DIAGNOSIS — Z87.59 HISTORY OF SEVERE PRE-ECLAMPSIA: ICD-10-CM

## 2019-09-20 DIAGNOSIS — Z86.32 HISTORY OF GESTATIONAL DIABETES MELLITUS (GDM): ICD-10-CM

## 2019-09-20 DIAGNOSIS — E89.0 H/O PARTIAL THYROIDECTOMY: ICD-10-CM

## 2019-09-20 DIAGNOSIS — O09.91 SUPERVISION OF HIGH RISK PREGNANCY IN FIRST TRIMESTER: ICD-10-CM

## 2019-09-20 PROCEDURE — G0463 HOSPITAL OUTPT CLINIC VISIT: HCPCS | Mod: ZF

## 2019-09-20 RX ORDER — ACETAMINOPHEN AND CODEINE PHOSPHATE 120; 12 MG/5ML; MG/5ML
0.35 SOLUTION ORAL DAILY
Qty: 90 TABLET | Refills: 6 | Status: SHIPPED | OUTPATIENT
Start: 2019-09-20

## 2019-09-20 RX ORDER — GLYCERIN/MINERAL OIL
1 LOTION (ML) TOPICAL DAILY
Qty: 30 TABLET | Refills: 3 | Status: SHIPPED | OUTPATIENT
Start: 2019-09-20 | End: 2020-03-03

## 2019-09-20 ASSESSMENT — ANXIETY QUESTIONNAIRES
7. FEELING AFRAID AS IF SOMETHING AWFUL MIGHT HAPPEN: NOT AT ALL
3. WORRYING TOO MUCH ABOUT DIFFERENT THINGS: NOT AT ALL
2. NOT BEING ABLE TO STOP OR CONTROL WORRYING: NOT AT ALL
6. BECOMING EASILY ANNOYED OR IRRITABLE: NOT AT ALL
5. BEING SO RESTLESS THAT IT IS HARD TO SIT STILL: NOT AT ALL
1. FEELING NERVOUS, ANXIOUS, OR ON EDGE: NOT AT ALL
GAD7 TOTAL SCORE: 0

## 2019-09-20 ASSESSMENT — MIFFLIN-ST. JEOR: SCORE: 1173.08

## 2019-09-20 ASSESSMENT — PATIENT HEALTH QUESTIONNAIRE - PHQ9: 5. POOR APPETITE OR OVEREATING: NOT AT ALL

## 2019-09-20 NOTE — LETTER
2019       RE: Rubina Desouza  [address]  [facility] [id number]     Dear Colleague,    Thank you for referring your patient, Rubina Desouza, to the [facility] at [facility]. Please see a copy of my visit note below.      Nursing Notes:   Kimberly ReynaLEON  2019  3:00 PM  Signed  SUBJECTIVE:   Rubina Desouza is here for her 6-week postpartum checkup.     PHQ-9 score: 0  Hx of Abuse:  No    Delivery Date: 2019.    Delivering provider:  Chas Noel CNM.    Type of delivery:  .  Perineum:  in tact.     Delivery complications: None  Infant gender:  boy, weight 7 pounds 9 oz.  Feeding Method:   and Bottlefed.  Complications reported with feeding:  none, infant thriving .    Bleeding:  None.  Duration:  4weeks.  Menses resumed:  Yes   Bowel/Urinary problems:  No    Contraception Planned:  oral contraceptive  She  has not had intercourse since delivery..         ================================================================  -Communicating effectively with in person interpeter assistance.  - declines pap today as is on her period- plans to do with repeat BP check in 6 weeks (will be 12 weeks PP). Had intact perineum, will defer exam until pap collected  - due for 2 hour glucose d/t GDM, aware she needs to fast before, has order to have done whenever she has time.  - aware of recommendation to check TSH/T4 outside of pregnancy - will have drawn with 2 hour glucose  - Desires hormonal birth control.  Wants 1.5-2 years between kids but declines LARC.  Prefers POP even after review of importance of timing.   - No stress or urge incontinence, no constipation  - breastfeeding going well, no issues. Feeding every 2 hours  - mood stable. Sleeps well in between feeds  - taking BP medication as prescribed - will continue at this time.   ROS: 10 point ROS neg other than the symptoms noted above in the HPI.       EXAM:  /81 (BP  "Location: Left arm, Patient Position: Chair)   Pulse 93   Ht 1.575 m (5' 2\")   Wt 51.5 kg (113 lb 8 oz)   LMP 2018   BMI 20.76 kg/m       General: healthy, alert, no distress, cooperative and smiling  Psych: NEGATIVE  Last PHQ-9 score on record= 0  Breasts:  Lactating, Nipples intact with no lesions, Non-tender and No S/S of yeast or mastitis  Abdomen: Benign, Soft, flat, non-tender, No masses, organomegaly and Diastasis ~3 FB  Incision:  None   Vulva: Deferred until pap collected   Vagina:  Deferred until pap collected   Cervix:  Deferred until pap collected .    Uterus:  Deferred until pap collected   Adnexa:  Deferred until pap collected   Recto-vaginal:   Deferred until pap collected       ASSESSMENT:   Encounter Diagnoses   Name Primary?     Routine postpartum follow-up Yes     History of gestational diabetes mellitus (GDM)      Hyperthyroidism      Cervical cancer screening      Postpartum care and examination of lactating mother      H/O partial thyroidectomy      History of severe pre-eclampsia      Encounter for BCP (birth control pills) initial prescription      Supervision of high risk pregnancy in first trimester       Normal postpartum exam after   Pregnancy was complicated by:  Gestational Diabetes - diet controlled and Postpartum Preeclampsia with Severe Features    PLAN:  Orders Placed This Encounter   Procedures     Glucose 2 Hour     TSH with free T4 reflex      Orders Placed This Encounter   Medications     norethindrone (MICRONOR) 0.35 MG tablet     Sig: Take 1 tablet (0.35 mg) by mouth daily     Dispense:  90 tablet     Refill:  6     Prenatal Vit-Fe Fumarate-FA ( PRENATAL VITAMINS) 28-0.8 MG TABS     Sig: Take 1 tablet by mouth daily     Dispense:  30 tablet     Refill:  3      Risks and benefits of prescribed medications discussed.  Medication instructions reviewed.  Contraception methods discussed  Signs and symptoms of postpartum depression/anxiety discussed and resources " offered  Discussed calcium intake, vitamins and supplements including Vitamin D  Exercise encouraged  High fiber, low fat diet encouraged  Kegel exercises recommended  STI/STD prevention discussed    2 hour glucose, TSH ordered - pt aware of need to fast for glucose and will have done when has time.   RTO 6 weeks for BP check, Pap smear, f/u POP initiation    Pt verbalized understanding of and agreement of plan of care.    Yaquelin BURCH, CNM

## 2019-09-20 NOTE — NURSING NOTE
SUBJECTIVE:   Rubina Desouza is here for her 6-week postpartum checkup.     PHQ-9 score: 0  Hx of Abuse:  No    Delivery Date: 2019.    Delivering provider:  Chas Noel CNM.    Type of delivery:  .  Perineum:  in tact.     Delivery complications: None  Infant gender:  boy, weight 7 pounds 9 oz.  Feeding Method:   and Bottlefed.  Complications reported with feeding:  none, infant thriving .    Bleeding:  None.  Duration:  4weeks.  Menses resumed:  Yes   Bowel/Urinary problems:  No    Contraception Planned:  oral contraceptive  She  has not had intercourse since delivery..

## 2019-09-20 NOTE — PROGRESS NOTES
"  Nursing Notes:   Kimberly ReynaLEON  2019  3:00 PM  Signed  SUBJECTIVE:   Rubina Desouza is here for her 6-week postpartum checkup.     PHQ-9 score: 0  Hx of Abuse:  No    Delivery Date: 2019.    Delivering provider:  Chas Noel CNM.    Type of delivery:  .  Perineum:  in tact.     Delivery complications: None  Infant gender:  boy, weight 7 pounds 9 oz.  Feeding Method:   and Bottlefed.  Complications reported with feeding:  none, infant thriving .    Bleeding:  None.  Duration:  4weeks.  Menses resumed:  Yes   Bowel/Urinary problems:  No    Contraception Planned:  oral contraceptive  She  has not had intercourse since delivery..         ================================================================  -Communicating effectively with in person interpeter assistance.  - declines pap today as is on her period- plans to do with repeat BP check in 6 weeks (will be 12 weeks PP). Had intact perineum, will defer exam until pap collected  - due for 2 hour glucose d/t GDM, aware she needs to fast before, has order to have done whenever she has time.  - aware of recommendation to check TSH/T4 outside of pregnancy - will have drawn with 2 hour glucose  - Desires hormonal birth control.  Wants 1.5-2 years between kids but declines LARC.  Prefers POP even after review of importance of timing.   - No stress or urge incontinence, no constipation  - breastfeeding going well, no issues. Feeding every 2 hours  - mood stable. Sleeps well in between feeds  - taking BP medication as prescribed - will continue at this time.   ROS: 10 point ROS neg other than the symptoms noted above in the HPI.       EXAM:  /81 (BP Location: Left arm, Patient Position: Chair)   Pulse 93   Ht 1.575 m (5' 2\")   Wt 51.5 kg (113 lb 8 oz)   LMP 2018   BMI 20.76 kg/m      General: healthy, alert, no distress, cooperative and smiling  Psych: NEGATIVE  Last PHQ-9 score on record= 0  Breasts:  Lactating, Nipples intact " with no lesions, Non-tender and No S/S of yeast or mastitis  Abdomen: Benign, Soft, flat, non-tender, No masses, organomegaly and Diastasis ~3 FB  Incision:  None   Vulva: Deferred until pap collected   Vagina:  Deferred until pap collected   Cervix:  Deferred until pap collected .    Uterus:  Deferred until pap collected   Adnexa:  Deferred until pap collected   Recto-vaginal:   Deferred until pap collected       ASSESSMENT:   Encounter Diagnoses   Name Primary?     Routine postpartum follow-up Yes     History of gestational diabetes mellitus (GDM)      Hyperthyroidism      Cervical cancer screening      Postpartum care and examination of lactating mother      H/O partial thyroidectomy      History of severe pre-eclampsia      Encounter for BCP (birth control pills) initial prescription      Supervision of high risk pregnancy in first trimester       Normal postpartum exam after   Pregnancy was complicated by:  Gestational Diabetes - diet controlled and Postpartum Preeclampsia with Severe Features    PLAN:  Orders Placed This Encounter   Procedures     Glucose 2 Hour     TSH with free T4 reflex      Orders Placed This Encounter   Medications     norethindrone (MICRONOR) 0.35 MG tablet     Sig: Take 1 tablet (0.35 mg) by mouth daily     Dispense:  90 tablet     Refill:  6     Prenatal Vit-Fe Fumarate-FA ( PRENATAL VITAMINS) 28-0.8 MG TABS     Sig: Take 1 tablet by mouth daily     Dispense:  30 tablet     Refill:  3        Risks and benefits of prescribed medications discussed.  Medication instructions reviewed.  Contraception methods discussed  Signs and symptoms of postpartum depression/anxiety discussed and resources offered  Discussed calcium intake, vitamins and supplements including Vitamin D  Exercise encouraged  High fiber, low fat diet encouraged  Kegel exercises recommended  STI/STD prevention discussed    2 hour glucose, TSH ordered - pt aware of need to fast for glucose and will have done when has  time.   RTO 6 weeks for BP check, Pap smear, f/u POP initiation    Pt verbalized understanding of and agreement of plan of care.    Yaquelin BURCH, CNM

## 2019-09-21 ASSESSMENT — ANXIETY QUESTIONNAIRES: GAD7 TOTAL SCORE: 0

## 2019-09-22 PROBLEM — O24.410 DIET CONTROLLED GESTATIONAL DIABETES MELLITUS (GDM): Status: RESOLVED | Noted: 2019-05-09 | Resolved: 2019-09-22

## 2019-09-30 ENCOUNTER — TELEPHONE (OUTPATIENT)
Dept: OBGYN | Facility: CLINIC | Age: 34
End: 2019-09-30

## 2019-09-30 NOTE — TELEPHONE ENCOUNTER
Left vm via [facility]  reminding her of importance to get 2 hr gtt drawn. Has annual scheduled so also put on appt note

## 2019-10-14 ENCOUNTER — PRE VISIT (OUTPATIENT)
Dept: UROLOGY | Facility: CLINIC | Age: 34
End: 2019-10-14

## 2019-10-14 NOTE — TELEPHONE ENCOUNTER
Chief Complaint : UDS-Dr. Carbajal/ [facility]    Hx/Sx: Atrophic Left Kidney    Records/Orders: Available     Pt Contacted: n/a    At Rooming: Normal; will have Interp.

## 2019-10-14 NOTE — TELEPHONE ENCOUNTER
Reason for Visit: Review UDS    Diagnosis: Atrophic left kidney    Orders/Procedures/Records: Records available    Contact Patient: N/A    Rooming Requirements: Normal      Willow Kramer  10/14/19  11:21 AM

## 2019-10-27 NOTE — PROGRESS NOTES
Reason for visit:  Follow up    HPI:  Ms. 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    Last year she had a miscarriage, which prompted a renal ultrasound, which showed an atrophic left kidney.   This prompted a CT scan, which revealed right pelviectasis, right hydroureter with a focal stricture in the proximal third of the ureter, and calcifications in the bladder.   She was born in [facility] and emigrated in [place].  She underwent cystoscopy and bladder biopsies confirming schistosomiasis.   A retrograde pyelogram revealed the proximal stricture was ureteral peristalsis.   Her ureteral orifice was quite small and her distal ureter was also narrow. She has completed a treatment of praziquantel.    She was supposed to get a renal scan and UDS but got pregnant so testing was put off until now.     Creatinine 8/2019 - 0.90    UDS today showed: good capacity 700cc, normal compliance, no DO, good sensation and voiding function, video showed normal appearing bladder contour.      Current Outpatient Medications   Medication Sig Dispense Refill     acetaminophen (TYLENOL) 325 MG tablet Take 2 tablets (650 mg) by mouth every 6 hours as needed for mild pain Start after Delivery. (Patient not taking: Reported on 9/20/2019) 100 tablet 0     ascorbic acid (VITAMIN C) 250 MG CHEW chewable tablet Take 1 tablet (250 mg) by mouth daily (Patient not taking: Reported on 9/20/2019) 90 tablet 3     ferrous sulfate (FE TABS) 325 (65 Fe) MG EC tablet Take 1 tablet (325 mg) by mouth daily 90 tablet 1     ibuprofen (ADVIL/MOTRIN) 600 MG tablet Take 1 tablet (600 mg) by mouth every 6 hours as needed for moderate pain Start after delivery (Patient not taking: Reported on 9/20/2019) 60 tablet 0     NIFEdipine ER OSMOTIC (ADALAT CC) 30 MG 24 hr tablet Take 1 tablet (30 mg) by mouth daily 60 tablet 0     norethindrone (MICRONOR) 0.35 MG tablet Take 1 tablet (0.35 mg)  "by mouth daily 90 tablet 6     Prenatal Vit-Fe Fumarate-FA (SM PRENATAL VITAMINS) 28-0.8 MG TABS Take 1 tablet by mouth daily 30 tablet 3     ranitidine (ZANTAC) 150 MG tablet Take 1 tablet (150 mg) by mouth 2 times daily (Patient not taking: Reported on 7/11/2019) 60 tablet 3     senna-docusate (SENOKOT-S/PERICOLACE) 8.6-50 MG tablet Take 1 tablet by mouth daily Start after delivery. (Patient not taking: Reported on 9/20/2019) 100 tablet 0       No Known Allergies  Past Medical History:   Diagnosis Date     Anemia 2017     Conductive hearing loss 2015     Hearing problem 2015     Hyperthyroidism     Better since surgery     Tinnitus 2018     Past Surgical History:   Procedure Laterality Date     CYSTOSCOPY, BIOPSY BLADDER, COMBINED N/A 10/26/2018    Procedure: Bladder Biopsy, Right Ureteroscopy with Possible Biopsy;  Surgeon: Viral Hampton MD;  Location: [facility] OR     CYSTOSCOPY, URETEROSCOPY, COMBINED  10/26/2018    Procedure: COMBINED CYSTOSCOPY, URETEROSCOPY;  Surgeon: Viral Hampton MD;  Location: [facility] OR     THYROID SURGERY  2010    in [facility]; partial thyroidectomy         ROS -see hpi otherwise rest is negative     OBJECTIVE:  Vitals:    10/28/19 1606   BP: (!) 158/90   Pulse: 108   Weight: 52.2 kg (115 lb)   Height: 1.575 m (5' 2\")     Constitutional: healthy, alert and no distress   Cardiovascular: regular rate, normal perfusion  Respiratory: normal work of breathing  Psychiatric: mentation appears normal and affect normal/bright  Head: Normocephalic. EOMI. Moist membranes  Abdomen: Abdomen soft, non-tender.   : No right flank tenderness  NEURO: Gait normal. 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 Her bladder was treated and UDS shows normal bladder function. " Due to possible ureteral narrowing seen in retrograde last year will get Mag 3 scan to rule out obstrution.      -- Renal scan  -- RTC 1 month to discuss results      Nakia Rollins MD  Reconstructive Urology Fellow

## 2019-10-28 ENCOUNTER — OFFICE VISIT (OUTPATIENT)
Dept: UROLOGY | Facility: CLINIC | Age: 34
End: 2019-10-28
Payer: COMMERCIAL

## 2019-10-28 ENCOUNTER — ANCILLARY PROCEDURE (OUTPATIENT)
Dept: RADIOLOGY | Facility: AMBULATORY SURGERY CENTER | Age: 34
End: 2019-10-28
Attending: NURSE PRACTITIONER
Payer: COMMERCIAL

## 2019-10-28 VITALS
BODY MASS INDEX: 21.16 KG/M2 | SYSTOLIC BLOOD PRESSURE: 158 MMHG | HEIGHT: 62 IN | DIASTOLIC BLOOD PRESSURE: 90 MMHG | HEART RATE: 108 BPM | WEIGHT: 115 LBS

## 2019-10-28 VITALS
SYSTOLIC BLOOD PRESSURE: 158 MMHG | HEART RATE: 108 BPM | WEIGHT: 115 LBS | DIASTOLIC BLOOD PRESSURE: 90 MMHG | HEIGHT: 62 IN | BODY MASS INDEX: 21.16 KG/M2

## 2019-10-28 DIAGNOSIS — N13.4 HYDROURETER: ICD-10-CM

## 2019-10-28 DIAGNOSIS — N26.1 ATROPHIC KIDNEY: Primary | ICD-10-CM

## 2019-10-28 LAB
APPEARANCE UR: CLEAR
BILIRUB UR QL: NORMAL
COLOR UR: YELLOW
GLUCOSE URINE: NORMAL MG/DL
HGB UR QL: NORMAL
KETONES UR QL: NORMAL MG/DL
LEUKOCYTE ESTERASE URINE: NORMAL
NITRITE UR QL STRIP: NORMAL
PH UR STRIP: 6 PH (ref 5–7)
PROTEIN ALBUMIN URINE: 100 MG/DL
SOURCE: NORMAL
SP GR UR STRIP: 1 (ref 1–1.03)
UROBILINOGEN UR QL STRIP: 0.2 EU/DL (ref 0.2–1)

## 2019-10-28 RX ORDER — SULFAMETHOXAZOLE/TRIMETHOPRIM 800-160 MG
1 TABLET ORAL ONCE
Status: CANCELLED | OUTPATIENT
Start: 2019-10-28 | End: 2019-10-28

## 2019-10-28 RX ADMIN — Medication 250 MG: at 16:39

## 2019-10-28 ASSESSMENT — PAIN SCALES - GENERAL
PAINLEVEL: NO PAIN (0)
PAINLEVEL: NO PAIN (0)

## 2019-10-28 ASSESSMENT — MIFFLIN-ST. JEOR
SCORE: 1179.89
SCORE: 1179.89

## 2019-10-28 NOTE — PROGRESS NOTES
PREPROCEDURE DIAGNOSES:    1. Atrophic left kidney, right pelviectasis and hydroureter, and schistosomiasis    POSTPROCEDURE DIAGNOSES:  -Normal bladder capacity (690 mL) with normal filling sensations.  -Good bladder compliance without DO/DOI.  -No BREANNE.  -Good detrusor contraction during voiding to max Pdet 28 cm H2O.  -Very brisk flow rate (Qmax 50 mL/s) with a bell-shaped flow curve and complete bladder emptying (final PVR 0 mL).  -EMG quiet during voiding.  -BOOI does not suggest bladder outlet obstruction.  -Fluoroscopy reveals a mildly-trabeculated bladder wall without diverticulae or cellules. No vesicoureteral reflux was observed. The bladder neck was closed during filling. As she was unable to void on the [place] chair, fluoroscopy was removed, and voiding images are therefore unavailable.    PROCEDURE:    1. Uroflowmetry.  2. Sterile urethral catheterization for measurement of postvoid residual urine volume.  3. Complex filling cystometrogram with measurement of bladder and rectal pressures.  4. Complex voiding cystometrogram with measurement of bladder and rectal pressures.  5. Electromyography of the pelvic floor during urodynamics.  6. Fluoroscopic imaging of the bladder during urodynamics, at least 3 views.    7. Interpretation of urodynamics and flouroscopic imaging.      INDICATIONS FOR PROCEDURE:  Ms. Rubina Desouza is a pleasant 34 year old female with atrophic left kidney, right pelviectasis and hydroureter, and schistosomiasis. Baseline video urodynamic assessment is requested today by Dr. Carbajal to better characterize Ms. Rubina Desouza's voiding dysfunction.      VOIDING DIARY:  Not completed.    DESCRIPTION OF PROCEDURE:  Risks, benefits, and alternatives to urodynamics were discussed with the patient and she wished to proceed.  Urodynamics are planned to better assess the primary etiology for Ms. Desouza's urologic dysfunction. After informed consent was obtained, the patient was taken to the procedure  room where uroflowmetry was performed. Findings below.     PRE-STUDY UROFLOWMETRY:  Voided volume: 629 mL.  Maximum flow rate: 33.2 mL/sec.  Average flow rate: 14.3 mL/sec.  Character of the curve: Bell-shaped.  Postvoid residual by catheter: 125 mL.  Pretest urine dipstick was negative for leukocytes and nitrites.    Next a 7F double-lumen urodynamics catheter was inserted into the bladder under sterile technique via the urethra.  A 7F abdominal manometry catheter was placed in the rectum.  EMG pads were placed on both sides of the anal verge.  The bladder was filled with 200 mL of Omnipaque at 30 mL/minute and serial pressures were recorded.  With coughing there was an appropriate rise in vesical and abdominal pressures with no change in detrusor pressure, confirming good study catheter placement.    DURING THE FILLING PHASE:  First sensation: 195 mL.  First Desire: 274 mL.  Strong Desire: 348 mL.  Maximum Capacity: 690 mL.    Uninhibited detrusor contractions: None.  Compliance: Good. PDet=0 cmH20 at capacity.  Continence: No DOI or BREANNE  EMG: Concordant during filling.    DURING THE VOIDING PHASE:  The patient made an initial attempt to void on the [place] chair, but was unable. She was then transferred down to a commode, which yielded the following data:   Maximum detrusor contraction with void: 28 cm of H2O pressure.  Voided volume: 690 mL.  Maximum flow rate: 50 mL/sec.  Average flow rate: 16 mL/sec.  Postvoid Residual: 0 mL.  EMG activity: Quiet.  Character of voiding curve: Bell-shaped.  BOOI: -78.3 (suggesting no obstruction - see key below)  [obstructed (HINSON index [BOOI] ? 40); equivocal (no definite   obstruction; BOOI 20-40); and no obstruction (BOOI ? 20)]    FLUOROSCOPIC IMAGING OF THE BLADDER DURING URODYNAMICS:  Please note, image numbers on UDS tracings correlate with iSite series numbers on PACS images. Fluoroscopy during today's procedure demonstrated a mildly-trabeculated bladder wall without  diverticulae or cellules.  No vesicoureteral reflux was observed.  The bladder neck was closed during filling. As she was unable to void on the [place] chair, fluoroscopy was removed, and voiding images are therefore unavailable.  After voiding to completion, all catheters were removed and the patient was brought back into the consultation room to further discuss today's study results.      ASSESSMENT/PLAN:  Ms. Rubina Desouza is a pleasant 34 year old female with atrophic left kidney, right pelviectasis and hydroureter, and schistosomiasis who demonstrated the following findings today on urodynamic evaluation:    -Normal bladder capacity (690 mL) with normal filling sensations.  -Good bladder compliance without DO/DOI.  -No BREANNE.  -Good detrusor contraction during voiding to max Pdet 28 cm H2O.  -Very brisk flow rate (Qmax 50 mL/s) with a bell-shaped flow curve and complete bladder emptying (final PVR 0 mL).  -EMG quiet during voiding.  -BOOI does not suggest bladder outlet obstruction.  -Fluoroscopy reveals a mildly-trabeculated bladder wall without diverticulae or cellules. No vesicoureteral reflux was observed. The bladder neck was closed during filling. As she was unable to void on the [place] chair, fluoroscopy was removed, and voiding images are therefore unavailable.    The patient will follow up as scheduled with Dr. Rollins to further discuss today's study results and make plans for how best to proceed.      - A single Keflex was provided for UTI prophylaxis following completion of today's study per department protocol.  The risk of UTI with VUDS is low at ~2.5-3%.      Thank you for allowing me to participate in the care of Ms. Rubina Desouza and please don't hesitate to contact me with any questions or concerns.      This procedure was performed under a collaborative agreement with Dr Viral Hampton, Professor and  of Urology, [facility].    MARCIN De, CNP  Department  of Urology

## 2019-10-28 NOTE — LETTER
10/28/2019       RE: Rubina Desouza  [address]  [facility] [address]     Dear Colleague,    Thank you for referring your patient, Rubina Desouza, to the [facility] UROLOGY AND INST FOR PROSTATE AND UROLOGIC CANCERS at [facility]. Please see a copy of my visit note below.    Reason for visit:  Follow up    HPI:  Ms. Roger Desouza is a 34 year old female  with solitary functioning right kidney with right hydroureter and bladder schistosomiasis.    Last year she had a miscarriage, which prompted a renal ultrasound, which showed an atrophic left kidney.   This prompted a CT scan, which revealed right pelviectasis, right hydroureter with a focal stricture in the proximal third of the ureter, and calcifications in the bladder.   She was born in [place] and emigrated in [place].  She underwent cystoscopy and bladder biopsies confirming schistosomiasis.   A retrograde pyelogram revealed the proximal stricture was ureteral peristalsis.   Her ureteral orifice was quite small and her distal ureter was also narrow. She has completed a treatment of praziquantel.    She was supposed to get a renal scan and UDS but got pregnant so testing was put off until now.     Creatinine 8/2019 - 0.90    UDS today showed: good capacity 700cc, normal compliance, no DO, good sensation and voiding function, video showed normal appearing bladder contour.      Current Outpatient Medications   Medication Sig Dispense Refill     acetaminophen (TYLENOL) 325 MG tablet Take 2 tablets (650 mg) by mouth every 6 hours as needed for mild pain Start after Delivery. (Patient not taking: Reported on 9/20/2019) 100 tablet 0     ascorbic acid (VITAMIN C) 250 MG CHEW chewable tablet Take 1 tablet (250 mg) by mouth daily (Patient not taking: Reported on 9/20/2019) 90 tablet 3     ferrous sulfate (FE TABS) 325 (65 Fe) MG EC tablet Take 1 tablet (325 mg) by mouth daily 90 tablet 1     ibuprofen (ADVIL/MOTRIN) 600 MG tablet Take 1  "tablet (600 mg) by mouth every 6 hours as needed for moderate pain Start after delivery (Patient not taking: Reported on 9/20/2019) 60 tablet 0     NIFEdipine ER OSMOTIC (ADALAT CC) 30 MG 24 hr tablet Take 1 tablet (30 mg) by mouth daily 60 tablet 0     norethindrone (MICRONOR) 0.35 MG tablet Take 1 tablet (0.35 mg) by mouth daily 90 tablet 6     Prenatal Vit-Fe Fumarate-FA (SM PRENATAL VITAMINS) 28-0.8 MG TABS Take 1 tablet by mouth daily 30 tablet 3     ranitidine (ZANTAC) 150 MG tablet Take 1 tablet (150 mg) by mouth 2 times daily (Patient not taking: Reported on 7/11/2019) 60 tablet 3     senna-docusate (SENOKOT-S/PERICOLACE) 8.6-50 MG tablet Take 1 tablet by mouth daily Start after delivery. (Patient not taking: Reported on 9/20/2019) 100 tablet 0       No Known Allergies  Past Medical History:   Diagnosis Date     Anemia 2017     Conductive hearing loss 2015     Hearing problem 2015     Hyperthyroidism     Better since surgery     Tinnitus 2018     Past Surgical History:   Procedure Laterality Date     CYSTOSCOPY, BIOPSY BLADDER, COMBINED N/A 10/26/2018    Procedure: Bladder Biopsy, Right Ureteroscopy with Possible Biopsy;  Surgeon: Viral Hampton MD;  [facility]: [facility]     CYSTOSCOPY, URETEROSCOPY, COMBINED  10/26/2018    Procedure: COMBINED CYSTOSCOPY, URETEROSCOPY;  Surgeon: Viral Hampton MD;  Location: [facility] OR     THYROID SURGERY  2010    in [facility]; partial thyroidectomy         ROS -see hpi otherwise rest is negative     OBJECTIVE:  Vitals:    10/28/19 1606   BP: (!) 158/90   Pulse: 108   Weight: 52.2 kg (115 lb)   Height: 1.575 m (5' 2\")     Constitutional: healthy, alert and no distress   Cardiovascular: regular rate, normal perfusion  Respiratory: normal work of breathing  Psychiatric: mentation appears normal and affect normal/bright  Head: Normocephalic. EOMI. Moist membranes  Abdomen: Abdomen soft, non-tender.   : No right flank tenderness  NEURO: Gait normal. Grossly " non-focal  SKIN: Soft, dry  JOINT/EXTREMITIES: extremities normal - no gross deformities noted and gait normal        Assessment/Plan: Rubina Desouza is a 34 year old female with a solitary functioning right kidney with right hydroureter and bladder schistosomiasis. Her bladder was treated and UDS shows normal bladder function. Due to possible ureteral narrowing seen in retrograde last year will get Mag 3 scan to rule out obstrution.      -- Renal scan  -- RTC 1 month to discuss results      Nakia Rollins MD  Reconstructive Urology Fellow    Again, thank you for allowing me to participate in the care of your patient.      Sincerely,    Crissy Rollins MD

## 2019-10-28 NOTE — NURSING NOTE
"Chief Complaint   Patient presents with     Follow Up     review UDS       Blood pressure (!) 158/90, pulse 108, height 1.575 m (5' 2\"), weight 52.2 kg (115 lb), unknown if currently breastfeeding. Body mass index is 21.03 kg/m .    Patient Active Problem List   Diagnosis     Hyperthyroidism     H/O partial thyroidectomy     Supervision of high risk pregnancy - [facility] CNM     Hx of shoulder dystocia, prior pregnancy, currently pregnant     Atrophy of left kidney     Thrombocytopenia (H)      (normal spontaneous vaginal delivery)     History of severe pre-eclampsia       No Known Allergies    Current Outpatient Medications   Medication Sig Dispense Refill     acetaminophen (TYLENOL) 325 MG tablet Take 2 tablets (650 mg) by mouth every 6 hours as needed for mild pain Start after Delivery. (Patient not taking: Reported on 2019) 100 tablet 0     ascorbic acid (VITAMIN C) 250 MG CHEW chewable tablet Take 1 tablet (250 mg) by mouth daily (Patient not taking: Reported on 2019) 90 tablet 3     ferrous sulfate (FE TABS) 325 (65 Fe) MG EC tablet Take 1 tablet (325 mg) by mouth daily 90 tablet 1     ibuprofen (ADVIL/MOTRIN) 600 MG tablet Take 1 tablet (600 mg) by mouth every 6 hours as needed for moderate pain Start after delivery (Patient not taking: Reported on 2019) 60 tablet 0     NIFEdipine ER OSMOTIC (ADALAT CC) 30 MG 24 hr tablet Take 1 tablet (30 mg) by mouth daily 60 tablet 0     norethindrone (MICRONOR) 0.35 MG tablet Take 1 tablet (0.35 mg) by mouth daily 90 tablet 6     Prenatal Vit-Fe Fumarate-FA (SM PRENATAL VITAMINS) 28-0.8 MG TABS Take 1 tablet by mouth daily 30 tablet 3     ranitidine (ZANTAC) 150 MG tablet Take 1 tablet (150 mg) by mouth 2 times daily (Patient not taking: Reported on 2019) 60 tablet 3     senna-docusate (SENOKOT-S/PERICOLACE) 8.6-50 MG tablet Take 1 tablet by mouth daily Start after delivery. (Patient not taking: Reported on 2019) 100 tablet 0       Social History "     Tobacco Use     Smoking status: Never Smoker     Smokeless tobacco: Never Used   Substance Use Topics     Alcohol use: No     Alcohol/week: 0.0 standard drinks     Drug use: No       Darby Radford LPN  10/28/2019  4:07 PM

## 2019-10-28 NOTE — PATIENT INSTRUCTIONS
Schedule imaging on a Friday (per patient request).  Follow up with Dr. Rollins in a month to review.      It was a pleasure meeting with you today.  Thank you for allowing me and my team the privilege of caring for you today.  YOU are the reason we are here, and I truly hope we provided you with the excellent service you deserve.  Please let us know if there is anything else we can do for you so that we can be sure you are leaving completely satisfied with your care experience.

## 2019-10-28 NOTE — NURSING NOTE
"UDS-Dr. Carbajal/Ria    She denies any urinary sx pains or complaints.          Chief Complaint   Patient presents with     Urodynamics Study     Atrophic L kidney        Blood pressure (!) 158/90, pulse 108, height 1.575 m (5' 2\"), weight 52.2 kg (115 lb), unknown if currently breastfeeding. Body mass index is 21.03 kg/m .    Patient Active Problem List   Diagnosis     Hyperthyroidism     H/O partial thyroidectomy     Supervision of high risk pregnancy - [facility] CNM     Hx of shoulder dystocia, prior pregnancy, currently pregnant     Atrophy of left kidney     Thrombocytopenia (H)      (normal spontaneous vaginal delivery)     History of severe pre-eclampsia       No Known Allergies    Current Outpatient Medications   Medication Sig Dispense Refill     NIFEdipine ER OSMOTIC (ADALAT CC) 30 MG 24 hr tablet Take 1 tablet (30 mg) by mouth daily 60 tablet 0     norethindrone (MICRONOR) 0.35 MG tablet Take 1 tablet (0.35 mg) by mouth daily 90 tablet 6     Prenatal Vit-Fe Fumarate-FA (SM PRENATAL VITAMINS) 28-0.8 MG TABS Take 1 tablet by mouth daily 30 tablet 3     acetaminophen (TYLENOL) 325 MG tablet Take 2 tablets (650 mg) by mouth every 6 hours as needed for mild pain Start after Delivery. (Patient not taking: Reported on 2019) 100 tablet 0     ascorbic acid (VITAMIN C) 250 MG CHEW chewable tablet Take 1 tablet (250 mg) by mouth daily (Patient not taking: Reported on 2019) 90 tablet 3     ferrous sulfate (FE TABS) 325 (65 Fe) MG EC tablet Take 1 tablet (325 mg) by mouth daily 90 tablet 1     ibuprofen (ADVIL/MOTRIN) 600 MG tablet Take 1 tablet (600 mg) by mouth every 6 hours as needed for moderate pain Start after delivery (Patient not taking: Reported on 2019) 60 tablet 0     ranitidine (ZANTAC) 150 MG tablet Take 1 tablet (150 mg) by mouth 2 times daily (Patient not taking: Reported on 2019) 60 tablet 3     senna-docusate (SENOKOT-S/PERICOLACE) 8.6-50 MG tablet Take 1 tablet by mouth daily Start " after delivery. (Patient not taking: Reported on 2019) 100 tablet 0       Social History     Tobacco Use     Smoking status: Never Smoker     Smokeless tobacco: Never Used   Substance Use Topics     Alcohol use: No     Alcohol/week: 0.0 standard drinks     Drug use: No       Invasive Procedure Safety Checklist:    Procedure: Urodynamics    Action: Complete sections and checkboxes as appropriate.  Pre-procedure:  1. Patient ID Verified with 2 identifiers (Meron and  or MRN) : YES    2. Procedure and site verified with patient/designee (when able) : YES    3. Accurate consent documentation in medical record : YES    4. H&P (or appropriate assessment) documented in medical record : N/A  H&P must be up to 30 days prior to procedure an updated within 24 hours of Procedure as applicable.     5. Relevant diagnostic and radiology test results appropriately labeled and displayed as applicable : YES    6. Blood products, implants, devices, and/or special equipment available for the procedure as applicable : YES    7. Procedure site(s) marked with provider initials [Exclusions: none] : NO    8. Marking not required. Reason : Yes  Procedure does not require site marking    Time Out:     Time-Out performed immediately prior to starting procedure, including verbal and active participation of all team members addressing: YES    1. Correct patient identity.  2. Confirmed that the correct side and site are marked.  3. An accurate procedure to be done.  4. Agreement on the procedure to be done.  5. Correct patient position.  6. Relevant images and results are properly labeled and appropriately displayed.  7. The need to administer antibiotics or fluids for irrigation purposes during the procedure as applicable.  8. Safety precautions based on patient history or medication use.    During Procedure: Verification of correct person, site, and procedure occurs any time the responsibility for care of the patient is transferred to  another member of the care team.    The following medication was given: Cephalexin    MEDICATION:  Keflex   ROUTE: PO  SITE: Orally  DOSE: 500mg  LOT #: [id number]  : Hyperoptic  EXPIRATION DATE: 10/2020  NDC#: [id number]  Was there drug waste? No    Prior to administration, verified patient identity using patient's name and date of birth.  Due to administration, patient instructed to remain in clinic for 15 minutes  afterwards, and to report any adverse reaction to me immediately.    Drug Amount Wasted:  None.  Vial/Syringe: Single dose vial      The following medication was given:     MEDICATION:  Omnipaque (Iohexol Injection)  ROUTE: Provider Administered  SITE: Provider Administered via catheter  DOSE: 240mL  LOT #: [id number]  : [employer]  EXPIRATION DATE: 05/29/2022  NDC#: [address]-59   Was there drug waste? No    Prior to injection, verified patient identity using patient's name and date of birth.  Due to injection administration, patient instructed to remain in clinic for 15 minutes  afterwards, and to report any adverse reaction to me immediately.    Drug Amount Wasted:  None.  Vial/Syringe: Single dose vial    Rubina Desouza comes into clinic today at the request of Dr. Rollins for Urodynamics.    Order has been verified: Yes.      The following medication was given: See Above    Insertion:  14 Fr straight tipped vinyl straight catheter inserted into urethral meatus in the usual sterile fashion without difficulty.  Received 125 ml yellow urine output.     Patient did tolerate procedure well.    Patient instructed as to where to call or go for pain, fever, leakage, or decreased urine flow.     Patient instructed as to where to call or go for pain, fever, leakage, or decreased urine flow.     This service provided today was under the direct supervision of Ruby Shah, who was available if needed.    Kirt Palacios, EMT, EMT  10/28/2019  3:33 PM

## 2019-11-05 ENCOUNTER — PRE VISIT (OUTPATIENT)
Dept: UROLOGY | Facility: CLINIC | Age: 34
End: 2019-11-05

## 2019-11-05 NOTE — TELEPHONE ENCOUNTER
Reason for Visit: Review imaging    Diagnosis: Atrophic Kidney    Orders/Procedures/Records: Records available    Contact Patient: N/A    Rooming Requirements: Normal      Willow Kramer  11/05/19  8:35 AM

## 2019-12-06 ENCOUNTER — HOSPITAL ENCOUNTER (OUTPATIENT)
Dept: NUCLEAR MEDICINE | Facility: CLINIC | Age: 34
Setting detail: NUCLEAR MEDICINE
Discharge: HOME | End: 2019-12-06
Attending: UROLOGY | Admitting: UROLOGY

## 2019-12-06 PROCEDURE — A9562 TC99M MERTIATIDE: HCPCS | Performed by: UROLOGY

## 2019-12-06 PROCEDURE — 78708 K FLOW/FUNCT IMAGE W/DRUG: CPT

## 2019-12-06 PROCEDURE — T1013 SIGN LANG/ORAL INTERPRETER: HCPCS | Mod: U3

## 2019-12-06 PROCEDURE — 34300033 ZZH RX 343: Performed by: UROLOGY

## 2019-12-06 PROCEDURE — 25000128 H RX IP 250 OP 636: Performed by: UROLOGY

## 2019-12-06 RX ORDER — FUROSEMIDE 10 MG/ML
20 INJECTION INTRAMUSCULAR; INTRAVENOUS ONCE
Status: COMPLETED | OUTPATIENT
Start: 2019-12-06 | End: 2019-12-06

## 2019-12-06 RX ADMIN — TECHNESCAN TC 99M MERTIATIDE 9.2 MILLICURIE: 1 INJECTION, POWDER, LYOPHILIZED, FOR SOLUTION INTRAVENOUS at 15:38

## 2019-12-06 RX ADMIN — FUROSEMIDE 20 MG: 10 INJECTION, SOLUTION INTRAVENOUS at 15:39

## 2019-12-15 NOTE — PROGRESS NOTES
Reason for visit:  Follow up of atrophic left kidney    HPI:  Ms. 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     Last year she had a miscarriage, which prompted a renal ultrasound, which showed an atrophic left kidney.   This prompted a CT scan, which revealed right pelviectasis, right hydroureter with a focal stricture in the proximal third of the ureter, and calcifications in the bladder.   She was born in [facility] and emigrated in 2014.  She underwent cystoscopy and bladder biopsies confirming schistosomiasis.   A retrograde pyelogram revealed the proximal stricture was ureteral peristalsis.   Her ureteral orifice was quite small and her distal ureter was also narrow. She has completed a treatment of praziquantel.     She was supposed to get a renal scan and UDS but got pregnant so testing was put off.      UDS 10/2019 showed: good capacity 700cc, normal compliance, no DO, good sensation and voiding function, video showed normal appearing bladder contour.  Renal scan last week showed normal right kidney with rapid excretion, only 6% function of left kidney.         Current Outpatient Medications   Medication Sig Dispense Refill     acetaminophen (TYLENOL) 325 MG tablet Take 2 tablets (650 mg) by mouth every 6 hours as needed for mild pain Start after Delivery. (Patient not taking: Reported on 9/20/2019) 100 tablet 0     ascorbic acid (VITAMIN C) 250 MG CHEW chewable tablet Take 1 tablet (250 mg) by mouth daily (Patient not taking: Reported on 9/20/2019) 90 tablet 3     ferrous sulfate (FE TABS) 325 (65 Fe) MG EC tablet Take 1 tablet (325 mg) by mouth daily 90 tablet 1     ibuprofen (ADVIL/MOTRIN) 600 MG tablet Take 1 tablet (600 mg) by mouth every 6 hours as needed for moderate pain Start after delivery (Patient not taking: Reported on 9/20/2019) 60 tablet 0     NIFEdipine ER OSMOTIC (ADALAT CC) 30 MG 24 hr tablet Take 1 tablet (30  mg) by mouth daily 60 tablet 0     norethindrone (MICRONOR) 0.35 MG tablet Take 1 tablet (0.35 mg) by mouth daily 90 tablet 6     Prenatal Vit-Fe Fumarate-FA (SM PRENATAL VITAMINS) 28-0.8 MG TABS Take 1 tablet by mouth daily 30 tablet 3     ranitidine (ZANTAC) 150 MG tablet Take 1 tablet (150 mg) by mouth 2 times daily (Patient not taking: Reported on 7/11/2019) 60 tablet 3     senna-docusate (SENOKOT-S/PERICOLACE) 8.6-50 MG tablet Take 1 tablet by mouth daily Start after delivery. (Patient not taking: Reported on 9/20/2019) 100 tablet 0       No Known Allergies  Past Medical History:   Diagnosis Date     Anemia 2017     Conductive hearing loss 2015     Hearing problem 2015     Hyperthyroidism     Better since surgery     Tinnitus 2018     Past Surgical History:   Procedure Laterality Date     CYSTOSCOPY, BIOPSY BLADDER, COMBINED N/A 10/26/2018    Procedure: Bladder Biopsy, Right Ureteroscopy with Possible Biopsy;  Surgeon: Viral Hampton MD;  [facility]     CYSTOSCOPY, URETEROSCOPY, COMBINED  10/26/2018    Procedure: COMBINED CYSTOSCOPY, URETEROSCOPY;  Surgeon: Viral Hampton MD;  [facility]     THYROID SURGERY  2010    in [facility]; partial thyroidectomy         ROS -see hpi otherwise rest is negative     OBJECTIVE:  Constitutional: healthy, alert and no distress   Cardiovascular: regular rate, normal perfusion  Respiratory: normal work of breathing  Psychiatric: mentation appears normal and affect normal/bright  Head: Normocephalic. EOMI. Moist membranes  Abdomen: Abdomen soft, non-tender.   : no CVA tenderness  NEURO: Gait normal. Grossly non-focal  SKIN: Soft, dry  JOINT/EXTREMITIES: extremities normal - no gross deformities noted and gait normal        Assessment/Plan: Rubina Desouza is a 34 year old female with schistosomiasis of the bladder, atrophic left kidney.     -- Normal right kidney, normal bladder function  -- No symptoms of left atrophic kidney to require intervention  such as infections, pain, hypertension  -- Discussed removal versus observation of left kidney, favor observation unless develops infections, pain or blood pressure issues. She should see her primary doctor yearly for blood pressure checks. Discussed that she should not overly use NSAIDs. She understands all.   -- RTC as needed      Nakia Rollins MD  Reconstructive Urology Fellow

## 2019-12-16 ENCOUNTER — OFFICE VISIT (OUTPATIENT)
Dept: UROLOGY | Facility: CLINIC | Age: 34
End: 2019-12-16

## 2019-12-16 VITALS
HEIGHT: 62 IN | DIASTOLIC BLOOD PRESSURE: 93 MMHG | HEART RATE: 78 BPM | SYSTOLIC BLOOD PRESSURE: 147 MMHG | BODY MASS INDEX: 21.16 KG/M2 | WEIGHT: 115 LBS

## 2019-12-16 DIAGNOSIS — B65.9 SCHISTOSOMIASIS: ICD-10-CM

## 2019-12-16 DIAGNOSIS — N13.4 HYDROURETER: ICD-10-CM

## 2019-12-16 DIAGNOSIS — N26.1 ATROPHIC KIDNEY: Primary | ICD-10-CM

## 2019-12-16 PROBLEM — D50.9 IRON DEFICIENCY ANEMIA: Status: ACTIVE | Noted: 2018-01-04

## 2019-12-16 PROBLEM — H91.90 HEARING LOSS: Status: ACTIVE | Noted: 2018-01-04

## 2019-12-16 PROBLEM — R76.11 ABNORMAL REACTION TO TUBERCULIN TEST: Status: ACTIVE | Noted: 2018-11-29

## 2019-12-16 PROBLEM — M79.646 PAIN IN HAND AND FINGERS: Status: ACTIVE | Noted: 2018-01-04

## 2019-12-16 PROBLEM — N13.30 HYDRONEPHROSIS: Status: ACTIVE | Noted: 2018-08-23

## 2019-12-16 PROBLEM — N18.9 CHRONIC KIDNEY DISEASE (CKD): Status: ACTIVE | Noted: 2018-08-02

## 2019-12-16 PROBLEM — O16.9 MATERNAL HYPERTENSION, ANTEPARTUM: Status: ACTIVE | Noted: 2019-09-06

## 2019-12-16 PROBLEM — M79.643 PAIN IN HAND AND FINGERS: Status: ACTIVE | Noted: 2018-01-04

## 2019-12-16 PROBLEM — J47.9 BRONCHIECTASIS (H): Status: ACTIVE | Noted: 2019-02-28

## 2019-12-16 PROBLEM — O14.10 SEVERE PRE-ECLAMPSIA: Status: ACTIVE | Noted: 2019-08-19

## 2019-12-16 ASSESSMENT — PAIN SCALES - GENERAL: PAINLEVEL: NO PAIN (0)

## 2019-12-16 ASSESSMENT — MIFFLIN-ST. JEOR: SCORE: 1179.89

## 2019-12-16 NOTE — NURSING NOTE
"Chief Complaint   Patient presents with     Follow Up     Review imaging       Blood pressure (!) 147/93, pulse 78, height 1.575 m (5' 2\"), weight 52.2 kg (115 lb), unknown if currently breastfeeding. Body mass index is 21.03 kg/m .    Patient Active Problem List   Diagnosis     Hyperthyroidism     H/O partial thyroidectomy     Supervision of high risk pregnancy - [facility] CNM     Hx of shoulder dystocia, prior pregnancy, currently pregnant     Atrophy of left kidney     Thrombocytopenia (H)      (normal spontaneous vaginal delivery)     History of severe pre-eclampsia     Abnormal reaction to tuberculin test     Bronchiectasis (H)     Chronic kidney disease (CKD)     Hearing loss     Hydronephrosis     Iron deficiency anemia     Maternal hypertension, antepartum     Pain in hand and fingers     Severe pre-eclampsia       No Known Allergies    Current Outpatient Medications   Medication Sig Dispense Refill     ferrous sulfate (FE TABS) 325 (65 Fe) MG EC tablet Take 1 tablet (325 mg) by mouth daily 90 tablet 1     NIFEdipine ER OSMOTIC (ADALAT CC) 30 MG 24 hr tablet Take 1 tablet (30 mg) by mouth daily 60 tablet 0     norethindrone (MICRONOR) 0.35 MG tablet Take 1 tablet (0.35 mg) by mouth daily 90 tablet 6     Prenatal Vit-Fe Fumarate-FA (SM PRENATAL VITAMINS) 28-0.8 MG TABS Take 1 tablet by mouth daily 30 tablet 3     acetaminophen (TYLENOL) 325 MG tablet Take 2 tablets (650 mg) by mouth every 6 hours as needed for mild pain Start after Delivery. (Patient not taking: Reported on 2019) 100 tablet 0     ascorbic acid (VITAMIN C) 250 MG CHEW chewable tablet Take 1 tablet (250 mg) by mouth daily (Patient not taking: Reported on 2019) 90 tablet 3     ibuprofen (ADVIL/MOTRIN) 600 MG tablet Take 1 tablet (600 mg) by mouth every 6 hours as needed for moderate pain Start after delivery (Patient not taking: Reported on 2019) 60 tablet 0     ranitidine (ZANTAC) 150 MG tablet Take 1 tablet (150 mg) by mouth 2 " times daily (Patient not taking: Reported on 7/11/2019) 60 tablet 3     senna-docusate (SENOKOT-S/PERICOLACE) 8.6-50 MG tablet Take 1 tablet by mouth daily Start after delivery. (Patient not taking: Reported on 9/20/2019) 100 tablet 0       Social History     Tobacco Use     Smoking status: Never Smoker     Smokeless tobacco: Never Used   Substance Use Topics     Alcohol use: No     Alcohol/week: 0.0 standard drinks     Drug use: No       JANKI Clemons  12/16/2019  5:23 PM

## 2019-12-16 NOTE — LETTER
12/16/2019       RE: Rubina Desouza  [address]  [facility] [address]     Dear Colleague,    Thank you for referring your patient, Rubina Desouza, to the [facility] UROLOGY AND INST FOR PROSTATE AND UROLOGIC CANCERS at [facility] Please see a copy of my visit note below.    Reason for visit:  Follow up of atrophic left kidney    HPI:  Ms. 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     Last year she had a miscarriage, which prompted a renal ultrasound, which showed an atrophic left kidney.   This prompted a CT scan, which revealed right pelviectasis, right hydroureter with a focal stricture in the proximal third of the ureter, and calcifications in the bladder.   She was born in [place] and emigrated in 2014.  She underwent cystoscopy and bladder biopsies confirming schistosomiasis.   A retrograde pyelogram revealed the proximal stricture was ureteral peristalsis.   Her ureteral orifice was quite small and her distal ureter was also narrow. She has completed a treatment of praziquantel.     She was supposed to get a renal scan and UDS but got pregnant so testing was put off.      UDS 10/2019 showed: good capacity 700cc, normal compliance, no DO, good sensation and voiding function, video showed normal appearing bladder contour.  Renal scan last week showed normal right kidney with rapid excretion, only 6% function of left kidney.         Current Outpatient Medications   Medication Sig Dispense Refill     acetaminophen (TYLENOL) 325 MG tablet Take 2 tablets (650 mg) by mouth every 6 hours as needed for mild pain Start after Delivery. (Patient not taking: Reported on 9/20/2019) 100 tablet 0     ascorbic acid (VITAMIN C) 250 MG CHEW chewable tablet Take 1 tablet (250 mg) by mouth daily (Patient not taking: Reported on 9/20/2019) 90 tablet 3     ferrous sulfate (FE TABS) 325 (65 Fe) MG EC tablet  Take 1 tablet (325 mg) by mouth daily 90 tablet 1     ibuprofen (ADVIL/MOTRIN) 600 MG tablet Take 1 tablet (600 mg) by mouth every 6 hours as needed for moderate pain Start after delivery (Patient not taking: Reported on 9/20/2019) 60 tablet 0     NIFEdipine ER OSMOTIC (ADALAT CC) 30 MG 24 hr tablet Take 1 tablet (30 mg) by mouth daily 60 tablet 0     norethindrone (MICRONOR) 0.35 MG tablet Take 1 tablet (0.35 mg) by mouth daily 90 tablet 6     Prenatal Vit-Fe Fumarate-FA (SM PRENATAL VITAMINS) 28-0.8 MG TABS Take 1 tablet by mouth daily 30 tablet 3     ranitidine (ZANTAC) 150 MG tablet Take 1 tablet (150 mg) by mouth 2 times daily (Patient not taking: Reported on 7/11/2019) 60 tablet 3     senna-docusate (SENOKOT-S/PERICOLACE) 8.6-50 MG tablet Take 1 tablet by mouth daily Start after delivery. (Patient not taking: Reported on 9/20/2019) 100 tablet 0       No Known Allergies  Past Medical History:   Diagnosis Date     Anemia 2017     Conductive hearing loss 2015     Hearing problem 2015     Hyperthyroidism     Better since surgery     Tinnitus 2018     Past Surgical History:   Procedure Laterality Date     CYSTOSCOPY, BIOPSY BLADDER, COMBINED N/A 10/26/2018    Procedure: Bladder Biopsy, Right Ureteroscopy with Possible Biopsy;  Surgeon: Viral Hampton MD;  Location:  OR     CYSTOSCOPY, URETEROSCOPY, COMBINED  10/26/2018    Procedure: COMBINED CYSTOSCOPY, URETEROSCOPY;  Surgeon: Viral Hampton MD;  Location: [facility] OR     THYROID SURGERY  2010    in [facility]; partial thyroidectomy         ROS -see hpi otherwise rest is negative     OBJECTIVE:  Constitutional: healthy, alert and no distress   Cardiovascular: regular rate, normal perfusion  Respiratory: normal work of breathing  Psychiatric: mentation appears normal and affect normal/bright  Head: Normocephalic. EOMI. Moist membranes  Abdomen: Abdomen soft, non-tender.   : no CVA tenderness  NEURO: Gait normal. Grossly non-focal  SKIN: Soft,  dry  JOINT/EXTREMITIES: extremities normal - no gross deformities noted and gait normal        Assessment/Plan: Rubina Desouza is a 34 year old female with schistosomiasis of the bladder, atrophic left kidney.     -- Normal right kidney, normal bladder function  -- No symptoms of left atrophic kidney to require intervention such as infections, pain, hypertension  -- Discussed removal versus observation of left kidney, favor observation unless develops infections, pain or blood pressure issues. She should see her primary doctor yearly for blood pressure checks. Discussed that she should not overly use NSAIDs. She understands all.   -- RTC as needed      Nakia Rollins MD  Reconstructive Urology Fellow    Again, thank you for allowing me to participate in the care of your patient.      Sincerely,    Crissy Rollins MD

## 2020-01-01 NOTE — LETTER
1/28/2019         Rubina Desouza   [address]  [facility] [address]        Dear Ms. Desouza:    Jonhbo-Your labs are stable, if not slightly improved    Results for orders placed or performed in visit on 01/18/19   Thyroid stimulating immunoglobulin   Result Value Ref Range    Thyroid Stim Immunog <1.0 <=1.3 TSI index   TSH   Result Value Ref Range    TSH <0.01 (L) 0.40 - 4.00 mU/L   T4 free   Result Value Ref Range    T4 Free 1.71 (H) 0.76 - 1.46 ng/dL   T3 Free   Result Value Ref Range    Free T3 4.0 2.3 - 4.2 pg/mL   Thyrotropin Receptor Antibody   Result Value Ref Range    Thyrotropin Receptor Antibody <1.00 0.00 - 1.75 IU/L         Please note that test explanations are brief and do not reflect all diagnostic uses.  If you have any questions or concerns, please call the clinic at [phone].      Sincerely,      Neha Spencer sent on behalf of  Swetha Stephenson MD   Pt tidal volume weaned to 3.3 this shift based on 4.5mL/kg.

## 2020-03-03 RX ORDER — GLYCERIN/MINERAL OIL
1 LOTION (ML) TOPICAL DAILY
Qty: 90 TABLET | Refills: 3 | Status: SHIPPED | OUTPATIENT
Start: 2020-03-03 | End: 2024-05-17

## 2020-03-10 ENCOUNTER — HEALTH MAINTENANCE LETTER (OUTPATIENT)
Age: 35
End: 2020-03-10

## 2020-12-05 NOTE — PROGRESS NOTES
OB/GYN Postpartum Note     S: Feels well. Pain controlled on Tylenol and Ibuprofen. Bleeding moderate. Ambulating without dizziness. Voiding spontaneously. Tolerating regular diet without nausea or vomiting. Patient has no RUQ pain, vision changes, persistent headache and having no issues breastfeeding.     O:   Patient Vitals for the past 24 hrs:   BP Temp Temp src Pulse Heart Rate Resp SpO2 Weight   19 1136 135/82 98.5  F (36.9  C) Oral 64 -- 16 -- --   19 0719 122/54 97.9  F (36.6  C) Oral -- 56 14 -- --   19 0555 (!) 143/84 97.9  F (36.6  C) Oral -- 56 18 -- 53.3 kg (117 lb 8 oz)   19 0333 (!) 149/95 97.9  F (36.6  C) Oral -- 60 17 -- --   19 2335 (!) 142/91 -- -- -- -- -- -- --   19 2323 (!) 145/94 -- -- -- 58 -- -- --   19 2321 (!) 132/102 97.7  F (36.5  C) Oral -- 60 18 -- --   19 2109 134/83 -- -- -- -- -- -- --   19 (!) 140/93 -- -- -- -- -- -- --   19 (!) 142/85 97.5  F (36.4  C) Oral -- 59 16 -- --   19 1624 135/84 -- -- 73 -- 16 100 % --     General: Alert and oriented, appears well, in NAD  CV: regular rate  Resp: nonlabored breathing, no crackles or rales noted  Abdomen: soft, nontender, non-distended  Extremities: No edema in BLE, DTR 2+        A: Ms. Rubina Desouza is a 34 year old  PPD #2 s/p . Pregnancy c/b preE w/ SF diagnosed on PPD#0, transferred from [facility] service at that time. Now s/p magnesium for seizure prophylaxis and normotensive.     P:  PreE w/SF  - s/p 24h magnesium   - s/p labetalol 20mg x1 (on PPD#0)  - PO antihypertensives if persistently >150/100    - HELLP labs               - Cr 0.89>0.84, stable from baseline                - plt 122>102>136                - o/w normal  - daily weights     Postpartum:  Heme 12.6 > QBL 237cc > 11.7  Pain: tylenol, ibuprofen  : voiding w/o difficulty   Feeding: Breastfeeding  Contraception: Following up with Midwife at 6 week postpartum for contraception.  PNC: Rh  pos, rubella immune - no MMR or rhogam indicated     Disposition: Home PPD#3 pending BPs     Kenisha Kessler MD  OB/GYN PGY-2  08/13/19 6:29 AM     Staff MD Note    I appreciate the note by Dr. Kessler.  Any necessary changes have been made by me.  I evaluated the patient with the resident and agree with the assessment and plan.    Brandie Collins MD     Yes

## 2020-12-27 ENCOUNTER — HEALTH MAINTENANCE LETTER (OUTPATIENT)
Age: 35
End: 2020-12-27

## 2021-04-24 ENCOUNTER — HEALTH MAINTENANCE LETTER (OUTPATIENT)
Age: 36
End: 2021-04-24

## 2021-10-09 ENCOUNTER — HEALTH MAINTENANCE LETTER (OUTPATIENT)
Age: 36
End: 2021-10-09

## 2021-10-25 ENCOUNTER — LAB REQUISITION (OUTPATIENT)
Dept: LAB | Facility: CLINIC | Age: 36
End: 2021-10-25

## 2021-10-25 ENCOUNTER — LAB REQUISITION (OUTPATIENT)
Dept: LAB | Facility: CLINIC | Age: 36
End: 2021-10-25
Payer: COMMERCIAL

## 2021-10-25 DIAGNOSIS — E04.1 NONTOXIC SINGLE THYROID NODULE: ICD-10-CM

## 2021-10-25 DIAGNOSIS — N18.2 CHRONIC KIDNEY DISEASE, STAGE 2 (MILD): ICD-10-CM

## 2021-10-25 LAB
ALBUMIN SERPL-MCNC: 3 G/DL (ref 3.4–5)
ANION GAP SERPL CALCULATED.3IONS-SCNC: 8 MMOL/L (ref 3–14)
BUN SERPL-MCNC: 14 MG/DL (ref 7–30)
CALCIUM SERPL-MCNC: 8.8 MG/DL (ref 8.5–10.1)
CHLORIDE BLD-SCNC: 106 MMOL/L (ref 94–109)
CO2 SERPL-SCNC: 23 MMOL/L (ref 20–32)
CREAT SERPL-MCNC: 0.82 MG/DL (ref 0.52–1.04)
GFR SERPL CREATININE-BSD FRML MDRD: >90 ML/MIN/1.73M2
GLUCOSE BLD-MCNC: 88 MG/DL (ref 70–99)
PHOSPHATE SERPL-MCNC: 3.4 MG/DL (ref 2.5–4.5)
POTASSIUM BLD-SCNC: 3.8 MMOL/L (ref 3.4–5.3)
SODIUM SERPL-SCNC: 137 MMOL/L (ref 133–144)
TSH SERPL DL<=0.005 MIU/L-ACNC: 0.5 MU/L (ref 0.4–4)

## 2021-10-25 PROCEDURE — 80069 RENAL FUNCTION PANEL: CPT | Mod: ORL | Performed by: REGISTERED NURSE

## 2021-10-25 PROCEDURE — 84443 ASSAY THYROID STIM HORMONE: CPT | Mod: ORL | Performed by: REGISTERED NURSE

## 2022-01-10 ENCOUNTER — LAB REQUISITION (OUTPATIENT)
Dept: LAB | Facility: CLINIC | Age: 37
End: 2022-01-10
Payer: COMMERCIAL

## 2022-01-10 DIAGNOSIS — Z3A.01 LESS THAN 8 WEEKS GESTATION OF PREGNANCY: ICD-10-CM

## 2022-01-10 LAB — B-HCG SERPL-ACNC: 9 IU/L (ref 0–5)

## 2022-01-10 PROCEDURE — 84702 CHORIONIC GONADOTROPIN TEST: CPT | Mod: ORL | Performed by: REGISTERED NURSE

## 2022-01-12 ENCOUNTER — LAB REQUISITION (OUTPATIENT)
Dept: LAB | Facility: CLINIC | Age: 37
End: 2022-01-12
Payer: COMMERCIAL

## 2022-01-12 DIAGNOSIS — Z3A.01 LESS THAN 8 WEEKS GESTATION OF PREGNANCY: ICD-10-CM

## 2022-01-12 LAB — B-HCG SERPL-ACNC: 5 IU/L (ref 0–5)

## 2022-01-12 PROCEDURE — 84702 CHORIONIC GONADOTROPIN TEST: CPT | Mod: ORL | Performed by: REGISTERED NURSE

## 2022-02-17 PROBLEM — O40.9XX0 POLYHYDRAMNIOS: Status: RESOLVED | Noted: 2019-03-20 | Resolved: 2019-06-12

## 2022-02-17 PROBLEM — O20.0 THREATENED MISCARRIAGE: Status: RESOLVED | Noted: 2017-01-20 | Resolved: 2017-04-26

## 2022-03-23 ENCOUNTER — LAB REQUISITION (OUTPATIENT)
Dept: LAB | Facility: CLINIC | Age: 37
End: 2022-03-23
Payer: COMMERCIAL

## 2022-03-23 DIAGNOSIS — E04.1 NONTOXIC SINGLE THYROID NODULE: ICD-10-CM

## 2022-03-23 LAB — TSH SERPL DL<=0.005 MIU/L-ACNC: 0.46 MU/L (ref 0.4–4)

## 2022-03-23 PROCEDURE — 84443 ASSAY THYROID STIM HORMONE: CPT | Mod: ORL | Performed by: REGISTERED NURSE

## 2022-03-29 ENCOUNTER — ANCILLARY PROCEDURE (OUTPATIENT)
Dept: ULTRASOUND IMAGING | Facility: CLINIC | Age: 37
End: 2022-03-29
Attending: REGISTERED NURSE
Payer: COMMERCIAL

## 2022-03-29 DIAGNOSIS — M54.2 ANTERIOR NECK PAIN: ICD-10-CM

## 2022-03-29 PROCEDURE — 76536 US EXAM OF HEAD AND NECK: CPT | Mod: GC | Performed by: RADIOLOGY

## 2022-04-22 ENCOUNTER — LAB REQUISITION (OUTPATIENT)
Dept: LAB | Facility: CLINIC | Age: 37
End: 2022-04-22
Payer: COMMERCIAL

## 2022-04-22 DIAGNOSIS — N26.1 ATROPHY OF KIDNEY (TERMINAL): ICD-10-CM

## 2022-04-22 LAB
ALBUMIN SERPL-MCNC: 3.5 G/DL (ref 3.4–5)
ANION GAP SERPL CALCULATED.3IONS-SCNC: 8 MMOL/L (ref 3–14)
BUN SERPL-MCNC: 14 MG/DL (ref 7–30)
CALCIUM SERPL-MCNC: 9.3 MG/DL (ref 8.5–10.1)
CHLORIDE BLD-SCNC: 107 MMOL/L (ref 94–109)
CO2 SERPL-SCNC: 25 MMOL/L (ref 20–32)
CREAT SERPL-MCNC: 0.85 MG/DL (ref 0.52–1.04)
GFR SERPL CREATININE-BSD FRML MDRD: >90 ML/MIN/1.73M2
GLUCOSE BLD-MCNC: 100 MG/DL (ref 70–99)
PHOSPHATE SERPL-MCNC: 2.9 MG/DL (ref 2.5–4.5)
POTASSIUM BLD-SCNC: 3.5 MMOL/L (ref 3.4–5.3)
SODIUM SERPL-SCNC: 140 MMOL/L (ref 133–144)

## 2022-04-22 PROCEDURE — 80069 RENAL FUNCTION PANEL: CPT | Mod: ORL | Performed by: REGISTERED NURSE

## 2022-04-22 PROCEDURE — 87086 URINE CULTURE/COLONY COUNT: CPT | Mod: ORL | Performed by: REGISTERED NURSE

## 2022-04-24 LAB — BACTERIA UR CULT: NORMAL

## 2022-04-25 ENCOUNTER — TRANSCRIBE ORDERS (OUTPATIENT)
Dept: OTHER | Age: 37
End: 2022-04-25
Payer: COMMERCIAL

## 2022-04-25 DIAGNOSIS — E04.1 THYROID NODULE: Primary | ICD-10-CM

## 2022-04-26 ENCOUNTER — MEDICAL CORRESPONDENCE (OUTPATIENT)
Dept: HEALTH INFORMATION MANAGEMENT | Facility: CLINIC | Age: 37
End: 2022-04-26
Payer: COMMERCIAL

## 2022-04-28 ENCOUNTER — TELEPHONE (OUTPATIENT)
Dept: ENDOCRINOLOGY | Facility: CLINIC | Age: 37
End: 2022-04-28
Payer: COMMERCIAL

## 2022-04-28 ENCOUNTER — TRANSCRIBE ORDERS (OUTPATIENT)
Dept: OTHER | Age: 37
End: 2022-04-28
Payer: COMMERCIAL

## 2022-04-28 DIAGNOSIS — N26.1 ATROPHY OF LEFT KIDNEY: Primary | ICD-10-CM

## 2022-04-28 NOTE — TELEPHONE ENCOUNTER
[facility] Call Center    Phone Message    May a detailed message be left on voicemail: yes     Reason for Call: Appointment Intake    Referring Provider Name: Referred by: Julia Hernandes APRN,CNP   [facility]   [address] [place] [id number]   Phone: [phone]   Fax: [phone]    Diagnosis and/or Symptoms: Thyroid Nodule    I did get pt scheduled with Dr. Álvarez on 08/29.  Protocols state to send encounter if scheduled past two weeks.    Please review    Action Taken: Message routed to:  [facility] ([facility] Endo    Travel Screening: Not Applicable

## 2022-04-29 NOTE — TELEPHONE ENCOUNTER
Endocrine triage  Chart review shows she is already our patient having seen Dr Eder Brooke.  Is there a good reason why she wasn't scheduled as a follow up with Dr Mast.?  Please reschedule her as follow up with Dr Mast.  thanks  Gissel Howard MD

## 2022-05-04 NOTE — TELEPHONE ENCOUNTER
[facility] Nurse Calling from [facility] to check on the status of Patient being seen sooner on Behalf of the referring provider . Urgent need due to Thyroid Nodule. Patient was last seen in 2019 I believe that may be why it was scheduled as new with new provider per Protocols. Please follow for sooner appointment. . Referring Clinic would like to be point of contact for this Please call them  [phone] back option# 4 for Nurse Line. Thank you

## 2022-05-09 NOTE — TELEPHONE ENCOUNTER
Jag Garcia, Seda Martinez 5 days ago     HK    Would you be able to contact this pt and offer her the Return In Person visit on 6/17/22 @ 11:50, with Dr. Mast?  I have a hold on the appt time.

## 2022-05-16 ENCOUNTER — HEALTH MAINTENANCE LETTER (OUTPATIENT)
Age: 37
End: 2022-05-16

## 2022-06-17 ENCOUNTER — OFFICE VISIT (OUTPATIENT)
Dept: ENDOCRINOLOGY | Facility: CLINIC | Age: 37
End: 2022-06-17
Attending: REGISTERED NURSE
Payer: COMMERCIAL

## 2022-06-17 VITALS
TEMPERATURE: 98.1 F | DIASTOLIC BLOOD PRESSURE: 89 MMHG | SYSTOLIC BLOOD PRESSURE: 141 MMHG | WEIGHT: 125 LBS | RESPIRATION RATE: 18 BRPM | BODY MASS INDEX: 22.86 KG/M2 | OXYGEN SATURATION: 98 % | HEART RATE: 98 BPM

## 2022-06-17 DIAGNOSIS — E04.1 THYROID NODULE: ICD-10-CM

## 2022-06-17 PROCEDURE — 99205 OFFICE O/P NEW HI 60 MIN: CPT | Performed by: INTERNAL MEDICINE

## 2022-06-17 ASSESSMENT — PAIN SCALES - GENERAL: PAINLEVEL: NO PAIN (0)

## 2022-06-17 NOTE — PATIENT INSTRUCTIONS
Please call for ultrasound guided biopsy of the left thyroid nodule at your convenience    If you have any questions, please do not hesitate to call clinic line at [phone] and ask for Endocrinology clinic.  If you need to fax, please fax to clinic fax number at [phone]    After clinic hours or weekends, please contact [phone] and ask for Endocrinologist-on call      Sincerely,    Humaira Mast MD  Endocrinology     THYROID BIOPSY: [phone]

## 2022-06-17 NOTE — PROGRESS NOTES
Endocrinology Note         Rubina is a 37 year old female presents today for left thyroid nodule    HPI  Rubina is a 37 year old female with history of partial right thyroidectomy in 2010 in [facility] presents today for who present here for evaluation of left thyroid nodule    Conversation is done via [facility] .    Patient did have history of gestational hyperthyroidism that improved and normalized during remaining pregnancy in 2017 and 2019.    She also had history of partial right thyroidectomy in 2010 in [place]. It was thought to be done due to thyroid gland swollen. Since surgery, she has not required medication.    She reported having neck pain radiating down to her right chest and right upper back since October 2021. It comes and goes. It usually occurred when she changed the position of her neck. She also reports hoarseness. She denied difficulty swallowing or breathing. Her main concern is tightness in her right chest and right upper back. She did have ultrasound thyroid in March 2022 ordered by PCP.    Ultrasound neck 3/2022 showed a 1.2 x 0.7 x 1.3 cm solid, hypoechoic with mild irregularity and punctate echogenic foci in the left thyroid lobe meets TIRAD criteria for biopsy.    TSH in 3/2022 was normal.    Past Medical History  Past Medical History:   Diagnosis Date     Anemia 2017     Conductive hearing loss 2015     Hearing problem 2015     Hyperthyroidism     Better since surgery     Tinnitus 2018   Hx of partial right  thyroidectomy in 2010 in [place]  Gestational hyperthyroidism in 2017 and 2019  Hx of miscarriages x2    Allergies  No Known Allergies     Medications  Current Outpatient Medications   Medication Sig Dispense Refill     acetaminophen (TYLENOL) 325 MG tablet Take 2 tablets (650 mg) by mouth every 6 hours as needed for mild pain Start after Delivery. (Patient not taking: Reported on 9/20/2019) 100 tablet 0     ascorbic acid (VITAMIN C) 250 MG CHEW chewable  tablet Take 1 tablet (250 mg) by mouth daily (Patient not taking: Reported on 9/20/2019) 90 tablet 3     ferrous sulfate (FE TABS) 325 (65 Fe) MG EC tablet Take 1 tablet (325 mg) by mouth daily 90 tablet 1     ibuprofen (ADVIL/MOTRIN) 600 MG tablet Take 1 tablet (600 mg) by mouth every 6 hours as needed for moderate pain Start after delivery (Patient not taking: Reported on 9/20/2019) 60 tablet 0     NIFEdipine ER OSMOTIC (ADALAT CC) 30 MG 24 hr tablet Take 1 tablet (30 mg) by mouth daily 60 tablet 0     norethindrone (MICRONOR) 0.35 MG tablet Take 1 tablet (0.35 mg) by mouth daily 90 tablet 6     Prenatal Vit-Fe Fumarate-FA (SM PRENATAL VITAMINS) 28-0.8 MG TABS Take 1 tablet by mouth daily 90 tablet 3     ranitidine (ZANTAC) 150 MG tablet Take 1 tablet (150 mg) by mouth 2 times daily (Patient not taking: Reported on 7/11/2019) 60 tablet 3     senna-docusate (SENOKOT-S/PERICOLACE) 8.6-50 MG tablet Take 1 tablet by mouth daily Start after delivery. (Patient not taking: Reported on 9/20/2019) 100 tablet 0     Family History  family history includes Hypertension in her father.   No thyroid disease in the family    Social History  Social History     Tobacco Use     Smoking status: Never Smoker     Smokeless tobacco: Never Used   Substance Use Topics     Alcohol use: No     Alcohol/week: 0.0 standard drinks     Drug use: No   stay at home mother  No smoking, no EtOH, no illicit drug    ROS  Constitutional: no weight change, good energy  Eyes: no vision change, diplopia or red eyes   Neck: no difficulty swallowing, no choking, no neck enlargement + neck pain radiating down to right chest  Cardiovascular: no chest pain, palpitations  Respiratory: no dyspnea, cough, shortness of breath or wheezing   GI: some nausea, no vomiting, diarrhea or constipation, no abdominal pain   : no change in urine, no dysuria or hematuria  Musculoskeletal: no joint or muscle pain or swelling   Integumentary: no concerning lesion  Neuro: no  loss of strength or sensation, no numbness or tingling, no tremor, no dizziness, no headache  Endo: no polyuria or polydipsia, no temperature intolerance   Heme/Lymph: no concerning bumps, no bleeding problems   Allergy: no environmental allergies   Psych: no depression or anxiety, no sleep problems.    Physical Exam  BP (!) 141/89   Pulse 98   Temp 98.1  F (36.7  C)   Resp 18   Wt 56.7 kg (125 lb)   SpO2 98%   BMI 22.86 kg/m    Body mass index is 22.86 kg/m .  Constitutional: no distress, comfortable, pleasant   Eyes: anicteric, normal extra-ocular movements, no lid lag or retraction  Neck: generous left thyroid enlargement, could not appreciate discrete nodule   Cardiovascular: regular rate and rhythm, normal S1 and S2, no murmurs  Respiratory: clear to auscultation, no wheezes or crackles, normal breath sounds   Gastrointestinal:  nontender, no hepatomegaly, no masses   Musculoskeletal: no edema   Skin: no jaundice   Neurological: cranial nerves intact, 2+ reflexes at patella, normal gait, no tremor on outstretched hands bilaterally  Psychological: appropriate mood   Lymphatic: no cervical  lymphadenopathy.      RESULTS  I have personally reviewed labs and images.        US thyroid 329/2022  FINDINGS:  Thyroid parenchyma: Postsurgical changes of partial right thyroid lobe resection. The residual right thyroid lobe measures 3.7 x 1.1 x 1.3 cm. The left thyroid lobe measures 5.5 x 3.0 x 1.6 cm. The isthmus is  0.4 cm in width.     No nodules in the residual right thyroid lobe or isthmus.     Left lobe:    Nodule 1:    Size: 1.2 x 0.7 x 1.3 cm    Shape: Wider-than-tall (0 points)    Composition: Solid or almost completely solid (2 points)    Echogenicity: Hypoechoic (2 points)    Margins: Irregular (2 points)    Internal Echogenic Foci: Punctate echogenic foci (3 points)    TIRADS category: 5                                                                    IMPRESSION:   1. TR5 nodule measuring 1.2 cm in the  left thyroid lobe, suspicious and meets TI-RADS criteria for biopsy.  2. Partial resection of the right thyroid lobe without suspicious findings in the resection bed or residual right thyroid tissue.     ASSESSMENT:    Rubina is a 37 year old female with history of partial right thyroidectomy in 2010 in [facility] presents today for who present here for evaluation of left thyroid nodule    1) left thyroid nodule: found from ultrasound thyroid in March 2022 after she complained of neck pain that radiating down to right chest and back. On exam, she denied tenderness at the anterior neck or thyroid area. I am not sure that her left thyroid nodule is causing neck pain and chest pain she described. I recommend her to discuss chest pain and back pain with her PCP.  Regarding her left thyroid nodule, I reviewed ultrasound and recommend to pursue with FNA given hypoechoic with mild irregularly. Patient would like to get FNA in September this year due to planned travel in the summer.    PLAN:   - recommend to pursue with FNA left thyroid nodule  - recommend her to discuss with PCP re: right sided chest pain and back pain.    External notes/medical records independently reviewed, labs and imaging independently reviewed, medical management and tests to be discussed/communicated to patient.    Time: I spent 77 minutes spent on the date of the encounter preparing to see patient (including chart review and preparation), obtaining and or reviewing additional medical history, performing a physical exam and evaluation, documenting clinical information in the electronic health record, independently interpreting results, communicating results to the patient and coordinating care.

## 2022-06-17 NOTE — LETTER
6/17/2022       RE: Rubina Desouza  [address]  [facility] [address]     Dear Colleague,    Thank you for referring your patient, Rubina eDsouza, to the [facility] [place] at [facility]. Please see a copy of my visit note below.         Endocrinology Note         Rubina is a 37 year old female presents today for left thyroid nodule    HPI  Rubina is a 37 year old female with history of partial right thyroidectomy in 2010 in [facility] presents today for who present here for evaluation of left thyroid nodule    Conversation is done via [email or web address] .    Patient did have history of gestational hyperthyroidism that improved and normalized during remaining pregnancy in 2017 and 2019.    She also had history of partial right thyroidectomy in 2010 in [place]. It was thought to be done due to thyroid gland swollen. Since surgery, she has not required medication.    She reported having neck pain radiating down to her right chest and right upper back since October 2021. It comes and goes. It usually occurred when she changed the position of her neck. She also reports hoarseness. She denied difficulty swallowing or breathing. Her main concern is tightness in her right chest and right upper back. She did have ultrasound thyroid in March 2022 ordered by PCP.    Ultrasound neck 3/2022 showed a 1.2 x 0.7 x 1.3 cm solid, hypoechoic with mild irregularity and punctate echogenic foci in the left thyroid lobe meets TIRAD criteria for biopsy.    TSH in 3/2022 was normal.    Past Medical History  Past Medical History:   Diagnosis Date     Anemia 2017     Conductive hearing loss 2015     Hearing problem 2015     Hyperthyroidism     Better since surgery     Tinnitus 2018   Hx of partial right  thyroidectomy in 2010 in [place]  Gestational hyperthyroidism in 2017 and 2019  Hx of miscarriages x2    Allergies  No Known Allergies      Medications  Current Outpatient Medications   Medication Sig Dispense Refill     acetaminophen (TYLENOL) 325 MG tablet Take 2 tablets (650 mg) by mouth every 6 hours as needed for mild pain Start after Delivery. (Patient not taking: Reported on 9/20/2019) 100 tablet 0     ascorbic acid (VITAMIN C) 250 MG CHEW chewable tablet Take 1 tablet (250 mg) by mouth daily (Patient not taking: Reported on 9/20/2019) 90 tablet 3     ferrous sulfate (FE TABS) 325 (65 Fe) MG EC tablet Take 1 tablet (325 mg) by mouth daily 90 tablet 1     ibuprofen (ADVIL/MOTRIN) 600 MG tablet Take 1 tablet (600 mg) by mouth every 6 hours as needed for moderate pain Start after delivery (Patient not taking: Reported on 9/20/2019) 60 tablet 0     NIFEdipine ER OSMOTIC (ADALAT CC) 30 MG 24 hr tablet Take 1 tablet (30 mg) by mouth daily 60 tablet 0     norethindrone (MICRONOR) 0.35 MG tablet Take 1 tablet (0.35 mg) by mouth daily 90 tablet 6     Prenatal Vit-Fe Fumarate-FA (SM PRENATAL VITAMINS) 28-0.8 MG TABS Take 1 tablet by mouth daily 90 tablet 3     ranitidine (ZANTAC) 150 MG tablet Take 1 tablet (150 mg) by mouth 2 times daily (Patient not taking: Reported on 7/11/2019) 60 tablet 3     senna-docusate (SENOKOT-S/PERICOLACE) 8.6-50 MG tablet Take 1 tablet by mouth daily Start after delivery. (Patient not taking: Reported on 9/20/2019) 100 tablet 0     Family History  family history includes Hypertension in her father.   No thyroid disease in the family    Social History  Social History     Tobacco Use     Smoking status: Never Smoker     Smokeless tobacco: Never Used   Substance Use Topics     Alcohol use: No     Alcohol/week: 0.0 standard drinks     Drug use: No   stay at home mother  No smoking, no EtOH, no illicit drug    ROS  Constitutional: no weight change, good energy  Eyes: no vision change, diplopia or red eyes   Neck: no difficulty swallowing, no choking, no neck enlargement + neck pain radiating down to right chest  Cardiovascular:  no chest pain, palpitations  Respiratory: no dyspnea, cough, shortness of breath or wheezing   GI: some nausea, no vomiting, diarrhea or constipation, no abdominal pain   : no change in urine, no dysuria or hematuria  Musculoskeletal: no joint or muscle pain or swelling   Integumentary: no concerning lesion  Neuro: no loss of strength or sensation, no numbness or tingling, no tremor, no dizziness, no headache  Endo: no polyuria or polydipsia, no temperature intolerance   Heme/Lymph: no concerning bumps, no bleeding problems   Allergy: no environmental allergies   Psych: no depression or anxiety, no sleep problems.    Physical Exam  BP (!) 141/89   Pulse 98   Temp 98.1  F (36.7  C)   Resp 18   Wt 56.7 kg (125 lb)   SpO2 98%   BMI 22.86 kg/m    Body mass index is 22.86 kg/m .  Constitutional: no distress, comfortable, pleasant   Eyes: anicteric, normal extra-ocular movements, no lid lag or retraction  Neck: generous left thyroid enlargement, could not appreciate discrete nodule   Cardiovascular: regular rate and rhythm, normal S1 and S2, no murmurs  Respiratory: clear to auscultation, no wheezes or crackles, normal breath sounds   Gastrointestinal:  nontender, no hepatomegaly, no masses   Musculoskeletal: no edema   Skin: no jaundice   Neurological: cranial nerves intact, 2+ reflexes at patella, normal gait, no tremor on outstretched hands bilaterally  Psychological: appropriate mood   Lymphatic: no cervical  lymphadenopathy.      RESULTS  I have personally reviewed labs and images.        US thyroid 329/2022  FINDINGS:  Thyroid parenchyma: Postsurgical changes of partial right thyroid lobe resection. The residual right thyroid lobe measures 3.7 x 1.1 x 1.3 cm. The left thyroid lobe measures 5.5 x 3.0 x 1.6 cm. The isthmus is  0.4 cm in width.     No nodules in the residual right thyroid lobe or isthmus.     Left lobe:    Nodule 1:    Size: 1.2 x 0.7 x 1.3 cm    Shape: Wider-than-tall (0 points)     Composition: Solid or almost completely solid (2 points)    Echogenicity: Hypoechoic (2 points)    Margins: Irregular (2 points)    Internal Echogenic Foci: Punctate echogenic foci (3 points)    TIRADS category: 5                                                                    IMPRESSION:   1. TR5 nodule measuring 1.2 cm in the left thyroid lobe, suspicious and meets TI-RADS criteria for biopsy.  2. Partial resection of the right thyroid lobe without suspicious findings in the resection bed or residual right thyroid tissue.     ASSESSMENT:    Rubina is a 37 year old female with history of partial right thyroidectomy in 2010 in [facility] presents today for who present here for evaluation of left thyroid nodule    1) left thyroid nodule: found from ultrasound thyroid in March 2022 after she complained of neck pain that radiating down to right chest and back. On exam, she denied tenderness at the anterior neck or thyroid area. I am not sure that her left thyroid nodule is causing neck pain and chest pain she described. I recommend her to discuss chest pain and back pain with her PCP.  Regarding her left thyroid nodule, I reviewed ultrasound and recommend to pursue with FNA given hypoechoic with mild irregularly. Patient would like to get FNA in September this year due to planned travel in the summer.    PLAN:   - recommend to pursue with FNA left thyroid nodule  - recommend her to discuss with PCP re: right sided chest pain and back pain.    External notes/medical records independently reviewed, labs and imaging independently reviewed, medical management and tests to be discussed/communicated to patient.    Time: I spent 77 minutes spent on the date of the encounter preparing to see patient (including chart review and preparation), obtaining and or reviewing additional medical history, performing a physical exam and evaluation, documenting clinical information in the electronic health record, independently  interpreting results, communicating results to the patient and coordinating care.    Sincerely,    Humaira Mast MD

## 2022-07-13 DIAGNOSIS — N26.1 ATROPHY OF LEFT KIDNEY: Primary | ICD-10-CM

## 2022-07-14 ENCOUNTER — TELEPHONE (OUTPATIENT)
Dept: NEPHROLOGY | Facility: CLINIC | Age: 37
End: 2022-07-14
Payer: COMMERCIAL

## 2022-07-14 DIAGNOSIS — Z91.199 NO-SHOW FOR APPOINTMENT: Primary | ICD-10-CM

## 2022-07-14 NOTE — TELEPHONE ENCOUNTER
Chief Complaint   Patient presents with     Appointment     No show to scheduled appointment     Attempted to call patient to see if she could come into clinic later per Dr. Mon's request. However, the number dialed on file went straight to voicemail and there was no room to leave message, tried to call 3 times.

## 2022-09-11 ENCOUNTER — HEALTH MAINTENANCE LETTER (OUTPATIENT)
Age: 37
End: 2022-09-11

## 2022-12-13 ENCOUNTER — ANCILLARY PROCEDURE (OUTPATIENT)
Dept: ULTRASOUND IMAGING | Facility: CLINIC | Age: 37
End: 2022-12-13
Attending: INTERNAL MEDICINE
Payer: COMMERCIAL

## 2022-12-13 DIAGNOSIS — E04.1 THYROID NODULE: ICD-10-CM

## 2022-12-13 LAB
PATH REPORT.COMMENTS IMP SPEC: ABNORMAL
PATH REPORT.COMMENTS IMP SPEC: ABNORMAL
PATH REPORT.COMMENTS IMP SPEC: YES
PATH REPORT.FINAL DX SPEC: ABNORMAL
PATH REPORT.GROSS SPEC: ABNORMAL
PATH REPORT.MICROSCOPIC SPEC OTHER STN: ABNORMAL
PATH REPORT.RELEVANT HX SPEC: ABNORMAL

## 2022-12-13 PROCEDURE — 88172 CYTP DX EVAL FNA 1ST EA SITE: CPT | Mod: 26 | Performed by: PATHOLOGY

## 2022-12-13 PROCEDURE — 88172 CYTP DX EVAL FNA 1ST EA SITE: CPT | Mod: TC | Performed by: INTERNAL MEDICINE

## 2022-12-13 PROCEDURE — 88173 CYTOPATH EVAL FNA REPORT: CPT | Mod: 26 | Performed by: PATHOLOGY

## 2022-12-13 PROCEDURE — 10005 FNA BX W/US GDN 1ST LES: CPT | Performed by: RADIOLOGY

## 2022-12-13 RX ORDER — LIDOCAINE HYDROCHLORIDE 10 MG/ML
5 INJECTION, SOLUTION EPIDURAL; INFILTRATION; INTRACAUDAL; PERINEURAL ONCE
Status: COMPLETED | OUTPATIENT
Start: 2022-12-13 | End: 2022-12-13

## 2022-12-13 RX ADMIN — LIDOCAINE HYDROCHLORIDE 2 ML: 10 INJECTION, SOLUTION EPIDURAL; INFILTRATION; INTRACAUDAL; PERINEURAL at 10:07

## 2022-12-13 NOTE — DISCHARGE INSTRUCTIONS
Directions for after your Thyroid Fine Needle Aspiration:    You can remove your bandage within a few hours.  The site of the biopsy may be sore for a day or two after the procedure. You can take over-the-counter pain medicine if needed.  Notify your doctor if you have any of the following:  Fever above 101 degrees  Swelling in the area of the biopsy  Redness or leaking from the biopsy site  Your doctor will notify you of the results in 2-3 days.   
[id number] Comprehensive

## 2022-12-14 ENCOUNTER — TELEPHONE (OUTPATIENT)
Dept: ENDOCRINOLOGY | Facility: CLINIC | Age: 37
End: 2022-12-14

## 2022-12-14 NOTE — TELEPHONE ENCOUNTER
----- Message from Humaira Mast MD sent at 12/14/2022  8:44 AM CST -----  Regarding: thyroid biopsy result  Hello team,    Could you please let this pt know via ? Her thyroid biopsy result showed some atypical cell that they cannot tell whether it is benign or cancerous. I recommend to repeat biopsy in 2 months.   If she has any question, I can call when I return.    Thanks,  Humaira

## 2022-12-14 NOTE — TELEPHONE ENCOUNTER
She tells me the biopsy was painful and she would want to wait  6 months before repeating.  She tells me  the  results could be the same in 2 or  6 months without knowing and she would not be OK with that.  She gives permission  to  do further testing on this sample to determine what it is. Would affirma testing be appropriate ? Yolanda Lobato RN on 12/14/2022 at 9:41 AM

## 2022-12-15 NOTE — TELEPHONE ENCOUNTER
Let send for Afirma. Can you please let the cyto lab know?     Thanks,   Tasma    You need to enter the Afirma order for Cytology to send out .Yolanda Lobato RN on 12/15/2022 at 12:37 PM

## 2022-12-16 DIAGNOSIS — E04.1 THYROID NODULE: Primary | ICD-10-CM

## 2022-12-28 LAB — SCANNED LAB RESULT: NORMAL

## 2022-12-30 ENCOUNTER — TELEPHONE (OUTPATIENT)
Dept: ENDOCRINOLOGY | Facility: CLINIC | Age: 37
End: 2022-12-30

## 2022-12-30 NOTE — TELEPHONE ENCOUNTER
calling requesting call back wife asked that he speak with doctor for more clarification and how to move forward.     Thank you .

## 2022-12-30 NOTE — TELEPHONE ENCOUNTER
Called patient re: FNA and Afirma result.    FNA of the left sided nodule showed AUS  Afirma showed suspicious for malignancy 50%.   BRAF was negative        Recommend surgical referral for left partial thyroidectomy  Patient would like to think about it and get back to me    Humaira Mast MD, MS  Division of Diabetes and Endocrinology  Department of Medicine

## 2023-01-03 ENCOUNTER — TELEPHONE (OUTPATIENT)
Dept: ENDOCRINOLOGY | Facility: CLINIC | Age: 38
End: 2023-01-03

## 2023-01-03 NOTE — TELEPHONE ENCOUNTER
Discussed the FNA and Afirma result with patient's  at patient's request.    Recommend lobectomy    He requests to send the result. Will send the result by mail.  He will discuss with the patient and get back to us    Humaira Mast MD, MS  Division of Diabetes and Endocrinology  Department of Medicine

## 2023-06-03 ENCOUNTER — HEALTH MAINTENANCE LETTER (OUTPATIENT)
Age: 38
End: 2023-06-03

## 2023-11-07 ENCOUNTER — LAB REQUISITION (OUTPATIENT)
Dept: LAB | Facility: CLINIC | Age: 38
End: 2023-11-07
Payer: COMMERCIAL

## 2023-11-07 DIAGNOSIS — Z00.00 ENCOUNTER FOR GENERAL ADULT MEDICAL EXAMINATION WITHOUT ABNORMAL FINDINGS: ICD-10-CM

## 2023-11-07 LAB
CHOLEST SERPL-MCNC: 217 MG/DL
CREAT UR-MCNC: 28.9 MG/DL
HDLC SERPL-MCNC: 49 MG/DL
LDLC SERPL CALC-MCNC: 137 MG/DL
MICROALBUMIN UR-MCNC: 247 MG/L
MICROALBUMIN/CREAT UR: 854.67 MG/G CR (ref 0–25)
NONHDLC SERPL-MCNC: 168 MG/DL
TRIGL SERPL-MCNC: 156 MG/DL

## 2023-11-07 PROCEDURE — 86704 HEP B CORE ANTIBODY TOTAL: CPT | Mod: ORL | Performed by: INTERNAL MEDICINE

## 2023-11-07 PROCEDURE — 82570 ASSAY OF URINE CREATININE: CPT | Mod: ORL | Performed by: PEDIATRICS

## 2023-11-07 PROCEDURE — 86803 HEPATITIS C AB TEST: CPT | Mod: ORL | Performed by: INTERNAL MEDICINE

## 2023-11-07 PROCEDURE — 86706 HEP B SURFACE ANTIBODY: CPT | Mod: ORL | Performed by: INTERNAL MEDICINE

## 2023-11-07 PROCEDURE — 80061 LIPID PANEL: CPT | Mod: ORL | Performed by: INTERNAL MEDICINE

## 2023-11-07 PROCEDURE — 87340 HEPATITIS B SURFACE AG IA: CPT | Mod: ORL | Performed by: INTERNAL MEDICINE

## 2023-11-08 ENCOUNTER — LAB REQUISITION (OUTPATIENT)
Dept: LAB | Facility: CLINIC | Age: 38
End: 2023-11-08
Payer: COMMERCIAL

## 2023-11-08 DIAGNOSIS — N18.32 CHRONIC KIDNEY DISEASE, STAGE 3B (H): ICD-10-CM

## 2023-11-08 LAB
HBV CORE AB SERPL QL IA: NONREACTIVE
HBV SURFACE AB SERPL IA-ACNC: 567.75 M[IU]/ML
HBV SURFACE AB SERPL IA-ACNC: REACTIVE M[IU]/ML
HBV SURFACE AG SERPL QL IA: NONREACTIVE
HCV AB SERPL QL IA: NONREACTIVE

## 2023-11-10 ENCOUNTER — TRANSCRIBE ORDERS (OUTPATIENT)
Dept: OTHER | Age: 38
End: 2023-11-10

## 2023-11-10 DIAGNOSIS — N18.32 STAGE 3B CHRONIC KIDNEY DISEASE (H): Primary | ICD-10-CM

## 2023-11-13 ENCOUNTER — ANCILLARY PROCEDURE (OUTPATIENT)
Dept: ULTRASOUND IMAGING | Facility: CLINIC | Age: 38
End: 2023-11-13
Attending: PEDIATRICS
Payer: COMMERCIAL

## 2023-11-13 DIAGNOSIS — N18.32 STAGE 3B CHRONIC KIDNEY DISEASE (CKD) (H): ICD-10-CM

## 2023-11-13 PROCEDURE — 76770 US EXAM ABDO BACK WALL COMP: CPT | Mod: GC | Performed by: RADIOLOGY

## 2023-11-14 NOTE — CONFIDENTIAL NOTE
DIAGNOSIS:: Stage 3b chronic kidney disease     DATE RECEIVED: 01.17.2024   NOTES STATUS DETAILS   OFFICE NOTE from referring provider Care Everywhere 11.07.2023 Crys Hobbs MD  [facility]    OFFICE NOTE from other specialist  Care Everywhere 10.25.2021 Julia Hernandes, APRN,CNP [facility]    *Only VASCULITIS or LUPUS gather office notes for the following     *PULMONARY       *ENT     *DERMATOLOGY     *RHEUMATOLOGY     DISCHARGE SUMMARY from hospital     DISCHARGE REPORT from the ER     MEDICATION LIST     IMAGING  (NEED IMAGES AND REPORTS)     KIDNEY CT SCAN     KIDNEY ULTRASOUND     MR ABDOMEN     NUCLEAR MEDICINE RENAL     LABS     CBC Internal 10.25.2021   CMP Internal 10.25.2021   BMP Care Everywhere 11.07.2023   UA Care Everywhere 04.22.2022   URINE PROTEIN Care Everywhere 02.22.2022   RENAL PANEL Care Everywhere 04.22.2022   BIOPSY     KIDNEY BIOPSY

## 2023-12-21 NOTE — TELEPHONE ENCOUNTER
[facility]    Discharge Summary    Oliver Bal Patient Status:  Inpatient    1975 MRN [id number]   Location [facility] Attending Bertram Navas MD   Hosp Day # 2 PCP Rashaad Storey MD     Date of Admission: 2023 Disposition: Home or Self Care     Date of Discharge: 2023    Admitting Diagnosis: Abdominal pain, acute [R10.9]  Acute pancreatitis, unspecified complication status, unspecified pancreatitis type [facility] Discharge Diagnoses:   Alcoholic pancreatitis      Lace+ Score: 56  59-90 High Risk  29-58 Medium Risk  0-28   Low Risk. TCM Follow-Up Recommendation:  LACE 29-58: Moderate Risk of readmission after discharge from the hospital.      Problem List:   Patient Active Problem List   Diagnosis    Hypokalemia    Duodenitis    Gastric mass    Gastric outlet obstruction    Prolonged QT interval    Pneumoperitoneum    CARLOS A (acute kidney injury) (HCC)    Hyponatremia    Metabolic alkalosis    Lightheaded    Weakness generalized    Traumatic injury of head, initial encounter    Hypotension, unspecified hypotension type    Malfunction of jejunostomy tube ([address]       Reason for Admission: Abdominal pain    Physical Exam:   General appearance: alert, appears stated age and cooperative  Pulmonary:  clear to auscultation bilaterally  Cardiovascular: S1, S2 normal, no murmur, click, rub or gallop, regular rate and rhythm  Abdominal: soft, non-tender; bowel sounds normal; no masses,  no organomegaly  Extremities: extremities normal, atraumatic, no cyanosis or edema  Psychiatric: calm      History of Present Illness: The patient is a 50-year-old  male with chronic history of alcohol abuse. Started drinking 4 to 5 days ago and started developing midabdominal pain radiating to back associated with nausea, vomiting, poor oral intake for last 2 to 3 days. Came in today to the emergency department for evaluation.   CBC showed white blood cell count of 18.0 Spoke with Rubina via   Services and informed her of need for appointment with internal medicine doctor about one week after her next appointment with Diabetes Education. She agreed with plan and was scheduled for Wednesday, June 12 at 1040. Location information given.   with left shift, platelet count 729. Chemistry, calcium level, liver function tests were unremarkable. Alcohol level was negative. His last drink was yesterday. Lipase 368. CT scan of the abdomen showed findings compatible with acute pancreatitis within the pancreaticoduodenal groove and pancreatic head. Scattered pancreatic calcifications suggestive of chronic pancreatitis. No organized measurable fluid collection, pseudocyst.  No evidence of pancreatic infarct or hemorrhage. Nonspecific common bile duct dilation without significant intrahepatic biliary ductal dilatation. Patient was started on IV fluids, and he will be admitted to the hospital for further management. Magnesium level was added. Hospital Course: Acute Pancreatitis   - likely due to alcohol abuse  - start CLD today tolerating well and advance to regular diet  - cont PPI  - GI on consult recs appreciated and cleared for discharge     Etoh abuse  -Recommended AA  -Recommended ceasing all alcohol consumption                  Hypokalemia  - replace per protocol     Protein calorie malnutrition  - dietician eval        Consultations: Gastroenterology    Procedures: None    Complications: None    Discharge Condition: Good    Discharge Medications:      Discharge Medications        START taking these medications        Instructions Prescription details   acetaminophen 500 MG Tabs  Commonly known as: Tylenol Extra Strength      Take 1 tablet (500 mg total) by mouth every 4 (four) hours as needed for Fever (equal to or greater than 100.4). Refills: 0            CONTINUE taking these medications        Instructions Prescription details   traMADol 50 MG Tabs  Commonly known as: Ultram      Take 1 tablet (50 mg total) by mouth every 6 (six) hours as needed for Pain.    Quantity: 30 tablet  Refills: 0               Where to Get Your Medications        These medications were sent to [address] [facility] [phone], [address]      Phone: [phone]   traMADol 50 MG Tabs         Follow up Visits:  Follow-up with  in 1 week    Follow up Labs: None    Other Discharge Instructions: Recommend attending Alcoholics Anonymous meetings    Dave Meléndez MD  12/21/2023  5:07 PM    > 35 min

## 2024-01-11 ENCOUNTER — TELEPHONE (OUTPATIENT)
Dept: NEPHROLOGY | Facility: CLINIC | Age: 39
End: 2024-01-11
Payer: COMMERCIAL

## 2024-01-11 NOTE — TELEPHONE ENCOUNTER
Appointment reminder.  Called and spoke with pt for a scheduled appt on 1/17 at 1:00 pm in person with non-fasting labs at 12:00 pm. Pt in agreement, via [place]  services.  Emy Lyle,  Nephrology Clinic Navigator  [facility]  Direct: [phone].

## 2024-01-15 DIAGNOSIS — N26.1 ATROPHY OF LEFT KIDNEY: Primary | ICD-10-CM

## 2024-01-17 ENCOUNTER — PRE VISIT (OUTPATIENT)
Dept: NEPHROLOGY | Facility: CLINIC | Age: 39
End: 2024-01-17

## 2024-01-17 ENCOUNTER — LAB (OUTPATIENT)
Dept: LAB | Facility: CLINIC | Age: 39
End: 2024-01-17
Payer: COMMERCIAL

## 2024-01-17 ENCOUNTER — OFFICE VISIT (OUTPATIENT)
Dept: NEPHROLOGY | Facility: CLINIC | Age: 39
End: 2024-01-17
Attending: PEDIATRICS
Payer: COMMERCIAL

## 2024-01-17 VITALS
BODY MASS INDEX: 22.5 KG/M2 | WEIGHT: 123 LBS | DIASTOLIC BLOOD PRESSURE: 104 MMHG | HEART RATE: 79 BPM | OXYGEN SATURATION: 100 % | TEMPERATURE: 97.8 F | SYSTOLIC BLOOD PRESSURE: 168 MMHG

## 2024-01-17 DIAGNOSIS — N26.1 ATROPHY OF LEFT KIDNEY: ICD-10-CM

## 2024-01-17 DIAGNOSIS — N18.32 STAGE 3B CHRONIC KIDNEY DISEASE (H): ICD-10-CM

## 2024-01-17 DIAGNOSIS — R80.9 PROTEINURIA, UNSPECIFIED TYPE: Primary | ICD-10-CM

## 2024-01-17 LAB
ALBUMIN MFR UR ELPH: 31.8 MG/DL
ALBUMIN SERPL BCG-MCNC: 3.8 G/DL (ref 3.5–5.2)
ALBUMIN UR-MCNC: 20 MG/DL
ANION GAP SERPL CALCULATED.3IONS-SCNC: 8 MMOL/L (ref 7–15)
APPEARANCE UR: CLEAR
BACTERIA #/AREA URNS HPF: ABNORMAL /HPF
BILIRUB UR QL STRIP: NEGATIVE
BUN SERPL-MCNC: 11.7 MG/DL (ref 6–20)
CALCIUM SERPL-MCNC: 9.3 MG/DL (ref 8.6–10)
CHLORIDE SERPL-SCNC: 103 MMOL/L (ref 98–107)
COLOR UR AUTO: ABNORMAL
CREAT SERPL-MCNC: 0.91 MG/DL (ref 0.51–0.95)
CREAT UR-MCNC: 11.4 MG/DL
DEPRECATED HCO3 PLAS-SCNC: 28 MMOL/L (ref 22–29)
EGFRCR SERPLBLD CKD-EPI 2021: 83 ML/MIN/1.73M2
ERYTHROCYTE [DISTWIDTH] IN BLOOD BY AUTOMATED COUNT: 12.8 % (ref 10–15)
GLUCOSE SERPL-MCNC: 98 MG/DL (ref 70–99)
GLUCOSE UR STRIP-MCNC: NEGATIVE MG/DL
HCT VFR BLD AUTO: 36 % (ref 35–47)
HGB BLD-MCNC: 12 G/DL (ref 11.7–15.7)
HGB UR QL STRIP: NEGATIVE
KETONES UR STRIP-MCNC: NEGATIVE MG/DL
LEUKOCYTE ESTERASE UR QL STRIP: NEGATIVE
MCH RBC QN AUTO: 29.3 PG (ref 26.5–33)
MCHC RBC AUTO-ENTMCNC: 33.3 G/DL (ref 31.5–36.5)
MCV RBC AUTO: 88 FL (ref 78–100)
NITRATE UR QL: NEGATIVE
PH UR STRIP: 6.5 [PH] (ref 5–7)
PHOSPHATE SERPL-MCNC: 3.2 MG/DL (ref 2.5–4.5)
PLATELET # BLD AUTO: 195 10E3/UL (ref 150–450)
POTASSIUM SERPL-SCNC: 4.2 MMOL/L (ref 3.4–5.3)
PROT/CREAT 24H UR: 2.79 MG/MG CR (ref 0–0.2)
PTH-INTACT SERPL-MCNC: 67 PG/ML (ref 15–65)
RBC # BLD AUTO: 4.09 10E6/UL (ref 3.8–5.2)
RBC URINE: <1 /HPF
SODIUM SERPL-SCNC: 139 MMOL/L (ref 135–145)
SP GR UR STRIP: 1 (ref 1–1.03)
SQUAMOUS EPITHELIAL: 1 /HPF
UROBILINOGEN UR STRIP-MCNC: NORMAL MG/DL
WBC # BLD AUTO: 6.9 10E3/UL (ref 4–11)
WBC URINE: <1 /HPF

## 2024-01-17 PROCEDURE — 80069 RENAL FUNCTION PANEL: CPT | Performed by: PATHOLOGY

## 2024-01-17 PROCEDURE — G0463 HOSPITAL OUTPT CLINIC VISIT: HCPCS | Performed by: STUDENT IN AN ORGANIZED HEALTH CARE EDUCATION/TRAINING PROGRAM

## 2024-01-17 PROCEDURE — 82306 VITAMIN D 25 HYDROXY: CPT | Performed by: STUDENT IN AN ORGANIZED HEALTH CARE EDUCATION/TRAINING PROGRAM

## 2024-01-17 PROCEDURE — 99000 SPECIMEN HANDLING OFFICE-LAB: CPT | Performed by: PATHOLOGY

## 2024-01-17 PROCEDURE — 83970 ASSAY OF PARATHORMONE: CPT | Performed by: PATHOLOGY

## 2024-01-17 PROCEDURE — 99417 PROLNG OP E/M EACH 15 MIN: CPT | Performed by: STUDENT IN AN ORGANIZED HEALTH CARE EDUCATION/TRAINING PROGRAM

## 2024-01-17 PROCEDURE — 99205 OFFICE O/P NEW HI 60 MIN: CPT | Performed by: STUDENT IN AN ORGANIZED HEALTH CARE EDUCATION/TRAINING PROGRAM

## 2024-01-17 PROCEDURE — 85027 COMPLETE CBC AUTOMATED: CPT | Performed by: PATHOLOGY

## 2024-01-17 PROCEDURE — 81001 URINALYSIS AUTO W/SCOPE: CPT | Performed by: PATHOLOGY

## 2024-01-17 PROCEDURE — 84156 ASSAY OF PROTEIN URINE: CPT | Performed by: PATHOLOGY

## 2024-01-17 PROCEDURE — 82043 UR ALBUMIN QUANTITATIVE: CPT | Performed by: STUDENT IN AN ORGANIZED HEALTH CARE EDUCATION/TRAINING PROGRAM

## 2024-01-17 PROCEDURE — G2211 COMPLEX E/M VISIT ADD ON: HCPCS | Performed by: STUDENT IN AN ORGANIZED HEALTH CARE EDUCATION/TRAINING PROGRAM

## 2024-01-17 PROCEDURE — 36415 COLL VENOUS BLD VENIPUNCTURE: CPT | Performed by: PATHOLOGY

## 2024-01-17 RX ORDER — NIFEDIPINE 30 MG
30 TABLET, EXTENDED RELEASE ORAL 2 TIMES DAILY
Qty: 180 TABLET | Refills: 3 | Status: SHIPPED | OUTPATIENT
Start: 2024-01-17 | End: 2024-05-17

## 2024-01-17 ASSESSMENT — PAIN SCALES - GENERAL: PAINLEVEL: NO PAIN (0)

## 2024-01-17 NOTE — LETTER
1/17/2024       RE: Rubina Desouza  [address]  [facility] [address]     Dear Colleague,    Thank you for referring your patient, Rubina Desouza, to the [facility]. Please see a copy of my visit note below.        Nephrology Clinic Visit  Rubina Desouza MRN: [id number] YOB: 1985  Primary Care Provider: Hali Tubbs  ----------------------------------------------------------------------------------------------------------------------  Visit 1/17/2024:  --BP Control: 158/84 (in office). Systolic Bps at home are in the 120's to 130's/80's-90's.   --Current Regimen: Nifedipine 30mg qDay  -DM Control: 5.6 (11/7/2023)  --Current Regimen: None  -Creatinine Trend: 0.91 (1/17/2024)  from 1.1 (12/19/2023) from 1.6 (11/7/2023) from 0.85 (4/22/2022)  -Nocturia: 0x  -Hematuria: Denies  -Nephrolithiasis: No  -NSAID Use: No, just tylenol  -Herbal/OTC Medication Use: No    -Family History of Kidney Disease: Denies, does have a history of high blood pressure.     -Other:  --JOEL 11/2023: She denies any major changes to her health at that time. She denies any new pain or taking pain medicine. She did have COVID in December but no URI in November that she recalls. She was noted to have some high blood pressure around that time. No gross hematuria, no dysuria, no frequency. No rash or skin changes. No leg swelling. Denies using NSAIDs. Denies orthostatic symptoms. Denies urinary retention symptoms.   -We discussed proteinuria and potential for transient proteinuria in setting of JOEL and ramifications of proteinuria on kidney health  -We discussed her US findings from 11/2023  -She is not currently using any birth control. We discussed Losartan and Jardiance for maximum kidney protection (and that neither of these agents are safe in pregnancy)  -In 2018 she had a miscarriage. She had abdominal pain. She had imaging  "that showed her atrophic kidney. No prior imaging.  -Appears there was some consideration for  TB in the past. Saw ID in the past. Did not feel she needed treatment for TB nor that  TB was the cause of her renal atrophy per chart review. Bladder biopsy culture 11/30/2018 w/o AFB. From ID note 11/30/2018: \"Single positive culture of PENNY from urine: At this time, seems most likely to be a contaminant as it is growing only a single urine culture with 2 negative cultures obtained at [facility] and 3 additional negative cultures (2 from urine, 1 from bladder biopsy) obtained in October 2018 in the [facility] system.\"  -History of Miscarriage x 3: 2017, 2018, 2021. Unclear underlying etology. Has not been diagnosed with aPL syndrome in the past. She denies any history of diagnosed blood clots.     Objective:  PAST MEDICAL HISTORY:  Past Medical History:   Diagnosis Date    Anemia 2017    Conductive hearing loss 2015    Hearing problem 2015    Hyperthyroidism     Better since surgery    Tinnitus 2018       PAST SURGICAL HISTORY:  Past Surgical History:   Procedure Laterality Date    CYSTOSCOPY, BIOPSY BLADDER, COMBINED N/A 10/26/2018    Procedure: Bladder Biopsy, Right Ureteroscopy with Possible Biopsy;  Surgeon: Viral Hampton MD;  Location: [facility]    CYSTOSCOPY, URETEROSCOPY, COMBINED  10/26/2018    Procedure: COMBINED CYSTOSCOPY, URETEROSCOPY;  Surgeon: Viral Hampton MD;  Location: [facility] OR    THYROID SURGERY  2010    in [facility]; partial thyroidectomy       MEDICATIONS:  Current Outpatient Medications   Medication Instructions    acetaminophen (TYLENOL) 650 mg, Oral, EVERY 6 HOURS PRN, Start after Delivery.    ascorbic acid (VITAMIN C) 250 mg, Oral, DAILY    ferrous sulfate (FE TABS) 325 mg, Oral, DAILY    ibuprofen (ADVIL/MOTRIN) 600 mg, Oral, EVERY 6 HOURS PRN, Start after delivery    NIFEdipine ER (ADALAT CC) 30 mg, Oral, DAILY    norethindrone (MICRONOR) 0.35 mg, Oral, DAILY    Prenatal Vit-Fe " Fumarate-FA (SM PRENATAL VITAMINS) 28-0.8 MG TABS 1 tablet, Oral, DAILY    ranitidine (ZANTAC) 150 mg, Oral, 2 TIMES DAILY    senna-docusate (SENOKOT-S/PERICOLACE) 8.6-50 MG tablet 1 tablet, Oral, DAILY, Start after delivery.       FAMILY MEDICAL HISTORY:   Family History   Problem Relation Age of Onset    Hypertension Father        PHYSICAL EXAM:   BP (!) 168/104 (BP Location: Right arm, Patient Position: Sitting, Cuff Size: Adult Regular)   Pulse 79   Temp 97.8  F (36.6  C) (Oral)   Wt 55.8 kg (123 lb)   SpO2 100%   BMI 22.50 kg/m    GENERAL APPEARANCE: alert and no distress  EYES: nonicteric  ABDOMEN: soft, nontender, normal bowel sounds, no HSM   Extremities: No LE edema  MS: no evidence of inflammation in joints, no muscle tenderness  SKIN: no rash  NEURO: mentation intact and speech normal  PSYCH: affect normal    LABS REVIEWED BY ME:   ANEMIA  Recent Labs   Lab Test 08/13/19 1945 08/12/19  0658 08/11/19 2054 08/11/19  0716 07/11/19  1151 05/02/19  1053 10/12/17  0000 09/08/17  0817   HGB 11.9 13.0 11.7 12.6   < > 10.9*   < >  --    IRONSAT  --   --   --   --   --  36  --  63*   SAMANTHA  --   --   --   --   --  33  --  32    < > = values in this interval not displayed.       BMP  Recent Labs   Lab Test 04/22/22  1544 10/25/21  1509 08/13/19 1945 08/12/19  1512 08/12/19  0658 08/11/19 2054 07/11/19  1151    137  --   --   --   --  137   POTASSIUM 3.5 3.8  --   --   --   --  3.5   CHLORIDE 107 106  --   --   --   --  110*   CO2 25 23  --   --   --   --  20   ANIONGAP 8 8  --   --   --   --  7   BUN 14 14  --   --   --   --  9   CR 0.85 0.82 0.90  --  0.84   < > 0.86   GFRESTIMATED >90 >90 83  --  >90   < > 89   MAG  --   --   --  7.3* 8.8*  --   --     < > = values in this interval not displayed.       CBC  Recent Labs   Lab Test 08/13/19  1945 08/12/19  0658 08/11/19 2054 08/11/19  0716   HGB 11.9 13.0 11.7 12.6   WBC 9.7 11.3* 8.5 7.7   HCT 35.7 37.7 35.7 37.2   MCV 95 93 96 92   * 136* 102*  "122*       DIABETES  Recent Labs   Lab Test 05/02/19  1053   A1C 6.2*       HYPONATREMIANo lab results found.    Invalid input(s): \"UOSM\", \"OSM\"    MBD  Recent Labs   Lab Test 04/22/22  1544 10/25/21  1509 07/11/19  1151   GERALD 9.3 8.8 8.8   ALBUMIN 3.5 3.0*  --    PHOS 2.9 3.4  --         URINE STUDIES  Recent Labs   Lab Test 10/28/19  0000 06/12/19  1227   COLOR Yellow Light Yellow   APPEARANCE Clear Clear   URINEGLC Neg Negative   URINEBILI Neg Negative   URINEKETONE Neg Negative   SG 1.005 1.004   UBLD Trace Negative   URINEPH 6.0 6.5   PROTEIN 100 Negative   UROBILINOGEN 0.2  --    NITRITE Neg Negative   LEUKEST Neg Trace*   RBCU  --  1   WBCU  --  1     Recent Labs   Lab Test 08/11/19  0725   UTPG 1.03*       ADDITIONAL LABS ORDERED/REVIEWED BY ME:  See below    Assessment/Plan  CKD Stage G2A3  Established care with [facility] 1/17/2024    Creatinine of 1.6 on 11/7/2023. Previous baseline looks to be 0.8-0.9. Improved back to baseline (0.91) at the time of establishing care with me.     UA 11/7/2023 w/o WBC's or RBC's. UA 1/17/2023 w/ no RBCs or WBCs  UACR 854mg/gr 11/7/2023  UPCR 2.79g/gr 1/17/2024. Was 1.03 8/11/2019 and 0.77g/gr 2/10/2021.    History of severe Pre-E with pregnancy in 2019.   Has had 3 spont abortions in the past (most recent 12/2021. \"Her OB history includes a term delivery in 2012, then an SAB followed by a term pregnancy, and again an SAB followed by a term pregnancy.\"     Saw urology in 2018 for bladder schistosomiasis, UDS w/o VUR, History of atrophic L kidney. Renal US 11/13/2023 w/ R kidney 12.1cm and L kidney 5.5cm. There was also mild R hydro present w/ retrograde pyelogram revealing area of proximal stricture was actually ureteral peristalsis and that her ureteral orifice was quite small and distal ureter quite narrow. She had a split function study done 12/2019 w/ essentially no function on the L (93% vs 6%)    Saw ID in 2018 with concern for PENNY and potential  TB. From ID " "note 11/30/2018: \"Single positive culture of PENNY from urine: At this time, seems most likely to be a contaminant as it is growing only a single urine culture with 2 negative cultures obtained at [facility] and 3 additional negative cultures (2 from urine, 1 from bladder biopsy) obtained in October 2018 in the [facility] system.\"    Of note:  -Hep B and Hep C negative 11/7/2023  -MARCELLA negative 8/3/2018  -HIV negative 11/16/2019  -No history of DVT or arterial thrombosis per her report.     CKD Etiology (Biopsy Proven: No):  -Pre-E w/ severe features (8/2019)  -Reduced Renal Mass (atrophic L kidney w/ unclear cause/timing)   -? Glomerular hyperfiltration related FSGS due to reduced renal mass  -Obstructive nephropathy -> stable R mild hydroureter, ureteral narrowing.  -Hx of bladder schistosomiasis s/p treatment in 2018  -Hypertensive Nephrosclerosis  -Previous NSAID use  -? Renal Vein or Renal Artery thrombosis (multiple spont miscarriages. ? aPL syndrome as unifying diagnosis)    Plan:  -Optimize Bp Control:   --Goal <120/<80 given age and underlying kidney disease.   --Ideally would use ACEi/ARB but not safe if potential for pregnancy. We discussed advantages of strict BP control and reduction of proteinuria.   --Increase Nifedipine to 30mg BID for now.   --Message sent to update her on her persistent proteinuria and recommendation to start reliable form of birth control and have urinary pregnancy test completed. Once on a reliable form of birth control we will plan to start Losartan 25mg qDay and reduce her Nifedipine back down to 30mg qDay. Uptitrate Losartan to goal 100mg qDay as tolerated.   -Avoid SGLT2i given potential for pregnancy.   --We discussed benefits of SGLT2i in proteinuric kidney disease.   --We discussed need to use reliable birth control prior to starting these agents.  --If she starts reliable birth control will plan to start Jardiance 10mg qDay  -Obtain renal US w/ duplex. I am curious if renal vein or " artery thrombosis is contributing to her recent JOEL and increased proteinuria  -Send Cystatin-C  -Will send aPL testing given history of recurrent spont abortions and potential that renal vein/artery thrombosis could be driving her clinical picture  -Follow up UACR to eval for Albuminuria/Proteinuria discrepancy.  -Send urine pregnancy  -Remainder per problem below      Anemia of Renal Disease  Hemoglobin: 12 (1/17/2024)  Ferritin:  TSAT:    Plan:  -No need for IV iron or EPO at this time      Lipid Management in CKD  KDIGO 2013 Guidelines recommend the following for patients with CKD:  Adults >/= 49 yo w/ CKD stage 1 or 2: Statin  Adults >/= 49 yo w/ CKD stage 3-5: Statin + Ezetimibe or Statin alone  Adults 18-49 + 1 of the following (CAD, DM, Ischemic stroke, 10 yr ASCVD risk >10%): Statin    KDIGO guidelines recommend Simvastatin 20mg/Ezetimibe 10mg qDay for patients with CKD G3a-G5 (in line with the SHARP trial). If Simvastatin used alone, 40mg qDay is referenced.   Other options include: Atorvastatin 20mg qDay (4D trial) and Rosuvastatin 10mg qDay (SCOOBY trial)    Plan:  -If proteinuria persists, would recommend statin in the near future      Metabolic Acidosis of Renal Disease  Bicarb: 28 (1/17/2024)    Plan:  -No need for NaHCO at this time      Mineral Bone Disease  KDIGO 2017 Guidelines recommend the following for patients with CKD R4f-Y5G:  -Treatments of CKD MBD should be based on serial assessments of phosphate, calcium, and PTH levels, considered together (Not Graded).  -Lowering elevated phosphate levels toward the normal range (2C) and guiding decisions about phosphate-lowering treatment should be based on progressively or persistently elevated serum phosphate (Not Graded). Limiting dietary phosphate intake in the treatment of hyperphosphatemiaalone or in combination with other treatments (2D). It is reasonable to consider phosphate source (e.g., animal, vegetable, additives) in making dietary  recommendations (Not Graded).  -Avoiding hypercalcemia (2C)  -In patients with CKD G3a-G5 not on dialysis, the optimal PTH level is not known. However, we suggest that patients with levels of intact PTH progressively rising or persistently above the upper normal limit for the assay be evaluated for modifiable factors, including hyperphosphatemia, hypocalcemia, high phosphate intake, and vitamin D deficiency (2C).  -In adult patients with CKD G3a-G5 not on dialysis, we suggest that calcitriol and vitamin D analogs not be routinely used (2C). It is reasonable to reserve the use of calcitriol and vitamin D analogs for patients with CKD G4-G5 with severe and progressive hyperparathyroidism (Not Graded).    PTH: 67 (1/17/2024)  Phos: 3.2 (1/17/2024)  Calcium: 9.3 (1/17/2024)  Vitamin D: 29 (1/17/2024)    Plan:  -No need for phos binder    Other:  -Specialty Care Coordination Referral - CKD G1-G3b w/o imminent RRT planning/needs (Yes/no, Date Referral Placed): No  -Med Therapy Management Referral (Yes/No, Date of Referral): No    Return to clinic: 4 months    Wilmer Calix MD   of Medicine  Division of Nephrology and Hypertension  [facility]    90 minutes spent on the date of the encounter doing chart review, history and exam, documentation and further activities as noted above    The longitudinal plan of care for Atrophic L kidney, Hypertension, Proteinuria, CKD G1A3 was addressed during this visit. Due to the added complexity in care, I will continue to support Rubina Desouza in the subsequent management of this condition(s) and with the ongoing continuity of care of this condition(s).

## 2024-01-17 NOTE — PATIENT INSTRUCTIONS
"It was a pleasure to see you in nephrology clinic today. I've included a brief summary of our discussion and care plan from today's visit below.  _______________________________________________________________________    My recommendations are summarized as follows:  -Keep a Blood Pressure log. Please make sure that you are using a validated blood pressure device (check \"www.validatebp.org\").  -Avoid all NSAID's. Examples include Ibuprofen (Advil, Motrin), naprosyn (Aleve), celebrex among others. Acetaminophen (Tylenol) is ok with maximum dose in 24 hours of 3000mg.  -Healthy lifestyle measures will keep your kidney's functioning at their current best. This includes regular exercise, maintaining a healthy body weight and smoking cessation.   -I would like to start 2 kidney protective medications (Losartan and Jardiance). We would need you to be on a reliable form of birth control prior to starting these medications as they are not safe with pregnancy. The nifedipine is safe with pregnancy. If you start a reliable form of birth control (oral contraceptive pill, consistent condom use, etc) please reach out to my office and we will adjust your nifedipine to Losartan.  -I am increasing your Nifedipine to 30mg 2x per day (morning and night). Please watch for those orthostatic symptoms that we talked about.   -I will send you a message about your pending lab tests    Please return to Nephrology Clinic in 4 months to review your progress.     Who do I call with any questions after my visit?  There are multiple ways to contact your nephrology care team:    -To schedule or reschedule an appointment, please call [phone].  -Reach out via [employer]. These messages are answered by your nurse or doctor during business hours and typically in 1-2 days. [employer] messages are best for quick questions/clarifications/updates. Frequently, your doctor or nurse will recommend setting up a follow up appointment to address any significant " questions/concerns.  -For urgent questions after business hours, you may reach the on-call Nephrology Fellow by contacting the [facility]  at [phone].    To schedule imaging:   -Please call [phone]     To schedule your lab appointment at the [facility]:  -Please call [phone]    Sincerely,    Dr. Wilmer Calix   of Medicine  Division of Nephrology and Hypertension  [facility]

## 2024-01-17 NOTE — PROGRESS NOTES
"    Nephrology Clinic Visit  Rubina Desouza MRN: [id number] YOB: 1985  Primary Care Provider: Hali Tubbs  ----------------------------------------------------------------------------------------------------------------------  Visit 1/17/2024:  --BP Control: 158/84 (in office). Systolic Bps at home are in the 120's to 130's/80's-90's.   --Current Regimen: Nifedipine 30mg qDay  -DM Control: 5.6 (11/7/2023)  --Current Regimen: None  -Creatinine Trend: 0.91 (1/17/2024)  from 1.1 (12/19/2023) from 1.6 (11/7/2023) from 0.85 (4/22/2022)  -Nocturia: 0x  -Hematuria: Denies  -Nephrolithiasis: No  -NSAID Use: No, just tylenol  -Herbal/OTC Medication Use: No    -Family History of Kidney Disease: Denies, does have a history of high blood pressure.     -Other:  --JOEL 11/2023: She denies any major changes to her health at that time. She denies any new pain or taking pain medicine. She did have COVID in December but no URI in November that she recalls. She was noted to have some high blood pressure around that time. No gross hematuria, no dysuria, no frequency. No rash or skin changes. No leg swelling. Denies using NSAIDs. Denies orthostatic symptoms. Denies urinary retention symptoms.   -We discussed proteinuria and potential for transient proteinuria in setting of JOEL and ramifications of proteinuria on kidney health  -We discussed her US findings from 11/2023  -She is not currently using any birth control. We discussed Losartan and Jardiance for maximum kidney protection (and that neither of these agents are safe in pregnancy)  -In 2018 she had a miscarriage. She had abdominal pain. She had imaging that showed her atrophic kidney. No prior imaging.  -Appears there was some consideration for  TB in the past. Saw ID in the past. Did not feel she needed treatment for TB nor that  TB was the cause of her renal atrophy per chart review. Bladder biopsy culture 11/30/2018 w/o AFB. From ID note 11/30/2018: \"Single " "positive culture of PENNY from urine: At this time, seems most likely to be a contaminant as it is growing only a single urine culture with 2 negative cultures obtained at [facility] and 3 additional negative cultures (2 from urine, 1 from bladder biopsy) obtained in October 2018 in the [place] system.\"  -History of Miscarriage x 3: 2017, 2018, 2021. Unclear underlying etology. Has not been diagnosed with aPL syndrome in the past. She denies any history of diagnosed blood clots.     Objective:  PAST MEDICAL HISTORY:  Past Medical History:   Diagnosis Date    Anemia 2017    Conductive hearing loss 2015    Hearing problem 2015    Hyperthyroidism     Better since surgery    Tinnitus 2018       PAST SURGICAL HISTORY:  Past Surgical History:   Procedure Laterality Date    CYSTOSCOPY, BIOPSY BLADDER, COMBINED N/A 10/26/2018    Procedure: Bladder Biopsy, Right Ureteroscopy with Possible Biopsy;  Surgeon: Viral Hampton MD;  Location: [facility] OR    CYSTOSCOPY, URETEROSCOPY, COMBINED  10/26/2018    Procedure: COMBINED CYSTOSCOPY, URETEROSCOPY;  Surgeon: Viral Hampton MD;  Location: [facility] OR    THYROID SURGERY  2010    in [facility]; partial thyroidectomy       MEDICATIONS:  Current Outpatient Medications   Medication Instructions    acetaminophen (TYLENOL) 650 mg, Oral, EVERY 6 HOURS PRN, Start after Delivery.    ascorbic acid (VITAMIN C) 250 mg, Oral, DAILY    ferrous sulfate (FE TABS) 325 mg, Oral, DAILY    ibuprofen (ADVIL/MOTRIN) 600 mg, Oral, EVERY 6 HOURS PRN, Start after delivery    NIFEdipine ER (ADALAT CC) 30 mg, Oral, DAILY    norethindrone (MICRONOR) 0.35 mg, Oral, DAILY    Prenatal Vit-Fe Fumarate-FA (SM PRENATAL VITAMINS) 28-0.8 MG TABS 1 tablet, Oral, DAILY    ranitidine (ZANTAC) 150 mg, Oral, 2 TIMES DAILY    senna-docusate (SENOKOT-S/PERICOLACE) 8.6-50 MG tablet 1 tablet, Oral, DAILY, Start after delivery.       FAMILY MEDICAL HISTORY:   Family History   Problem Relation Age of Onset    Hypertension " "Father        PHYSICAL EXAM:   BP (!) 168/104 (BP Location: Right arm, Patient Position: Sitting, Cuff Size: Adult Regular)   Pulse 79   Temp 97.8  F (36.6  C) (Oral)   Wt 55.8 kg (123 lb)   SpO2 100%   BMI 22.50 kg/m    GENERAL APPEARANCE: alert and no distress  EYES: nonicteric  ABDOMEN: soft, nontender, normal bowel sounds, no HSM   Extremities: No LE edema  MS: no evidence of inflammation in joints, no muscle tenderness  SKIN: no rash  NEURO: mentation intact and speech normal  PSYCH: affect normal    LABS REVIEWED BY ME:   ANEMIA  Recent Labs   Lab Test 08/13/19 1945 08/12/19 0658 08/11/19 2054 08/11/19  0716 07/11/19  1151 05/02/19  1053 10/12/17  0000 09/08/17  0817   HGB 11.9 13.0 11.7 12.6   < > 10.9*   < >  --    IRONSAT  --   --   --   --   --  36  --  63*   SAMANTHA  --   --   --   --   --  33  --  32    < > = values in this interval not displayed.       BMP  Recent Labs   Lab Test 04/22/22  1544 10/25/21  1509 08/13/19 1945 08/12/19  1512 08/12/19 0658 08/11/19 2054 07/11/19  1151    137  --   --   --   --  137   POTASSIUM 3.5 3.8  --   --   --   --  3.5   CHLORIDE 107 106  --   --   --   --  110*   CO2 25 23  --   --   --   --  20   ANIONGAP 8 8  --   --   --   --  7   BUN 14 14  --   --   --   --  9   CR 0.85 0.82 0.90  --  0.84   < > 0.86   GFRESTIMATED >90 >90 83  --  >90   < > 89   MAG  --   --   --  7.3* 8.8*  --   --     < > = values in this interval not displayed.       CBC  Recent Labs   Lab Test 08/13/19 1945 08/12/19 0658 08/11/19 2054 08/11/19  0716   HGB 11.9 13.0 11.7 12.6   WBC 9.7 11.3* 8.5 7.7   HCT 35.7 37.7 35.7 37.2   MCV 95 93 96 92   * 136* 102* 122*       DIABETES  Recent Labs   Lab Test 05/02/19  1053   A1C 6.2*       HYPONATREMIANo lab results found.    Invalid input(s): \"UOSM\", \"OSM\"    MBD  Recent Labs   Lab Test 04/22/22  1544 10/25/21  1509 07/11/19  1151   GERALD 9.3 8.8 8.8   ALBUMIN 3.5 3.0*  --    PHOS 2.9 3.4  --         URINE STUDIES  Recent Labs " "  Lab Test 10/28/19  0000 06/12/19  1227   COLOR Yellow Light Yellow   APPEARANCE Clear Clear   URINEGLC Neg Negative   URINEBILI Neg Negative   URINEKETONE Neg Negative   SG 1.005 1.004   UBLD Trace Negative   URINEPH 6.0 6.5   PROTEIN 100 Negative   UROBILINOGEN 0.2  --    NITRITE Neg Negative   LEUKEST Neg Trace*   RBCU  --  1   WBCU  --  1     Recent Labs   Lab Test 08/11/19  0725   UTPG 1.03*       ADDITIONAL LABS ORDERED/REVIEWED BY ME:  See below    Assessment/Plan  CKD Stage G2A3  Established care with [facility] 1/17/2024    Creatinine of 1.6 on 11/7/2023. Previous baseline looks to be 0.8-0.9. Improved back to baseline (0.91) at the time of establishing care with me.     UA 11/7/2023 w/o WBC's or RBC's. UA 1/17/2023 w/ no RBCs or WBCs  UACR 854mg/gr 11/7/2023  UPCR 2.79g/gr 1/17/2024. Was 1.03 8/11/2019 and 0.77g/gr 2/10/2021.    History of severe Pre-E with pregnancy in 2019.   Has had 3 spont abortions in the past (most recent 12/2021. \"Her OB history includes a term delivery in 2012, then an SAB followed by a term pregnancy, and again an SAB followed by a term pregnancy.\"     Saw urology in 2018 for bladder schistosomiasis, UDS w/o VUR, History of atrophic L kidney. Renal US 11/13/2023 w/ R kidney 12.1cm and L kidney 5.5cm. There was also mild R hydro present w/ retrograde pyelogram revealing area of proximal stricture was actually ureteral peristalsis and that her ureteral orifice was quite small and distal ureter quite narrow. She had a split function study done 12/2019 w/ essentially no function on the L (93% vs 6%)    Saw ID in 2018 with concern for PENNY and potential  TB. From ID note 11/30/2018: \"Single positive culture of PENNY from urine: At this time, seems most likely to be a contaminant as it is growing only a single urine culture with 2 negative cultures obtained at [facility] and 3 additional negative cultures (2 from urine, 1 from bladder biopsy) obtained in October 2018 in the [place] " "system.\"    Of note:  -Hep B and Hep C negative 11/7/2023  -MARCELLA negative 8/3/2018  -HIV negative 11/16/2019  -No history of DVT or arterial thrombosis per her report.     CKD Etiology (Biopsy Proven: No):  -Pre-E w/ severe features (8/2019)  -Reduced Renal Mass (atrophic L kidney w/ unclear cause/timing)   -? Glomerular hyperfiltration related FSGS due to reduced renal mass  -Obstructive nephropathy -> stable R mild hydroureter, ureteral narrowing.  -Hx of bladder schistosomiasis s/p treatment in 2018  -Hypertensive Nephrosclerosis  -Previous NSAID use  -? Renal Vein or Renal Artery thrombosis (multiple spont miscarriages. ? aPL syndrome as unifying diagnosis)    Plan:  -Optimize Bp Control:   --Goal <120/<80 given age and underlying kidney disease.   --Ideally would use ACEi/ARB but not safe if potential for pregnancy. We discussed advantages of strict BP control and reduction of proteinuria.   --Increase Nifedipine to 30mg BID for now.   --Message sent to update her on her persistent proteinuria and recommendation to start reliable form of birth control and have urinary pregnancy test completed. Once on a reliable form of birth control we will plan to start Losartan 25mg qDay and reduce her Nifedipine back down to 30mg qDay. Uptitrate Losartan to goal 100mg qDay as tolerated.   -Avoid SGLT2i given potential for pregnancy.   --We discussed benefits of SGLT2i in proteinuric kidney disease.   --We discussed need to use reliable birth control prior to starting these agents.  --If she starts reliable birth control will plan to start Jardiance 10mg qDay  -Obtain renal US w/ duplex. I am curious if renal vein or artery thrombosis is contributing to her recent JOEL and increased proteinuria  -Send Cystatin-C  -Will send aPL testing given history of recurrent spont abortions and potential that renal vein/artery thrombosis could be driving her clinical picture  -Follow up UACR to eval for Albuminuria/Proteinuria " discrepancy.  -Send urine pregnancy  -Remainder per problem below      Anemia of Renal Disease  Hemoglobin: 12 (1/17/2024)  Ferritin:  TSAT:    Plan:  -No need for IV iron or EPO at this time      Lipid Management in CKD  KDIGO 2013 Guidelines recommend the following for patients with CKD:  Adults >/= 51 yo w/ CKD stage 1 or 2: Statin  Adults >/= 51 yo w/ CKD stage 3-5: Statin + Ezetimibe or Statin alone  Adults 18-49 + 1 of the following (CAD, DM, Ischemic stroke, 10 yr ASCVD risk >10%): Statin    KDIGO guidelines recommend Simvastatin 20mg/Ezetimibe 10mg qDay for patients with CKD G3a-G5 (in line with the SHARP trial). If Simvastatin used alone, 40mg qDay is referenced.   Other options include: Atorvastatin 20mg qDay (4D trial) and Rosuvastatin 10mg qDay (SCOOBY trial)    Plan:  -If proteinuria persists, would recommend statin in the near future      Metabolic Acidosis of Renal Disease  Bicarb: 28 (1/17/2024)    Plan:  -No need for NaHCO at this time      Mineral Bone Disease  KDIGO 2017 Guidelines recommend the following for patients with CKD F1z-L3P:  -Treatments of CKD MBD should be based on serial assessments of phosphate, calcium, and PTH levels, considered together (Not Graded).  -Lowering elevated phosphate levels toward the normal range (2C) and guiding decisions about phosphate-lowering treatment should be based on progressively or persistently elevated serum phosphate (Not Graded). Limiting dietary phosphate intake in the treatment of hyperphosphatemiaalone or in combination with other treatments (2D). It is reasonable to consider phosphate source (e.g., animal, vegetable, additives) in making dietary recommendations (Not Graded).  -Avoiding hypercalcemia (2C)  -In patients with CKD G3a-G5 not on dialysis, the optimal PTH level is not known. However, we suggest that patients with levels of intact PTH progressively rising or persistently above the upper normal limit for the assay be evaluated for  modifiable factors, including hyperphosphatemia, hypocalcemia, high phosphate intake, and vitamin D deficiency (2C).  -In adult patients with CKD G3a-G5 not on dialysis, we suggest that calcitriol and vitamin D analogs not be routinely used (2C). It is reasonable to reserve the use of calcitriol and vitamin D analogs for patients with CKD G4-G5 with severe and progressive hyperparathyroidism (Not Graded).    PTH: 67 (1/17/2024)  Phos: 3.2 (1/17/2024)  Calcium: 9.3 (1/17/2024)  Vitamin D: 29 (1/17/2024)    Plan:  -No need for phos binder    Other:  -Specialty Care Coordination Referral - CKD G1-G3b w/o imminent RRT planning/needs (Yes/no, Date Referral Placed): No  -Med Therapy Management Referral (Yes/No, Date of Referral): No    Return to clinic: 4 months    Wilmer Calix MD   of Medicine  Division of Nephrology and Hypertension  [facility]    90 minutes spent on the date of the encounter doing chart review, history and exam, documentation and further activities as noted above    The longitudinal plan of care for Atrophic L kidney, Hypertension, Proteinuria, CKD G1A3 was addressed during this visit. Due to the added complexity in care, I will continue to support Rubina Desouza in the subsequent management of this condition(s) and with the ongoing continuity of care of this condition(s).

## 2024-01-17 NOTE — NURSING NOTE
Chief Complaint   Patient presents with    Consult     New NEPH     BP (!) 163/101 (BP Location: Left arm, Patient Position: Sitting, Cuff Size: Adult Regular)   Pulse 79   Temp 97.8  F (36.6  C) (Oral)   Wt 55.8 kg (123 lb)   SpO2 100%   BMI 22.50 kg/m      Zander Mejia CMA on 1/17/2024 at 1:13 PM

## 2024-01-18 LAB
CREAT UR-MCNC: 12.2 MG/DL
MICROALBUMIN UR-MCNC: 232 MG/L
MICROALBUMIN/CREAT UR: 1901.64 MG/G CR (ref 0–25)
VIT D+METAB SERPL-MCNC: 29 NG/ML (ref 20–50)

## 2024-02-02 ENCOUNTER — PATIENT OUTREACH (OUTPATIENT)
Dept: NEPHROLOGY | Facility: CLINIC | Age: 39
End: 2024-02-02
Payer: COMMERCIAL

## 2024-02-02 NOTE — PROGRESS NOTES
2/2- Called pt in regards to the following message from Dr. Calix.  Do you mind reaching out to patient to follow up my [email or web address] message from 1/18/2024? Doesn't look like she has read it (I confirmed with her that she was fine with communicating via [email or web address] after our last appt).   -As we discussed yesterday, I think it is very important we get you on those two kidney protective medications.(Jardiance and Losartan). We cannot start these unless you are on a reliable form of birth control as they are not safe with pregnancy.     I spoke to Ingrid and she stated she's not on birth control but she has an appt w/her PCP in March and she will start at that time. She will call me after said appt.  Paul VERDE RN  Nephrology care coordinator  [facility]

## 2024-05-16 NOTE — PROGRESS NOTES
Nephrology Clinic Visit  Rubina Desouza MRN: [id number] YOB: 1985  Primary Care Provider: Hali Tubbs  ----------------------------------------------------------------------------------------------------------------------  Visit 5/17/2024:  -BP Control: 148/91 today in office. Home readings: 137-140's are the highest/80-90's.   --Current Regimen: Nifedipine 30mg BID (held), Losartan 100mg qDay  --Previous Agents: Besides the nifedipine and losartan she thinks she's been on 1 agent before but can't remember what it was and remembers she didn't have any problems with that agent.    -DM Control: 5.6 (11/7/2023)  --Current Regimen: None  -Creatinine Trend: 1.15 (5/17/2024) from 0.91 (1/17/2024)  from 1.1 (12/19/2023) from 1.6 (11/7/2023) from 0.85 (4/22/2022)  -Had planned for renal US w/ duplex after last appt, but doesn't look like this happened.   -Was started on Jardiance 10mg qDay. She thinks she is having some reduced appetite due to Jardiance. She is still taking it for now  -She is now on birth control.   -She reports some decreased appetite recently. She thinks it might be due to Jardiance. We are going to try stopping jardiance for 1 week and seeing how her appetite does. If appetite improved   -She is leaving for [place] in about a month and will be gone for 6 months! She is going to [facility].   -We discussed bringing her blood pressure machine, bringing enough of her meds to last   --[facility] before leaving. She will work with them on getting as close to 6 months of a supply of meds as possible.   -Total proteinuria down to 1.24g/gr    Visit 1/17/2024:  --BP Control: 158/84 (in office). Systolic Bps at home are in the 120's to 130's/80's-90's.   --Current Regimen: Nifedipine 30mg qDay  -DM Control: 5.6 (11/7/2023)  --Current Regimen: None  -Creatinine Trend: 0.91 (1/17/2024)  from 1.1 (12/19/2023) from 1.6 (11/7/2023) from 0.85 (4/22/2022)  -Nocturia:  "0x  -Hematuria: Denies  -Nephrolithiasis: No  -NSAID Use: No, just tylenol  -Herbal/OTC Medication Use: No    -Family History of Kidney Disease: Denies, does have a history of high blood pressure.     -Other:  --JOEL 11/2023: She denies any major changes to her health at that time. She denies any new pain or taking pain medicine. She did have COVID in December but no URI in November that she recalls. She was noted to have some high blood pressure around that time. No gross hematuria, no dysuria, no frequency. No rash or skin changes. No leg swelling. Denies using NSAIDs. Denies orthostatic symptoms. Denies urinary retention symptoms.   -We discussed proteinuria and potential for transient proteinuria in setting of JOEL and ramifications of proteinuria on kidney health  -We discussed her US findings from 11/2023  -She is not currently using any birth control. We discussed Losartan and Jardiance for maximum kidney protection (and that neither of these agents are safe in pregnancy)  -In 2018 she had a miscarriage. She had abdominal pain. She had imaging that showed her atrophic kidney. No prior imaging.  -Appears there was some consideration for  TB in the past. Saw ID in the past. Did not feel she needed treatment for TB nor that  TB was the cause of her renal atrophy per chart review. Bladder biopsy culture 11/30/2018 w/o AFB. From ID note 11/30/2018: \"Single positive culture of PENNY from urine: At this time, seems most likely to be a contaminant as it is growing only a single urine culture with 2 negative cultures obtained at [facility] and 3 additional negative cultures (2 from urine, 1 from bladder biopsy) obtained in October 2018 in the [facility] system.\"  -History of Miscarriage x 3: 2017, 2018, 2021. Unclear underlying etology. Has not been diagnosed with aPL syndrome in the past. She denies any history of diagnosed blood clots.     Objective:  PAST MEDICAL HISTORY:  Past Medical History:   Diagnosis Date    Anemia " 2017    Conductive hearing loss 2015    Hearing problem 2015    Hyperthyroidism     Better since surgery    Tinnitus 2018       PAST SURGICAL HISTORY:  Past Surgical History:   Procedure Laterality Date    CYSTOSCOPY, BIOPSY BLADDER, COMBINED N/A 10/26/2018    Procedure: Bladder Biopsy, Right Ureteroscopy with Possible Biopsy;  Surgeon: Viral Hampton MD;  Location: [facility] OR    CYSTOSCOPY, URETEROSCOPY, COMBINED  10/26/2018    Procedure: COMBINED CYSTOSCOPY, URETEROSCOPY;  Surgeon: Viral Hampton MD;  Location: [facility] OR    THYROID SURGERY  2010    in [facility]; partial thyroidectomy       MEDICATIONS:  Current Outpatient Medications   Medication Instructions    acetaminophen (TYLENOL) 650 mg, Oral, EVERY 6 HOURS PRN, Start after Delivery.    ascorbic acid (VITAMIN C) 250 mg, Oral, DAILY    ferrous sulfate (FE TABS) 325 mg, Oral, DAILY    ibuprofen (ADVIL/MOTRIN) 600 mg, Oral, EVERY 6 HOURS PRN, Start after delivery    NIFEdipine ER (ADALAT CC) 30 mg, Oral, DAILY    norethindrone (MICRONOR) 0.35 mg, Oral, DAILY    Prenatal Vit-Fe Fumarate-FA (SM PRENATAL VITAMINS) 28-0.8 MG TABS 1 tablet, Oral, DAILY    ranitidine (ZANTAC) 150 mg, Oral, 2 TIMES DAILY    senna-docusate (SENOKOT-S/PERICOLACE) 8.6-50 MG tablet 1 tablet, Oral, DAILY, Start after delivery.       FAMILY MEDICAL HISTORY:   Family History   Problem Relation Age of Onset    Hypertension Father        PHYSICAL EXAM:   BP (!) 148/93   Pulse 84   Wt 52.6 kg (115 lb 14.4 oz)   SpO2 99%   BMI 21.20 kg/m    GENERAL APPEARANCE: alert and no distress  EYES: nonicteric  ABDOMEN: soft, nontender, normal bowel sounds, no HSM   Extremities: No LE edema  MS: no evidence of inflammation in joints, no muscle tenderness  SKIN: no rash  NEURO: mentation intact and speech normal  PSYCH: affect normal    LABS REVIEWED BY ME:   ANEMIA  Recent Labs   Lab Test 01/17/24  1219 08/13/19  1945 08/12/19  0658 08/11/19 2054 07/11/19  1151 05/02/19  1053 10/12/17  0000  "09/08/17 0817   HGB 12.0 11.9 13.0 11.7   < > 10.9*   < >  --    IRONSAT  --   --   --   --   --  36  --  63*   SAMANTHA  --   --   --   --   --  33  --  32    < > = values in this interval not displayed.       BMP  Recent Labs   Lab Test 01/17/24  1219 04/22/22  1544 10/25/21  1509 08/13/19 1945 08/12/19 1512 08/12/19 0658 08/11/19 2054 07/11/19  1151    140 137  --   --   --   --  137   POTASSIUM 4.2 3.5 3.8  --   --   --   --  3.5   CHLORIDE 103 107 106  --   --   --   --  110*   CO2 28 25 23  --   --   --   --  20   ANIONGAP 8 8 8  --   --   --   --  7   BUN 11.7 14 14  --   --   --   --  9   CR 0.91 0.85 0.82 0.90  --  0.84   < > 0.86   GFRESTIMATED 83 >90 >90 83  --  >90   < > 89   MAG  --   --   --   --  7.3* 8.8*  --   --     < > = values in this interval not displayed.       CBC  Recent Labs   Lab Test 01/17/24 1219 08/13/19 1945 08/12/19 0658 08/11/19 2054   HGB 12.0 11.9 13.0 11.7   WBC 6.9 9.7 11.3* 8.5   HCT 36.0 35.7 37.7 35.7   MCV 88 95 93 96    145* 136* 102*       DIABETES  Recent Labs   Lab Test 05/02/19  1053   A1C 6.2*       HYPONATREMIANo lab results found.    Invalid input(s): \"UOSM\", \"OSM\"    MBD  Recent Labs   Lab Test 01/17/24  1219 04/22/22  1544 10/25/21  1509 07/11/19  1151   GERALD 9.3 9.3 8.8 8.8   ALBUMIN 3.8 3.5 3.0*  --    PHOS 3.2 2.9 3.4  --    PTHI 67*  --   --   --         URINE STUDIES  Recent Labs   Lab Test 01/17/24  1334 10/28/19  0000 06/12/19  1227   COLOR Straw Yellow Light Yellow   APPEARANCE Clear Clear Clear   URINEGLC Negative Neg Negative   URINEBILI Negative Neg Negative   URINEKETONE Negative Neg Negative   SG 1.002* 1.005 1.004   UBLD Negative Trace Negative   URINEPH 6.5 6.0 6.5   PROTEIN 20* 100 Negative   UROBILINOGEN  --  0.2  --    NITRITE Negative Neg Negative   LEUKEST Negative Neg Trace*   RBCU <1  --  1   WBCU <1  --  1     Recent Labs   Lab Test 08/11/19  0725   UTPG 1.03*       ADDITIONAL LABS ORDERED/REVIEWED BY ME:  See " "below    Assessment/Plan  CKD Stage G2A3  Established care with [facility] ger[facility] Neph[facility] 1/17/2024    Creatinine of 1.6 on 11/7/2023. Previous baseline looks to be 0.8-0.9. Improved back to baseline (0.91) at the time of establishing care with me.     UA 11/7/2023 w/o WBC's or RBC's. UA 1/17/2023 w/ no RBCs or WBCs  UACR 854mg/gr 11/7/2023  UPCR 2.79g/gr 1/17/2024. Was 1.03 8/11/2019 and 0.77g/gr 2/10/2021.    History of severe Pre-E with pregnancy in 2019.   Has had 3 spont abortions in the past (most recent 12/2021. \"Her OB history includes a term delivery in 2012, then an SAB followed by a term pregnancy, and again an SAB followed by a term pregnancy.\"     Saw urology in 2018 for bladder schistosomiasis, UDS w/o VUR, History of atrophic L kidney. Renal US 11/13/2023 w/ R kidney 12.1cm and L kidney 5.5cm. There was also mild R hydro present w/ retrograde pyelogram revealing area of proximal stricture was actually ureteral peristalsis and that her ureteral orifice was quite small and distal ureter quite narrow. She had a split function study done 12/2019 w/ essentially no function on the L (93% vs 6%)    Saw ID in 2018 with concern for PENNY and potential  TB. From ID note 11/30/2018: \"Single positive culture of PENNY from urine: At this time, seems most likely to be a contaminant as it is growing only a single urine culture with 2 negative cultures obtained at [facility] and 3 additional negative cultures (2 from urine, 1 from bladder biopsy) obtained in October 2018 in the [facility] system.\"    Of note:  -Hep B and Hep C negative 11/7/2023  -MARCELLA negative 8/3/2018  -HIV negative 11/16/2019  -No history of DVTs or arterial thrombosis per her report.     CKD Etiology (Biopsy Proven: No):  -Pre-E w/ severe features (8/2019)  -Reduced Renal Mass (atrophic L kidney w/ unclear cause/timing. Present dating back to at least 7/2018)   -? Glomerular hyperfiltration related FSGS due to reduced renal mass  -Obstructive nephropathy -> stable R " mild hydroureter, ureteral narrowing.  -Hx of bladder schistosomiasis s/p treatment in 2018  -Hypertensive Nephrosclerosis  -Previous NSAID use  -? Renal Vein or Renal Artery thrombosis (multiple spont miscarriages. ? aPL syndrome as unifying diagnosis)    Plan:  -Optimize Bp Control:   --Goal <120/<80 given age and underlying kidney disease.   ---Continue Losartan 100mg qDay.   ---Trial Amlodipine 5mg qDay for BP. Monitor for Leg swelling/HA with this adjustment.   -Hold Jardiance 10mg qday for a week or so. See if her appetite improves. If yes then we will need to work towards slowly getting her back on 10mg qDay (likely start with 5mg every other day and slowly titrate). If appetite unchanged, will just have her resume 10mg qDay and will need to look for another etiology.  Monitor for UTI/SSTI SE.   -Recommended that she still have renal US w/ Duplex performed. Order in the system. She has the number to contact to set this up. I am curious if renal vein or artery thrombosis is contributing to her recent JOEL and increased proteinuria  -Follow up Cystatin-C from today  -Follow up aPL testing from today given history of recurrent spont abortions and potential that renal vein/artery thrombosis could be driving her clinical picture  -Remainder per problem below      Anemia of Renal Disease  Hemoglobin: 12 (1/17/2024)  Ferritin:  TSAT:    Plan:  -No need for IV iron or EPO at this time      Lipid Management in CKD  KDIGO 2013 Guidelines recommend the following for patients with CKD:  Adults >/= 49 yo w/ CKD stage 1 or 2: Statin  Adults >/= 49 yo w/ CKD stage 3-5: Statin + Ezetimibe or Statin alone  Adults 18-49 + 1 of the following (CAD, DM, Ischemic stroke, 10 yr ASCVD risk >10%): Statin    KDIGO guidelines recommend Simvastatin 20mg/Ezetimibe 10mg qDay for patients with CKD G3a-G5 (in line with the SHARP trial). If Simvastatin used alone, 40mg qDay is referenced.   Other options include: Atorvastatin 20mg qDay (4D  trial) and Rosuvastatin 10mg qDay (SCOOBY trial)    Plan:  -If proteinuria persists, would recommend statin in the near future      Metabolic Acidosis of Renal Disease  Bicarb: 25 (5/17/2024)    Plan:  -No need for NaHCO at this time      Mineral Bone Disease  KDIGO 2017 Guidelines recommend the following for patients with CKD S5p-R5V:  -Treatments of CKD MBD should be based on serial assessments of phosphate, calcium, and PTH levels, considered together (Not Graded).  -Lowering elevated phosphate levels toward the normal range (2C) and guiding decisions about phosphate-lowering treatment should be based on progressively or persistently elevated serum phosphate (Not Graded). Limiting dietary phosphate intake in the treatment of hyperphosphatemiaalone or in combination with other treatments (2D). It is reasonable to consider phosphate source (e.g., animal, vegetable, additives) in making dietary recommendations (Not Graded).  -Avoiding hypercalcemia (2C)  -In patients with CKD G3a-G5 not on dialysis, the optimal PTH level is not known. However, we suggest that patients with levels of intact PTH progressively rising or persistently above the upper normal limit for the assay be evaluated for modifiable factors, including hyperphosphatemia, hypocalcemia, high phosphate intake, and vitamin D deficiency (2C).  -In adult patients with CKD G3a-G5 not on dialysis, we suggest that calcitriol and vitamin D analogs not be routinely used (2C). It is reasonable to reserve the use of calcitriol and vitamin D analogs for patients with CKD G4-G5 with severe and progressive hyperparathyroidism (Not Graded).    PTH: 67 (1/17/2024)  Phos: 3.2 (1/17/2024)  Calcium: 9.3 (5/17/2024)  Vitamin D: 29 (1/17/2024)    Plan:  -No need for phos binder    Other:  -Specialty Care Coordination Referral - CKD G1-G3b w/o imminent RRT planning/needs (Yes/no, Date Referral Placed): No  -Med Therapy Management Referral (Yes/No, Date of Referral):  No    Return to clinic: 8 Months when she returns from [facility].    Wilmer Calix MD   of Medicine  Division of Nephrology and Hypertension  [facility]    45 minutes spent on the date of the encounter doing chart review, history and exam, documentation and further activities as noted above    The longitudinal plan of care for Atrophic L kidney, Hypertension, Sub-nephrotic range Proteinuria, CKD G1A3 was addressed during this visit. Due to the added complexity in care, I will continue to support Rubina Desouza in the subsequent management of this condition(s) and with the ongoing continuity of care of this condition(s).

## 2024-05-17 ENCOUNTER — OFFICE VISIT (OUTPATIENT)
Dept: NEPHROLOGY | Facility: CLINIC | Age: 39
End: 2024-05-17
Attending: STUDENT IN AN ORGANIZED HEALTH CARE EDUCATION/TRAINING PROGRAM
Payer: COMMERCIAL

## 2024-05-17 ENCOUNTER — LAB (OUTPATIENT)
Dept: LAB | Facility: CLINIC | Age: 39
End: 2024-05-17
Attending: STUDENT IN AN ORGANIZED HEALTH CARE EDUCATION/TRAINING PROGRAM
Payer: COMMERCIAL

## 2024-05-17 VITALS
WEIGHT: 115.9 LBS | SYSTOLIC BLOOD PRESSURE: 148 MMHG | OXYGEN SATURATION: 99 % | HEART RATE: 84 BPM | DIASTOLIC BLOOD PRESSURE: 93 MMHG | BODY MASS INDEX: 21.2 KG/M2

## 2024-05-17 DIAGNOSIS — N18.32 STAGE 3B CHRONIC KIDNEY DISEASE (H): ICD-10-CM

## 2024-05-17 DIAGNOSIS — I10 HYPERTENSION, ESSENTIAL: ICD-10-CM

## 2024-05-17 DIAGNOSIS — R80.9 PROTEINURIA, UNSPECIFIED TYPE: ICD-10-CM

## 2024-05-17 DIAGNOSIS — N26.1 ATROPHY OF LEFT KIDNEY: ICD-10-CM

## 2024-05-17 DIAGNOSIS — N18.32 STAGE 3B CHRONIC KIDNEY DISEASE (H): Primary | ICD-10-CM

## 2024-05-17 LAB
ALBUMIN MFR UR ELPH: 17.3 MG/DL
ANION GAP SERPL CALCULATED.3IONS-SCNC: 11 MMOL/L (ref 7–15)
BUN SERPL-MCNC: 18.8 MG/DL (ref 6–20)
CALCIUM SERPL-MCNC: 9.3 MG/DL (ref 8.6–10)
CHLORIDE SERPL-SCNC: 101 MMOL/L (ref 98–107)
CREAT SERPL-MCNC: 1.15 MG/DL (ref 0.51–0.95)
CREAT UR-MCNC: 14 MG/DL
DEPRECATED HCO3 PLAS-SCNC: 25 MMOL/L (ref 22–29)
EGFRCR SERPLBLD CKD-EPI 2021: 62 ML/MIN/1.73M2
GLUCOSE SERPL-MCNC: 99 MG/DL (ref 70–99)
HCG UR QL: NEGATIVE
POTASSIUM SERPL-SCNC: 3.7 MMOL/L (ref 3.4–5.3)
PROT/CREAT 24H UR: 1.24 MG/MG CR (ref 0–0.2)
SODIUM SERPL-SCNC: 137 MMOL/L (ref 135–145)

## 2024-05-17 PROCEDURE — 84156 ASSAY OF PROTEIN URINE: CPT | Performed by: PATHOLOGY

## 2024-05-17 PROCEDURE — 80048 BASIC METABOLIC PNL TOTAL CA: CPT | Performed by: PATHOLOGY

## 2024-05-17 PROCEDURE — 82610 CYSTATIN C: CPT | Performed by: STUDENT IN AN ORGANIZED HEALTH CARE EDUCATION/TRAINING PROGRAM

## 2024-05-17 PROCEDURE — G2211 COMPLEX E/M VISIT ADD ON: HCPCS | Performed by: STUDENT IN AN ORGANIZED HEALTH CARE EDUCATION/TRAINING PROGRAM

## 2024-05-17 PROCEDURE — 86146 BETA-2 GLYCOPROTEIN ANTIBODY: CPT | Performed by: STUDENT IN AN ORGANIZED HEALTH CARE EDUCATION/TRAINING PROGRAM

## 2024-05-17 PROCEDURE — 81025 URINE PREGNANCY TEST: CPT | Performed by: PATHOLOGY

## 2024-05-17 PROCEDURE — 99215 OFFICE O/P EST HI 40 MIN: CPT | Performed by: STUDENT IN AN ORGANIZED HEALTH CARE EDUCATION/TRAINING PROGRAM

## 2024-05-17 PROCEDURE — 36415 COLL VENOUS BLD VENIPUNCTURE: CPT | Performed by: PATHOLOGY

## 2024-05-17 PROCEDURE — 86038 ANTINUCLEAR ANTIBODIES: CPT | Performed by: STUDENT IN AN ORGANIZED HEALTH CARE EDUCATION/TRAINING PROGRAM

## 2024-05-17 PROCEDURE — G0463 HOSPITAL OUTPT CLINIC VISIT: HCPCS | Performed by: STUDENT IN AN ORGANIZED HEALTH CARE EDUCATION/TRAINING PROGRAM

## 2024-05-17 PROCEDURE — 85390 FIBRINOLYSINS SCREEN I&R: CPT | Mod: 26 | Performed by: PATHOLOGY

## 2024-05-17 PROCEDURE — 86147 CARDIOLIPIN ANTIBODY EA IG: CPT | Performed by: STUDENT IN AN ORGANIZED HEALTH CARE EDUCATION/TRAINING PROGRAM

## 2024-05-17 PROCEDURE — 85613 RUSSELL VIPER VENOM DILUTED: CPT | Performed by: STUDENT IN AN ORGANIZED HEALTH CARE EDUCATION/TRAINING PROGRAM

## 2024-05-17 PROCEDURE — 99000 SPECIMEN HANDLING OFFICE-LAB: CPT | Performed by: PATHOLOGY

## 2024-05-17 RX ORDER — LOSARTAN POTASSIUM 50 MG/1
1 TABLET ORAL
COMMUNITY
Start: 2024-03-05 | End: 2024-05-17

## 2024-05-17 RX ORDER — LEVONORGESTREL AND ETHINYL ESTRADIOL 0.15-0.03
1 KIT ORAL
COMMUNITY
Start: 2024-03-05

## 2024-05-17 RX ORDER — LOSARTAN POTASSIUM 100 MG/1
1 TABLET ORAL DAILY
COMMUNITY
Start: 2024-05-07

## 2024-05-17 RX ORDER — AMLODIPINE BESYLATE 5 MG/1
5 TABLET ORAL DAILY
Qty: 90 TABLET | Refills: 3 | Status: SHIPPED | OUTPATIENT
Start: 2024-05-17 | End: 2025-05-12

## 2024-05-17 ASSESSMENT — PAIN SCALES - GENERAL: PAINLEVEL: NO PAIN (0)

## 2024-05-17 NOTE — NURSING NOTE
Chief Complaint   Patient presents with    RECHECK     4 month follow.      Vitals:    05/17/24 1536 05/17/24 1538 05/17/24 1540 05/17/24 1541   BP: (!) 151/93 (!) 151/94 (!) 142/92 (!) 148/93   BP Location: Right arm Right arm Right arm    Patient Position: Sitting Sitting Sitting    Cuff Size: Adult Small Adult Small Adult Small    Pulse: 84      SpO2: 99%      Weight: 52.6 kg (115 lb 14.4 oz)          BP Readings from Last 3 Encounters:   05/17/24 (!) 148/93   01/17/24 (!) 168/104   06/17/22 (!) 141/89       BP (!) 148/93   Pulse 84   Wt 52.6 kg (115 lb 14.4 oz)   SpO2 99%   BMI 21.20 kg/m       Natalie Valencia

## 2024-05-17 NOTE — PATIENT INSTRUCTIONS
"It was a pleasure to see you in nephrology clinic today. I've included a brief summary of our discussion and care plan from today's visit below.  _______________________________________________________________________    My recommendations are summarized as follows:  -Keep a Blood Pressure log. Please make sure that you are using a validated blood pressure device (check \"www.validatebp.org\").  -Avoid all NSAID's. Examples include Ibuprofen (Advil, Motrin), naprosyn (Aleve), celebrex among others. Acetaminophen (Tylenol) is ok with maximum dose in 24 hours of 3000mg.  -Healthy lifestyle measures will keep your kidney's functioning at their current best. This includes regular exercise, maintaining a healthy body weight and smoking cessation.   -Please call [phone] to arrange for your repeat kidney ultrasound.   -Please continue your losartan 100mg 1x per day. I will send you a message later today with my advice on next blood pressure agent once your labs result.   -Let's try holding off on your jardiance pill for about 1 week. Update me to let me know if your appetite is improved after stopping that medication. We will come up with a game plan to slowly reintroduce this medication.     Please return to Nephrology Clinic in about 8 months once you are back [facility] to review your progress.     Who do I call with any questions after my visit?  There are multiple ways to contact your nephrology care team:    -To schedule or reschedule an appointment, please call [phone].  -Reach out via Megathread. These messages are answered by your nurse or doctor during business hours and typically in 1-2 days. Megathread messages are best for quick questions/clarifications/updates. Frequently, your doctor or nurse will recommend setting up a follow up appointment to address any significant questions/concerns.  -For urgent questions after business hours, you may reach the on-call Nephrology Fellow by contacting the [facility]  at [phone].    To schedule imaging:   -Please call [phone]     To schedule your lab appointment at the [facility]:  -Please call [phone]    Sincerely,    Dr. Wilmer Calix   of Medicine  Division of Nephrology and Hypertension  [facility]

## 2024-05-17 NOTE — LETTER
5/17/2024       RE: Rubina Desouza  [address]  [facility] [address]     Dear Colleague,    Thank you for referring your patient, Rubina Desouza, to the [facility]PHROLOGY [facility]IC [facility]. Please see a copy of my visit note below.        Nephrology Clinic Visit  Rubina Desouza MRN: [id number] YOB: 1985  Primary Care Provider: Hali Tubbs  ----------------------------------------------------------------------------------------------------------------------  Visit 5/17/2024:  -BP Control: 148/91 today in office. Home readings: 137-140's are the highest/80-90's.   --Current Regimen: Nifedipine 30mg BID (held), Losartan 100mg qDay  --Previous Agents: Besides the nifedipine and losartan she thinks she's been on 1 agent before but can't remember what it was and remembers she didn't have any problems with that agent.    -DM Control: 5.6 (11/7/2023)  --Current Regimen: None  -Creatinine Trend: 1.15 (5/17/2024) from 0.91 (1/17/2024)  from 1.1 (12/19/2023) from 1.6 (11/7/2023) from 0.85 (4/22/2022)  -Had planned for renal US w/ duplex after last appt, but doesn't look like this happened.   -Was started on Jardiance 10mg qDay. She thinks she is having some reduced appetite due to Jardiance. She is still taking it for now  -She is now on birth control.   -She reports some decreased appetite recently. She thinks it might be due to Jardiance. We are going to try stopping jardiance for 1 week and seeing how her appetite does. If appetite improved   -She is leaving for [place] in about a month and will be gone for 6 months! She is going to [facility].   -We discussed bringing her blood pressure machine, bringing enough of her meds to last   --[facility] before leaving. She will work with them on getting as close to 6 months of a supply of meds as possible.   -Total proteinuria down to 1.24g/gr    Visit  "1/17/2024:  --BP Control: 158/84 (in office). Systolic Bps at home are in the 120's to 130's/80's-90's.   --Current Regimen: Nifedipine 30mg qDay  -DM Control: 5.6 (11/7/2023)  --Current Regimen: None  -Creatinine Trend: 0.91 (1/17/2024)  from 1.1 (12/19/2023) from 1.6 (11/7/2023) from 0.85 (4/22/2022)  -Nocturia: 0x  -Hematuria: Denies  -Nephrolithiasis: No  -NSAID Use: No, just tylenol  -Herbal/OTC Medication Use: No    -Family History of Kidney Disease: Denies, does have a history of high blood pressure.     -Other:  --JOEL 11/2023: She denies any major changes to her health at that time. She denies any new pain or taking pain medicine. She did have COVID in December but no URI in November that she recalls. She was noted to have some high blood pressure around that time. No gross hematuria, no dysuria, no frequency. No rash or skin changes. No leg swelling. Denies using NSAIDs. Denies orthostatic symptoms. Denies urinary retention symptoms.   -We discussed proteinuria and potential for transient proteinuria in setting of JOEL and ramifications of proteinuria on kidney health  -We discussed her US findings from 11/2023  -She is not currently using any birth control. We discussed Losartan and Jardiance for maximum kidney protection (and that neither of these agents are safe in pregnancy)  -In 2018 she had a miscarriage. She had abdominal pain. She had imaging that showed her atrophic kidney. No prior imaging.  -Appears there was some consideration for  TB in the past. Saw ID in the past. Did not feel she needed treatment for TB nor that  TB was the cause of her renal atrophy per chart review. Bladder biopsy culture 11/30/2018 w/o AFB. From ID note 11/30/2018: \"Single positive culture of PENNY from urine: At this time, seems most likely to be a contaminant as it is growing only a single urine culture with 2 negative cultures obtained at [facility] and 3 additional negative cultures (2 from urine, 1 from bladder biopsy) " "obtained in October 2018 in the [facility] system.\"  -History of Miscarriage x 3: 2017, 2018, 2021. Unclear underlying etology. Has not been diagnosed with aPL syndrome in the past. She denies any history of diagnosed blood clots.     Objective:  PAST MEDICAL HISTORY:  Past Medical History:   Diagnosis Date     Anemia 2017     Conductive hearing loss 2015     Hearing problem 2015     Hyperthyroidism     Better since surgery     Tinnitus 2018       PAST SURGICAL HISTORY:  Past Surgical History:   Procedure Laterality Date     CYSTOSCOPY, BIOPSY BLADDER, COMBINED N/A 10/26/2018    Procedure: Bladder Biopsy, Right Ureteroscopy with Possible Biopsy;  Surgeon: Viral Hampton MD;  Location: [facility] OR     CYSTOSCOPY, URETEROSCOPY, COMBINED  10/26/2018    Procedure: COMBINED CYSTOSCOPY, URETEROSCOPY;  Surgeon: Viral Hampton MD;  Location:  OR     THYROID SURGERY  2010    in [facility]; partial thyroidectomy       MEDICATIONS:  Current Outpatient Medications   Medication Instructions     acetaminophen (TYLENOL) 650 mg, Oral, EVERY 6 HOURS PRN, Start after Delivery.     ascorbic acid (VITAMIN C) 250 mg, Oral, DAILY     ferrous sulfate (FE TABS) 325 mg, Oral, DAILY     ibuprofen (ADVIL/MOTRIN) 600 mg, Oral, EVERY 6 HOURS PRN, Start after delivery     NIFEdipine ER (ADALAT CC) 30 mg, Oral, DAILY     norethindrone (MICRONOR) 0.35 mg, Oral, DAILY     Prenatal Vit-Fe Fumarate-FA (SM PRENATAL VITAMINS) 28-0.8 MG TABS 1 tablet, Oral, DAILY     ranitidine (ZANTAC) 150 mg, Oral, 2 TIMES DAILY     senna-docusate (SENOKOT-S/PERICOLACE) 8.6-50 MG tablet 1 tablet, Oral, DAILY, Start after delivery.       FAMILY MEDICAL HISTORY:   Family History   Problem Relation Age of Onset     Hypertension Father        PHYSICAL EXAM:   BP (!) 148/93   Pulse 84   Wt 52.6 kg (115 lb 14.4 oz)   SpO2 99%   BMI 21.20 kg/m    GENERAL APPEARANCE: alert and no distress  EYES: nonicteric  ABDOMEN: soft, nontender, normal bowel sounds, no HSM " "  Extremities: No LE edema  MS: no evidence of inflammation in joints, no muscle tenderness  SKIN: no rash  NEURO: mentation intact and speech normal  PSYCH: affect normal    LABS REVIEWED BY ME:   ANEMIA  Recent Labs   Lab Test 01/17/24  1219 08/13/19  1945 08/12/19  0658 08/11/19 2054 07/11/19  1151 05/02/19  1053 10/12/17  0000 09/08/17 0817   HGB 12.0 11.9 13.0 11.7   < > 10.9*   < >  --    IRONSAT  --   --   --   --   --  36  --  63*   SAMANTHA  --   --   --   --   --  33  --  32    < > = values in this interval not displayed.       BMP  Recent Labs   Lab Test 01/17/24  1219 04/22/22  1544 10/25/21  1509 08/13/19 1945 08/12/19  1512 08/12/19 0658 08/11/19 2054 07/11/19  1151    140 137  --   --   --   --  137   POTASSIUM 4.2 3.5 3.8  --   --   --   --  3.5   CHLORIDE 103 107 106  --   --   --   --  110*   CO2 28 25 23  --   --   --   --  20   ANIONGAP 8 8 8  --   --   --   --  7   BUN 11.7 14 14  --   --   --   --  9   CR 0.91 0.85 0.82 0.90  --  0.84   < > 0.86   GFRESTIMATED 83 >90 >90 83  --  >90   < > 89   MAG  --   --   --   --  7.3* 8.8*  --   --     < > = values in this interval not displayed.       CBC  Recent Labs   Lab Test 01/17/24 1219 08/13/19 1945 08/12/19 0658 08/11/19 2054   HGB 12.0 11.9 13.0 11.7   WBC 6.9 9.7 11.3* 8.5   HCT 36.0 35.7 37.7 35.7   MCV 88 95 93 96    145* 136* 102*       DIABETES  Recent Labs   Lab Test 05/02/19  1053   A1C 6.2*       HYPONATREMIANo lab results found.    Invalid input(s): \"UOSM\", \"OSM\"    MBD  Recent Labs   Lab Test 01/17/24  1219 04/22/22  1544 10/25/21  1509 07/11/19  1151   GERALD 9.3 9.3 8.8 8.8   ALBUMIN 3.8 3.5 3.0*  --    PHOS 3.2 2.9 3.4  --    PTHI 67*  --   --   --         URINE STUDIES  Recent Labs   Lab Test 01/17/24  1334 10/28/19  0000 06/12/19  1227   COLOR Straw Yellow Light Yellow   APPEARANCE Clear Clear Clear   URINEGLC Negative Neg Negative   URINEBILI Negative Neg Negative   URINEKETONE Negative Neg Negative   SG 1.002* 1.005 " "1.004   UBLD Negative Trace Negative   URINEPH 6.5 6.0 6.5   PROTEIN 20* 100 Negative   UROBILINOGEN  --  0.2  --    NITRITE Negative Neg Negative   LEUKEST Negative Neg Trace*   RBCU <1  --  1   WBCU <1  --  1     Recent Labs   Lab Test 08/11/19  0725   UTPG 1.03*       ADDITIONAL LABS ORDERED/REVIEWED BY ME:  See below    Assessment/Plan  CKD Stage G2A3  Established care with [facility] 1/17/2024    Creatinine of 1.6 on 11/7/2023. Previous baseline looks to be 0.8-0.9. Improved back to baseline (0.91) at the time of establishing care with me.     UA 11/7/2023 w/o WBC's or RBC's. UA 1/17/2023 w/ no RBCs or WBCs  UACR 854mg/gr 11/7/2023  UPCR 2.79g/gr 1/17/2024. Was 1.03 8/11/2019 and 0.77g/gr 2/10/2021.    History of severe Pre-E with pregnancy in 2019.   Has had 3 spont abortions in the past (most recent 12/2021. \"Her OB history includes a term delivery in 2012, then an SAB followed by a term pregnancy, and again an SAB followed by a term pregnancy.\"     Saw urology in 2018 for bladder schistosomiasis, UDS w/o VUR, History of atrophic L kidney. Renal US 11/13/2023 w/ R kidney 12.1cm and L kidney 5.5cm. There was also mild R hydro present w/ retrograde pyelogram revealing area of proximal stricture was actually ureteral peristalsis and that her ureteral orifice was quite small and distal ureter quite narrow. She had a split function study done 12/2019 w/ essentially no function on the L (93% vs 6%)    Saw ID in 2018 with concern for PENNY and potential  TB. From ID note 11/30/2018: \"Single positive culture of PENNY from urine: At this time, seems most likely to be a contaminant as it is growing only a single urine culture with 2 negative cultures obtained at [facility] and 3 additional negative cultures (2 from urine, 1 from bladder biopsy) obtained in October 2018 in the [facility] system.\"    Of note:  -Hep B and Hep C negative 11/7/2023  -MARCELLA negative 8/3/2018  -HIV negative 11/16/2019  -No history of DVTs or arterial " thrombosis per her report.     CKD Etiology (Biopsy Proven: No):  -Pre-E w/ severe features (8/2019)  -Reduced Renal Mass (atrophic L kidney w/ unclear cause/timing. Present dating back to at least 7/2018)   -? Glomerular hyperfiltration related FSGS due to reduced renal mass  -Obstructive nephropathy -> stable R mild hydroureter, ureteral narrowing.  -Hx of bladder schistosomiasis s/p treatment in 2018  -Hypertensive Nephrosclerosis  -Previous NSAID use  -? Renal Vein or Renal Artery thrombosis (multiple spont miscarriages. ? aPL syndrome as unifying diagnosis)    Plan:  -Optimize Bp Control:   --Goal <120/<80 given age and underlying kidney disease.   ---Continue Losartan 100mg qDay.   ---Trial Amlodipine 5mg qDay for BP. Monitor for Leg swelling/HA with this adjustment.   -Hold Jardiance 10mg qday for a week or so. See if her appetite improves. If yes then we will need to work towards slowly getting her back on 10mg qDay (likely start with 5mg every other day and slowly titrate). If appetite unchanged, will just have her resume 10mg qDay and will need to look for another etiology.  Monitor for UTI/SSTI SE.   -Recommended that she still have renal US w/ Duplex performed. Order in the system. She has the number to contact to set this up. I am curious if renal vein or artery thrombosis is contributing to her recent JOEL and increased proteinuria  -Follow up Cystatin-C from today  -Follow up aPL testing from today given history of recurrent spont abortions and potential that renal vein/artery thrombosis could be driving her clinical picture  -Remainder per problem below      Anemia of Renal Disease  Hemoglobin: 12 (1/17/2024)  Ferritin:  TSAT:    Plan:  -No need for IV iron or EPO at this time      Lipid Management in CKD  KDIGO 2013 Guidelines recommend the following for patients with CKD:  Adults >/= 51 yo w/ CKD stage 1 or 2: Statin  Adults >/= 51 yo w/ CKD stage 3-5: Statin + Ezetimibe or Statin alone  Adults  18-49 + 1 of the following (CAD, DM, Ischemic stroke, 10 yr ASCVD risk >10%): Statin    KDIGO guidelines recommend Simvastatin 20mg/Ezetimibe 10mg qDay for patients with CKD G3a-G5 (in line with the SHARP trial). If Simvastatin used alone, 40mg qDay is referenced.   Other options include: Atorvastatin 20mg qDay (4D trial) and Rosuvastatin 10mg qDay (SCOOBY trial)    Plan:  -If proteinuria persists, would recommend statin in the near future      Metabolic Acidosis of Renal Disease  Bicarb: 25 (5/17/2024)    Plan:  -No need for NaHCO at this time      Mineral Bone Disease  KDIGO 2017 Guidelines recommend the following for patients with CKD V0m-H8W:  -Treatments of CKD MBD should be based on serial assessments of phosphate, calcium, and PTH levels, considered together (Not Graded).  -Lowering elevated phosphate levels toward the normal range (2C) and guiding decisions about phosphate-lowering treatment should be based on progressively or persistently elevated serum phosphate (Not Graded). Limiting dietary phosphate intake in the treatment of hyperphosphatemiaalone or in combination with other treatments (2D). It is reasonable to consider phosphate source (e.g., animal, vegetable, additives) in making dietary recommendations (Not Graded).  -Avoiding hypercalcemia (2C)  -In patients with CKD G3a-G5 not on dialysis, the optimal PTH level is not known. However, we suggest that patients with levels of intact PTH progressively rising or persistently above the upper normal limit for the assay be evaluated for modifiable factors, including hyperphosphatemia, hypocalcemia, high phosphate intake, and vitamin D deficiency (2C).  -In adult patients with CKD G3a-G5 not on dialysis, we suggest that calcitriol and vitamin D analogs not be routinely used (2C). It is reasonable to reserve the use of calcitriol and vitamin D analogs for patients with CKD G4-G5 with severe and progressive hyperparathyroidism (Not Graded).    PTH: 67  (1/17/2024)  Phos: 3.2 (1/17/2024)  Calcium: 9.3 (5/17/2024)  Vitamin D: 29 (1/17/2024)    Plan:  -No need for phos binder    Other:  -Specialty Care Coordination Referral - CKD G1-G3b w/o imminent RRT planning/needs (Yes/no, Date Referral Placed): No  -Med Therapy Management Referral (Yes/No, Date of Referral): No    Return to clinic: 8 Months when she returns from [facility].    Wilmer Calix MD   of Medicine  Division of Nephrology and Hypertension  [facility]    45 minutes spent on the date of the encounter doing chart review, history and exam, documentation and further activities as noted above    The longitudinal plan of care for Atrophic L kidney, Hypertension, Sub-nephrotic range Proteinuria, CKD G1A3 was addressed during this visit. Due to the added complexity in care, I will continue to support Rubina Desouza in the subsequent management of this condition(s) and with the ongoing continuity of care of this condition(s).      Again, thank you for allowing me to participate in the care of your patient.      Sincerely,    Wilmer Calxi MD

## 2024-05-18 LAB
CYSTATIN C (ROCHE): 1.2 MG/L (ref 0.6–1)
GFR SERPL CREATININE-BSD FRML MDRD: 62 ML/MIN/1.73M2

## 2024-05-20 LAB
ANA SER QL IF: NEGATIVE
B2 GLYCOPROT1 IGG SERPL IA-ACNC: 1.8 U/ML
B2 GLYCOPROT1 IGM SERPL IA-ACNC: 2.6 U/ML
CARDIOLIPIN IGG SER IA-ACNC: <2 GPL-U/ML
CARDIOLIPIN IGG SER IA-ACNC: NEGATIVE
CARDIOLIPIN IGM SER IA-ACNC: <2 MPL-U/ML
CARDIOLIPIN IGM SER IA-ACNC: NEGATIVE
DRVVT SCREEN RATIO: 0.82
INR PPP: 1.13 (ref 0.85–1.15)
LA PPP-IMP: NEGATIVE
LUPUS INTERPRETATION: ABNORMAL
PATH REPORT.COMMENTS IMP SPEC: ABNORMAL
PTT RATIO: 1
THROMBIN TIME: 19.8 SECONDS (ref 13–19)

## 2024-05-23 ENCOUNTER — VIRTUAL VISIT (OUTPATIENT)
Dept: INTERPRETER SERVICES | Facility: CLINIC | Age: 39
End: 2024-05-23

## 2024-05-23 ENCOUNTER — ANCILLARY PROCEDURE (OUTPATIENT)
Dept: ULTRASOUND IMAGING | Facility: CLINIC | Age: 39
End: 2024-05-23
Attending: STUDENT IN AN ORGANIZED HEALTH CARE EDUCATION/TRAINING PROGRAM
Payer: COMMERCIAL

## 2024-05-23 DIAGNOSIS — R80.9 PROTEINURIA, UNSPECIFIED TYPE: ICD-10-CM

## 2024-05-23 PROCEDURE — 76770 US EXAM ABDO BACK WALL COMP: CPT | Mod: GC | Performed by: RADIOLOGY

## 2024-05-23 PROCEDURE — 93975 VASCULAR STUDY: CPT | Mod: GC | Performed by: RADIOLOGY

## 2024-07-13 ENCOUNTER — HEALTH MAINTENANCE LETTER (OUTPATIENT)
Age: 39
End: 2024-07-13

## 2025-01-15 DIAGNOSIS — N18.32 STAGE 3B CHRONIC KIDNEY DISEASE (H): Primary | ICD-10-CM

## 2025-07-19 ENCOUNTER — HEALTH MAINTENANCE LETTER (OUTPATIENT)
Age: 40
End: 2025-07-19

## (undated) DEVICE — GLOVE PROTEXIS W/NEU-THERA 7.5  2D73TE75

## (undated) DEVICE — LINEN GOWN XLG 5407

## (undated) DEVICE — GLOVE PROTEXIS W/NEU-THERA 8.0  2D73TE80

## (undated) DEVICE — SYR 50ML LL W/O NDL 309653

## (undated) DEVICE — DRSG TELFA 3X8" 1238

## (undated) DEVICE — PACK CYSTO CUSTOM ASC

## (undated) DEVICE — LINEN TOWEL PACK X5 5464

## (undated) DEVICE — PAD CHUX UNDERPAD 30X36" P3036C

## (undated) RX ORDER — DEXAMETHASONE SODIUM PHOSPHATE 4 MG/ML
INJECTION, SOLUTION INTRA-ARTICULAR; INTRALESIONAL; INTRAMUSCULAR; INTRAVENOUS; SOFT TISSUE
Status: DISPENSED
Start: 2018-10-26

## (undated) RX ORDER — LIDOCAINE HYDROCHLORIDE 20 MG/ML
INJECTION, SOLUTION EPIDURAL; INFILTRATION; INTRACAUDAL; PERINEURAL
Status: DISPENSED
Start: 2018-10-26

## (undated) RX ORDER — PROPOFOL 10 MG/ML
INJECTION, EMULSION INTRAVENOUS
Status: DISPENSED
Start: 2018-10-26

## (undated) RX ORDER — ACETAMINOPHEN 325 MG/1
TABLET ORAL
Status: DISPENSED
Start: 2018-10-26

## (undated) RX ORDER — FUROSEMIDE 10 MG/ML
INJECTION INTRAMUSCULAR; INTRAVENOUS
Status: DISPENSED
Start: 2019-12-06

## (undated) RX ORDER — ONDANSETRON 2 MG/ML
INJECTION INTRAMUSCULAR; INTRAVENOUS
Status: DISPENSED
Start: 2018-10-26

## (undated) RX ORDER — FENTANYL CITRATE 50 UG/ML
INJECTION, SOLUTION INTRAMUSCULAR; INTRAVENOUS
Status: DISPENSED
Start: 2018-10-26

## (undated) RX ORDER — EPHEDRINE SULFATE 50 MG/ML
INJECTION, SOLUTION INTRAMUSCULAR; INTRAVENOUS; SUBCUTANEOUS
Status: DISPENSED
Start: 2018-10-26

## (undated) RX ORDER — CEFAZOLIN SODIUM 1 G/50ML
SOLUTION INTRAVENOUS
Status: DISPENSED
Start: 2018-10-26

## (undated) RX ORDER — LIDOCAINE HYDROCHLORIDE 10 MG/ML
INJECTION, SOLUTION EPIDURAL; INFILTRATION; INTRACAUDAL; PERINEURAL
Status: DISPENSED
Start: 2022-12-13

## (undated) RX ORDER — SULFAMETHOXAZOLE/TRIMETHOPRIM 800-160 MG
TABLET ORAL
Status: DISPENSED
Start: 2019-10-28

## (undated) RX ORDER — CEPHALEXIN 500 MG/1
CAPSULE ORAL
Status: DISPENSED
Start: 2019-10-28